# Patient Record
Sex: FEMALE | Race: WHITE | NOT HISPANIC OR LATINO | Employment: OTHER | URBAN - METROPOLITAN AREA
[De-identification: names, ages, dates, MRNs, and addresses within clinical notes are randomized per-mention and may not be internally consistent; named-entity substitution may affect disease eponyms.]

---

## 2017-01-31 ENCOUNTER — PATIENT MESSAGE (OUTPATIENT)
Dept: OBSTETRICS AND GYNECOLOGY | Facility: CLINIC | Age: 62
End: 2017-01-31

## 2017-02-17 ENCOUNTER — LAB VISIT (OUTPATIENT)
Dept: LAB | Facility: HOSPITAL | Age: 62
End: 2017-02-17
Attending: INTERNAL MEDICINE
Payer: COMMERCIAL

## 2017-02-17 DIAGNOSIS — R73.03 PREDIABETES: ICD-10-CM

## 2017-02-17 DIAGNOSIS — Z29.9 PREVENTIVE MEASURE: ICD-10-CM

## 2017-02-17 LAB
ALBUMIN SERPL BCP-MCNC: 3.9 G/DL
ALP SERPL-CCNC: 68 U/L
ALT SERPL W/O P-5'-P-CCNC: 28 U/L
ANION GAP SERPL CALC-SCNC: 7 MMOL/L
AST SERPL-CCNC: 22 U/L
BASOPHILS # BLD AUTO: 0.03 K/UL
BASOPHILS NFR BLD: 0.5 %
BILIRUB SERPL-MCNC: 0.4 MG/DL
BUN SERPL-MCNC: 17 MG/DL
CALCIUM SERPL-MCNC: 9.7 MG/DL
CHLORIDE SERPL-SCNC: 104 MMOL/L
CHOLEST/HDLC SERPL: 4.1 {RATIO}
CO2 SERPL-SCNC: 29 MMOL/L
CREAT SERPL-MCNC: 0.8 MG/DL
DIFFERENTIAL METHOD: NORMAL
EOSINOPHIL # BLD AUTO: 0.2 K/UL
EOSINOPHIL NFR BLD: 3.5 %
ERYTHROCYTE [DISTWIDTH] IN BLOOD BY AUTOMATED COUNT: 12.8 %
EST. GFR  (AFRICAN AMERICAN): >60 ML/MIN/1.73 M^2
EST. GFR  (NON AFRICAN AMERICAN): >60 ML/MIN/1.73 M^2
GLUCOSE SERPL-MCNC: 100 MG/DL
HCT VFR BLD AUTO: 41.6 %
HCV AB SERPL QL IA: NEGATIVE
HDL/CHOLESTEROL RATIO: 24.4 %
HDLC SERPL-MCNC: 193 MG/DL
HDLC SERPL-MCNC: 47 MG/DL
HGB BLD-MCNC: 13.6 G/DL
LDLC SERPL CALC-MCNC: 91.6 MG/DL
LYMPHOCYTES # BLD AUTO: 2.4 K/UL
LYMPHOCYTES NFR BLD: 40.3 %
MCH RBC QN AUTO: 30.6 PG
MCHC RBC AUTO-ENTMCNC: 32.7 %
MCV RBC AUTO: 94 FL
MONOCYTES # BLD AUTO: 0.3 K/UL
MONOCYTES NFR BLD: 5.2 %
NEUTROPHILS # BLD AUTO: 3 K/UL
NEUTROPHILS NFR BLD: 50.5 %
NONHDLC SERPL-MCNC: 146 MG/DL
PLATELET # BLD AUTO: 283 K/UL
PMV BLD AUTO: 10.5 FL
POTASSIUM SERPL-SCNC: 4.5 MMOL/L
PROT SERPL-MCNC: 7.4 G/DL
RBC # BLD AUTO: 4.45 M/UL
SODIUM SERPL-SCNC: 140 MMOL/L
TRIGL SERPL-MCNC: 272 MG/DL
TSH SERPL DL<=0.005 MIU/L-ACNC: 1.02 UIU/ML
WBC # BLD AUTO: 6.01 K/UL

## 2017-02-17 PROCEDURE — 86803 HEPATITIS C AB TEST: CPT

## 2017-02-17 PROCEDURE — 36415 COLL VENOUS BLD VENIPUNCTURE: CPT | Mod: PO

## 2017-02-17 PROCEDURE — 85025 COMPLETE CBC W/AUTO DIFF WBC: CPT

## 2017-02-17 PROCEDURE — 83036 HEMOGLOBIN GLYCOSYLATED A1C: CPT

## 2017-02-17 PROCEDURE — 80053 COMPREHEN METABOLIC PANEL: CPT

## 2017-02-17 PROCEDURE — 84443 ASSAY THYROID STIM HORMONE: CPT

## 2017-02-17 PROCEDURE — 80061 LIPID PANEL: CPT

## 2017-02-19 LAB
ESTIMATED AVG GLUCOSE: 123 MG/DL
HBA1C MFR BLD HPLC: 5.9 %

## 2017-02-21 ENCOUNTER — PATIENT MESSAGE (OUTPATIENT)
Dept: OBSTETRICS AND GYNECOLOGY | Facility: CLINIC | Age: 62
End: 2017-02-21

## 2017-02-21 ENCOUNTER — OFFICE VISIT (OUTPATIENT)
Dept: INTERNAL MEDICINE | Facility: CLINIC | Age: 62
End: 2017-02-21
Payer: COMMERCIAL

## 2017-02-21 VITALS
WEIGHT: 177.69 LBS | BODY MASS INDEX: 27.89 KG/M2 | TEMPERATURE: 97 F | OXYGEN SATURATION: 95 % | SYSTOLIC BLOOD PRESSURE: 130 MMHG | DIASTOLIC BLOOD PRESSURE: 86 MMHG | HEIGHT: 67 IN | HEART RATE: 102 BPM

## 2017-02-21 DIAGNOSIS — N95.9 MENOPAUSAL AND POSTMENOPAUSAL DISORDER: ICD-10-CM

## 2017-02-21 DIAGNOSIS — I10 ESSENTIAL HYPERTENSION: Primary | ICD-10-CM

## 2017-02-21 DIAGNOSIS — Z00.00 ENCOUNTER FOR PREVENTIVE HEALTH EXAMINATION: ICD-10-CM

## 2017-02-21 DIAGNOSIS — R73.03 PREDIABETES: ICD-10-CM

## 2017-02-21 DIAGNOSIS — I70.1 RAS (RENAL ARTERY STENOSIS): ICD-10-CM

## 2017-02-21 DIAGNOSIS — E78.00 PURE HYPERCHOLESTEROLEMIA: ICD-10-CM

## 2017-02-21 PROCEDURE — 3079F DIAST BP 80-89 MM HG: CPT | Mod: S$GLB,,, | Performed by: INTERNAL MEDICINE

## 2017-02-21 PROCEDURE — 90670 PCV13 VACCINE IM: CPT | Mod: S$GLB,,, | Performed by: INTERNAL MEDICINE

## 2017-02-21 PROCEDURE — 3075F SYST BP GE 130 - 139MM HG: CPT | Mod: S$GLB,,, | Performed by: INTERNAL MEDICINE

## 2017-02-21 PROCEDURE — 99999 PR PBB SHADOW E&M-EST. PATIENT-LVL III: CPT | Mod: PBBFAC,,, | Performed by: INTERNAL MEDICINE

## 2017-02-21 PROCEDURE — 99396 PREV VISIT EST AGE 40-64: CPT | Mod: 25,S$GLB,, | Performed by: INTERNAL MEDICINE

## 2017-02-21 PROCEDURE — 90471 IMMUNIZATION ADMIN: CPT | Mod: S$GLB,,, | Performed by: INTERNAL MEDICINE

## 2017-02-21 NOTE — MR AVS SNAPSHOT
[facility]  [address] [place] [address]  Phone: [phone]  Fax: [phone]                  Beatrice Regan   2017 3:40 PM   Office Visit    Description:  Female : 1955   Provider:  Lolis Huynh MD   Department:  [facility]           Reason for Visit     Follow-up           Diagnoses this Visit        Comments    Essential hypertension    -  Primary     Prediabetes         Pure hypercholesterolemia         CORNELL (renal artery stenosis)         Menopausal and postmenopausal disorder         Encounter for preventive health examination                To Do List           Future Appointments        Provider Department Dept Phone    2017 1:00 PM [id number] [phone]    3/2/2017 9:00 AM Sera Elder MD [facility] OB/ -[phone]    3/10/2017 9:45 AM Charlie Wade MD [facility] Ophthalmology [phone]    2017 10:05 AM LABORATORY, [facility] [phone]    2017 1:00 PM Lolis Huynh MD [facility] [phone]      Goals (5 Years of Data)     None      Follow-Up and Disposition     Return in about 3 months (around 2017).      [facility]     [facility] - 24/7 Assistance  Registered nurses in the [facility] provide clinical advisement, health education, appointment booking, and other advisory services.  Call for this free service at [phone].             Medications           Message regarding Medications     Verify the changes and/or additions to your medication regime listed below are the same as discussed with your clinician today.  If any of these changes or additions are incorrect, please notify your healthcare provider.        STOP taking these medications     albuterol 90 mcg/actuation inhaler Inhale 2 puffs into the lungs every 6 (six) hours as needed for Wheezing.    FOLIC  "ACID/MULTIVITS-MIN (MULTIVITAMIN FOR CHOLESTEROL ORAL) Take by mouth.    methylPREDNISolone (MEDROL DOSEPACK) 4 mg tablet use as directed           Verify that the below list of medications is an accurate representation of the medications you are currently taking.  If none reported, the list may be blank. If incorrect, please contact your healthcare provider. Carry this list with you in case of emergency.           Current Medications     aspirin (ASPIR-81) 81 MG EC tablet Take 1 tablet by mouth Daily.    atorvastatin (LIPITOR) 10 MG tablet Take 1 tablet (10 mg total) by mouth once daily.    cholecalciferol, vitamin D3, 1,000 unit capsule Take 1 capsule by mouth Daily.    fluoxetine (PROZAC) 20 MG capsule Take 1 capsule (20 mg total) by mouth once daily.    losartan (COZAAR) 50 MG tablet Take 1 tablet (50 mg total) by mouth once daily.    metformin (GLUCOPHAGE) 1000 MG tablet 1 po with breakfast, half po with lunch, 1 po with dinner.    multivitamin capsule Take 1 capsule by mouth once daily.    naproxen (NAPROSYN) 500 MG tablet Take 1 tablet (500 mg total) by mouth 2 (two) times daily as needed (pain). Monitor BP    omega-3 fatty acids (FISH OIL) 300 mg Cap Take by mouth.           Clinical Reference Information           Your Vitals Were     BP Pulse Temp Height Weight SpO2    130/86 (BP Location: Right arm) 102 97.2 °F (36.2 °C) (Tympanic) 5' 7" (1.702 m) 80.6 kg (177 lb 11.1 oz) 95%    BMI                27.83 kg/m2          Blood Pressure          Most Recent Value    BP  130/86      Allergies as of 2/21/2017     No Known Allergies      Immunizations Administered on Date of Encounter - 2/21/2017     Name Date Dose VIS Date Route    Pneumococcal Conjugate - 13 Valent  Incomplete 0.5 mL 11/5/2015 Intramuscular      Orders Placed During Today's Visit      Normal Orders This Visit    Pneumococcal Conjugate Vaccine (13 Valent) (IM)     Future Labs/Procedures Expected by Expires    Hemoglobin A1c  2/21/2017 4/22/2018 "    Mammo Digital Screening Bilat with CAD  2/21/2017 2/21/2018      Language Assistance Services     ATTENTION: Language assistance services are available, free of charge. Please call [phone].      ATENCIÓN: Si habdeonna saldana, tiene a edwards disposición servicios gratuitos de asistencia lingüística. Llame al [phone].     CHÚ Ý: N?u b?n nói Ti?ng Vi?t, có các d?ch v? h? tr? ngôn ng? mi?n phí dành cho b?n. G?i s? [phone].         [facility] complies with applicable Federal civil rights laws and does not discriminate on the basis of race, color, national origin, age, disability, or sex.

## 2017-02-22 ENCOUNTER — PATIENT MESSAGE (OUTPATIENT)
Dept: OBSTETRICS AND GYNECOLOGY | Facility: CLINIC | Age: 62
End: 2017-02-22

## 2017-02-27 ENCOUNTER — HOSPITAL ENCOUNTER (OUTPATIENT)
Dept: RADIOLOGY | Facility: HOSPITAL | Age: 62
Discharge: HOME | End: 2017-02-27
Attending: INTERNAL MEDICINE
Payer: COMMERCIAL

## 2017-02-27 DIAGNOSIS — Z00.00 ENCOUNTER FOR PREVENTIVE HEALTH EXAMINATION: ICD-10-CM

## 2017-02-27 PROCEDURE — 77067 SCR MAMMO BI INCL CAD: CPT | Mod: 26,,, | Performed by: RADIOLOGY

## 2017-02-27 PROCEDURE — 77067 SCR MAMMO BI INCL CAD: CPT | Mod: TC

## 2017-02-27 PROCEDURE — 77063 BREAST TOMOSYNTHESIS BI: CPT | Mod: 26,,, | Performed by: RADIOLOGY

## 2017-03-10 ENCOUNTER — OFFICE VISIT (OUTPATIENT)
Dept: OPHTHALMOLOGY | Facility: CLINIC | Age: 62
End: 2017-03-10
Payer: COMMERCIAL

## 2017-03-10 DIAGNOSIS — Z96.1 PSEUDOPHAKIA OF RIGHT EYE: ICD-10-CM

## 2017-03-10 DIAGNOSIS — R73.03 PREDIABETES: ICD-10-CM

## 2017-03-10 DIAGNOSIS — H43.813 PVD (POSTERIOR VITREOUS DETACHMENT), BILATERAL: ICD-10-CM

## 2017-03-10 DIAGNOSIS — H25.12 NUCLEAR SCLEROSIS, LEFT: Primary | ICD-10-CM

## 2017-03-10 PROCEDURE — 92014 COMPRE OPH EXAM EST PT 1/>: CPT | Mod: S$GLB,,, | Performed by: OPHTHALMOLOGY

## 2017-03-10 PROCEDURE — 99999 PR PBB SHADOW E&M-EST. PATIENT-LVL II: CPT | Mod: PBBFAC,,, | Performed by: OPHTHALMOLOGY

## 2017-03-10 NOTE — MR AVS SNAPSHOT
[facility]  [address] [place] [address]  Phone: [phone]  Fax: [phone]                  Beatrice Regan   3/10/2017 9:45 AM   Office Visit    Description:  Female : 1955   Provider:  Charlie Wade MD   Department:  [facility] - Ophthalmology           Reason for Visit     Eye Exam     Spots and/or Floaters           Diagnoses this Visit        Comments    Nuclear sclerosis, left    -  Primary     Pseudophakia of right eye         Prediabetes         PVD (posterior vitreous detachment), bilateral                To Do List           Future Appointments        Provider Department Dept Phone    3/30/2017 11:15 AM Sera Elder MD [facility] OB/ -[phone]    2017 10:05 AM LAB[facility]ATORY, [facility] [phone]    2017 1:00 PM Lolis Huynh MD [facility] [phone]      Goals (5 Years of Data)     None      Follow-Up and Disposition     Return in about 1 year (around 3/10/2018).      [facility]     [facility] Care [facility] - 24/7 Assistance  Registered nurses in the [facility] provide clinical advisement, health education, appointment booking, and other advisory services.  Call for this free service at [phone].             Medications           Message regarding Medications     Verify the changes and/or additions to your medication regime listed below are the same as discussed with your clinician today.  If any of these changes or additions are incorrect, please notify your healthcare provider.             Verify that the below list of medications is an accurate representation of the medications you are currently taking.  If none reported, the list may be blank. If incorrect, please contact your healthcare provider. Carry this list with you in case of emergency.           Current Medications     aspirin (ASPIR-81) 81 MG EC tablet Take 1 tablet by mouth Daily.    atorvastatin  (LIPITOR) 10 MG tablet Take 1 tablet (10 mg total) by mouth once daily.    cholecalciferol, vitamin D3, 1,000 unit capsule Take 1 capsule by mouth Daily.    fluoxetine (PROZAC) 20 MG capsule Take 1 capsule (20 mg total) by mouth once daily.    losartan (COZAAR) 50 MG tablet Take 1 tablet (50 mg total) by mouth once daily.    metformin (GLUCOPHAGE) 1000 MG tablet 1 po with breakfast, half po with lunch, 1 po with dinner.    multivitamin capsule Take 1 capsule by mouth once daily.    naproxen (NAPROSYN) 500 MG tablet Take 1 tablet (500 mg total) by mouth 2 (two) times daily as needed (pain). Monitor BP    omega-3 fatty acids (FISH OIL) 300 mg Cap Take by mouth.           Clinical Reference Information           Allergies as of 3/10/2017     No Known Allergies      Immunizations Administered on Date of Encounter - 3/10/2017     None      Language Assistance Services     ATTENTION: Language assistance services are available, free of charge. Please call [phone].      ATENCIÓN: Si kari saldana, tiene a edwards disposición servicios gratuitos de asistencia lingüística. Llame al [phone].     ELY Ý: N?u b?n nói Ti?ng Vi?t, có các d?ch v? h? tr? ngôn ng? mi?n phí dành cho b?n. G?i s? [phone].         [facility] complies with applicable Federal civil rights laws and does not discriminate on the basis of race, color, national origin, age, disability, or sex.

## 2017-03-10 NOTE — PROGRESS NOTES
SUBJECTIVE:   Beatrice Regan is a 62 y.o. female   Uncorrected distance visual acuity was 20/20 -1 in the right eye and 20/30 in the left eye.   Chief Complaint   Patient presents with    Eye Exam     no complaints.     Spots and/or Floaters     floater in od. no light flashes. no curtain or veil         HPI:  HPI     Eye Exam    Additional comments: no complaints.            Spots and/or Floaters    Additional comments: floater in od. no light flashes. no curtain or veil              Comments   Pt only wears glasses sometimes for reading    1. S/p bilateral blepharoplasty for Dermatochalasis  2. Restor IOL OD 2/16/11 with Yag 5/19/11  3. NSC OS       Last edited by Yulissa Aguilar MA on 3/10/2017  9:53 AM. (History)        Assessment /Plan :  1. Nuclear sclerosis, left Patient does reports mild visual decline from incipient cataractous changes, but not sufficient to affect activities of daily living. I recommend monitoring visual status and follow up when visual symptoms worsen.   2. Pseudophakia of right eye doing well   3. Prediabetes No diabetic retinopathy at this time. Reviewed diabetic eye precautions including avoiding tobacco use,  Good glucose control, and importance of regular follow up.    4. PVD (posterior vitreous detachment), bilateral Patient seen and evaluated for PVD OU. No tears or breaks were seen after careful retinal evaluation. Discussed retinal detachment signs and symptoms including flashes of lights, floaters, perceived curtains or veils. Advised to patient to monitor visual status including increase in flashes and floaters, or the development of visual field changes including curtain and /or veils. Advised patient to RTC urgently if these symptoms occur. Explained the need for follow up exams to the patient even if there are no changes in the symptoms.           RTC in 1 year or prn any changes

## 2017-03-30 ENCOUNTER — OFFICE VISIT (OUTPATIENT)
Dept: OBSTETRICS AND GYNECOLOGY | Facility: CLINIC | Age: 62
End: 2017-03-30
Payer: COMMERCIAL

## 2017-03-30 VITALS — HEIGHT: 67 IN | WEIGHT: 175.13 LBS | BODY MASS INDEX: 27.49 KG/M2

## 2017-03-30 DIAGNOSIS — Z01.419 ENCOUNTER FOR GYNECOLOGICAL EXAMINATION WITHOUT ABNORMAL FINDING: Primary | ICD-10-CM

## 2017-03-30 PROCEDURE — 99396 PREV VISIT EST AGE 40-64: CPT | Mod: S$GLB,,, | Performed by: OBSTETRICS & GYNECOLOGY

## 2017-03-30 PROCEDURE — 88142 CYTOPATH C/V THIN LAYER: CPT

## 2017-03-30 PROCEDURE — 99999 PR PBB SHADOW E&M-EST. PATIENT-LVL III: CPT | Mod: PBBFAC,,, | Performed by: OBSTETRICS & GYNECOLOGY

## 2017-03-30 NOTE — PROGRESS NOTES
CC: Well woman exam    Beatrice Regan is a 62 y.o. female  presents for a well woman exam.  LMP: No LMP recorded. Patient is postmenopausal..  No issues, problems, or complaints.    Past Medical History:   Diagnosis Date    Cataract     OS    Hyperlipidemia     Hypertension     with white coat effect    Menopausal and postmenopausal disorder     Metabolic syndrome     CORNELL (renal artery stenosis)      Past Surgical History:   Procedure Laterality Date    APPENDECTOMY      lap    BLEPHAROPLASTY OU      breast reduction      CATARACT EXTRACTION BILATERAL W/ ANTERIOR VITRECTOMY      CATARACT EXTRACTION W/  INTRAOCULAR LENS IMPLANT  OD 11    NE EXPLORATORY OF ABDOMEN      YAG OD       Social History     Social History    Marital status:      Spouse name: N/A    Number of children: N/A    Years of education: N/A     Social History Main Topics    Smoking status: Never Smoker    Smokeless tobacco: Never Used    Alcohol use No    Drug use: No    Sexual activity: Yes     Partners: Male     Birth control/ protection: Post-menopausal     Other Topics Concern    None     Social History Narrative     Family History   Problem Relation Age of Onset    Diabetes Father     Hypertension Father     Hypertension Brother     Melanoma Paternal Uncle     Melanoma Son     Melanoma Paternal Uncle     Breast cancer Neg Hx     Colon cancer Neg Hx     Ovarian cancer Neg Hx     Uterine cancer Neg Hx     Psoriasis Neg Hx     Lupus Neg Hx     Eczema Neg Hx     Acne Neg Hx      OB History      Para Term  AB TAB SAB Ectopic Multiple Living    2 2 2       2          Current Outpatient Prescriptions:     aspirin (ASPIR-81) 81 MG EC tablet, Take 1 tablet by mouth Daily., Disp: , Rfl:     atorvastatin (LIPITOR) 10 MG tablet, Take 1 tablet (10 mg total) by mouth once daily., Disp: 90 tablet, Rfl: 3    cholecalciferol, vitamin D3, 1,000 unit capsule, Take 1 capsule by mouth  "Daily., Disp: , Rfl:     fluoxetine (PROZAC) 20 MG capsule, Take 1 capsule (20 mg total) by mouth once daily., Disp: 90 capsule, Rfl: 3    losartan (COZAAR) 50 MG tablet, Take 1 tablet (50 mg total) by mouth once daily., Disp: 90 tablet, Rfl: 11    metformin (GLUCOPHAGE) 1000 MG tablet, 1 po with breakfast, half po with lunch, 1 po with dinner., Disp: 225 tablet, Rfl: 3    multivitamin capsule, Take 1 capsule by mouth once daily., Disp: , Rfl:     naproxen (NAPROSYN) 500 MG tablet, Take 1 tablet (500 mg total) by mouth 2 (two) times daily as needed (pain). Monitor BP, Disp: 30 tablet, Rfl: 2    omega-3 fatty acids (FISH OIL) 300 mg Cap, Take by mouth., Disp: , Rfl:     GYNECOLOGY HISTORY:  No abnormal pap/std    DATA REVIEWED:  Last pap: normal Date: 2014  Last mmg: normal Date: 2017  Last colonoscopy: hemorrhoids Date: 2011, repeat 10y  Last DEXA: normal Date: 2012, repeat 5y    Ht 5' 7" (1.702 m)  Wt 79.5 kg (175 lb 2.5 oz)  BMI 27.43 kg/m2    ROS:  GENERAL: Denies weight gain or weight loss. Feeling well overall.   SKIN: Denies rash or lesions.   HEAD: Denies head injury or headache.   NODES: Denies enlarged lymph nodes.   CHEST: Denies chest pain or shortness of breath.   CARDIOVASCULAR: Denies palpitations or left sided chest pain.   ABDOMEN: No abdominal pain, constipation, diarrhea, nausea, vomiting or rectal bleeding.   URINARY: No frequency, dysuria, hematuria, or burning on urination.  REPRODUCTIVE: See HPI.   BREASTS: The patient denies pain, lumps, or nipple discharge.   HEMATOLOGIC: No easy bruisability or excessive bleeding.   MUSCULOSKELETAL: Denies joint pain or swelling.   NEUROLOGIC: Denies syncope or weakness.   PSYCHIATRIC: Denies depression, anxiety or mood swings.    PHYSICAL EXAM:    APPEARANCE: Well nourished, well developed, in no acute distress.  AFFECT: WNL, alert and oriented x 3  SKIN: No acne or hirsutism  NECK: Neck symmetric without masses or thyromegaly  NODES: No inguinal, " cervical, axillary, or femoral lymph node enlargement  CHEST: Good respiratory effect  ABDOMEN: Soft.  No tenderness or masses.  No hepatosplenomegaly.  No hernias.  BREASTS: Symmetrical, no skin changes or visible lesions.  No palpable masses, nipple discharge bilaterally.  PELVIC: Normal external genitalia without lesions.  Normal hair distribution.  Adequate perineal body, normal urethral meatus.  Vagina atrophic without lesions or discharge.  Cervix pink, without lesions, discharge or tenderness.  No significant cystocele or rectocele.  Bimanual exam shows uterus to be normal size, regular, mobile and nontender.  Adnexa without masses or tenderness.   EXTREMITIES: No edema.    Encounter for gynecological examination without abnormal finding  -     Liquid-based pap smear, screening    Patient was counseled today on A.C.S. Pap guidelines (q3) and recommendations for yearly pelvic exams, yearly mammograms starting age 40, and clinical breast exams; to see her PCP for other health maintenance.

## 2017-04-06 ENCOUNTER — PATIENT MESSAGE (OUTPATIENT)
Dept: OBSTETRICS AND GYNECOLOGY | Facility: CLINIC | Age: 62
End: 2017-04-06

## 2017-05-23 ENCOUNTER — LAB VISIT (OUTPATIENT)
Dept: LAB | Facility: HOSPITAL | Age: 62
End: 2017-05-23
Attending: INTERNAL MEDICINE
Payer: COMMERCIAL

## 2017-05-23 ENCOUNTER — PATIENT MESSAGE (OUTPATIENT)
Dept: INTERNAL MEDICINE | Facility: CLINIC | Age: 62
End: 2017-05-23

## 2017-05-23 DIAGNOSIS — R73.03 PREDIABETES: ICD-10-CM

## 2017-05-23 PROCEDURE — 83036 HEMOGLOBIN GLYCOSYLATED A1C: CPT

## 2017-05-23 PROCEDURE — 36415 COLL VENOUS BLD VENIPUNCTURE: CPT | Mod: PO

## 2017-05-24 LAB
ESTIMATED AVG GLUCOSE: 126 MG/DL
HBA1C MFR BLD HPLC: 6 %

## 2017-06-11 ENCOUNTER — PATIENT MESSAGE (OUTPATIENT)
Dept: INTERNAL MEDICINE | Facility: CLINIC | Age: 62
End: 2017-06-11

## 2017-06-13 ENCOUNTER — OFFICE VISIT (OUTPATIENT)
Dept: INTERNAL MEDICINE | Facility: CLINIC | Age: 62
End: 2017-06-13
Payer: COMMERCIAL

## 2017-06-13 VITALS
TEMPERATURE: 97 F | HEART RATE: 85 BPM | OXYGEN SATURATION: 98 % | WEIGHT: 172.38 LBS | SYSTOLIC BLOOD PRESSURE: 132 MMHG | BODY MASS INDEX: 27 KG/M2 | DIASTOLIC BLOOD PRESSURE: 80 MMHG

## 2017-06-13 DIAGNOSIS — N95.9 MENOPAUSAL AND POSTMENOPAUSAL DISORDER: ICD-10-CM

## 2017-06-13 DIAGNOSIS — I10 ESSENTIAL HYPERTENSION: ICD-10-CM

## 2017-06-13 DIAGNOSIS — E78.00 PURE HYPERCHOLESTEROLEMIA: Primary | ICD-10-CM

## 2017-06-13 DIAGNOSIS — R73.03 PREDIABETES: ICD-10-CM

## 2017-06-13 PROBLEM — F32.A ANXIETY AND DEPRESSION: Status: ACTIVE | Noted: 2017-06-13

## 2017-06-13 PROBLEM — F41.9 ANXIETY AND DEPRESSION: Status: ACTIVE | Noted: 2017-06-13

## 2017-06-13 PROCEDURE — 99999 PR PBB SHADOW E&M-EST. PATIENT-LVL III: CPT | Mod: PBBFAC,,, | Performed by: INTERNAL MEDICINE

## 2017-06-13 PROCEDURE — 99204 OFFICE O/P NEW MOD 45 MIN: CPT | Mod: S$GLB,,, | Performed by: INTERNAL MEDICINE

## 2017-06-13 RX ORDER — FLUOXETINE HYDROCHLORIDE 20 MG/1
40 CAPSULE ORAL DAILY
Qty: 180 CAPSULE | Refills: 3 | Status: SHIPPED | OUTPATIENT
Start: 2017-06-13 | End: 2017-09-19 | Stop reason: SDUPTHER

## 2017-06-13 RX ORDER — VITAMIN E 268 MG
400 CAPSULE ORAL DAILY
Qty: 100 CAPSULE | Refills: 1 | Status: SHIPPED | OUTPATIENT
Start: 2017-06-13

## 2017-06-13 NOTE — PROGRESS NOTES
Subjective:       Patient ID: Beatrice Regan is a 62 y.o. female.    Chief Complaint: Follow-up    Here for follow up of medical problems.  Was exercising regularly until 1 month ago, has been tired and not exercising.  Taking metformin regularly.  Had wedding 10 days ago, with out of town guests.  BMs normal.  More hot flashes lately.  Feels overwhelmed and angry at .  Easily tearful.  No f/c/cough.  No cp/sob/palp.    Updated/annual due 2/17:  HM: 2/17 [id number], 4/11 TDaP, 12/15 zostavax, 4/12 BMD rep 5y, 4/11 Cscope rep 10y, 3/17 MMG/Gyn, 2/17 HCV neg, 3/17 Eye Dr. Wade.      Review of Systems   Constitutional: Negative for chills, diaphoresis and fever.   Respiratory: Negative for cough and shortness of breath.    Cardiovascular: Negative for chest pain, palpitations and leg swelling.   Gastrointestinal: Negative for blood in stool, constipation, diarrhea, nausea and vomiting.   Genitourinary: Negative for dysuria, frequency and hematuria.   Psychiatric/Behavioral: The patient is not nervous/anxious.        Objective:   /80 (BP Location: Right arm, Patient Position: Sitting, BP Method: Manual)   Pulse 85   Temp 97.2 °F (36.2 °C) (Tympanic)   Wt 78.2 kg (172 lb 6.4 oz)   SpO2 98%   BMI 27.00 kg/m²     Physical Exam   Constitutional: She is oriented to person, place, and time. She appears well-developed.   HENT:   Mouth/Throat: Oropharynx is clear and moist.   Neck: Neck supple. Carotid bruit is not present. No thyroid mass present.   Cardiovascular: Normal rate, regular rhythm and intact distal pulses.  Exam reveals no gallop and no friction rub.    No murmur heard.  Pulmonary/Chest: Effort normal and breath sounds normal. She has no wheezes. She has no rales.   Abdominal: Soft. Bowel sounds are normal. She exhibits no mass. There is no hepatosplenomegaly. There is no tenderness.   Musculoskeletal: She exhibits no edema.   Lymphadenopathy:     She has no cervical adenopathy.    Neurological: She is alert and oriented to person, place, and time.   Psychiatric: She exhibits a depressed mood.     Results for BROOKS VAUGHN (MRN [id number]) as of 6/13/2017 15:27   Ref. Range 2/17/2017 08:11 3/1/2017 20:28 3/30/2017 11:29 5/23/2017 10:55   Hemoglobin A1C Latest Ref Range: 4.5 - 6.2 % 5.9   6.0   Estimated Avg Glucose Latest Ref Range: 68 - 131 mg/dL 123   126     Assessment:       1. Pure hypercholesterolemia    2. Prediabetes    3. Essential hypertension    4. Menopausal and postmenopausal disorder        Plan:       Brooks was seen today for follow-up.    Diagnoses and all orders for this visit:    Pure hypercholesterolemia- on statin.    Prediabetes- cont rx.  Restart exercise.  Recheck 3mo.  -     Hemoglobin A1c; Future    Essential hypertension- stable on rx.     sx, uncontrolled- incr to 40mg.  Start vit E 400u daily.

## 2017-09-12 ENCOUNTER — LAB VISIT (OUTPATIENT)
Dept: LAB | Facility: HOSPITAL | Age: 62
End: 2017-09-12
Attending: INTERNAL MEDICINE
Payer: COMMERCIAL

## 2017-09-12 DIAGNOSIS — R73.03 PREDIABETES: ICD-10-CM

## 2017-09-12 LAB
ESTIMATED AVG GLUCOSE: 114 MG/DL
HBA1C MFR BLD HPLC: 5.6 %

## 2017-09-12 PROCEDURE — 36415 COLL VENOUS BLD VENIPUNCTURE: CPT | Mod: PO

## 2017-09-12 PROCEDURE — 83036 HEMOGLOBIN GLYCOSYLATED A1C: CPT

## 2017-09-19 ENCOUNTER — OFFICE VISIT (OUTPATIENT)
Dept: INTERNAL MEDICINE | Facility: CLINIC | Age: 62
End: 2017-09-19
Payer: COMMERCIAL

## 2017-09-19 VITALS
DIASTOLIC BLOOD PRESSURE: 90 MMHG | OXYGEN SATURATION: 97 % | WEIGHT: 173.5 LBS | HEIGHT: 67 IN | SYSTOLIC BLOOD PRESSURE: 130 MMHG | BODY MASS INDEX: 27.23 KG/M2 | TEMPERATURE: 97 F | HEART RATE: 98 BPM

## 2017-09-19 DIAGNOSIS — Z29.9 PREVENTIVE MEASURE: ICD-10-CM

## 2017-09-19 DIAGNOSIS — N95.9 MENOPAUSAL AND POSTMENOPAUSAL DISORDER: ICD-10-CM

## 2017-09-19 DIAGNOSIS — I10 ESSENTIAL HYPERTENSION: ICD-10-CM

## 2017-09-19 DIAGNOSIS — E78.5 HYPERLIPIDEMIA: ICD-10-CM

## 2017-09-19 DIAGNOSIS — I10 ESSENTIAL HYPERTENSION: Primary | ICD-10-CM

## 2017-09-19 DIAGNOSIS — R73.03 PREDIABETES: ICD-10-CM

## 2017-09-19 DIAGNOSIS — M76.60 ACHILLES TENDINITIS, UNSPECIFIED LATERALITY: ICD-10-CM

## 2017-09-19 DIAGNOSIS — E78.00 PURE HYPERCHOLESTEROLEMIA: ICD-10-CM

## 2017-09-19 DIAGNOSIS — M25.50 ARTHRALGIA, UNSPECIFIED JOINT: ICD-10-CM

## 2017-09-19 PROCEDURE — 3080F DIAST BP >= 90 MM HG: CPT | Mod: S$GLB,,, | Performed by: INTERNAL MEDICINE

## 2017-09-19 PROCEDURE — 99214 OFFICE O/P EST MOD 30 MIN: CPT | Mod: S$GLB,,, | Performed by: INTERNAL MEDICINE

## 2017-09-19 PROCEDURE — 3075F SYST BP GE 130 - 139MM HG: CPT | Mod: S$GLB,,, | Performed by: INTERNAL MEDICINE

## 2017-09-19 PROCEDURE — 3008F BODY MASS INDEX DOCD: CPT | Mod: S$GLB,,, | Performed by: INTERNAL MEDICINE

## 2017-09-19 PROCEDURE — 99999 PR PBB SHADOW E&M-EST. PATIENT-LVL III: CPT | Mod: PBBFAC,,, | Performed by: INTERNAL MEDICINE

## 2017-09-19 RX ORDER — VENLAFAXINE HYDROCHLORIDE 37.5 MG/1
37.5 CAPSULE, EXTENDED RELEASE ORAL DAILY
Qty: 90 CAPSULE | Refills: 3 | Status: SHIPPED | OUTPATIENT
Start: 2017-09-19 | End: 2018-02-06

## 2017-09-19 RX ORDER — FLUOXETINE HYDROCHLORIDE 20 MG/1
20 CAPSULE ORAL DAILY
Qty: 90 CAPSULE | Refills: 3
Start: 2017-09-19 | End: 2017-09-19

## 2017-09-19 RX ORDER — LOSARTAN POTASSIUM 50 MG/1
TABLET ORAL
Qty: 90 TABLET | Refills: 3 | Status: SHIPPED | OUTPATIENT
Start: 2017-09-19 | End: 2018-09-18 | Stop reason: SDUPTHER

## 2017-09-19 RX ORDER — ATORVASTATIN CALCIUM 10 MG/1
TABLET, FILM COATED ORAL
Qty: 90 TABLET | Refills: 3 | Status: SHIPPED | OUTPATIENT
Start: 2017-09-19 | End: 2018-09-18 | Stop reason: SDUPTHER

## 2017-09-19 RX ORDER — VENLAFAXINE HYDROCHLORIDE 37.5 MG/1
75 CAPSULE, EXTENDED RELEASE ORAL DAILY
Qty: 90 CAPSULE | Refills: 3 | Status: SHIPPED | OUTPATIENT
Start: 2017-09-19 | End: 2017-09-19 | Stop reason: SDUPTHER

## 2017-09-19 RX ORDER — NAPROXEN 500 MG/1
500 TABLET ORAL 2 TIMES DAILY PRN
Qty: 30 TABLET | Refills: 2 | Status: SHIPPED | OUTPATIENT
Start: 2017-09-19 | End: 2018-02-09

## 2017-09-19 NOTE — TELEPHONE ENCOUNTER
----- Message from Irene Gibbs sent at 9/19/2017  2:44 PM CDT -----  Contact: Sharif,  Wants to change dosage of Effexor, please call them back at [phone]. Thank you

## 2017-09-19 NOTE — PROGRESS NOTES
"Subjective:       Patient ID: Beatrice Regan is a 62 y.o. female.    Chief Complaint: Follow-up    Here for follow up of medical problems.  More active lately, compliant with metformin.  Has an achilles tendon problem which restricted more exercise.  Higher dose prozac caused drowsiness, so returned to 20mg daily.  Thinks  sx are uncontrolled, or she is just feeling overall malaise more than in the past.  No f/c/sw/cough.  BMs normal.  Not checking BPs lately.  Taking vit D.  No cp/sob/palp.    Updated/annual due 2/18:  HM: 2/17 [id number], 4/11 TDaP, 12/15 zostavax, 4/12 BMD rep 5y/had another one outside 2015 which she said was normal, 4/11 Cscope rep 10y, 3/17 MMG/Gyn Dr. Elder, 2/17 HCV neg, 3/17 Eye Dr. Wade.          Review of Systems   Constitutional: Negative for activity change, chills, diaphoresis, fever and unexpected weight change.   HENT: Negative for hearing loss, rhinorrhea and trouble swallowing.    Eyes: Negative for discharge and visual disturbance.   Respiratory: Negative for cough, chest tightness, shortness of breath and wheezing.    Cardiovascular: Negative for chest pain, palpitations and leg swelling.   Gastrointestinal: Negative for blood in stool, constipation, diarrhea, nausea and vomiting.   Endocrine: Negative for polydipsia and polyuria.   Genitourinary: Negative for difficulty urinating, dysuria, frequency, hematuria and menstrual problem.   Musculoskeletal: Negative for arthralgias, joint swelling and neck pain.   Neurological: Negative for weakness and headaches.   Psychiatric/Behavioral: Negative for confusion and dysphoric mood. The patient is not nervous/anxious.        Objective:   BP (!) 130/90 (BP Location: Right arm)   Pulse 98   Temp 97.4 °F (36.3 °C) (Tympanic)   Ht 5' 7" (1.702 m)   Wt 78.7 kg (173 lb 8 oz)   SpO2 97%   BMI 27.17 kg/m²     Physical Exam   Constitutional: She is oriented to person, place, and time. She appears well-developed.   HENT: "   Mouth/Throat: Oropharynx is clear and moist.   Neck: Neck supple. Carotid bruit is not present. No thyroid mass present.   Cardiovascular: Normal rate, regular rhythm and intact distal pulses.  Exam reveals no gallop and no friction rub.    No murmur heard.  Pulmonary/Chest: Effort normal and breath sounds normal. She has no wheezes. She has no rales.   Abdominal: Soft. Bowel sounds are normal. She exhibits no mass. There is no hepatosplenomegaly. There is no tenderness.   Musculoskeletal: She exhibits no edema.   Lymphadenopathy:     She has no cervical adenopathy.   Neurological: She is alert and oriented to person, place, and time.   Psychiatric: She has a normal mood and affect.     Results for BROOKS VAUGHN (MRN [id number]) as of 9/19/2017 13:53   Ref. Range 5/23/2017 10:55 9/12/2017 10:07   Hemoglobin A1C Latest Ref Range: 4.0 - 5.6 % 6.0 5.6   Estimated Avg Glucose Latest Ref Range: 68 - 131 mg/dL 126 114     Assessment:       1. Essential hypertension    2. Prediabetes    3. Pure hypercholesterolemia    4. Menopausal and postmenopausal disorder    5. Arthralgia, unspecified joint    6. Achilles tendinitis, unspecified laterality    7. Preventive measure        Plan:       Brooks was seen today for follow-up.    Diagnoses and all orders for this visit:    Essential hypertension- monitor and sent to me in 2 weeks.    Prediabetes- doing better.  -     Hemoglobin A1c; Future    Pure hypercholesterolemia- recheck 5mo.    Menopausal and postmenopausal disorder- cont vit E, change to low dose effexor, discussed poss local ERT.  -     fluoxetine (PROZAC) 20 MG capsule; Take 1 capsule (20 mg total) by mouth once daily.    Achilles tendinitis, unspecified laterality  -     naproxen (NAPROSYN) 500 MG tablet; Take 1 tablet (500 mg total) by mouth 2 (two) times daily as needed (pain). Monitor BP    Preventive measure- due 2/18:  -     Comprehensive metabolic panel; Future  -     Lipid panel; Future  -     DXA Bone  Density Spine And Hip; Future  -     CBC auto differential; Future  -     TSH; Future  -     Vitamin D; Future  -     Hemoglobin A1c; Future

## 2018-02-01 ENCOUNTER — PATIENT MESSAGE (OUTPATIENT)
Dept: INTERNAL MEDICINE | Facility: CLINIC | Age: 63
End: 2018-02-01

## 2018-02-02 ENCOUNTER — HOSPITAL ENCOUNTER (EMERGENCY)
Facility: HOSPITAL | Age: 63
Discharge: HOME | End: 2018-02-02
Attending: EMERGENCY MEDICINE
Payer: COMMERCIAL

## 2018-02-02 VITALS
WEIGHT: 168 LBS | HEIGHT: 67 IN | SYSTOLIC BLOOD PRESSURE: 149 MMHG | HEART RATE: 91 BPM | BODY MASS INDEX: 26.37 KG/M2 | OXYGEN SATURATION: 95 % | DIASTOLIC BLOOD PRESSURE: 76 MMHG | RESPIRATION RATE: 18 BRPM | TEMPERATURE: 99 F

## 2018-02-02 DIAGNOSIS — R11.2 NAUSEA & VOMITING: ICD-10-CM

## 2018-02-02 LAB
ALBUMIN SERPL BCP-MCNC: 4.6 G/DL
ALP SERPL-CCNC: 76 U/L
ALT SERPL W/O P-5'-P-CCNC: 42 U/L
AMORPH CRY URNS QL MICRO: ABNORMAL
ANION GAP SERPL CALC-SCNC: 12 MMOL/L
AST SERPL-CCNC: 30 U/L
BACTERIA #/AREA URNS HPF: ABNORMAL /HPF
BASOPHILS # BLD AUTO: 0.02 K/UL
BASOPHILS NFR BLD: 0.2 %
BILIRUB SERPL-MCNC: 0.3 MG/DL
BILIRUB UR QL STRIP: NEGATIVE
BUN SERPL-MCNC: 20 MG/DL
CALCIUM SERPL-MCNC: 10.2 MG/DL
CHLORIDE SERPL-SCNC: 103 MMOL/L
CLARITY UR: CLEAR
CO2 SERPL-SCNC: 23 MMOL/L
COLOR UR: YELLOW
CREAT SERPL-MCNC: 0.7 MG/DL
DIFFERENTIAL METHOD: ABNORMAL
EOSINOPHIL # BLD AUTO: 0 K/UL
EOSINOPHIL NFR BLD: 0 %
ERYTHROCYTE [DISTWIDTH] IN BLOOD BY AUTOMATED COUNT: 12.8 %
EST. GFR  (AFRICAN AMERICAN): >60 ML/MIN/1.73 M^2
EST. GFR  (NON AFRICAN AMERICAN): >60 ML/MIN/1.73 M^2
GLUCOSE SERPL-MCNC: 129 MG/DL
GLUCOSE UR QL STRIP: NEGATIVE
HCT VFR BLD AUTO: 43.8 %
HGB BLD-MCNC: 14.9 G/DL
HGB UR QL STRIP: ABNORMAL
KETONES UR QL STRIP: ABNORMAL
LEUKOCYTE ESTERASE UR QL STRIP: ABNORMAL
LYMPHOCYTES # BLD AUTO: 1.6 K/UL
LYMPHOCYTES NFR BLD: 14.9 %
MCH RBC QN AUTO: 30.7 PG
MCHC RBC AUTO-ENTMCNC: 34 G/DL
MCV RBC AUTO: 90 FL
MICROSCOPIC COMMENT: ABNORMAL
MONOCYTES # BLD AUTO: 0.3 K/UL
MONOCYTES NFR BLD: 3 %
NEUTROPHILS # BLD AUTO: 8.6 K/UL
NEUTROPHILS NFR BLD: 81.9 %
NITRITE UR QL STRIP: NEGATIVE
PH UR STRIP: 7 [PH] (ref 5–8)
PLATELET # BLD AUTO: 296 K/UL
PMV BLD AUTO: 9.8 FL
POTASSIUM SERPL-SCNC: 4.2 MMOL/L
PROT SERPL-MCNC: 8.3 G/DL
PROT UR QL STRIP: ABNORMAL
RBC # BLD AUTO: 4.85 M/UL
RBC #/AREA URNS HPF: 10 /HPF (ref 0–4)
SODIUM SERPL-SCNC: 138 MMOL/L
SP GR UR STRIP: 1.01 (ref 1–1.03)
TROPONIN I SERPL DL<=0.01 NG/ML-MCNC: 0.02 NG/ML
URN SPEC COLLECT METH UR: ABNORMAL
UROBILINOGEN UR STRIP-ACNC: NEGATIVE EU/DL
WBC # BLD AUTO: 10.45 K/UL
WBC #/AREA URNS HPF: 10 /HPF (ref 0–5)

## 2018-02-02 PROCEDURE — 99284 EMERGENCY DEPT VISIT MOD MDM: CPT | Mod: 25

## 2018-02-02 PROCEDURE — 25000003 PHARM REV CODE 250: Performed by: EMERGENCY MEDICINE

## 2018-02-02 PROCEDURE — 84484 ASSAY OF TROPONIN QUANT: CPT

## 2018-02-02 PROCEDURE — 93005 ELECTROCARDIOGRAM TRACING: CPT | Mod: 59

## 2018-02-02 PROCEDURE — 96375 TX/PRO/DX INJ NEW DRUG ADDON: CPT

## 2018-02-02 PROCEDURE — 93010 ELECTROCARDIOGRAM REPORT: CPT | Mod: ,,, | Performed by: INTERNAL MEDICINE

## 2018-02-02 PROCEDURE — 63600175 PHARM REV CODE 636 W HCPCS: Performed by: EMERGENCY MEDICINE

## 2018-02-02 PROCEDURE — 80053 COMPREHEN METABOLIC PANEL: CPT

## 2018-02-02 PROCEDURE — 96361 HYDRATE IV INFUSION ADD-ON: CPT

## 2018-02-02 PROCEDURE — 96365 THER/PROPH/DIAG IV INF INIT: CPT

## 2018-02-02 PROCEDURE — 85025 COMPLETE CBC W/AUTO DIFF WBC: CPT

## 2018-02-02 PROCEDURE — 81000 URINALYSIS NONAUTO W/SCOPE: CPT

## 2018-02-02 RX ORDER — PROMETHAZINE HYDROCHLORIDE 25 MG/1
25 SUPPOSITORY RECTAL EVERY 6 HOURS PRN
Qty: 10 SUPPOSITORY | Refills: 0 | Status: SHIPPED | OUTPATIENT
Start: 2018-02-02

## 2018-02-02 RX ORDER — ONDANSETRON 2 MG/ML
4 INJECTION INTRAMUSCULAR; INTRAVENOUS
Status: COMPLETED | OUTPATIENT
Start: 2018-02-02 | End: 2018-02-02

## 2018-02-02 RX ORDER — PROMETHAZINE HYDROCHLORIDE 25 MG/1
12.5 TABLET ORAL EVERY 6 HOURS PRN
Qty: 12 TABLET | Refills: 0 | Status: SHIPPED | OUTPATIENT
Start: 2018-02-02 | End: 2018-02-02 | Stop reason: ALTCHOICE

## 2018-02-02 RX ADMIN — PROMETHAZINE HYDROCHLORIDE 12.5 MG: 25 INJECTION INTRAMUSCULAR; INTRAVENOUS at 11:02

## 2018-02-02 RX ADMIN — SODIUM CHLORIDE 1000 ML: 0.9 INJECTION, SOLUTION INTRAVENOUS at 09:02

## 2018-02-02 RX ADMIN — ONDANSETRON 4 MG: 2 INJECTION INTRAMUSCULAR; INTRAVENOUS at 09:02

## 2018-02-03 NOTE — ED PROVIDER NOTES
SCRIBE #1 NOTE: I, Kim Pérez, am scribing for, and in the presence of, Masha Washington MD. I have scribed the entire note.      History      Chief Complaint   Patient presents with    Vomiting     vomiting and headache. stop takign effexor two days ago       Review of patient's allergies indicates:   Allergen Reactions    Codeine Nausea And Vomiting        HPI   HPI    2018, 9:31 PM   History obtained from the patient      History of Present Illness: Beatrice Regan is a 63 y.o. female patient who presents to the Emergency Department for emesis which onset gradually this morning. Patient reports having elevated blood pressure for the last 4 days after going to a , with highest BP reading of 163/91. Patient reports speaking to her PCP, Dr. Snow about her elevated blood pressure and was told to stop Effexor. Patient states she has not taken Effexor for the last 2 days. Symptoms are episodic and moderate in severity. Patient reports having 6 episodes today, last episode was about 1 hour ago. No mitigating or exacerbating factors reported. Associated sxs include nausea. Patient also c/o L frontal HA onset this morning. Patient rates HA as 3/10 in severity. Patient denies any fever, chills, visual disturbance, neck pain, dizziness, CP, SOB, abd pain, diarrhea, extremity weakness/numbness, and all other sxs at this time. No further complaints or concerns at this time.     Arrival mode: Personal vehicle      PCP: Lolis Snow MD       Past Medical History:  Past Medical History:   Diagnosis Date    Cataract     OS    Hyperlipidemia     Hypertension     with white coat effect    Menopausal and postmenopausal disorder     Metabolic syndrome     CORNELL (renal artery stenosis)        Past Surgical History:  Past Surgical History:   Procedure Laterality Date    APPENDECTOMY      lap    BLEPHAROPLASTY OU      breast reduction      CATARACT EXTRACTION BILATERAL W/ ANTERIOR VITRECTOMY      CATARACT  EXTRACTION W/  INTRAOCULAR LENS IMPLANT  OD 2/16/11    TN EXPLORATORY OF ABDOMEN      YAG OD           Family History:  Family History   Problem Relation Age of Onset    Diabetes Father     Hypertension Father     Hypertension Brother     Melanoma Paternal Uncle     Melanoma Son     Melanoma Paternal Uncle     Breast cancer Neg Hx     Colon cancer Neg Hx     Ovarian cancer Neg Hx     Uterine cancer Neg Hx     Psoriasis Neg Hx     Lupus Neg Hx     Eczema Neg Hx     Acne Neg Hx        Social History:  Social History     Social History Main Topics    Smoking status: Never Smoker    Smokeless tobacco: Never Used    Alcohol use No    Drug use: No    Sexual activity: Yes     Partners: Male     Birth control/ protection: Post-menopausal       ROS   Review of Systems   Constitutional: Negative for chills and fever.   HENT: Negative for sore throat.    Eyes: Negative for visual disturbance.   Respiratory: Negative for shortness of breath.    Cardiovascular: Negative for chest pain.   Gastrointestinal: Positive for nausea and vomiting. Negative for abdominal pain and diarrhea.   Genitourinary: Negative for dysuria.   Musculoskeletal: Negative for back pain and neck pain.   Skin: Negative for rash.   Neurological: Positive for headaches. Negative for dizziness, weakness and numbness.   Hematological: Does not bruise/bleed easily.   All other systems reviewed and are negative.      Physical Exam      Initial Vitals [02/02/18 2054]   BP Pulse Resp Temp SpO2   (!) 197/95 96 19 97.7 °F (36.5 °C) 98 %      MAP       129          Physical Exam  Nursing Notes and Vital Signs Reviewed.  Constitutional: Patient is in no acute distress. Well-developed and well-nourished.  Head: Atraumatic. Normocephalic.  Eyes: PERRL. EOM intact. Conjunctivae are not pale. No scleral icterus.  ENT: Mucous membranes are moist. Oropharynx is clear and symmetric.    Neck: Supple. Full ROM. No lymphadenopathy.  Cardiovascular: Regular  "rate. Regular rhythm. No murmurs, rubs, or gallops. Distal pulses are 2+ and symmetric.  Pulmonary/Chest: No respiratory distress. Clear to auscultation bilaterally. No wheezing or rales.  Abdominal: Soft and non-distended.  There is no tenderness.  No rebound, guarding, or rigidity. Good bowel sounds.  Genitourinary: No CVA tenderness  Musculoskeletal: Moves all extremities. No obvious deformities. No edema. No calf tenderness.  Skin: Warm and dry.  Neurological:  Alert, awake, and appropriate.  Normal speech.  No acute focal neurological deficits are appreciated.  Psychiatric: Normal affect. Good eye contact. Appropriate in content.    ED Course    Procedures  ED Vital Signs:  Vitals:    02/02/18 2054 02/02/18 2158 02/02/18 2159 02/02/18 2216   BP: (!) 197/95   (!) 164/82   Pulse: 96 90 91 90   Resp: 19   20   Temp: 97.7 °F (36.5 °C)      TempSrc: Oral      SpO2: 98%   96%   Weight: 76.2 kg (168 lb)      Height: 5' 7" (1.702 m)       02/02/18 2231 02/02/18 2242 02/02/18 2340 02/02/18 2358   BP: (!) 156/77  (!) 149/76    Pulse: 89 95 91    Resp: 16 19 18    Temp:  97.6 °F (36.4 °C)  98.5 °F (36.9 °C)   TempSrc:  Oral  Oral   SpO2: 97% 97% 95%    Weight:       Height:           Abnormal Lab Results:  Labs Reviewed   CBC W/ AUTO DIFFERENTIAL - Abnormal; Notable for the following:        Result Value    Gran # (ANC) 8.6 (*)     Gran% 81.9 (*)     Lymph% 14.9 (*)     Mono% 3.0 (*)     All other components within normal limits   COMPREHENSIVE METABOLIC PANEL - Abnormal; Notable for the following:     Glucose 129 (*)     All other components within normal limits   URINALYSIS - Abnormal; Notable for the following:     Protein, UA Trace (*)     Ketones, UA Trace (*)     Occult Blood UA 1+ (*)     Leukocytes, UA 1+ (*)     All other components within normal limits   URINALYSIS MICROSCOPIC - Abnormal; Notable for the following:     RBC, UA 10 (*)     WBC, UA 10 (*)     Bacteria, UA Moderate (*)     All other components " within normal limits   TROPONIN I        All Lab Results:  Results for orders placed or performed during the hospital encounter of 02/02/18   CBC auto differential   Result Value Ref Range    WBC 10.45 3.90 - 12.70 K/uL    RBC 4.85 4.00 - 5.40 M/uL    Hemoglobin 14.9 12.0 - 16.0 g/dL    Hematocrit 43.8 37.0 - 48.5 %    MCV 90 82 - 98 fL    MCH 30.7 27.0 - 31.0 pg    MCHC 34.0 32.0 - 36.0 g/dL    RDW 12.8 11.5 - 14.5 %    Platelets 296 150 - 350 K/uL    MPV 9.8 9.2 - 12.9 fL    Gran # (ANC) 8.6 (H) 1.8 - 7.7 K/uL    Lymph # 1.6 1.0 - 4.8 K/uL    Mono # 0.3 0.3 - 1.0 K/uL    Eos # 0.0 0.0 - 0.5 K/uL    Baso # 0.02 0.00 - 0.20 K/uL    Gran% 81.9 (H) 38.0 - 73.0 %    Lymph% 14.9 (L) 18.0 - 48.0 %    Mono% 3.0 (L) 4.0 - 15.0 %    Eosinophil% 0.0 0.0 - 8.0 %    Basophil% 0.2 0.0 - 1.9 %    Differential Method Automated    Comprehensive metabolic panel   Result Value Ref Range    Sodium 138 136 - 145 mmol/L    Potassium 4.2 3.5 - 5.1 mmol/L    Chloride 103 95 - 110 mmol/L    CO2 23 23 - 29 mmol/L    Glucose 129 (H) 70 - 110 mg/dL    BUN, Bld 20 8 - 23 mg/dL    Creatinine 0.7 0.5 - 1.4 mg/dL    Calcium 10.2 8.7 - 10.5 mg/dL    Total Protein 8.3 6.0 - 8.4 g/dL    Albumin 4.6 3.5 - 5.2 g/dL    Total Bilirubin 0.3 0.1 - 1.0 mg/dL    Alkaline Phosphatase 76 55 - 135 U/L    AST 30 10 - 40 U/L    ALT 42 10 - 44 U/L    Anion Gap 12 8 - 16 mmol/L    eGFR if African American >60 >60 mL/min/1.73 m^2    eGFR if non African American >60 >60 mL/min/1.73 m^2   Troponin I   Result Value Ref Range    Troponin I 0.016 0.000 - 0.026 ng/mL   Urinalysis   Result Value Ref Range    Specimen UA Urine, Clean Catch     Color, UA Yellow Yellow, Straw, Linda    Appearance, UA Clear Clear    pH, UA 7.0 5.0 - 8.0    Specific Gravity, UA 1.010 1.005 - 1.030    Protein, UA Trace (A) Negative    Glucose, UA Negative Negative    Ketones, UA Trace (A) Negative    Bilirubin (UA) Negative Negative    Occult Blood UA 1+ (A) Negative    Nitrite, UA Negative  Negative    Urobilinogen, UA Negative <2.0 EU/dL    Leukocytes, UA 1+ (A) Negative   Urinalysis Microscopic   Result Value Ref Range    RBC, UA 10 (H) 0 - 4 /hpf    WBC, UA 10 (H) 0 - 5 /hpf    Bacteria, UA Moderate (A) None-Occ /hpf    Amorphous, UA Moderate None-Moderate    Microscopic Comment SEE COMMENT          Imaging Results:  Imaging Results    None        The EKG was ordered, reviewed, and independently interpreted by the ED provider.  Interpretation time: 2151  Rate: 93 BPM  Rhythm: normal sinus rhythm  Interpretation: Nonspecific ST abnormality. Abnormal ECG. No STEMI.         The Emergency Provider reviewed the vital signs and test results, which are outlined above.    ED Discussion     11:49 PM: Reassessed pt at this time.  Pt states her condition has improved at this time. Discussed with pt all pertinent ED information and results. Discussed pt dx and plan of tx. Gave pt all f/u and return to the ED instructions. All questions and concerns were addressed at this time. Pt expresses understanding of information and instructions, and is comfortable with plan to discharge. Pt is stable for discharge.    I discussed with patient and/or family/caretaker that evaluation in the ED does not suggest any emergent or life threatening medical conditions requiring immediate intervention beyond what was provided in the ED, and I believe patient is safe for discharge.  Regardless, an unremarkable evaluation in the ED does not preclude the development or presence of a serious of life threatening condition. As such, patient was instructed to return immediately for any worsening or change in current symptoms.      ED Medication(s):  Medications   sodium chloride 0.9% bolus 1,000 mL (0 mLs Intravenous Stopped 2/2/18 2243)   ondansetron injection 4 mg (4 mg Intravenous Given 2/2/18 2154)   promethazine (PHENERGAN) 12.5 mg in dextrose 5 % 50 mL IVPB (0 mg Intravenous Stopped 2/2/18 7049)       Discharge Medication List as  of 2/2/2018 11:49 PM      START taking these medications    Details   promethazine (PHENERGAN) 25 MG tablet Take 0.5 tablets (12.5 mg total) by mouth every 6 (six) hours as needed for Nausea., Starting Fri 2/2/2018, Print             Follow-up Information     Lolis Snow MD In 3 days.    Specialty:  Internal Medicine  Contact information:  [address]  [place] [address] [phone]             [facility]    Specialty:  Emergency Medicine  Why:  As needed, If symptoms worsen  Contact information:  [address] [facility] [address] [phone]                   Medical Decision Making    Medical Decision Making:   Clinical Tests:   Lab Tests: Ordered and Reviewed  Medical Tests: Ordered and Reviewed           Scribe Attestation:   Scribe #1: I performed the above scribed service and the documentation accurately describes the services I performed. I attest to the accuracy of the note.    Attending:   Physician Attestation Statement for Scribe #1: I, Masha Washington MD, personally performed the services described in this documentation, as scribed by Kim Pérez, in my presence, and it is both accurate and complete.          Clinical Impression       ICD-10-CM ICD-9-CM   1. Nausea & vomiting R11.2 787.01       Disposition:   Disposition: Discharged  Condition: Stable         Masha Washington MD  02/03/18 0543

## 2018-02-06 ENCOUNTER — TELEPHONE (OUTPATIENT)
Dept: INTERNAL MEDICINE | Facility: CLINIC | Age: 63
End: 2018-02-06

## 2018-02-06 ENCOUNTER — PATIENT MESSAGE (OUTPATIENT)
Dept: INTERNAL MEDICINE | Facility: CLINIC | Age: 63
End: 2018-02-06

## 2018-02-06 ENCOUNTER — OFFICE VISIT (OUTPATIENT)
Dept: URGENT CARE | Facility: CLINIC | Age: 63
End: 2018-02-06
Payer: COMMERCIAL

## 2018-02-06 VITALS
HEIGHT: 67 IN | TEMPERATURE: 99 F | DIASTOLIC BLOOD PRESSURE: 90 MMHG | SYSTOLIC BLOOD PRESSURE: 140 MMHG | WEIGHT: 180 LBS | BODY MASS INDEX: 28.25 KG/M2 | HEART RATE: 101 BPM | OXYGEN SATURATION: 98 %

## 2018-02-06 DIAGNOSIS — I10 ESSENTIAL HYPERTENSION: Primary | ICD-10-CM

## 2018-02-06 DIAGNOSIS — Z79.899 MEDICATION COURSE CHANGED: ICD-10-CM

## 2018-02-06 PROCEDURE — 99214 OFFICE O/P EST MOD 30 MIN: CPT | Mod: S$GLB,,, | Performed by: NURSE PRACTITIONER

## 2018-02-06 PROCEDURE — 99999 PR PBB SHADOW E&M-EST. PATIENT-LVL IV: CPT | Mod: PBBFAC,,, | Performed by: NURSE PRACTITIONER

## 2018-02-06 PROCEDURE — 3008F BODY MASS INDEX DOCD: CPT | Mod: S$GLB,,, | Performed by: NURSE PRACTITIONER

## 2018-02-06 RX ORDER — PROPRANOLOL HYDROCHLORIDE 20 MG/1
20 TABLET ORAL 2 TIMES DAILY PRN
Qty: 60 TABLET | Refills: 0 | Status: SHIPPED | OUTPATIENT
Start: 2018-02-06 | End: 2019-06-13

## 2018-02-06 RX ORDER — VENLAFAXINE 25 MG/1
25 TABLET ORAL 2 TIMES DAILY
Qty: 60 TABLET | Refills: 0 | Status: SHIPPED | OUTPATIENT
Start: 2018-02-06 | End: 2018-04-02

## 2018-02-06 NOTE — TELEPHONE ENCOUNTER
----- Message from Estefania Hlil sent at 2/6/2018  3:58 PM CST -----  Pt at [phone]//states she is returning your call/please call again//thanks/rafia

## 2018-02-06 NOTE — TELEPHONE ENCOUNTER
----- Message from Gloria Laird sent at 2/6/2018  3:24 PM CST -----  Contact: pt   Call pt regarding checking in at urgent care and want to see if she can get the doctor to see her due her blood pressure being so high.   .[phone] (home)

## 2018-02-06 NOTE — PROGRESS NOTES
Subjective:      Patient ID: Beatrice Regan is a 63 y.o. female.    Chief Complaint: Hypertension and Dizziness    Mrs. Regan presents to Urgent Care today with complaints of elevated BP readings, dizziness, and nausea. She was at a  on 18 and felt like her BP was up. She checked it the day after and it was elevated. Since then, BP has been higher than usual as she is normally well-controlled on Losartan. She thought that the BP might be elevated due to her Effexor so she stopped this medication about one week ago. Since that time she's had intermittent headaches, flushing, dizziness, nausea, and vomiting. Was seen in the ER over the weekend and had a negative workup, however, BP was very elevated during the ER visit (190's/90's). The vomiting has subsided and the dizziness and headaches have improved a decent bit. The BP today has been running in the 160's/100's at home. Mrs. Regan has been compliant with BP meds. No increased caffeine or other over the counter decongestants. She denies increased sodium in the diet. She has been under more stress than usual.       Hypertension   This is a chronic problem. The problem is uncontrolled. Associated symptoms include anxiety, headaches and malaise/fatigue. Pertinent negatives include no chest pain or palpitations. There are no associated agents to hypertension. Risk factors for coronary artery disease include post-menopausal state. Past treatments include angiotensin blockers. Compliance problems include exercise (hasn't been exercising lately).    Dizziness:    Associated symptoms: headaches.no fever, no weakness, no palpitations and no chest pain.   PMH includes: anxiety.    Review of Systems   Constitutional: Positive for fatigue and malaise/fatigue. Negative for fever.   HENT: Negative.    Eyes: Negative for visual disturbance.   Respiratory: Negative.    Cardiovascular: Negative for chest pain and palpitations.   Gastrointestinal: Negative.   "  Genitourinary: Negative.    Musculoskeletal: Negative.    Skin: Negative.    Neurological: Positive for dizziness and headaches. Negative for tremors, seizures, syncope, facial asymmetry, speech difficulty, weakness and numbness.   Hematological: Negative.        Objective:   BP (!) 140/90 (BP Location: Right arm, Patient Position: Sitting, BP Method: Large (Manual))   Pulse 101   Temp 98.7 °F (37.1 °C) (Tympanic)   Ht 5' 7" (1.702 m)   Wt 81.7 kg (180 lb 0.1 oz)   SpO2 98%   BMI 28.19 kg/m²   Physical Exam   Constitutional: She is oriented to person, place, and time. She appears well-developed and well-nourished. No distress.   HENT:   Head: Normocephalic and atraumatic.   Neck: Normal range of motion. Neck supple.   Pulmonary/Chest: Effort normal. No respiratory distress.   Neurological: She is alert and oriented to person, place, and time.   Skin: Skin is warm and dry. She is not diaphoretic.   Psychiatric: Her speech is normal and behavior is normal. Her mood appears anxious (became tearful when discussing home stressors).   Nursing note and vitals reviewed.    Assessment:      1. Essential hypertension    2. Medication course changed       Plan:   Essential hypertension  -     propranolol (INDERAL) 20 MG tablet; Take 1 tablet (20 mg total) by mouth 2 (two) times daily as needed.  Dispense: 60 tablet; Refill: 0    Medication course changed  -     venlafaxine (EFFEXOR) 25 MG Tab; Take 1 tablet (25 mg total) by mouth 2 (two) times daily.  Dispense: 60 tablet; Refill: 0    Discussed with Dr. Huynh. She recommended restarting the Effexor at a low dose. Will also start some propranolol to use as needed for elevated BP or elevated HR.   Follow up with BUFF[facility] Hutchinson on Friday per Dr. Huynh.  Has an appointment with Dr. Huynh in a couple of weeks.  Instructions, follow up, and supportive care as per AVS.      "

## 2018-02-06 NOTE — PATIENT INSTRUCTIONS
Taking Your Blood Pressure  Blood pressure is the force of blood against the artery wall as it moves from the heart through the blood vessels. You can take your own blood pressure reading using a digital monitor. Take your readings the same each time, using the same arm. Take readings as often as your healthcare provider instructs.  About blood pressure monitors  Blood pressure monitors are designed for certain ages and cases. You can find monitors for older adults, for pregnant women, and for children. Make sure the one you choose is the right one for your age and situation.  The American Heart Association recommends an automatic cuff monitor that fits on your upper arm (bicep). The cuff should fit your arm size. A cuff thats too large or too small will not give an accurate reading. Measure around your upper arm to find your size.  Monitors that attach to your finger or wrist are not as accurate as monitors for your upper arm.  Ask your healthcare provider for help in choosing a monitor. Bring your monitor to your next provider visit if you need help in using it the correct way.  The steps below are general instructions for using an automatic digital monitor.  Step 1. Relax    · Take your blood pressure at the same time every day, such as in the morning or evening, or at the time your healthcare provider recommends.  · Wait at least a half-hour after smoking, eating, or exercising. Don't drink coffee, tea, soda, or other caffeinated beverages before checking your blood pressure.  · Sit comfortably at a table with both feet on the floor. Do not cross your legs or feet. Place the monitor near you.  · Rest for a few minutes before you begin.  Step 2. Wrap the cuff    · Place your arm on the table, palm up. Your arm should be at the level of your heart. Wrap the cuff around your upper arm, just above your elbow. Its best done on bare skin, not over clothing. Most cuffs will indicate where the brachial artery (the  blood vessel in the middle of the arm at the inner side of the elbow) should line up with the cuff. Look in your monitor's instruction booklet for an illustration. You can also bring your cuff to your healthcare provider and have them show you how to correctly place the cuff.  Step 3. Inflate the cuff    · Push the button that starts the pump.  · The cuff will tighten, then loosen.  · The numbers will change. When they stop changing, your blood pressure reading will appear.  · Take 2 or 3 readings one minute apart.  Step 4. Write down the results of each reading    · Write down your blood pressure numbers for each reading. Note the date and time. Keep your results in one place, such as a notebook. Even if your monitor has a built-in memory, keep a hard copy of the readings.  · Remove the cuff from your arm. Turn off the machine.  · Bring your blood pressure records with your healthcare providers at each visit.  · If you start a new blood pressure medicine, note the day you started the new medicine. Also note the day if you change the dose of your medicine. This information goes on your blood pressure recording sheet. This will help your healthcare provider monitor how well the medicine changes are working.  · Ask your healthcare provider what numbers should prompt you to call him or her. Also ask what numbers should prompt you to get help right away.  Date Last Reviewed: 11/1/2016  © 7848-2716 [employer]. [address] [place] [address], [place] [address]. All rights reserved. This information is not intended as a substitute for professional medical care. Always follow your healthcare professional's instructions.

## 2018-02-09 ENCOUNTER — OFFICE VISIT (OUTPATIENT)
Dept: INTERNAL MEDICINE | Facility: CLINIC | Age: 63
End: 2018-02-09
Payer: COMMERCIAL

## 2018-02-09 ENCOUNTER — OFFICE VISIT (OUTPATIENT)
Dept: CARDIOLOGY | Facility: CLINIC | Age: 63
End: 2018-02-09
Payer: COMMERCIAL

## 2018-02-09 VITALS
BODY MASS INDEX: 28.37 KG/M2 | DIASTOLIC BLOOD PRESSURE: 78 MMHG | HEART RATE: 96 BPM | SYSTOLIC BLOOD PRESSURE: 134 MMHG | WEIGHT: 180.75 LBS | HEIGHT: 67 IN

## 2018-02-09 VITALS
SYSTOLIC BLOOD PRESSURE: 160 MMHG | BODY MASS INDEX: 28.02 KG/M2 | DIASTOLIC BLOOD PRESSURE: 108 MMHG | TEMPERATURE: 95 F | HEIGHT: 67 IN | HEART RATE: 84 BPM | WEIGHT: 178.56 LBS | OXYGEN SATURATION: 98 %

## 2018-02-09 DIAGNOSIS — R73.03 PREDIABETES: ICD-10-CM

## 2018-02-09 DIAGNOSIS — I10 ESSENTIAL HYPERTENSION: Primary | ICD-10-CM

## 2018-02-09 DIAGNOSIS — I70.1 RAS (RENAL ARTERY STENOSIS): ICD-10-CM

## 2018-02-09 DIAGNOSIS — E78.00 PURE HYPERCHOLESTEROLEMIA: ICD-10-CM

## 2018-02-09 PROCEDURE — 99204 OFFICE O/P NEW MOD 45 MIN: CPT | Mod: S$GLB,,, | Performed by: INTERNAL MEDICINE

## 2018-02-09 PROCEDURE — 99213 OFFICE O/P EST LOW 20 MIN: CPT | Mod: S$GLB,,, | Performed by: PHYSICIAN ASSISTANT

## 2018-02-09 PROCEDURE — 3008F BODY MASS INDEX DOCD: CPT | Mod: S$GLB,,, | Performed by: PHYSICIAN ASSISTANT

## 2018-02-09 PROCEDURE — 99999 PR PBB SHADOW E&M-EST. PATIENT-LVL IV: CPT | Mod: PBBFAC,,, | Performed by: PHYSICIAN ASSISTANT

## 2018-02-09 PROCEDURE — 3008F BODY MASS INDEX DOCD: CPT | Mod: S$GLB,,, | Performed by: INTERNAL MEDICINE

## 2018-02-09 PROCEDURE — 99999 PR PBB SHADOW E&M-EST. PATIENT-LVL III: CPT | Mod: PBBFAC,,, | Performed by: INTERNAL MEDICINE

## 2018-02-09 NOTE — PROGRESS NOTES
Subjective:   Patient ID:  Beatrice Regan is a 63 y.o. female who presents for cardiac consult of Hypertension      HPI  The patient came in today for cardiac consult of Hypertension    Referring Provider: BUFFY Bullock   Reason for consult: HTN, CORNELL    2/9/18  This is a 63 year old female pt with PMHx HTN, HLD, CORNELL, pre-DM presents for evaluation of HTN/CORNELL.     Pf of Dr. Victoria in past  10 years ago pt had CRONELL found on u/s - was started on medications with statin and aspirin. Was initially told she may need a stent but then Dr. Victoria placed her on meds and she had been doing well until recently.On 1/29/18 she felt BP was elevated - 160s/90s. Pt had been on Effexor for post-menopausal symptoms a few months ago and read about side effects of high BP, then later she felt high BP. Went to ED was given IV zofran and phenergan as she was vomiting. She was at urgent care 3 days ago and BP was elevated and was started on Propanolol prn. This AM pt had headache 160s/90 and she took a propanolol, then took Losartan prior to office visit this AM. Last few months have gained some weight.     Patient feels no chest pain, no sob, no leg swelling, no PND, no palpitation, no dizziness, no syncope, no CNS symptoms.    Patient is compliant with medications.    Past Medical History:   Diagnosis Date    Cataract     OS    Hyperlipidemia     Hypertension     with white coat effect    Menopausal and postmenopausal disorder     Metabolic syndrome     CORNELL (renal artery stenosis)        Past Surgical History:   Procedure Laterality Date    APPENDECTOMY      lap    BLEPHAROPLASTY OU      breast reduction      CATARACT EXTRACTION BILATERAL W/ ANTERIOR VITRECTOMY      CATARACT EXTRACTION W/  INTRAOCULAR LENS IMPLANT  OD 2/16/11    CA EXPLORATORY OF ABDOMEN      YAG OD         Social History   Substance Use Topics    Smoking status: Former Smoker     Quit date: 2/9/1998    Smokeless tobacco: Never Used    Alcohol use  No       Family History   Problem Relation Age of Onset    Diabetes Father     Hypertension Father     Hypertension Brother     Melanoma Paternal Uncle     Melanoma Son     Melanoma Paternal Uncle     Breast cancer Neg Hx     Colon cancer Neg Hx     Ovarian cancer Neg Hx     Uterine cancer Neg Hx     Psoriasis Neg Hx     Lupus Neg Hx     Eczema Neg Hx     Acne Neg Hx        Patient's Medications   New Prescriptions    No medications on file   Previous Medications    ASPIRIN (ASPIR-81) 81 MG EC TABLET    Take 1 tablet by mouth Daily.    ATORVASTATIN (LIPITOR) 10 MG TABLET    TAKE ONE TABLET BY MOUTH ONE TIME DAILY     CHOLECALCIFEROL, VITAMIN D3, 1,000 UNIT CAPSULE    Take 1 capsule by mouth Daily.    LOSARTAN (COZAAR) 50 MG TABLET    TAKE ONE TABLET BY MOUTH ONE TIME DAILY     METFORMIN (GLUCOPHAGE) 1000 MG TABLET    1 po with breakfast, half po with lunch, 1 po with dinner.    MULTIVITAMIN CAPSULE    Take 1 capsule by mouth once daily.    OMEGA-3 FATTY ACIDS (FISH OIL) 300 MG CAP    Take by mouth.    PROMETHAZINE (PHENERGAN) 25 MG SUPPOSITORY    Place 1 suppository (25 mg total) rectally every 6 (six) hours as needed for Nausea.    PROPRANOLOL (INDERAL) 20 MG TABLET    Take 1 tablet (20 mg total) by mouth 2 (two) times daily as needed.    VENLAFAXINE (EFFEXOR) 25 MG TAB    Take 1 tablet (25 mg total) by mouth 2 (two) times daily.    VITAMIN E 400 UNIT CAPSULE    Take 1 capsule (400 Units total) by mouth once daily.   Modified Medications    No medications on file   Discontinued Medications    NAPROXEN (NAPROSYN) 500 MG TABLET    Take 1 tablet (500 mg total) by mouth 2 (two) times daily as needed (pain). Monitor BP       Review of Systems   Constitutional: Negative.    HENT: Negative.    Eyes: Negative.    Respiratory: Negative.    Cardiovascular: Negative.    Gastrointestinal: Negative.    Genitourinary: Negative.    Musculoskeletal: Negative.    Skin: Negative.    Neurological: Negative.   "  Endo/Heme/Allergies: Negative.    Psychiatric/Behavioral: Negative.    All 12 systems otherwise negative.      Wt Readings from Last 3 Encounters:   02/09/18 82 kg (180 lb 12.4 oz)   02/09/18 81 kg (178 lb 9.2 oz)   02/06/18 81.7 kg (180 lb 0.1 oz)     Temp Readings from Last 3 Encounters:   02/09/18 (!) 95.3 °F (35.2 °C) (Tympanic)   02/06/18 98.7 °F (37.1 °C) (Tympanic)   02/02/18 98.5 °F (36.9 °C) (Oral)     BP Readings from Last 3 Encounters:   02/09/18 134/78   02/09/18 (!) 160/108   02/06/18 (!) 140/90     Pulse Readings from Last 3 Encounters:   02/09/18 96   02/09/18 84   02/06/18 101       /78 (BP Method: Large (Manual))   Pulse 96   Ht 5' 7" (1.702 m)   Wt 82 kg (180 lb 12.4 oz)   BMI 28.31 kg/m²     Objective:   Physical Exam   Constitutional: She is oriented to person, place, and time. She appears well-developed and well-nourished. No distress.   HENT:   Head: Normocephalic and atraumatic.   Nose: Nose normal.   Mouth/Throat: Oropharynx is clear and moist.   Eyes: Conjunctivae and EOM are normal. No scleral icterus.   Neck: Normal range of motion. Neck supple. No JVD present. No thyromegaly present.   Cardiovascular: Normal rate, regular rhythm, S1 normal and S2 normal.  Exam reveals no gallop, no S3, no S4 and no friction rub.    No murmur heard.  Pulmonary/Chest: Effort normal and breath sounds normal. No stridor. No respiratory distress. She has no wheezes. She has no rales. She exhibits no tenderness.   Abdominal: Soft. Bowel sounds are normal. She exhibits no distension and no mass. There is no tenderness. There is no rebound.   Genitourinary:   Genitourinary Comments: Deferred   Musculoskeletal: Normal range of motion. She exhibits no edema, tenderness or deformity.   Lymphadenopathy:     She has no cervical adenopathy.   Neurological: She is alert and oriented to person, place, and time. She exhibits normal muscle tone. Coordination normal.   Skin: Skin is warm and dry. No rash noted. " She is not diaphoretic. No erythema. No pallor.   Psychiatric: She has a normal mood and affect. Her behavior is normal. Judgment and thought content normal.   Nursing note and vitals reviewed.      Lab Results   Component Value Date     02/02/2018    K 4.2 02/02/2018     02/02/2018    CO2 23 02/02/2018    BUN 20 02/02/2018    CREATININE 0.7 02/02/2018     (H) 02/02/2018    HGBA1C 5.6 09/12/2017    AST 30 02/02/2018    ALT 42 02/02/2018    ALBUMIN 4.6 02/02/2018    PROT 8.3 02/02/2018    BILITOT 0.3 02/02/2018    WBC 10.45 02/02/2018    HGB 14.9 02/02/2018    HCT 43.8 02/02/2018    MCV 90 02/02/2018     02/02/2018    TSH 1.016 02/17/2017    CHOL 193 02/17/2017    HDL 47 02/17/2017    LDLCALC 91.6 02/17/2017    TRIG 272 (H) 02/17/2017     Assessment:      1. Essential hypertension    2. CORNELL (renal artery stenosis)    3. Pure hypercholesterolemia    4. Prediabetes        Plan:   1. HTN  - will order CORNELL studies to evaluate  - cont current meds with close monitoring, BP in office well controlled  - low salt diet    2. HLD  - cont statin    3. BP difference between arms  - will get arterial US of UE to r/o subclavian stenosis  - if abnormal will refer for angio    4. CORNELL  - check u/s  - if abnormal will refer for angio        Thank you for allowing me to participate in this patient's care. Please do not hesitate to contact me with any questions or concerns. Consult note has been forwarded to the referral physician.

## 2018-02-09 NOTE — PROGRESS NOTES
Subjective:       Patient ID: Beatrice Regan is a 63 y.o. female.    Chief Complaint: Follow-up (urgent care)    63 year old female presents to clinic for  f/u. She reports noticing BP increasing a couple of weeks ago. She then started Effexor, then stopped taking it due to not wanting to take it. She reports this abrupt discontinuation of Effexor caused N/V. She went to [facility] ER for N/V, which then resolved. She then went to [facility] 2 days ago for elevated BP and started back on Effexor per PCP recommendation. Pt reports she is currently tapering off of Effexor with her PCP. She reports taking propranolol per PCP PRN elevated BP. She reports this helps her BP short-term. She reports BP has been 118-164/72-95, heart rate 70s-80s since UC visit. She reports being diagnosed with renal artery stenosis about 10 years ago and having similar sxs at that time. She reports no CP, SOB, exertional sxs, edema, or other medical complaints.    PCP: Dr. Huynh    Patient Active Problem List:     Essential hypertension     Menopausal and postmenopausal disorder     Dry eye     Nuclear sclerosis     History of blepharoplasty     CORNELL (renal artery stenosis)     Pure hypercholesterolemia     Prediabetes     Pseudophakia of right eye     Posterior vitreous detachment of both eyes      Hypertension   This is a new problem. The current episode started more than 1 year ago. The problem is unchanged. The problem is uncontrolled. Associated symptoms include anxiety and headaches. Pertinent negatives include no blurred vision, chest pain, malaise/fatigue, neck pain, orthopnea, palpitations, peripheral edema, PND, shortness of breath or sweats. Risk factors for coronary artery disease include family history and stress. Past treatments include nothing. The current treatment provides no improvement. Compliance problems include medication side effects.      Review of Systems   Constitutional: Negative for chills, fever and  "malaise/fatigue.   Eyes: Negative for blurred vision.   Respiratory: Negative for cough and shortness of breath.    Cardiovascular: Negative for chest pain, palpitations, orthopnea, leg swelling and PND.   Musculoskeletal: Negative for neck pain.   Neurological: Positive for headaches. Negative for weakness and numbness.       Objective:       Vitals:    02/09/18 1107   BP: (!) 160/108   BP Location: Right arm   Patient Position: Sitting   BP Method: Large (Manual)   Pulse: 84   Temp: (!) 95.3 °F (35.2 °C)   TempSrc: Tympanic   SpO2: 98%   Weight: 81 kg (178 lb 9.2 oz)   Height: 5' 7" (1.702 m)     Physical Exam   Constitutional: She is oriented to person, place, and time. She appears well-developed and well-nourished. No distress.   HENT:   Head: Normocephalic and atraumatic.   Eyes: EOM are normal. No scleral icterus.   Neck: Neck supple.   Cardiovascular: Normal rate and regular rhythm.    Pulmonary/Chest: Effort normal and breath sounds normal. No respiratory distress. She has no decreased breath sounds. She has no wheezes. She has no rhonchi. She has no rales.   Musculoskeletal: Normal range of motion. She exhibits no edema.   Neurological: She is alert and oriented to person, place, and time. No cranial nerve deficit.   Skin: Skin is warm and dry. No rash noted.   Psychiatric: She has a normal mood and affect. Her speech is normal and behavior is normal. Thought content normal.       Assessment:       1. Essential hypertension    2. CORNELL (renal artery stenosis)        Plan:         Beatrice was seen today for follow-up.    Diagnoses and all orders for this visit:    Essential hypertension, CORNELL (renal artery stenosis)  -     Ambulatory referral to Cardiology  Recommend asap eval by cardiology - appt made for pt today.    F/u with PCP in 1.5 weeks for health management as scheduled. RTC sooner if needed.  "

## 2018-02-13 ENCOUNTER — TELEPHONE (OUTPATIENT)
Dept: RADIOLOGY | Facility: HOSPITAL | Age: 63
End: 2018-02-13

## 2018-02-13 ENCOUNTER — LAB VISIT (OUTPATIENT)
Dept: LAB | Facility: HOSPITAL | Age: 63
End: 2018-02-13
Attending: INTERNAL MEDICINE
Payer: COMMERCIAL

## 2018-02-13 DIAGNOSIS — R73.03 PREDIABETES: ICD-10-CM

## 2018-02-13 DIAGNOSIS — I10 ESSENTIAL HYPERTENSION: ICD-10-CM

## 2018-02-13 DIAGNOSIS — E78.00 PURE HYPERCHOLESTEROLEMIA: ICD-10-CM

## 2018-02-13 DIAGNOSIS — Z29.9 PREVENTIVE MEASURE: ICD-10-CM

## 2018-02-13 LAB
25(OH)D3+25(OH)D2 SERPL-MCNC: 29 NG/ML
ALBUMIN SERPL BCP-MCNC: 3.8 G/DL
ALP SERPL-CCNC: 72 U/L
ALT SERPL W/O P-5'-P-CCNC: 27 U/L
ANION GAP SERPL CALC-SCNC: 9 MMOL/L
AST SERPL-CCNC: 20 U/L
BASOPHILS # BLD AUTO: 0.03 K/UL
BASOPHILS NFR BLD: 0.4 %
BILIRUB SERPL-MCNC: 0.2 MG/DL
BUN SERPL-MCNC: 18 MG/DL
CALCIUM SERPL-MCNC: 9.7 MG/DL
CHLORIDE SERPL-SCNC: 107 MMOL/L
CHOLEST SERPL-MCNC: 190 MG/DL
CHOLEST/HDLC SERPL: 3.6 {RATIO}
CO2 SERPL-SCNC: 26 MMOL/L
CREAT SERPL-MCNC: 0.8 MG/DL
DIFFERENTIAL METHOD: NORMAL
EOSINOPHIL # BLD AUTO: 0.2 K/UL
EOSINOPHIL NFR BLD: 2.7 %
ERYTHROCYTE [DISTWIDTH] IN BLOOD BY AUTOMATED COUNT: 12.6 %
EST. GFR  (AFRICAN AMERICAN): >60 ML/MIN/1.73 M^2
EST. GFR  (NON AFRICAN AMERICAN): >60 ML/MIN/1.73 M^2
ESTIMATED AVG GLUCOSE: 117 MG/DL
GLUCOSE SERPL-MCNC: 97 MG/DL
HBA1C MFR BLD HPLC: 5.7 %
HCT VFR BLD AUTO: 41.4 %
HDLC SERPL-MCNC: 53 MG/DL
HDLC SERPL: 27.9 %
HGB BLD-MCNC: 13.6 G/DL
IMM GRANULOCYTES # BLD AUTO: 0.02 K/UL
IMM GRANULOCYTES NFR BLD AUTO: 0.2 %
LDLC SERPL CALC-MCNC: 94.4 MG/DL
LYMPHOCYTES # BLD AUTO: 3.2 K/UL
LYMPHOCYTES NFR BLD: 38.1 %
MCH RBC QN AUTO: 30.2 PG
MCHC RBC AUTO-ENTMCNC: 32.9 G/DL
MCV RBC AUTO: 92 FL
MONOCYTES # BLD AUTO: 0.5 K/UL
MONOCYTES NFR BLD: 5.9 %
NEUTROPHILS # BLD AUTO: 4.4 K/UL
NEUTROPHILS NFR BLD: 52.7 %
NONHDLC SERPL-MCNC: 137 MG/DL
NRBC BLD-RTO: 0 /100 WBC
PLATELET # BLD AUTO: 315 K/UL
PMV BLD AUTO: 10.5 FL
POTASSIUM SERPL-SCNC: 4.1 MMOL/L
PROT SERPL-MCNC: 7.3 G/DL
RBC # BLD AUTO: 4.51 M/UL
SODIUM SERPL-SCNC: 142 MMOL/L
TRIGL SERPL-MCNC: 213 MG/DL
TSH SERPL DL<=0.005 MIU/L-ACNC: 1.58 UIU/ML
WBC # BLD AUTO: 8.43 K/UL

## 2018-02-13 PROCEDURE — 83036 HEMOGLOBIN GLYCOSYLATED A1C: CPT

## 2018-02-13 PROCEDURE — 82306 VITAMIN D 25 HYDROXY: CPT

## 2018-02-13 PROCEDURE — 80061 LIPID PANEL: CPT

## 2018-02-13 PROCEDURE — 80053 COMPREHEN METABOLIC PANEL: CPT

## 2018-02-13 PROCEDURE — 84443 ASSAY THYROID STIM HORMONE: CPT

## 2018-02-13 PROCEDURE — 85025 COMPLETE CBC W/AUTO DIFF WBC: CPT

## 2018-02-13 PROCEDURE — 36415 COLL VENOUS BLD VENIPUNCTURE: CPT | Mod: PO

## 2018-02-14 ENCOUNTER — HOSPITAL ENCOUNTER (OUTPATIENT)
Dept: RADIOLOGY | Facility: HOSPITAL | Age: 63
Discharge: HOME | End: 2018-02-14
Attending: INTERNAL MEDICINE
Payer: COMMERCIAL

## 2018-02-14 DIAGNOSIS — I10 ESSENTIAL HYPERTENSION: ICD-10-CM

## 2018-02-14 DIAGNOSIS — I70.1 RAS (RENAL ARTERY STENOSIS): ICD-10-CM

## 2018-02-14 PROCEDURE — 76770 US EXAM ABDO BACK WALL COMP: CPT | Mod: TC,PO

## 2018-02-14 PROCEDURE — 76770 US EXAM ABDO BACK WALL COMP: CPT | Mod: 26,,, | Performed by: RADIOLOGY

## 2018-02-14 PROCEDURE — 93975 VASCULAR STUDY: CPT | Mod: 26,,, | Performed by: RADIOLOGY

## 2018-02-15 ENCOUNTER — CLINICAL SUPPORT (OUTPATIENT)
Dept: CARDIOLOGY | Facility: CLINIC | Age: 63
End: 2018-02-15
Attending: INTERNAL MEDICINE
Payer: COMMERCIAL

## 2018-02-15 DIAGNOSIS — I10 ESSENTIAL HYPERTENSION: ICD-10-CM

## 2018-02-15 DIAGNOSIS — I70.1 RAS (RENAL ARTERY STENOSIS): ICD-10-CM

## 2018-02-15 LAB
DIASTOLIC DYSFUNCTION: YES
ESTIMATED PA SYSTOLIC PRESSURE: 28.52
RETIRED EF AND QEF - SEE NOTES: 60 (ref 55–65)

## 2018-02-15 PROCEDURE — 93930 UPPER EXTREMITY STUDY: CPT | Mod: S$GLB,,, | Performed by: INTERNAL MEDICINE

## 2018-02-15 PROCEDURE — 93306 TTE W/DOPPLER COMPLETE: CPT | Mod: S$GLB,,, | Performed by: INTERNAL MEDICINE

## 2018-02-15 PROCEDURE — 93922 UPR/L XTREMITY ART 2 LEVELS: CPT | Mod: S$GLB,,, | Performed by: INTERNAL MEDICINE

## 2018-02-16 LAB — VASCULAR ANKLE BRACHIAL INDEX (ABI) LEFT: 1.04 (ref 0.9–1.2)

## 2018-02-19 ENCOUNTER — TELEPHONE (OUTPATIENT)
Dept: CARDIOLOGY | Facility: CLINIC | Age: 63
End: 2018-02-19

## 2018-02-19 NOTE — TELEPHONE ENCOUNTER
Spoke with patient with normal echo, normal us arterial arms, and renal artery scan.  Pt still concerned with BP and is going to appt with dr leyva tomorrow and will also consider moving up her appt with dr. mary.

## 2018-02-20 ENCOUNTER — TELEPHONE (OUTPATIENT)
Dept: INTERNAL MEDICINE | Facility: CLINIC | Age: 63
End: 2018-02-20

## 2018-02-20 ENCOUNTER — OFFICE VISIT (OUTPATIENT)
Dept: INTERNAL MEDICINE | Facility: CLINIC | Age: 63
End: 2018-02-20
Payer: COMMERCIAL

## 2018-02-20 VITALS
WEIGHT: 180.56 LBS | HEART RATE: 96 BPM | BODY MASS INDEX: 28.34 KG/M2 | SYSTOLIC BLOOD PRESSURE: 118 MMHG | TEMPERATURE: 98 F | DIASTOLIC BLOOD PRESSURE: 76 MMHG | HEIGHT: 67 IN | OXYGEN SATURATION: 96 %

## 2018-02-20 DIAGNOSIS — N95.9 MENOPAUSAL AND POSTMENOPAUSAL DISORDER: ICD-10-CM

## 2018-02-20 DIAGNOSIS — Z00.00 ENCOUNTER FOR PREVENTIVE HEALTH EXAMINATION: ICD-10-CM

## 2018-02-20 DIAGNOSIS — I10 ESSENTIAL HYPERTENSION: ICD-10-CM

## 2018-02-20 DIAGNOSIS — R23.2 VASOMOTOR FLUSHING: Primary | ICD-10-CM

## 2018-02-20 DIAGNOSIS — R73.03 PREDIABETES: ICD-10-CM

## 2018-02-20 DIAGNOSIS — E78.00 PURE HYPERCHOLESTEROLEMIA: ICD-10-CM

## 2018-02-20 PROCEDURE — 99396 PREV VISIT EST AGE 40-64: CPT | Mod: S$GLB,,, | Performed by: INTERNAL MEDICINE

## 2018-02-20 PROCEDURE — 99999 PR PBB SHADOW E&M-EST. PATIENT-LVL III: CPT | Mod: PBBFAC,,, | Performed by: INTERNAL MEDICINE

## 2018-02-20 RX ORDER — ATENOLOL 50 MG/1
50 TABLET ORAL DAILY
Qty: 30 TABLET | Refills: 11 | Status: SHIPPED | OUTPATIENT
Start: 2018-02-20 | End: 2019-03-14

## 2018-02-20 NOTE — PROGRESS NOTES
"Subjective:       Patient ID: Beatrice Regan is a 63 y.o. female.    Chief Complaint: Annual Exam    Here for follow up of medical problems and preventive exam.  BPs 129-147/ 77-91, HR 77-97.  Tried not to take venlafaxine 25mg but got nausea so took it.    Episodes of face flushing.    Recently restarted exercise.  Energy good.  No f/c/sw/cough.  No diarrhea.    Renal artery u/s negative.  ECHO neg.  2/18 BMD normal.      HM: 10/17 fluvax, 2/17 [id number], 4/11 TDaP, 12/15 zostavax, 2/18 BMD normal rep 5y, 4/11 Cscope rep 10y, 3/17 [facility]/Gyn Dr. Elder, 2/17 HCV neg, 3/17 Eye Dr. Wade.      Review of Systems   Constitutional: Negative for activity change, chills, diaphoresis, fever and unexpected weight change.   HENT: Negative for hearing loss, rhinorrhea and trouble swallowing.    Eyes: Negative for discharge and visual disturbance.   Respiratory: Negative for cough, chest tightness, shortness of breath and wheezing.    Cardiovascular: Negative for chest pain, palpitations and leg swelling.   Gastrointestinal: Negative for blood in stool, constipation, diarrhea, nausea and vomiting.   Endocrine: Negative for polydipsia and polyuria.   Genitourinary: Negative for difficulty urinating, dysuria, frequency, hematuria and menstrual problem.   Musculoskeletal: Negative for arthralgias, joint swelling and neck pain.   Neurological: Negative for weakness and headaches.   Psychiatric/Behavioral: Negative for confusion and dysphoric mood. The patient is not nervous/anxious.        Objective:   /76 (BP Location: Right arm, Patient Position: Sitting)   Pulse 96   Temp 97.7 °F (36.5 °C) (Tympanic)   Ht 5' 7" (1.702 m)   Wt 81.9 kg (180 lb 8.9 oz)   SpO2 96%   BMI 28.28 kg/m²     Physical Exam   Constitutional: She is oriented to person, place, and time. She appears well-developed.   HENT:   Mouth/Throat: Oropharynx is clear and moist.   Neck: Neck supple. Carotid bruit is not present. No thyroid mass present. "   Cardiovascular: Normal rate, regular rhythm and intact distal pulses.  Exam reveals no gallop and no friction rub.    No murmur heard.  Pulmonary/Chest: Effort normal and breath sounds normal. She has no wheezes. She has no rales.   Abdominal: Soft. Bowel sounds are normal. She exhibits no mass. There is no hepatosplenomegaly. There is no tenderness.   Musculoskeletal: She exhibits no edema.   Lymphadenopathy:     She has no cervical adenopathy.   Neurological: She is alert and oriented to person, place, and time.   Psychiatric: She has a normal mood and affect.     Results for BROOKS VAUGHN (MRN [id number]) as of 2/20/2018 13:32   Ref. Range 2/13/2018 07:33   WBC Latest Ref Range: 3.90 - 12.70 K/uL 8.43   RBC Latest Ref Range: 4.00 - 5.40 M/uL 4.51   Hemoglobin Latest Ref Range: 12.0 - 16.0 g/dL 13.6   Hematocrit Latest Ref Range: 37.0 - 48.5 % 41.4   MCV Latest Ref Range: 82 - 98 fL 92   MCH Latest Ref Range: 27.0 - 31.0 pg 30.2   MCHC Latest Ref Range: 32.0 - 36.0 g/dL 32.9   RDW Latest Ref Range: 11.5 - 14.5 % 12.6   Platelets Latest Ref Range: 150 - 350 K/uL 315   MPV Latest Ref Range: 9.2 - 12.9 fL 10.5   Gran% Latest Ref Range: 38.0 - 73.0 % 52.7   Gran # (ANC) Latest Ref Range: 1.8 - 7.7 K/uL 4.4   Immature Granulocytes Latest Ref Range: 0.0 - 0.5 % 0.2   Immature Grans (Abs) Latest Ref Range: 0.00 - 0.04 K/uL 0.02   Lymph% Latest Ref Range: 18.0 - 48.0 % 38.1   Lymph # Latest Ref Range: 1.0 - 4.8 K/uL 3.2   Mono% Latest Ref Range: 4.0 - 15.0 % 5.9   Mono # Latest Ref Range: 0.3 - 1.0 K/uL 0.5   Eosinophil% Latest Ref Range: 0.0 - 8.0 % 2.7   Eos # Latest Ref Range: 0.0 - 0.5 K/uL 0.2   Basophil% Latest Ref Range: 0.0 - 1.9 % 0.4   Baso # Latest Ref Range: 0.00 - 0.20 K/uL 0.03   nRBC Latest Ref Range: 0 /100 WBC 0   Sodium Latest Ref Range: 136 - 145 mmol/L 142   Potassium Latest Ref Range: 3.5 - 5.1 mmol/L 4.1   Chloride Latest Ref Range: 95 - 110 mmol/L 107   CO2 Latest Ref Range: 23 - 29 mmol/L 26    Anion Gap Latest Ref Range: 8 - 16 mmol/L 9   BUN, Bld Latest Ref Range: 8 - 23 mg/dL 18   Creatinine Latest Ref Range: 0.5 - 1.4 mg/dL 0.8   eGFR if non African American Latest Ref Range: >60 mL/min/1.73 m^2 >60.0   eGFR if African American Latest Ref Range: >60 mL/min/1.73 m^2 >60.0   Glucose Latest Ref Range: 70 - 110 mg/dL 97   Calcium Latest Ref Range: 8.7 - 10.5 mg/dL 9.7   Alkaline Phosphatase Latest Ref Range: 55 - 135 U/L 72   Total Protein Latest Ref Range: 6.0 - 8.4 g/dL 7.3   Albumin Latest Ref Range: 3.5 - 5.2 g/dL 3.8   Total Bilirubin Latest Ref Range: 0.1 - 1.0 mg/dL 0.2   AST Latest Ref Range: 10 - 40 U/L 20   ALT Latest Ref Range: 10 - 44 U/L 27   Triglycerides Latest Ref Range: 30 - 150 mg/dL 213 (H)   Cholesterol Latest Ref Range: 120 - 199 mg/dL 190   HDL Latest Ref Range: 40 - 75 mg/dL 53   LDL Cholesterol Latest Ref Range: 63.0 - 159.0 mg/dL 94.4   Total Cholesterol/HDL Ratio Latest Ref Range: 2.0 - 5.0  3.6   Vit D, 25-Hydroxy Latest Ref Range: 30 - 96 ng/mL 29 (L)   Hemoglobin A1C Latest Ref Range: 4.0 - 5.6 % 5.7 (H)   Estimated Avg Glucose Latest Ref Range: 68 - 131 mg/dL 117   TSH Latest Ref Range: 0.400 - 4.000 uIU/mL 1.581     Assessment:       1. Vasomotor flushing    2. Encounter for preventive health examination    3. Prediabetes    4. Pure hypercholesterolemia    5. Essential hypertension    6. Menopausal and postmenopausal disorder        Plan:       Beatrice was seen today for annual exam.    Diagnoses and all orders for this visit:    Vasomotor flushing  -     Metanephrines, urine; Future  -     5 HIAA, quantitative, Urine; Future  -     VMA, urine; Future    Encounter for preventive health examination- utd.    Prediabetes- cont exercise.    Pure hypercholesterolemia- cont statin.    Essential hypertension- cont losartan, start once daily atenolol, cont prn propranolol.    Menopausal and postmenopausal disorder- want off venlafaxine 12.5mg daily.    RTC 2 mo.

## 2018-02-20 NOTE — TELEPHONE ENCOUNTER
Pharmacy called to advise that Atenolol 50mg is still on b/o. They do have the 25mg in stock.  She would like to change it Atenolol 25mg tablet take 2 tablets by mouth daily.  Please advise./rpr    [please do change to 2-25mg together daily, if they are extended release, otherwise to 25mg bid.  SM

## 2018-02-22 ENCOUNTER — LAB VISIT (OUTPATIENT)
Dept: LAB | Facility: HOSPITAL | Age: 63
End: 2018-02-22
Attending: INTERNAL MEDICINE
Payer: COMMERCIAL

## 2018-02-22 DIAGNOSIS — R23.2 VASOMOTOR FLUSHING: ICD-10-CM

## 2018-02-22 PROCEDURE — 83835 ASSAY OF METANEPHRINES: CPT

## 2018-02-22 PROCEDURE — 84585 ASSAY OF URINE VMA: CPT

## 2018-02-22 PROCEDURE — 83497 ASSAY OF 5-HIAA: CPT

## 2018-02-27 LAB
5HIAA & CREATININE UR-IMP: NORMAL
5OH-INDOLEACETATE 24H UR-MCNC: 2.1 MG/L
5OH-INDOLEACETATE 24H UR-MRATE: 5 MG/D (ref 0–15)
5OH-INDOLEACETATE/CREAT 24H UR: 5 MG/GCR (ref 0–14)
COLLECT DURATION TIME SPEC: 24 HR
CREAT 24H UR-MRATE: 1035 MG/D (ref 500–1400)
CREAT UR-MCNC: 46 MG/DL
METANEPH 24H UR-MCNC: 47 UG/L
METANEPH 24H UR-MRATE: 106 UG/D (ref 39–143)
METANEPH+NORMETANEPH UR-IMP: ABNORMAL
METANEPH/CREAT 24H UR: 102 UG/G CRT (ref 0–300)
NORMETANEPHRINE 24H UR-MCNC: 206 UG/L
NORMETANEPHRINE 24H UR-MRATE: 464 UG/D (ref 109–393)
NORMETANEPHRINE/CREAT 24H UR: 448 UG/G CRT (ref 0–400)
SPECIMEN VOL ?TM UR: 2250 ML
VMA & CREAT 24H UR-IMP: NORMAL
VMA 24H UR-MCNC: 2.2 MG/L
VMA 24H UR-MRATE: 5 MG/D (ref 0–7)
VMA/CREAT 24H UR: 5 MG/GCR (ref 0–6)

## 2018-03-08 DIAGNOSIS — E88.810 METABOLIC SYNDROME: ICD-10-CM

## 2018-03-08 RX ORDER — METFORMIN HYDROCHLORIDE 1000 MG/1
TABLET ORAL
Qty: 225 TABLET | Refills: 2 | Status: SHIPPED | OUTPATIENT
Start: 2018-03-08 | End: 2019-06-13

## 2018-03-08 NOTE — TELEPHONE ENCOUNTER
----- Message from Mary Patel sent at 3/8/2018 10:01 AM CST -----  Contact: pt  States she needs to schedule a mammogram. Please call pt at [phone]. Thank you

## 2018-03-29 ENCOUNTER — TELEPHONE (OUTPATIENT)
Dept: OBSTETRICS AND GYNECOLOGY | Facility: CLINIC | Age: 63
End: 2018-03-29

## 2018-03-29 DIAGNOSIS — Z12.39 BREAST CANCER SCREENING: Primary | ICD-10-CM

## 2018-03-29 NOTE — TELEPHONE ENCOUNTER
----- Message from Chela Gil sent at 3/29/2018 12:08 PM CDT -----  Contact: PT Portal Request  Appointment Request From: Beatrice Regan    With Provider: Other - (see comments)    Would Accept With:Only the person I've selected    Preferred Date Range: From 4/5/2018 To 4/13/2018    Preferred Times: Any    Reason for visit: Request an Appt    Comments:  I spoke with someone there to request appointment for mammogram.  I received notification of mammogram due in the mail.  According to phone conversation I needed Dr Elder to order it. She said she would get this for me so I could set up appointment. Have not heard back. Could you get request for mammogram and set appointment for me? Thank you Beatrice Regan    MAMMOGRAM ORDER NEEDS TO BE PLACED PLEASE

## 2018-03-29 NOTE — TELEPHONE ENCOUNTER
Spoke with pt, scheduled mammogram per pt request. Mammogram orders in per written guidelines. Pt verbalized understanding.

## 2018-04-02 ENCOUNTER — OFFICE VISIT (OUTPATIENT)
Dept: OPHTHALMOLOGY | Facility: CLINIC | Age: 63
End: 2018-04-02
Payer: COMMERCIAL

## 2018-04-02 DIAGNOSIS — R73.03 PREDIABETES: ICD-10-CM

## 2018-04-02 DIAGNOSIS — H04.129 DRY EYE: ICD-10-CM

## 2018-04-02 DIAGNOSIS — Z96.1 PSEUDOPHAKIA OF RIGHT EYE: ICD-10-CM

## 2018-04-02 DIAGNOSIS — H25.12 NUCLEAR SCLEROSIS, LEFT: Primary | ICD-10-CM

## 2018-04-02 PROCEDURE — 92014 COMPRE OPH EXAM EST PT 1/>: CPT | Mod: S$GLB,,, | Performed by: OPHTHALMOLOGY

## 2018-04-02 PROCEDURE — 99999 PR PBB SHADOW E&M-EST. PATIENT-LVL II: CPT | Mod: PBBFAC,,, | Performed by: OPHTHALMOLOGY

## 2018-04-02 NOTE — PROGRESS NOTES
SUBJECTIVE:   Beatrice Regan is a 63 y.o. female   Uncorrected distance visual acuity was 20/20-2 in the right eye and 20/25-1 in the left eye.Corrected near visual acuity was J1 using both eyes.   Chief Complaint   Patient presents with    Eye Exam     yearly exam        HPI:  HPI     Eye Exam    Additional comments: yearly exam           Comments   Patient states her vision fluctuates - feels dry. No gtts. Eyes feel   tired. Uses glasses only in dim light / computer / reading. Not for   driving.      1. S/p bilateral blepharoplasty for Dermatochalasis  2. Restor IOL OD 2/16/11 with Yag 5/19/11  3. NSC OS  4. Pre Diabetes       Last edited by Deloris Barnes MA on 4/2/2018  9:58 AM. (History)        Assessment /Plan :  1. Nuclear sclerosis, left Patient does reports mild visual decline from incipient cataractous changes, but not sufficient to affect activities of daily living. I recommend monitoring visual status and follow up when visual symptoms worsen.     2. Pseudophakia of right eye well   3. Dry eye Findings and symptoms consistent with dry eyes. recommending:AT's qid/titrate prn     4. Prediabetes No diabetic retinopathy at this time. Reviewed diabetic eye precautions including avoiding tobacco use,  Good glucose control, and importance of regular follow up.        RTC in 1 year or prn any changes

## 2018-04-24 ENCOUNTER — HOSPITAL ENCOUNTER (OUTPATIENT)
Dept: RADIOLOGY | Facility: HOSPITAL | Age: 63
Discharge: HOME | End: 2018-04-24
Attending: OBSTETRICS & GYNECOLOGY
Payer: COMMERCIAL

## 2018-04-24 VITALS — WEIGHT: 180 LBS | BODY MASS INDEX: 28.25 KG/M2 | HEIGHT: 67 IN

## 2018-04-24 DIAGNOSIS — Z12.39 BREAST CANCER SCREENING: ICD-10-CM

## 2018-04-24 PROCEDURE — 77067 SCR MAMMO BI INCL CAD: CPT | Mod: 26,,, | Performed by: RADIOLOGY

## 2018-04-24 PROCEDURE — 77067 SCR MAMMO BI INCL CAD: CPT | Mod: TC,PO

## 2018-04-24 PROCEDURE — 77063 BREAST TOMOSYNTHESIS BI: CPT | Mod: 26,,, | Performed by: RADIOLOGY

## 2018-09-18 DIAGNOSIS — I10 ESSENTIAL HYPERTENSION: ICD-10-CM

## 2018-09-18 DIAGNOSIS — E78.5 HYPERLIPIDEMIA: ICD-10-CM

## 2018-09-18 RX ORDER — LOSARTAN POTASSIUM 50 MG/1
TABLET ORAL
Qty: 90 TABLET | Refills: 2 | Status: SHIPPED | OUTPATIENT
Start: 2018-09-18 | End: 2019-06-11 | Stop reason: SDUPTHER

## 2018-09-18 RX ORDER — ATORVASTATIN CALCIUM 10 MG/1
TABLET, FILM COATED ORAL
Qty: 90 TABLET | Refills: 2 | Status: SHIPPED | OUTPATIENT
Start: 2018-09-18 | End: 2019-06-11 | Stop reason: SDUPTHER

## 2018-10-05 ENCOUNTER — PATIENT MESSAGE (OUTPATIENT)
Dept: INTERNAL MEDICINE | Facility: CLINIC | Age: 63
End: 2018-10-05

## 2018-11-11 ENCOUNTER — PATIENT MESSAGE (OUTPATIENT)
Dept: INTERNAL MEDICINE | Facility: CLINIC | Age: 63
End: 2018-11-11

## 2018-11-11 DIAGNOSIS — M79.673 HEEL PAIN, UNSPECIFIED LATERALITY: Primary | ICD-10-CM

## 2019-02-06 ENCOUNTER — OFFICE VISIT (OUTPATIENT)
Dept: DERMATOLOGY | Facility: CLINIC | Age: 64
End: 2019-02-06
Payer: MEDICARE

## 2019-02-06 DIAGNOSIS — L30.9 DERMATITIS: ICD-10-CM

## 2019-02-06 DIAGNOSIS — D23.9 BLUE NEVUS: Primary | ICD-10-CM

## 2019-02-06 DIAGNOSIS — L81.4 LENTIGO: ICD-10-CM

## 2019-02-06 DIAGNOSIS — L57.0 ACTINIC KERATOSIS: ICD-10-CM

## 2019-02-06 DIAGNOSIS — Z12.83 SCREENING, MALIGNANT NEOPLASM, SKIN: ICD-10-CM

## 2019-02-06 DIAGNOSIS — D18.00 ANGIOMA: ICD-10-CM

## 2019-02-06 PROCEDURE — 99999 PR PBB SHADOW E&M-EST. PATIENT-LVL II: CPT | Mod: PBBFAC,,, | Performed by: STUDENT IN AN ORGANIZED HEALTH CARE EDUCATION/TRAINING PROGRAM

## 2019-02-06 PROCEDURE — 1101F PT FALLS ASSESS-DOCD LE1/YR: CPT | Mod: CPTII,S$GLB,, | Performed by: STUDENT IN AN ORGANIZED HEALTH CARE EDUCATION/TRAINING PROGRAM

## 2019-02-06 PROCEDURE — 17003 DESTRUCTION, PREMALIGNANT LESIONS; SECOND THROUGH 14 LESIONS: ICD-10-PCS | Mod: S$GLB,,, | Performed by: STUDENT IN AN ORGANIZED HEALTH CARE EDUCATION/TRAINING PROGRAM

## 2019-02-06 PROCEDURE — 17000 PR DESTRUCTION(LASER SURGERY,CRYOSURGERY,CHEMOSURGERY),PREMALIGNANT LESIONS,FIRST LESION: ICD-10-PCS | Mod: S$GLB,,, | Performed by: STUDENT IN AN ORGANIZED HEALTH CARE EDUCATION/TRAINING PROGRAM

## 2019-02-06 PROCEDURE — 17000 DESTRUCT PREMALG LESION: CPT | Mod: S$GLB,,, | Performed by: STUDENT IN AN ORGANIZED HEALTH CARE EDUCATION/TRAINING PROGRAM

## 2019-02-06 PROCEDURE — 99999 PR PBB SHADOW E&M-EST. PATIENT-LVL II: ICD-10-PCS | Mod: PBBFAC,,, | Performed by: STUDENT IN AN ORGANIZED HEALTH CARE EDUCATION/TRAINING PROGRAM

## 2019-02-06 PROCEDURE — 99202 OFFICE O/P NEW SF 15 MIN: CPT | Mod: 25,S$GLB,, | Performed by: STUDENT IN AN ORGANIZED HEALTH CARE EDUCATION/TRAINING PROGRAM

## 2019-02-06 PROCEDURE — 1101F PR PT FALLS ASSESS DOC 0-1 FALLS W/OUT INJ PAST YR: ICD-10-PCS | Mod: CPTII,S$GLB,, | Performed by: STUDENT IN AN ORGANIZED HEALTH CARE EDUCATION/TRAINING PROGRAM

## 2019-02-06 PROCEDURE — 99202 PR OFFICE/OUTPT VISIT, NEW, LEVL II, 15-29 MIN: ICD-10-PCS | Mod: 25,S$GLB,, | Performed by: STUDENT IN AN ORGANIZED HEALTH CARE EDUCATION/TRAINING PROGRAM

## 2019-02-06 PROCEDURE — 17003 DESTRUCT PREMALG LES 2-14: CPT | Mod: S$GLB,,, | Performed by: STUDENT IN AN ORGANIZED HEALTH CARE EDUCATION/TRAINING PROGRAM

## 2019-02-06 RX ORDER — TRIAMCINOLONE ACETONIDE 1 MG/G
CREAM TOPICAL
Qty: 454 G | Refills: 0 | Status: SHIPPED | OUTPATIENT
Start: 2019-02-06

## 2019-02-06 NOTE — PROGRESS NOTES
Subjective:       Patient ID:  Beatrice Regan is a 64 y.o. female who presents for   Chief Complaint   Patient presents with    Skin Check     tbse      History of Present Illness: The patient presents with chief complaint of a skin lesion. She was last seen on 11/24/14, where she had several actinic keratosis treated with cryotherapy. Complains today of a mole on the ear.   Location: spot to ear   Duration: unknown   Signs/Symptoms: dark spot to left ear     Prior treatments: none     Pt has no history of skin cancer. Patient with a a son an 2 uncles with a history of melanoma. She has had several moles removed in the past, but none were concerning.           Review of Systems   Skin: Positive for itching and rash.        Objective:    Physical Exam   Constitutional: She appears well-developed and well-nourished. No distress.   Neurological: She is alert and oriented to person, place, and time. She is not disoriented.   Psychiatric: She has a normal mood and affect.   Skin:   Areas Examined (abnormalities noted in diagram):   Scalp / Hair Palpated and Inspected  Head / Face Inspection Performed  Neck Inspection Performed  Chest / Axilla Inspection Performed  Abdomen Inspection Performed  Genitals / Buttocks / Groin Inspection Performed  Back Inspection Performed  RUE Inspected  LUE Inspection Performed  RLE Inspected  LLE Inspection Performed  Nails and Digits Inspection Performed                   Diagram Legend     Erythematous scaling macule/papule c/w actinic keratosis       Vascular papule c/w angioma      Pigmented verrucoid papule/plaque c/w seborrheic keratosis      Yellow umbilicated papule c/w sebaceous hyperplasia      Irregularly shaped tan macule c/w lentigo     1-2 mm smooth white papules consistent with Milia      Movable subcutaneous cyst with punctum c/w epidermal inclusion cyst      Subcutaneous movable cyst c/w pilar cyst      Firm pink to brown papule c/w dermatofibroma       Pedunculated fleshy papule(s) c/w skin tag(s)      Evenly pigmented macule c/w junctional nevus     Mildly variegated pigmented, slightly irregular-bordered macule c/w mildly atypical nevus      Flesh colored to evenly pigmented papule c/w intradermal nevus       Pink pearly papule/plaque c/w basal cell carcinoma      Erythematous hyperkeratotic cursted plaque c/w SCC      Surgical scar with no sign of skin cancer recurrence      Open and closed comedones      Inflammatory papules and pustules      Verrucoid papule consistent consistent with wart     Erythematous eczematous patches and plaques     Dystrophic onycholytic nail with subungual debris c/w onychomycosis     Umbilicated papule    Erythematous-base heme-crusted tan verrucoid plaque consistent with inflamed seborrheic keratosis     Erythematous Silvery Scaling Plaque c/w Psoriasis     See annotation      Assessment / Plan:        Blue nevus  Reassurance     Actinic keratosis  Cryosurgery Procedure Note    Verbal consent from the patient is obtained including, but not limited to, risk of hypopigmentation/hyperpigmentation, scar, recurrence of lesion. The patient is aware of the precancerous quality and need for treatment of these lesions. Liquid nitrogen cryosurgery is applied to the 6 actinic keratoses, as detailed in the physical exam, to produce a freeze injury. The patient is aware that blisters may form and is instructed on wound care with gentle cleansing and use of vaseline ointment to keep moist until healed. The patient is supplied a handout on cryosurgery and is instructed to call if lesions do not completely resolve.    Lentigo  This is a benign hyperpigmented sun induced lesion. Daily sun protection will reduce the number of new lesions. Treatment of these benign lesions are considered cosmetic.    Angioma  This is a benign vascular lesion. Reassurance given. No treatment required.     Screening, malignant neoplasm, skin  Total body skin  examination performed today including at least 12 points as noted in physical examination. No lesions suspicious for malignancy noted.    Dermatitis  -     triamcinolone acetonide 0.1% (KENALOG) 0.1 % cream; AAA bid  Dispense: 454 g; Refill: 0         Follow-up in about 1 year (around 2/6/2020).

## 2019-02-07 ENCOUNTER — OFFICE VISIT (OUTPATIENT)
Dept: DERMATOLOGY | Facility: CLINIC | Age: 64
End: 2019-02-07
Payer: MEDICARE

## 2019-02-07 DIAGNOSIS — D48.5 NEOPLASM OF UNCERTAIN BEHAVIOR OF SKIN: Primary | ICD-10-CM

## 2019-02-07 PROCEDURE — 1101F PT FALLS ASSESS-DOCD LE1/YR: CPT | Mod: CPTII,S$GLB,, | Performed by: STUDENT IN AN ORGANIZED HEALTH CARE EDUCATION/TRAINING PROGRAM

## 2019-02-07 PROCEDURE — 99999 PR PBB SHADOW E&M-EST. PATIENT-LVL II: CPT | Mod: PBBFAC,,, | Performed by: STUDENT IN AN ORGANIZED HEALTH CARE EDUCATION/TRAINING PROGRAM

## 2019-02-07 PROCEDURE — 11104 PUNCH BX SKIN SINGLE LESION: CPT | Mod: S$GLB,,, | Performed by: STUDENT IN AN ORGANIZED HEALTH CARE EDUCATION/TRAINING PROGRAM

## 2019-02-07 PROCEDURE — 99999 PR PBB SHADOW E&M-EST. PATIENT-LVL II: ICD-10-PCS | Mod: PBBFAC,,, | Performed by: STUDENT IN AN ORGANIZED HEALTH CARE EDUCATION/TRAINING PROGRAM

## 2019-02-07 PROCEDURE — 11104 PR PUNCH BIOPSY, SKIN, SINGLE LESION: ICD-10-PCS | Mod: S$GLB,,, | Performed by: STUDENT IN AN ORGANIZED HEALTH CARE EDUCATION/TRAINING PROGRAM

## 2019-02-07 PROCEDURE — 1101F PR PT FALLS ASSESS DOC 0-1 FALLS W/OUT INJ PAST YR: ICD-10-PCS | Mod: CPTII,S$GLB,, | Performed by: STUDENT IN AN ORGANIZED HEALTH CARE EDUCATION/TRAINING PROGRAM

## 2019-02-07 PROCEDURE — 11105 PR PUNCH BIOPSY, SKIN, EA ADDTL LESION: ICD-10-PCS | Mod: S$GLB,,, | Performed by: STUDENT IN AN ORGANIZED HEALTH CARE EDUCATION/TRAINING PROGRAM

## 2019-02-07 PROCEDURE — 88304 TISSUE SPECIMEN TO PATHOLOGY, DERMATOLOGY: ICD-10-PCS | Mod: 26,,, | Performed by: PATHOLOGY

## 2019-02-07 PROCEDURE — 11105 PUNCH BX SKIN EA SEP/ADDL: CPT | Mod: S$GLB,,, | Performed by: STUDENT IN AN ORGANIZED HEALTH CARE EDUCATION/TRAINING PROGRAM

## 2019-02-07 PROCEDURE — 99212 PR OFFICE/OUTPT VISIT, EST, LEVL II, 10-19 MIN: ICD-10-PCS | Mod: 25,S$GLB,, | Performed by: STUDENT IN AN ORGANIZED HEALTH CARE EDUCATION/TRAINING PROGRAM

## 2019-02-07 PROCEDURE — 99212 OFFICE O/P EST SF 10 MIN: CPT | Mod: 25,S$GLB,, | Performed by: STUDENT IN AN ORGANIZED HEALTH CARE EDUCATION/TRAINING PROGRAM

## 2019-02-07 PROCEDURE — 88304 TISSUE EXAM BY PATHOLOGIST: CPT | Mod: 26,,, | Performed by: PATHOLOGY

## 2019-02-07 PROCEDURE — 88304 TISSUE EXAM BY PATHOLOGIST: CPT | Mod: 59 | Performed by: PATHOLOGY

## 2019-02-07 NOTE — PROGRESS NOTES
Subjective:       Patient ID:  Beatrice Regan is a 64 y.o. female who presents for   Chief Complaint   Patient presents with    Spot     c/o spots to chest x 1 year no tx      History of Present Illness: The patient presents with chief complaint of skin lesions  Location: chest   Duration: 1 year   Signs/Symptoms: bothersome     Prior treatments: none           Review of Systems   Skin: Negative for itching, rash and dry skin.        Objective:    Physical Exam   Constitutional: She appears well-developed and well-nourished. No distress.   Neurological: She is alert and oriented to person, place, and time. She is not disoriented.   Psychiatric: She has a normal mood and affect.   Skin:   Areas Examined (abnormalities noted in diagram):   Head / Face Inspection Performed  Neck Inspection Performed  Chest / Axilla Inspection Performed  RUE Inspected  LUE Inspection Performed              Diagram Legend     Erythematous scaling macule/papule c/w actinic keratosis       Vascular papule c/w angioma      Pigmented verrucoid papule/plaque c/w seborrheic keratosis      Yellow umbilicated papule c/w sebaceous hyperplasia      Irregularly shaped tan macule c/w lentigo     1-2 mm smooth white papules consistent with Milia      Movable subcutaneous cyst with punctum c/w epidermal inclusion cyst      Subcutaneous movable cyst c/w pilar cyst      Firm pink to brown papule c/w dermatofibroma      Pedunculated fleshy papule(s) c/w skin tag(s)      Evenly pigmented macule c/w junctional nevus     Mildly variegated pigmented, slightly irregular-bordered macule c/w mildly atypical nevus      Flesh colored to evenly pigmented papule c/w intradermal nevus       Pink pearly papule/plaque c/w basal cell carcinoma      Erythematous hyperkeratotic cursted plaque c/w SCC      Surgical scar with no sign of skin cancer recurrence      Open and closed comedones      Inflammatory papules and pustules      Verrucoid papule consistent  consistent with wart     Erythematous eczematous patches and plaques     Dystrophic onycholytic nail with subungual debris c/w onychomycosis     Umbilicated papule    Erythematous-base heme-crusted tan verrucoid plaque consistent with inflamed seborrheic keratosis     Erythematous Silvery Scaling Plaque c/w Psoriasis     See annotation      Assessment / Plan:      Pathology Orders:     Normal Orders This Visit    Tissue Specimen To Pathology, Dermatology     Questions:    Directional Terms:  Other(comment)    Clinical Information:  r/o EIC vs other    Specific Site:  superior chest    Tissue Specimen To Pathology, Dermatology     Questions:    Directional Terms:  Other(comment)    Clinical Information:  r/o EIC vs other    Specific Site:  inferior chest        Neoplasm of uncertain behavior of skin  -     Tissue Specimen To Pathology, Dermatology  -     Tissue Specimen To Pathology, Dermatology    Punch biopsy procedure note:  Punch biopsy performed after verbal consent obtained. Area marked and prepped with alcohol. Approximately 1cc of 1% lidocaine with epinephrine injected. 4 mm disposable punch used to remove lesion. Hemostasis obtained and biopsy site closed with 1 - 2 Prolene sutures. Wound care instructions reviewed with patient and handout given.           Follow-up in about 2 weeks (around 2/21/2019) for suture removal.

## 2019-02-18 ENCOUNTER — PATIENT MESSAGE (OUTPATIENT)
Dept: INTERNAL MEDICINE | Facility: CLINIC | Age: 64
End: 2019-02-18

## 2019-02-18 DIAGNOSIS — R73.03 PREDIABETES: ICD-10-CM

## 2019-02-18 DIAGNOSIS — E78.00 PURE HYPERCHOLESTEROLEMIA: ICD-10-CM

## 2019-02-18 DIAGNOSIS — Z29.9 PREVENTIVE MEASURE: Primary | ICD-10-CM

## 2019-02-20 ENCOUNTER — LAB VISIT (OUTPATIENT)
Dept: LAB | Facility: HOSPITAL | Age: 64
End: 2019-02-20
Payer: MEDICARE

## 2019-02-20 DIAGNOSIS — R73.03 PREDIABETES: ICD-10-CM

## 2019-02-20 DIAGNOSIS — E78.00 PURE HYPERCHOLESTEROLEMIA: ICD-10-CM

## 2019-02-20 DIAGNOSIS — Z29.9 PREVENTIVE MEASURE: ICD-10-CM

## 2019-02-20 LAB
ALBUMIN SERPL BCP-MCNC: 4.2 G/DL
ALP SERPL-CCNC: 67 U/L
ALT SERPL W/O P-5'-P-CCNC: 24 U/L
ANION GAP SERPL CALC-SCNC: 7 MMOL/L
AST SERPL-CCNC: 21 U/L
BASOPHILS # BLD AUTO: 0.04 K/UL
BASOPHILS NFR BLD: 0.7 %
BILIRUB SERPL-MCNC: 0.3 MG/DL
BUN SERPL-MCNC: 23 MG/DL
CALCIUM SERPL-MCNC: 10.6 MG/DL
CHLORIDE SERPL-SCNC: 107 MMOL/L
CHOLEST SERPL-MCNC: 156 MG/DL
CHOLEST/HDLC SERPL: 3.3 {RATIO}
CO2 SERPL-SCNC: 28 MMOL/L
CREAT SERPL-MCNC: 0.8 MG/DL
DIFFERENTIAL METHOD: NORMAL
EOSINOPHIL # BLD AUTO: 0.2 K/UL
EOSINOPHIL NFR BLD: 2.9 %
ERYTHROCYTE [DISTWIDTH] IN BLOOD BY AUTOMATED COUNT: 12.9 %
EST. GFR  (AFRICAN AMERICAN): >60 ML/MIN/1.73 M^2
EST. GFR  (NON AFRICAN AMERICAN): >60 ML/MIN/1.73 M^2
ESTIMATED AVG GLUCOSE: 117 MG/DL
GLUCOSE SERPL-MCNC: 100 MG/DL
HBA1C MFR BLD HPLC: 5.7 %
HCT VFR BLD AUTO: 43.8 %
HDLC SERPL-MCNC: 47 MG/DL
HDLC SERPL: 30.1 %
HGB BLD-MCNC: 14.4 G/DL
IMM GRANULOCYTES # BLD AUTO: 0.01 K/UL
IMM GRANULOCYTES NFR BLD AUTO: 0.2 %
LDLC SERPL CALC-MCNC: 81.4 MG/DL
LYMPHOCYTES # BLD AUTO: 2.2 K/UL
LYMPHOCYTES NFR BLD: 37.2 %
MCH RBC QN AUTO: 30.8 PG
MCHC RBC AUTO-ENTMCNC: 32.9 G/DL
MCV RBC AUTO: 94 FL
MONOCYTES # BLD AUTO: 0.5 K/UL
MONOCYTES NFR BLD: 9.1 %
NEUTROPHILS # BLD AUTO: 3 K/UL
NEUTROPHILS NFR BLD: 49.9 %
NONHDLC SERPL-MCNC: 109 MG/DL
NRBC BLD-RTO: 0 /100 WBC
PLATELET # BLD AUTO: 263 K/UL
PMV BLD AUTO: 11.1 FL
POTASSIUM SERPL-SCNC: 5.4 MMOL/L
PROT SERPL-MCNC: 7.3 G/DL
RBC # BLD AUTO: 4.68 M/UL
SODIUM SERPL-SCNC: 142 MMOL/L
TRIGL SERPL-MCNC: 138 MG/DL
TSH SERPL DL<=0.005 MIU/L-ACNC: 1.08 UIU/ML
WBC # BLD AUTO: 5.91 K/UL

## 2019-02-20 PROCEDURE — 84443 ASSAY THYROID STIM HORMONE: CPT

## 2019-02-20 PROCEDURE — 36415 COLL VENOUS BLD VENIPUNCTURE: CPT

## 2019-02-20 PROCEDURE — 80053 COMPREHEN METABOLIC PANEL: CPT

## 2019-02-20 PROCEDURE — 85025 COMPLETE CBC W/AUTO DIFF WBC: CPT

## 2019-02-20 PROCEDURE — 83036 HEMOGLOBIN GLYCOSYLATED A1C: CPT

## 2019-02-20 PROCEDURE — 80061 LIPID PANEL: CPT

## 2019-02-21 ENCOUNTER — CLINICAL SUPPORT (OUTPATIENT)
Dept: DERMATOLOGY | Facility: CLINIC | Age: 64
End: 2019-02-21
Payer: MEDICARE

## 2019-02-21 ENCOUNTER — LAB VISIT (OUTPATIENT)
Dept: LAB | Facility: HOSPITAL | Age: 64
End: 2019-02-21
Payer: MEDICARE

## 2019-02-21 ENCOUNTER — PATIENT MESSAGE (OUTPATIENT)
Dept: INTERNAL MEDICINE | Facility: CLINIC | Age: 64
End: 2019-02-21

## 2019-02-21 DIAGNOSIS — R79.9 ABNORMAL BLOOD CHEMISTRY: ICD-10-CM

## 2019-02-21 DIAGNOSIS — R79.9 ABNORMAL BLOOD CHEMISTRY: Primary | ICD-10-CM

## 2019-02-21 DIAGNOSIS — Z48.02 VISIT FOR SUTURE REMOVAL: Primary | ICD-10-CM

## 2019-02-21 LAB
ANION GAP SERPL CALC-SCNC: 11 MMOL/L
BUN SERPL-MCNC: 16 MG/DL
CALCIUM SERPL-MCNC: 10.6 MG/DL
CHLORIDE SERPL-SCNC: 103 MMOL/L
CO2 SERPL-SCNC: 27 MMOL/L
CREAT SERPL-MCNC: 0.7 MG/DL
EST. GFR  (AFRICAN AMERICAN): >60 ML/MIN/1.73 M^2
EST. GFR  (NON AFRICAN AMERICAN): >60 ML/MIN/1.73 M^2
GLUCOSE SERPL-MCNC: 93 MG/DL
POTASSIUM SERPL-SCNC: 4.2 MMOL/L
SODIUM SERPL-SCNC: 141 MMOL/L

## 2019-02-21 PROCEDURE — 80048 BASIC METABOLIC PNL TOTAL CA: CPT

## 2019-02-21 PROCEDURE — 99024 PR POST-OP FOLLOW-UP VISIT: ICD-10-PCS | Mod: S$GLB,,, | Performed by: STUDENT IN AN ORGANIZED HEALTH CARE EDUCATION/TRAINING PROGRAM

## 2019-02-21 PROCEDURE — 99024 POSTOP FOLLOW-UP VISIT: CPT | Mod: S$GLB,,, | Performed by: STUDENT IN AN ORGANIZED HEALTH CARE EDUCATION/TRAINING PROGRAM

## 2019-02-21 PROCEDURE — 36415 COLL VENOUS BLD VENIPUNCTURE: CPT

## 2019-02-28 NOTE — PROGRESS NOTES
"Subjective:      Patient ID: Beatrice Regan is a 64 y.o. female.    Chief Complaint: Annual Exam      HPI  Here for f/u medical problems and preventive exam.  Energy good.  Has lost 25# purposefully.  On [employer].  Gym treadmill and weights and walks daily.    No f/c/sw/cough.  No cp/sob/palp.  BMs normal.  Urine normal.  Taking vit D daily.  Taking Ca supplement plus "feuling bars" have calcium.  Off metformin x 3mo.    HM: 10/17 fluvax, 3/19 today HAV, 2/17 [id number], 3/19 today [id number], 4/11 TDaP, 12/15 zostavax, 2/18 BMD normal rep 5y, 4/11 Cscope rep 10y, 4/18 [facility]/Gyn Dr. Elder, 2/17 HCV neg, 4/18 Eye Dr. Wade.     Review of Systems   Constitutional: Negative for activity change, appetite change, chills, diaphoresis, fever and unexpected weight change.   HENT: Negative for congestion, ear pain, hearing loss, rhinorrhea, sinus pressure, sore throat and trouble swallowing.    Eyes: Negative for discharge and visual disturbance.   Respiratory: Negative for cough, chest tightness, shortness of breath and wheezing.    Cardiovascular: Negative for chest pain and palpitations.   Gastrointestinal: Negative for blood in stool, constipation, diarrhea, nausea and vomiting.   Endocrine: Negative for polydipsia and polyuria.   Genitourinary: Negative for difficulty urinating, dysuria, frequency, hematuria, menstrual problem, urgency and vaginal discharge.   Musculoskeletal: Negative for arthralgias, joint swelling and neck pain.   Skin: Negative for rash.   Neurological: Negative for dizziness, weakness and headaches.   Psychiatric/Behavioral: Negative for confusion, dysphoric mood and sleep disturbance. The patient is not nervous/anxious.          Objective:   /70   Pulse 88   Temp 97.7 °F (36.5 °C) (Tympanic)   Ht 5' 7" (1.702 m)   Wt 70.6 kg (155 lb 10.3 oz)   SpO2 98%   BMI 24.38 kg/m²     Physical Exam   Constitutional: She is oriented to person, place, and time. She appears well-developed and " well-nourished.   HENT:   Right Ear: External ear normal. Tympanic membrane is not injected.   Left Ear: External ear normal. Tympanic membrane is not injected.   Mouth/Throat: Oropharynx is clear and moist.   Eyes: Conjunctivae are normal.   Neck: Normal range of motion. Neck supple. No thyromegaly present.   Cardiovascular: Normal rate, regular rhythm and intact distal pulses. Exam reveals no gallop and no friction rub.   No murmur heard.  Pulmonary/Chest: Effort normal and breath sounds normal. She has no wheezes. She has no rales.   Abdominal: Soft. Bowel sounds are normal. She exhibits no mass. There is no tenderness.   Musculoskeletal: She exhibits no edema.   Lymphadenopathy:     She has no cervical adenopathy.   Neurological: She is alert and oriented to person, place, and time.   Skin: Skin is warm. No rash noted.   Psychiatric: She has a normal mood and affect.     Results for orders placed or performed in visit on 02/21/19   Basic metabolic panel   Result Value Ref Range    Sodium 141 136 - 145 mmol/L    Potassium 4.2 3.5 - 5.1 mmol/L    Chloride 103 95 - 110 mmol/L    CO2 27 23 - 29 mmol/L    Glucose 93 70 - 110 mg/dL    BUN, Bld 16 8 - 23 mg/dL    Creatinine 0.7 0.5 - 1.4 mg/dL    Calcium 10.6 (H) 8.7 - 10.5 mg/dL    Anion Gap 11 8 - 16 mmol/L    eGFR if African American >60.0 >60 mL/min/1.73 m^2    eGFR if non African American >60.0 >60 mL/min/1.73 m^2           Assessment:       1. Encounter for preventive health examination    2. Pure hypercholesterolemia    3. Prediabetes    4. Menopausal and postmenopausal disorder    5. Essential hypertension    6. Hypercalcemia          Plan:     Encounter for preventive health examination- utd.  HAV, [id number], Discussed pt needs to get Shingrix vaccination at pharmacy.  -     Pneumococcal Polysaccharide Vaccine (23 Valent) (SQ/IM)  -     Hepatitis A Vaccine (Adult) (IM)    Pure hypercholesterolemia- 10yr vasc risk 6%, cont exercise and statin.    Prediabetes-  cont wt loss and exercise, recheck 3mo.  -     Hemoglobin A1c; Future; Expected date: 03/14/2019    Menopausal and postmenopausal disorder    Essential hypertension- stable, cont rx.    Hypercalcemia- recheck 3mo.  -     PTH, intact; Future; Expected date: 03/28/2019  -     Basic metabolic panel; Future; Expected date: 03/14/2019  -     Calcium, ionized; Future; Expected date: 03/14/2019

## 2019-03-14 ENCOUNTER — OFFICE VISIT (OUTPATIENT)
Dept: DERMATOLOGY | Facility: CLINIC | Age: 64
End: 2019-03-14
Payer: MEDICARE

## 2019-03-14 ENCOUNTER — OFFICE VISIT (OUTPATIENT)
Dept: INTERNAL MEDICINE | Facility: CLINIC | Age: 64
End: 2019-03-14
Payer: MEDICARE

## 2019-03-14 VITALS
SYSTOLIC BLOOD PRESSURE: 118 MMHG | DIASTOLIC BLOOD PRESSURE: 70 MMHG | HEIGHT: 67 IN | OXYGEN SATURATION: 98 % | TEMPERATURE: 98 F | HEART RATE: 88 BPM | WEIGHT: 155.63 LBS | BODY MASS INDEX: 24.43 KG/M2

## 2019-03-14 DIAGNOSIS — E78.00 PURE HYPERCHOLESTEROLEMIA: ICD-10-CM

## 2019-03-14 DIAGNOSIS — N95.9 MENOPAUSAL AND POSTMENOPAUSAL DISORDER: ICD-10-CM

## 2019-03-14 DIAGNOSIS — R73.03 PREDIABETES: ICD-10-CM

## 2019-03-14 DIAGNOSIS — Z00.00 ENCOUNTER FOR PREVENTIVE HEALTH EXAMINATION: Primary | ICD-10-CM

## 2019-03-14 DIAGNOSIS — T14.8XXA HEMATOMA: Primary | ICD-10-CM

## 2019-03-14 DIAGNOSIS — I10 ESSENTIAL HYPERTENSION: ICD-10-CM

## 2019-03-14 DIAGNOSIS — E83.52 HYPERCALCEMIA: ICD-10-CM

## 2019-03-14 PROCEDURE — 90732 PNEUMOCOCCAL POLYSACCHARIDE VACCINE 23-VALENT =>2YO SQ IM: ICD-10-PCS | Mod: S$GLB,,, | Performed by: INTERNAL MEDICINE

## 2019-03-14 PROCEDURE — G0009 PNEUMOCOCCAL POLYSACCHARIDE VACCINE 23-VALENT =>2YO SQ IM: ICD-10-PCS | Mod: 59,S$GLB,, | Performed by: INTERNAL MEDICINE

## 2019-03-14 PROCEDURE — 99999 PR PBB SHADOW E&M-EST. PATIENT-LVL I: CPT | Mod: PBBFAC,,, | Performed by: STUDENT IN AN ORGANIZED HEALTH CARE EDUCATION/TRAINING PROGRAM

## 2019-03-14 PROCEDURE — 99999 PR PBB SHADOW E&M-EST. PATIENT-LVL I: ICD-10-PCS | Mod: PBBFAC,,, | Performed by: STUDENT IN AN ORGANIZED HEALTH CARE EDUCATION/TRAINING PROGRAM

## 2019-03-14 PROCEDURE — G0009 ADMIN PNEUMOCOCCAL VACCINE: HCPCS | Mod: 59,S$GLB,, | Performed by: INTERNAL MEDICINE

## 2019-03-14 PROCEDURE — 90471 HEPATITIS A VACCINE ADULT IM: ICD-10-PCS | Mod: S$GLB,,, | Performed by: INTERNAL MEDICINE

## 2019-03-14 PROCEDURE — 3074F SYST BP LT 130 MM HG: CPT | Mod: CPTII,S$GLB,, | Performed by: INTERNAL MEDICINE

## 2019-03-14 PROCEDURE — 99397 PR PREVENTIVE VISIT,EST,65 & OVER: ICD-10-PCS | Mod: 25,S$GLB,, | Performed by: INTERNAL MEDICINE

## 2019-03-14 PROCEDURE — 3078F PR MOST RECENT DIASTOLIC BLOOD PRESSURE < 80 MM HG: ICD-10-PCS | Mod: CPTII,S$GLB,, | Performed by: INTERNAL MEDICINE

## 2019-03-14 PROCEDURE — 90632 HEPA VACCINE ADULT IM: CPT | Mod: S$GLB,,, | Performed by: INTERNAL MEDICINE

## 2019-03-14 PROCEDURE — 99999 PR PBB SHADOW E&M-EST. PATIENT-LVL IV: CPT | Mod: PBBFAC,,, | Performed by: INTERNAL MEDICINE

## 2019-03-14 PROCEDURE — 99024 POSTOP FOLLOW-UP VISIT: CPT | Mod: S$GLB,,, | Performed by: STUDENT IN AN ORGANIZED HEALTH CARE EDUCATION/TRAINING PROGRAM

## 2019-03-14 PROCEDURE — 99999 PR PBB SHADOW E&M-EST. PATIENT-LVL IV: ICD-10-PCS | Mod: PBBFAC,,, | Performed by: INTERNAL MEDICINE

## 2019-03-14 PROCEDURE — 99024 PR POST-OP FOLLOW-UP VISIT: ICD-10-PCS | Mod: S$GLB,,, | Performed by: STUDENT IN AN ORGANIZED HEALTH CARE EDUCATION/TRAINING PROGRAM

## 2019-03-14 PROCEDURE — 90732 PPSV23 VACC 2 YRS+ SUBQ/IM: CPT | Mod: S$GLB,,, | Performed by: INTERNAL MEDICINE

## 2019-03-14 PROCEDURE — 3074F PR MOST RECENT SYSTOLIC BLOOD PRESSURE < 130 MM HG: ICD-10-PCS | Mod: CPTII,S$GLB,, | Performed by: INTERNAL MEDICINE

## 2019-03-14 PROCEDURE — 90471 IMMUNIZATION ADMIN: CPT | Mod: S$GLB,,, | Performed by: INTERNAL MEDICINE

## 2019-03-14 PROCEDURE — 90632 HEPATITIS A VACCINE ADULT IM: ICD-10-PCS | Mod: S$GLB,,, | Performed by: INTERNAL MEDICINE

## 2019-03-14 PROCEDURE — 99397 PER PM REEVAL EST PAT 65+ YR: CPT | Mod: 25,S$GLB,, | Performed by: INTERNAL MEDICINE

## 2019-03-14 PROCEDURE — 3078F DIAST BP <80 MM HG: CPT | Mod: CPTII,S$GLB,, | Performed by: INTERNAL MEDICINE

## 2019-03-19 ENCOUNTER — PATIENT MESSAGE (OUTPATIENT)
Dept: OPHTHALMOLOGY | Facility: CLINIC | Age: 64
End: 2019-03-19

## 2019-03-19 ENCOUNTER — TELEPHONE (OUTPATIENT)
Dept: OPHTHALMOLOGY | Facility: CLINIC | Age: 64
End: 2019-03-19

## 2019-03-19 NOTE — TELEPHONE ENCOUNTER
Spoke with pt.  She has swollen lump on lid.  She thinks it is a stye  It has been there about 4 days.  No tearing or discharge.  She just started warm compresses yesterday.  Discussed that warm compresses 3-4x a day is often the only treatment needed to make stye resolve.  Offered appt today with Dr. Serrano, but pt chooses to try warm compresses first.  To call back if she has any more concerns.

## 2019-04-15 ENCOUNTER — TELEPHONE (OUTPATIENT)
Dept: OPHTHALMOLOGY | Facility: CLINIC | Age: 64
End: 2019-04-15

## 2019-04-15 ENCOUNTER — OFFICE VISIT (OUTPATIENT)
Dept: OPHTHALMOLOGY | Facility: CLINIC | Age: 64
End: 2019-04-15
Payer: MEDICARE

## 2019-04-15 DIAGNOSIS — H25.12 SENILE NUCLEAR CATARACT, LEFT: Primary | ICD-10-CM

## 2019-04-15 DIAGNOSIS — H00.12 CHALAZION OF RIGHT LOWER EYELID: ICD-10-CM

## 2019-04-15 DIAGNOSIS — H43.813 POSTERIOR VITREOUS DETACHMENT OF BOTH EYES: ICD-10-CM

## 2019-04-15 DIAGNOSIS — Z96.1 PSEUDOPHAKIA OF RIGHT EYE: ICD-10-CM

## 2019-04-15 PROCEDURE — 92136 IOL MASTER - OS - LEFT EYE: ICD-10-PCS | Mod: LT,S$GLB,, | Performed by: OPHTHALMOLOGY

## 2019-04-15 PROCEDURE — 99999 PR PBB SHADOW E&M-EST. PATIENT-LVL II: ICD-10-PCS | Mod: PBBFAC,,, | Performed by: OPHTHALMOLOGY

## 2019-04-15 PROCEDURE — 99999 PR PBB SHADOW E&M-EST. PATIENT-LVL II: CPT | Mod: PBBFAC,,, | Performed by: OPHTHALMOLOGY

## 2019-04-15 PROCEDURE — 92136 OPHTHALMIC BIOMETRY: CPT | Mod: LT,S$GLB,, | Performed by: OPHTHALMOLOGY

## 2019-04-15 PROCEDURE — 92014 COMPRE OPH EXAM EST PT 1/>: CPT | Mod: S$GLB,,, | Performed by: OPHTHALMOLOGY

## 2019-04-15 PROCEDURE — 92014 PR EYE EXAM, EST PATIENT,COMPREHESV: ICD-10-PCS | Mod: S$GLB,,, | Performed by: OPHTHALMOLOGY

## 2019-04-15 RX ORDER — PREDNISOLONE ACETATE-GATIFLOXACIN-BROMFENAC .75; 5; 1 MG/ML; MG/ML; MG/ML
1 SUSPENSION/ DROPS OPHTHALMIC 4 TIMES DAILY
Qty: 7 ML | Refills: 1 | Status: SHIPPED | OUTPATIENT
Start: 2019-04-15

## 2019-04-15 NOTE — PROGRESS NOTES
SUBJECTIVE:   Beatrice Regan is a 64 y.o. female   Uncorrected distance visual acuity was 20/25 in the right eye and 20/40 in the left eye.   Chief Complaint   Patient presents with    Cataract        HPI:  HPI     Patient c/o chalazions first ALAINA and then RLL which is  almost gone ,   patient states distant vision is blurry in OS  And patient states more   difficult to bring objects int o focus.         1. S/p bilateral blepharoplasty for Dermatochalasis  2. Restor IOL OD 2/16/11 with Yag 5/19/11  3. NSC OS  4. Pre Diabetes    Last edited by JOSEF Lomax on 4/15/2019  8:24 AM. (History)        Assessment /Plan :  1. Senile nuclear cataract, left  Visually Significant Cataract OS:   Patient reports decreased vision consistent with the clinical amount of lenticular opacity,  which reaches the level of visual significance and affects activities of daily living such as  read. Risks, benefits, and alternatives to cataract surgery were  discussed.  IOL options were discussed, including Premium IOL'S and the associated  side effects and additional patient cost associated with them as well as patient's visual  goals. The pt expressed a desire to proceed with surgery with the potential for some  reasonable degree of visual improvement and was consented.  Risks of loss of vision and eye reviewed as well as possibility of need for spectacle correction after surgery even with premium implants. Verbal and written preop  instructions were provided to the patient.      Pt is interested in ORA assisted Mulitfocal IOL's with possible TORIC correction if indicated and understands that approximately 80% patients generally do not require glasses after surgery but some pts still wear glasses for near, intermediate and/or distance tasks especially if they have higher levels of astigmatism. Some pt also see haloes around lights and have more difficulty reading in reduced lighting.The additonal cost of $1900 per eye will be  the patient's responsibilty and second eye will also require a multifocal lens implant.      Final visual acuity may be limited by astigmatism    Pt wishes to have Phaco/IOL  OS     Requests a Restor Multifocal IOL. 2.5 with ORA    Will aim for near and distance    Other considerations: None                 2. Pseudophakia of right eye  -- Condition stable, no therapeutic change required. Monitoring routinely.     3. Chalazion of right lower eyelid Findings and symptoms consistent with Chalazion right lower eyelid. The chalzion instruction sheet was reviewed with the pt, recommending frequent warm wet compresses 20-30 min and rpt 3-4 times. Clean lid margins with dilute baby shampoo or ocusoft pads and omega 3 Fish oils orally. If worsens or fails to resolve in approximately 3-4 weeks call back to schedule chalazion excision if desired.     4. Posterior vitreous detachment of both eyes  -- Condition stable, no therapeutic change required. Monitoring routinely.

## 2019-04-15 NOTE — TELEPHONE ENCOUNTER
Spoke with Edilma.  Pt called back and has decided just to do lid hygiene for now.  She won't be getting medicine for her chalazion.

## 2019-04-15 NOTE — TELEPHONE ENCOUNTER
----- Message from Mignon Mares sent at 4/15/2019 11:03 AM CDT -----  Contact: [facility]essie   She's calling in regards to another medication that's cheaper/ affordable       [facility] call back at [phone]

## 2019-04-24 ENCOUNTER — TELEPHONE (OUTPATIENT)
Dept: FAMILY MEDICINE | Facility: CLINIC | Age: 64
End: 2019-04-24

## 2019-04-24 DIAGNOSIS — Z12.39 BREAST CANCER SCREENING: Primary | ICD-10-CM

## 2019-04-24 NOTE — TELEPHONE ENCOUNTER
----- Message from Cira Smith sent at 4/24/2019  9:48 AM CDT -----  Contact: pt  She's calling In regards to orders for mammo,pls call pt back at      [phone] (home)

## 2019-05-24 ENCOUNTER — OFFICE VISIT (OUTPATIENT)
Dept: DERMATOLOGY | Facility: CLINIC | Age: 64
End: 2019-05-24
Payer: MEDICARE

## 2019-05-24 DIAGNOSIS — L81.4 LENTIGO: ICD-10-CM

## 2019-05-24 DIAGNOSIS — L82.1 SEBORRHEIC KERATOSES: Primary | ICD-10-CM

## 2019-05-24 PROCEDURE — 1101F PT FALLS ASSESS-DOCD LE1/YR: CPT | Mod: CPTII,S$GLB,, | Performed by: STUDENT IN AN ORGANIZED HEALTH CARE EDUCATION/TRAINING PROGRAM

## 2019-05-24 PROCEDURE — 99212 PR OFFICE/OUTPT VISIT, EST, LEVL II, 10-19 MIN: ICD-10-PCS | Mod: S$GLB,,, | Performed by: STUDENT IN AN ORGANIZED HEALTH CARE EDUCATION/TRAINING PROGRAM

## 2019-05-24 PROCEDURE — 99999 PR PBB SHADOW E&M-EST. PATIENT-LVL III: CPT | Mod: PBBFAC,,, | Performed by: STUDENT IN AN ORGANIZED HEALTH CARE EDUCATION/TRAINING PROGRAM

## 2019-05-24 PROCEDURE — 99212 OFFICE O/P EST SF 10 MIN: CPT | Mod: S$GLB,,, | Performed by: STUDENT IN AN ORGANIZED HEALTH CARE EDUCATION/TRAINING PROGRAM

## 2019-05-24 PROCEDURE — 99999 PR PBB SHADOW E&M-EST. PATIENT-LVL III: ICD-10-PCS | Mod: PBBFAC,,, | Performed by: STUDENT IN AN ORGANIZED HEALTH CARE EDUCATION/TRAINING PROGRAM

## 2019-05-24 PROCEDURE — 1101F PR PT FALLS ASSESS DOC 0-1 FALLS W/OUT INJ PAST YR: ICD-10-PCS | Mod: CPTII,S$GLB,, | Performed by: STUDENT IN AN ORGANIZED HEALTH CARE EDUCATION/TRAINING PROGRAM

## 2019-05-24 RX ORDER — TRETINOIN 0.25 MG/G
CREAM TOPICAL NIGHTLY
Qty: 45 G | Refills: 6 | Status: SHIPPED | OUTPATIENT
Start: 2019-05-24

## 2019-05-24 NOTE — PROGRESS NOTES
Subjective:       Patient ID:  Beatrice Regan is a 64 y.o. female who presents for   Chief Complaint   Patient presents with    Spot     c/o spot to leg x 3-4 weeks tx none      History of Present Illness: The patient presents with chief complaint of a skin lesion.  Location: leg   Duration:  3-4 weeks   Signs/Symptoms: redness, crusty  Prior treatments: none   Denies any history of skin cancer.     At last visit on 3/14/19, she had a hematoma on the chest from a punch biopsy. That has since resolved. No issues in the interim.         Review of Systems   Skin: Negative for itching, rash and dry skin.        Objective:    Physical Exam   Constitutional: She appears well-developed and well-nourished. No distress.   Neurological: She is alert and oriented to person, place, and time. She is not disoriented.   Psychiatric: She has a normal mood and affect.   Skin:   Areas Examined (abnormalities noted in diagram):   Head / Face Inspection Performed  Neck Inspection Performed  Chest / Axilla Inspection Performed  RUE Inspected  LUE Inspection Performed  RLE Inspected  LLE Inspection Performed              Diagram Legend     Erythematous scaling macule/papule c/w actinic keratosis       Vascular papule c/w angioma      Pigmented verrucoid papule/plaque c/w seborrheic keratosis      Yellow umbilicated papule c/w sebaceous hyperplasia      Irregularly shaped tan macule c/w lentigo     1-2 mm smooth white papules consistent with Milia      Movable subcutaneous cyst with punctum c/w epidermal inclusion cyst      Subcutaneous movable cyst c/w pilar cyst      Firm pink to brown papule c/w dermatofibroma      Pedunculated fleshy papule(s) c/w skin tag(s)      Evenly pigmented macule c/w junctional nevus     Mildly variegated pigmented, slightly irregular-bordered macule c/w mildly atypical nevus      Flesh colored to evenly pigmented papule c/w intradermal nevus       Pink pearly papule/plaque c/w basal cell carcinoma       Erythematous hyperkeratotic cursted plaque c/w SCC      Surgical scar with no sign of skin cancer recurrence      Open and closed comedones      Inflammatory papules and pustules      Verrucoid papule consistent consistent with wart     Erythematous eczematous patches and plaques     Dystrophic onycholytic nail with subungual debris c/w onychomycosis     Umbilicated papule    Erythematous-base heme-crusted tan verrucoid plaque consistent with inflamed seborrheic keratosis     Erythematous Silvery Scaling Plaque c/w Psoriasis     See annotation      Assessment / Plan:        Seborrheic keratoses  These are benign inherited growths without a malignant potential. Reassurance given to patient.     Cryosurgery procedure note:    Verbal consent from the patient is obtained including, but not limited to, risk of hypopigmentation/hyperpigmentation, scar, recurrence of lesion. Liquid nitrogen cryosurgery is applied to 1 lesions to produce a freeze injury. The patient is aware that blisters may form and is instructed on wound care with gentle cleansing and use of vaseline ointment to keep moist until healed. The patient is supplied a handout on cryosurgery and is instructed to call if lesions do not completely resolve.    Lentigo  This is a benign hyperpigmented sun induced lesion. Daily sun protection will reduce the number of new lesions. Treatment of these benign lesions are considered cosmetic.    -     tretinoin (RETIN-A) 0.025 % cream; Apply topically every evening.  Dispense: 45 g; Refill: 6         Follow up in about 1 year (around 5/24/2020).

## 2019-05-28 ENCOUNTER — HOSPITAL ENCOUNTER (OUTPATIENT)
Dept: RADIOLOGY | Facility: HOSPITAL | Age: 64
Discharge: HOME | End: 2019-05-28
Attending: INTERNAL MEDICINE
Payer: MEDICARE

## 2019-05-28 VITALS — WEIGHT: 155 LBS | HEIGHT: 67 IN | BODY MASS INDEX: 24.33 KG/M2

## 2019-05-28 DIAGNOSIS — Z12.39 BREAST CANCER SCREENING: ICD-10-CM

## 2019-05-28 PROCEDURE — 77067 SCR MAMMO BI INCL CAD: CPT | Mod: TC

## 2019-05-28 PROCEDURE — 77063 BREAST TOMOSYNTHESIS BI: CPT | Mod: 26,,, | Performed by: RADIOLOGY

## 2019-05-28 PROCEDURE — 77063 MAMMO DIGITAL SCREENING BILAT WITH TOMOSYNTHESIS_CAD: ICD-10-PCS | Mod: 26,,, | Performed by: RADIOLOGY

## 2019-05-28 PROCEDURE — 77067 SCR MAMMO BI INCL CAD: CPT | Mod: 26,,, | Performed by: RADIOLOGY

## 2019-05-28 PROCEDURE — 77067 MAMMO DIGITAL SCREENING BILAT WITH TOMOSYNTHESIS_CAD: ICD-10-PCS | Mod: 26,,, | Performed by: RADIOLOGY

## 2019-05-30 NOTE — PROGRESS NOTES
"Subjective:      Patient ID: Beatrice Regan is a 64 y.o. female.    Chief Complaint: Follow-up (3 months )      HPI  Here for follow up of medical problems.  Lost 16# more, total 41#.  Exercising daily.  Energy good.  BMs normal.  No separate Ca supplement.  No f/c/sw/cough.  No cp/sob/palp.    BP at home 106-117/68.    Updated/ annual due 3/20:  HM: 10/17 fluvax, 3/19 HAV, 2/17 [id number], 3/19 [id number], 4/11 TDaP, 12/15 zostavax, 2/18 BMD normal rep 5y, 4/11 Cscope rep 10y, 5/19 MMG/Gyn Dr. Elder, 2/17 HCV neg, 4/18 Eye Dr. Wade.     Review of Systems   Constitutional: Negative for activity change, chills, diaphoresis, fever and unexpected weight change.   HENT: Negative for hearing loss, rhinorrhea and trouble swallowing.    Eyes: Negative for discharge and visual disturbance.   Respiratory: Negative for cough, chest tightness, shortness of breath and wheezing.    Cardiovascular: Negative for chest pain, palpitations and leg swelling.   Gastrointestinal: Negative for blood in stool, constipation, diarrhea, nausea and vomiting.   Endocrine: Negative for polydipsia and polyuria.   Genitourinary: Negative for difficulty urinating, dysuria, frequency, hematuria and menstrual problem.   Musculoskeletal: Negative for arthralgias, joint swelling and neck pain.   Neurological: Negative for weakness and headaches.   Psychiatric/Behavioral: Negative for confusion and dysphoric mood. The patient is not nervous/anxious.          Objective:   /68   Pulse 94   Temp 98.6 °F (37 °C) (Oral)   Ht 5' 7" (1.702 m)   Wt 63.5 kg (139 lb 15.9 oz)   SpO2 96%   BMI 21.93 kg/m²     Physical Exam   Constitutional: She is oriented to person, place, and time. She appears well-developed.   HENT:   Mouth/Throat: Oropharynx is clear and moist.   Neck: Neck supple. Carotid bruit is not present. No thyroid mass present.   Cardiovascular: Normal rate, regular rhythm and intact distal pulses. Exam reveals no gallop and no " friction rub.   No murmur heard.  Pulmonary/Chest: Effort normal and breath sounds normal. She has no wheezes. She has no rales.   Abdominal: Soft. Bowel sounds are normal. She exhibits no mass. There is no hepatosplenomegaly. There is no tenderness.   Musculoskeletal: She exhibits no edema.   Lymphadenopathy:     She has no cervical adenopathy.   Neurological: She is alert and oriented to person, place, and time.   Psychiatric: She has a normal mood and affect.       Results for BROOKS VAUGHN (MRN [id number]) as of 6/13/2019 11:05   Ref. Range 2/20/2019 08:25 2/21/2019 10:12 5/28/2019 09:45 6/6/2019 09:15   Sodium Latest Ref Range: 136 - 145 mmol/L 142 141  143   Potassium Latest Ref Range: 3.5 - 5.1 mmol/L 5.4 (H) 4.2  5.3 (H)   Chloride Latest Ref Range: 95 - 110 mmol/L 107 103  106   CO2 Latest Ref Range: 23 - 29 mmol/L 28 27  31 (H)   Anion Gap Latest Ref Range: 8 - 16 mmol/L 7 (L) 11  6 (L)   BUN, Bld Latest Ref Range: 8 - 23 mg/dL 23 16  19   Creatinine Latest Ref Range: 0.5 - 1.4 mg/dL 0.8 0.7  0.7   eGFR if non African American Latest Ref Range: >60 mL/min/1.73 m^2 >60.0 >60.0  >60.0   eGFR if African American Latest Ref Range: >60 mL/min/1.73 m^2 >60.0 >60.0  >60.0   Glucose Latest Ref Range: 70 - 110 mg/dL 100 93  84   Calcium Latest Ref Range: 8.7 - 10.5 mg/dL 10.6 (H) 10.6 (H)  10.8 (H)   Calcium, Ion Latest Ref Range: 1.06 - 1.42 mmol/L    1.34   Alkaline Phosphatase Latest Ref Range: 55 - 135 U/L 67      PROTEIN TOTAL Latest Ref Range: 6.0 - 8.4 g/dL 7.3      Albumin Latest Ref Range: 3.5 - 5.2 g/dL 4.2      BILIRUBIN TOTAL Latest Ref Range: 0.1 - 1.0 mg/dL 0.3      AST Latest Ref Range: 10 - 40 U/L 21      ALT Latest Ref Range: 10 - 44 U/L 24      Triglycerides Latest Ref Range: 30 - 150 mg/dL 138      Cholesterol Latest Ref Range: 120 - 199 mg/dL 156      HDL Latest Ref Range: 40 - 75 mg/dL 47      Hdl/Cholesterol Ratio Latest Ref Range: 20.0 - 50.0 % 30.1      LDL Cholesterol External Latest  Ref Range: 63.0 - 159.0 mg/dL 81.4      Non-HDL Cholesterol Latest Units: mg/dL 109      Total Cholesterol/HDL Ratio Latest Ref Range: 2.0 - 5.0  3.3      Hemoglobin A1C External Latest Ref Range: 4.0 - 5.6 % 5.7 (H)   5.4   Estimated Avg Glucose Latest Ref Range: 68 - 131 mg/dL 117   108   TSH Latest Ref Range: 0.400 - 4.000 uIU/mL 1.083      PTH Latest Ref Range: 9.0 - 77.0 pg/mL    53.0       Assessment:       1. Hypercalcemia    2. Prediabetes    3. Essential hypertension          Plan:     Hypercalcemia, normal ionized Ca.  Recheck 4mo.  -     X-Ray Chest PA And Lateral; Future; Expected date: 06/13/2019  -     Basic metabolic panel; Future; Expected date: 06/13/2019  -     Calcium, ionized; Future; Expected date: 06/13/2019    Prediabetes, now resolved.    Essential hypertension- possible resolved with 41# wt loss.  Stop losartan, send me BPs in 2 weeks.

## 2019-06-06 ENCOUNTER — PATIENT MESSAGE (OUTPATIENT)
Dept: FAMILY MEDICINE | Facility: CLINIC | Age: 64
End: 2019-06-06

## 2019-06-06 ENCOUNTER — LAB VISIT (OUTPATIENT)
Dept: LAB | Facility: HOSPITAL | Age: 64
End: 2019-06-06
Payer: MEDICARE

## 2019-06-06 DIAGNOSIS — R73.03 PREDIABETES: ICD-10-CM

## 2019-06-06 DIAGNOSIS — E83.52 HYPERCALCEMIA: ICD-10-CM

## 2019-06-06 LAB
ANION GAP SERPL CALC-SCNC: 6 MMOL/L (ref 8–16)
BUN SERPL-MCNC: 19 MG/DL (ref 8–23)
CA-I BLDV-SCNC: 1.34 MMOL/L (ref 1.06–1.42)
CALCIUM SERPL-MCNC: 10.8 MG/DL (ref 8.7–10.5)
CHLORIDE SERPL-SCNC: 106 MMOL/L (ref 95–110)
CO2 SERPL-SCNC: 31 MMOL/L (ref 23–29)
CREAT SERPL-MCNC: 0.7 MG/DL (ref 0.5–1.4)
EST. GFR  (AFRICAN AMERICAN): >60 ML/MIN/1.73 M^2
EST. GFR  (NON AFRICAN AMERICAN): >60 ML/MIN/1.73 M^2
ESTIMATED AVG GLUCOSE: 108 MG/DL (ref 68–131)
GLUCOSE SERPL-MCNC: 84 MG/DL (ref 70–110)
HBA1C MFR BLD HPLC: 5.4 % (ref 4–5.6)
POTASSIUM SERPL-SCNC: 5.3 MMOL/L (ref 3.5–5.1)
PTH-INTACT SERPL-MCNC: 53 PG/ML (ref 9–77)
SODIUM SERPL-SCNC: 143 MMOL/L (ref 136–145)

## 2019-06-06 PROCEDURE — 36415 COLL VENOUS BLD VENIPUNCTURE: CPT

## 2019-06-06 PROCEDURE — 82330 ASSAY OF CALCIUM: CPT

## 2019-06-06 PROCEDURE — 80048 BASIC METABOLIC PNL TOTAL CA: CPT

## 2019-06-06 PROCEDURE — 83970 ASSAY OF PARATHORMONE: CPT

## 2019-06-06 PROCEDURE — 83036 HEMOGLOBIN GLYCOSYLATED A1C: CPT

## 2019-06-11 DIAGNOSIS — E78.5 HYPERLIPIDEMIA: ICD-10-CM

## 2019-06-11 DIAGNOSIS — I10 ESSENTIAL HYPERTENSION: ICD-10-CM

## 2019-06-11 RX ORDER — LOSARTAN POTASSIUM 50 MG/1
TABLET ORAL
Qty: 90 TABLET | Refills: 1 | Status: SHIPPED | OUTPATIENT
Start: 2019-06-11 | End: 2019-06-13

## 2019-06-11 RX ORDER — ATORVASTATIN CALCIUM 10 MG/1
TABLET, FILM COATED ORAL
Qty: 90 TABLET | Refills: 1 | Status: SHIPPED | OUTPATIENT
Start: 2019-06-11 | End: 2019-12-08 | Stop reason: SDUPTHER

## 2019-06-13 ENCOUNTER — HOSPITAL ENCOUNTER (OUTPATIENT)
Dept: RADIOLOGY | Facility: HOSPITAL | Age: 64
Discharge: HOME | End: 2019-06-13
Attending: INTERNAL MEDICINE
Payer: MEDICARE

## 2019-06-13 ENCOUNTER — OFFICE VISIT (OUTPATIENT)
Dept: FAMILY MEDICINE | Facility: CLINIC | Age: 64
End: 2019-06-13
Payer: MEDICARE

## 2019-06-13 VITALS
BODY MASS INDEX: 21.97 KG/M2 | OXYGEN SATURATION: 96 % | WEIGHT: 140 LBS | HEIGHT: 67 IN | HEART RATE: 94 BPM | DIASTOLIC BLOOD PRESSURE: 68 MMHG | SYSTOLIC BLOOD PRESSURE: 112 MMHG | TEMPERATURE: 99 F

## 2019-06-13 DIAGNOSIS — I10 ESSENTIAL HYPERTENSION: ICD-10-CM

## 2019-06-13 DIAGNOSIS — R73.03 PREDIABETES: ICD-10-CM

## 2019-06-13 DIAGNOSIS — E83.52 HYPERCALCEMIA: ICD-10-CM

## 2019-06-13 DIAGNOSIS — E83.52 HYPERCALCEMIA: Primary | ICD-10-CM

## 2019-06-13 PROCEDURE — 71046 X-RAY EXAM CHEST 2 VIEWS: CPT | Mod: TC,FY,PO

## 2019-06-13 PROCEDURE — 1101F PT FALLS ASSESS-DOCD LE1/YR: CPT | Mod: CPTII,S$GLB,, | Performed by: INTERNAL MEDICINE

## 2019-06-13 PROCEDURE — 99214 OFFICE O/P EST MOD 30 MIN: CPT | Mod: S$GLB,,, | Performed by: INTERNAL MEDICINE

## 2019-06-13 PROCEDURE — 3074F PR MOST RECENT SYSTOLIC BLOOD PRESSURE < 130 MM HG: ICD-10-PCS | Mod: CPTII,S$GLB,, | Performed by: INTERNAL MEDICINE

## 2019-06-13 PROCEDURE — 71046 X-RAY EXAM CHEST 2 VIEWS: CPT | Mod: 26,,, | Performed by: RADIOLOGY

## 2019-06-13 PROCEDURE — 99999 PR PBB SHADOW E&M-EST. PATIENT-LVL IV: CPT | Mod: PBBFAC,,, | Performed by: INTERNAL MEDICINE

## 2019-06-13 PROCEDURE — 3008F BODY MASS INDEX DOCD: CPT | Mod: CPTII,S$GLB,, | Performed by: INTERNAL MEDICINE

## 2019-06-13 PROCEDURE — 99214 PR OFFICE/OUTPT VISIT, EST, LEVL IV, 30-39 MIN: ICD-10-PCS | Mod: S$GLB,,, | Performed by: INTERNAL MEDICINE

## 2019-06-13 PROCEDURE — 71046 XR CHEST PA AND LATERAL: ICD-10-PCS | Mod: 26,,, | Performed by: RADIOLOGY

## 2019-06-13 PROCEDURE — 3078F DIAST BP <80 MM HG: CPT | Mod: CPTII,S$GLB,, | Performed by: INTERNAL MEDICINE

## 2019-06-13 PROCEDURE — 3008F PR BODY MASS INDEX (BMI) DOCUMENTED: ICD-10-PCS | Mod: CPTII,S$GLB,, | Performed by: INTERNAL MEDICINE

## 2019-06-13 PROCEDURE — 3074F SYST BP LT 130 MM HG: CPT | Mod: CPTII,S$GLB,, | Performed by: INTERNAL MEDICINE

## 2019-06-13 PROCEDURE — 99999 PR PBB SHADOW E&M-EST. PATIENT-LVL IV: ICD-10-PCS | Mod: PBBFAC,,, | Performed by: INTERNAL MEDICINE

## 2019-06-13 PROCEDURE — 3078F PR MOST RECENT DIASTOLIC BLOOD PRESSURE < 80 MM HG: ICD-10-PCS | Mod: CPTII,S$GLB,, | Performed by: INTERNAL MEDICINE

## 2019-06-13 PROCEDURE — 1101F PR PT FALLS ASSESS DOC 0-1 FALLS W/OUT INJ PAST YR: ICD-10-PCS | Mod: CPTII,S$GLB,, | Performed by: INTERNAL MEDICINE

## 2019-06-14 ENCOUNTER — TELEPHONE (OUTPATIENT)
Dept: OPHTHALMOLOGY | Facility: CLINIC | Age: 64
End: 2019-06-14

## 2019-06-14 NOTE — TELEPHONE ENCOUNTER
Will give message to [place]. Pt is scheduled for a 1 day with [facility] on 7/18.    ----- Message from Toya Hand sent at 6/14/2019 10:37 AM CDT -----  Contact: [phone]  Would like to  Cancel  appt, patient will call back later  thanks karishma

## 2019-09-11 ENCOUNTER — PATIENT MESSAGE (OUTPATIENT)
Dept: FAMILY MEDICINE | Facility: CLINIC | Age: 64
End: 2019-09-11

## 2019-10-01 ENCOUNTER — LAB VISIT (OUTPATIENT)
Dept: LAB | Facility: HOSPITAL | Age: 64
End: 2019-10-01
Attending: INTERNAL MEDICINE
Payer: MEDICARE

## 2019-10-01 DIAGNOSIS — E83.52 HYPERCALCEMIA: ICD-10-CM

## 2019-10-01 PROCEDURE — 80048 BASIC METABOLIC PNL TOTAL CA: CPT | Mod: HCNC

## 2019-10-01 PROCEDURE — 36415 COLL VENOUS BLD VENIPUNCTURE: CPT | Mod: HCNC,PO

## 2019-10-01 PROCEDURE — 82330 ASSAY OF CALCIUM: CPT | Mod: HCNC

## 2019-10-02 LAB
ANION GAP SERPL CALC-SCNC: 9 MMOL/L (ref 8–16)
BUN SERPL-MCNC: 22 MG/DL (ref 8–23)
CA-I BLDV-SCNC: 1.39 MMOL/L (ref 1.06–1.42)
CALCIUM SERPL-MCNC: 10.2 MG/DL (ref 8.7–10.5)
CHLORIDE SERPL-SCNC: 105 MMOL/L (ref 95–110)
CO2 SERPL-SCNC: 28 MMOL/L (ref 23–29)
CREAT SERPL-MCNC: 0.7 MG/DL (ref 0.5–1.4)
EST. GFR  (AFRICAN AMERICAN): >60 ML/MIN/1.73 M^2
EST. GFR  (NON AFRICAN AMERICAN): >60 ML/MIN/1.73 M^2
GLUCOSE SERPL-MCNC: 71 MG/DL (ref 70–110)
POTASSIUM SERPL-SCNC: 5.5 MMOL/L (ref 3.5–5.1)
SODIUM SERPL-SCNC: 142 MMOL/L (ref 136–145)

## 2019-10-07 ENCOUNTER — PATIENT MESSAGE (OUTPATIENT)
Dept: FAMILY MEDICINE | Facility: CLINIC | Age: 64
End: 2019-10-07

## 2019-10-07 NOTE — PROGRESS NOTES
Subjective:       Patient ID:  Beatrice Regan is a 64 y.o. female who presents for   Chief Complaint   Patient presents with    Spot     c/o spot to chest that hasnt healed properly x 3-4 weeks no tx      History of Present Illness: The patient presents for follow-up of wound check from a previous cyst removal. She had 2 punch excisions done on the chest on 2/7/19. She reports that 1 of the areas healed completely, but the other one looks very bruised and is tender still. She denies any redness around the area, denies any fever/chills or other complaints.           Review of Systems   Skin: Negative for itching, rash and dry skin.        Objective:    Physical Exam   Constitutional: She appears well-developed and well-nourished. No distress.   Neurological: She is alert and oriented to person, place, and time. She is not disoriented.   Psychiatric: She has a normal mood and affect.   Skin:   Areas Examined (abnormalities noted in diagram):   Head / Face Inspection Performed  Chest / Axilla Inspection Performed              Diagram Legend     Erythematous scaling macule/papule c/w actinic keratosis       Vascular papule c/w angioma      Pigmented verrucoid papule/plaque c/w seborrheic keratosis      Yellow umbilicated papule c/w sebaceous hyperplasia      Irregularly shaped tan macule c/w lentigo     1-2 mm smooth white papules consistent with Milia      Movable subcutaneous cyst with punctum c/w epidermal inclusion cyst      Subcutaneous movable cyst c/w pilar cyst      Firm pink to brown papule c/w dermatofibroma      Pedunculated fleshy papule(s) c/w skin tag(s)      Evenly pigmented macule c/w junctional nevus     Mildly variegated pigmented, slightly irregular-bordered macule c/w mildly atypical nevus      Flesh colored to evenly pigmented papule c/w intradermal nevus       Pink pearly papule/plaque c/w basal cell carcinoma      Erythematous hyperkeratotic cursted plaque c/w SCC      Surgical scar with  no sign of skin cancer recurrence      Open and closed comedones      Inflammatory papules and pustules      Verrucoid papule consistent consistent with wart     Erythematous eczematous patches and plaques     Dystrophic onycholytic nail with subungual debris c/w onychomycosis     Umbilicated papule    Erythematous-base heme-crusted tan verrucoid plaque consistent with inflamed seborrheic keratosis     Erythematous Silvery Scaling Plaque c/w Psoriasis     See annotation      Assessment / Plan:        Hematoma - s/p punch excision of a cyst on the chest. Discussed etiology of symptoms, and treatment including compression vs injections vs other. There is no evidence of infection. Patient wants to hold on any interventions at this time and will proceed with compression. Counseled the patient to notify clinic in the next 1-2 weeks if any worsening or improvement. If no improvement, will manually drain lesion.        Follow-up if symptoms worsen or fail to improve.   66 yr old female w hxf aortic dissection (post-repair several years prior), spinal cord hematoma (now functionally paraplegic), chronic spasticity + pain (on Oxycodone and Baclofen pump), HTN, nephrolithiasis s/p left urethral stent, UTIs and endotheliitis/keratitis (post-corneal transplant) presenting with black tarry stools. Admitted to medicine for further management.

## 2019-10-21 NOTE — PROGRESS NOTES
"Subjective:      Patient ID: Beatrice Regan is a 64 y.o. female.    Chief Complaint: Follow-up (4 months )      HPI  Here for follow up of medical problems.  BPs at home 122-138/70s.  Energy good.  Treadmill, light weights.  No f/c/sw/cough.  No cp/sob/palp.  BMs normal.    Updated/ annual due 3/20:  HM: 11/19 fluvax, 3/19 HAV, 2/17 [id number], 3/19 [id number], 4/11 TDaP, 12/15 zostavax, 2/18 BMD normal rep 5y, 4/11 Cscope rep 10y, 5/19 MMG/Gyn Dr. Elder, 2/17 HCV neg, 4/18 Eye Dr. Wade.     Review of Systems   Constitutional: Negative for activity change, chills, diaphoresis, fever and unexpected weight change.   HENT: Negative for hearing loss, rhinorrhea and trouble swallowing.    Eyes: Negative for discharge and visual disturbance.   Respiratory: Negative for cough, chest tightness, shortness of breath and wheezing.    Cardiovascular: Negative for chest pain, palpitations and leg swelling.   Gastrointestinal: Negative for blood in stool, constipation, diarrhea, nausea and vomiting.   Endocrine: Negative for polydipsia and polyuria.   Genitourinary: Negative for difficulty urinating, dysuria, frequency, hematuria and menstrual problem.   Musculoskeletal: Negative for arthralgias, joint swelling and neck pain.   Neurological: Negative for weakness and headaches.   Psychiatric/Behavioral: Negative for confusion and dysphoric mood. The patient is not nervous/anxious.          Objective:   /75   Pulse 79   Temp 97.8 °F (36.6 °C) (Oral)   Ht 5' 7" (1.702 m)   Wt 66.6 kg (146 lb 13.2 oz)   SpO2 98%   BMI 23.00 kg/m²     Physical Exam   Constitutional: She is oriented to person, place, and time. She appears well-developed.   HENT:   Mouth/Throat: Oropharynx is clear and moist.   Neck: Neck supple. Carotid bruit is not present. No thyroid mass present.   Cardiovascular: Normal rate, regular rhythm and intact distal pulses. Exam reveals no gallop and no friction rub.   No murmur heard.  Pulmonary/Chest: " Effort normal and breath sounds normal. She has no wheezes. She has no rales.   Abdominal: Soft. Bowel sounds are normal. She exhibits no mass. There is no hepatosplenomegaly. There is no tenderness.   Musculoskeletal: She exhibits no edema.   Lymphadenopathy:     She has no cervical adenopathy.   Neurological: She is alert and oriented to person, place, and time.   Psychiatric: She has a normal mood and affect.           Assessment:       1. Essential hypertension    2. Prediabetes    3. Other hyperlipidemia    4. Preventive measure    5. Hypercalcemia          Plan:     Essential hypertension-   -     Hypertension [employer] [facility] ([facility]) Enrollment Order  -     Hypertension Digital Medicine ([facility]): Assign Onboarding Questionnaires    Prediabetes- cont exercise, recheck 6mo.  -     Hemoglobin A1c; Future; Expected date: 11/04/2019    Other hyperlipidemia-on statin, recheck 6mo.    Hypercalcemia, now resolved.    Preventive measure- fluvax now, labs in 6mo.  -     CBC auto differential; Future; Expected date: 11/04/2019  -     Comprehensive metabolic panel; Future; Expected date: 11/04/2019  -     Lipid panel; Future; Expected date: 11/04/2019  -     TSH; Future; Expected date: 11/04/2019  -     Hemoglobin A1c; Future; Expected date: 11/04/2019

## 2019-11-04 ENCOUNTER — PATIENT MESSAGE (OUTPATIENT)
Dept: ADMINISTRATIVE | Facility: OTHER | Age: 64
End: 2019-11-04

## 2019-11-04 ENCOUNTER — OFFICE VISIT (OUTPATIENT)
Dept: FAMILY MEDICINE | Facility: CLINIC | Age: 64
End: 2019-11-04
Payer: MEDICARE

## 2019-11-04 ENCOUNTER — PES CALL (OUTPATIENT)
Dept: ADMINISTRATIVE | Facility: CLINIC | Age: 64
End: 2019-11-04

## 2019-11-04 VITALS
OXYGEN SATURATION: 98 % | BODY MASS INDEX: 23.04 KG/M2 | HEART RATE: 79 BPM | HEIGHT: 67 IN | WEIGHT: 146.81 LBS | DIASTOLIC BLOOD PRESSURE: 75 MMHG | SYSTOLIC BLOOD PRESSURE: 130 MMHG | TEMPERATURE: 98 F

## 2019-11-04 DIAGNOSIS — R73.03 PREDIABETES: ICD-10-CM

## 2019-11-04 DIAGNOSIS — I10 ESSENTIAL HYPERTENSION: Primary | ICD-10-CM

## 2019-11-04 DIAGNOSIS — E83.52 HYPERCALCEMIA: ICD-10-CM

## 2019-11-04 DIAGNOSIS — E78.49 OTHER HYPERLIPIDEMIA: ICD-10-CM

## 2019-11-04 DIAGNOSIS — Z29.9 PREVENTIVE MEASURE: ICD-10-CM

## 2019-11-04 PROCEDURE — 3075F PR MOST RECENT SYSTOLIC BLOOD PRESS GE 130-139MM HG: ICD-10-PCS | Mod: HCNC,CPTII,S$GLB, | Performed by: INTERNAL MEDICINE

## 2019-11-04 PROCEDURE — 99214 OFFICE O/P EST MOD 30 MIN: CPT | Mod: 25,HCNC,S$GLB, | Performed by: INTERNAL MEDICINE

## 2019-11-04 PROCEDURE — 3078F PR MOST RECENT DIASTOLIC BLOOD PRESSURE < 80 MM HG: ICD-10-PCS | Mod: HCNC,CPTII,S$GLB, | Performed by: INTERNAL MEDICINE

## 2019-11-04 PROCEDURE — 3078F DIAST BP <80 MM HG: CPT | Mod: HCNC,CPTII,S$GLB, | Performed by: INTERNAL MEDICINE

## 2019-11-04 PROCEDURE — G0008 ADMIN INFLUENZA VIRUS VAC: HCPCS | Mod: HCNC,S$GLB,, | Performed by: INTERNAL MEDICINE

## 2019-11-04 PROCEDURE — 3075F SYST BP GE 130 - 139MM HG: CPT | Mod: HCNC,CPTII,S$GLB, | Performed by: INTERNAL MEDICINE

## 2019-11-04 PROCEDURE — 1101F PT FALLS ASSESS-DOCD LE1/YR: CPT | Mod: HCNC,CPTII,S$GLB, | Performed by: INTERNAL MEDICINE

## 2019-11-04 PROCEDURE — 99999 PR PBB SHADOW E&M-EST. PATIENT-LVL IV: ICD-10-PCS | Mod: PBBFAC,HCNC,, | Performed by: INTERNAL MEDICINE

## 2019-11-04 PROCEDURE — 90662 IIV NO PRSV INCREASED AG IM: CPT | Mod: HCNC,S$GLB,, | Performed by: INTERNAL MEDICINE

## 2019-11-04 PROCEDURE — 1101F PR PT FALLS ASSESS DOC 0-1 FALLS W/OUT INJ PAST YR: ICD-10-PCS | Mod: HCNC,CPTII,S$GLB, | Performed by: INTERNAL MEDICINE

## 2019-11-04 PROCEDURE — 3008F BODY MASS INDEX DOCD: CPT | Mod: HCNC,CPTII,S$GLB, | Performed by: INTERNAL MEDICINE

## 2019-11-04 PROCEDURE — 90662 FLU VACCINE - HIGH DOSE (65+) PRESERVATIVE FREE IM: ICD-10-PCS | Mod: HCNC,S$GLB,, | Performed by: INTERNAL MEDICINE

## 2019-11-04 PROCEDURE — 99999 PR PBB SHADOW E&M-EST. PATIENT-LVL IV: CPT | Mod: PBBFAC,HCNC,, | Performed by: INTERNAL MEDICINE

## 2019-11-04 PROCEDURE — 3008F PR BODY MASS INDEX (BMI) DOCUMENTED: ICD-10-PCS | Mod: HCNC,CPTII,S$GLB, | Performed by: INTERNAL MEDICINE

## 2019-11-04 PROCEDURE — G0008 FLU VACCINE - HIGH DOSE (65+) PRESERVATIVE FREE IM: ICD-10-PCS | Mod: HCNC,S$GLB,, | Performed by: INTERNAL MEDICINE

## 2019-11-04 PROCEDURE — 99214 PR OFFICE/OUTPT VISIT, EST, LEVL IV, 30-39 MIN: ICD-10-PCS | Mod: 25,HCNC,S$GLB, | Performed by: INTERNAL MEDICINE

## 2019-11-11 ENCOUNTER — PATIENT OUTREACH (OUTPATIENT)
Dept: OTHER | Facility: OTHER | Age: 64
End: 2019-11-11

## 2019-11-11 NOTE — LETTER
November 11, 2019     Beatrice Regan  [address] [place] [address]       Dear Beatrice,    [facility]! Our goal is to make care effective, proactive and convenient by using data you send us from home to better treat your chronic conditions.          My name is Maggie Hairston, and I am your dedicated Digital Medicine clinician. As an expert in medication management, I will help ensure that the medications you are taking continue to provide the intended benefits and help you reach your goals. You can reach me directly at [phone] or by sending me a message directly through your [facility] account.      I am Nayeli Gonzales and I will be your health . My job is to help you identify lifestyle changes to improve your disease control. We will talk about nutrition, exercise, and other ways you may be able to adjust your current habits to better your health. Additionally, we will help ensure you are completing the tests and screenings that are necessary to help manage your conditions. You can reach me directly at [phone] or by sending me a message directly through your [facility] account.    Most importantly, YOU are at the center of this team. Together, we will work to improve your overall health and encourage you to meet your goals for a healthier lifestyle.     What we expect from YOU:  · Please take frequent home blood pressure measurements. We ask that you take at least 1 blood pressure reading per week, but more information will better help us get you know you. Be sure you rest for a few minutes before taking the reading in a quiet, comfortable place.     Be available to receive phone calls or [facility] messages, when appropriate, from your care team. Please let us know if there are any specific days or times that work best for us to reach you via phone.     Complete routine tests and screenings. Dont worry, we will help keep you on track!           What  you should expect from your Digital Medicine Care Team:   We will work with you to create a personalized plan of care and provide you with encouragement and education, including regarding lifestyle changes, that could help you manage your disease states.     We will adjust your current medications, if needed, and continue to monitor your long-term progress.     We will provide you and your physician with monthly progress reports after you have been in the program for more than 30 days.     We will send you reminders through [facility] and text messages to help ensure you do not miss any testing deadlines to help manage your disease states.    You will be able to reach us by phone or through your [facility] account by clicking our names under Care Team on the right side of the home screen.    I look forward to working with you to achieve your blood pressure goals!    We look forward to working with you to help manage your health,    Sincerely,    Your Digital Medicine Team    Please visit our websites to learn more:   · Hypertension: [email or web address]      Remember, we are not available for emergencies. If you have an emergency, please contact your doctors office directly or call [facility] on-call ([phone] or [phone]) or [phone].

## 2019-11-11 NOTE — LETTER
November 11, 2019     Beatrice Regan  [address] [place] [address]       Dear Beatrice,    Welcome to "[employer]! Our goal is to make care effective, proactive and convenient by using data you send us from home to better treat your chronic conditions.          My name is Maggie Hairston, and I am your dedicated Digital Medicine clinician. As an expert in medication management, I will help ensure that the medications you are taking continue to provide the intended benefits and help you reach your goals. You can reach me directly at [phone] or by sending me a message directly through your [facility] account.      I am Nayeli Gonzales and I will be your health . My job is to help you identify lifestyle changes to improve your disease control. We will talk about nutrition, exercise, and other ways you may be able to adjust your current habits to better your health. Additionally, we will help ensure you are completing the tests and screenings that are necessary to help manage your conditions. You can reach me directly at [phone] or by sending me a message directly through your [facility] account.    Most importantly, YOU are at the center of this team. Together, we will work to improve your overall health and encourage you to meet your goals for a healthier lifestyle.     What we expect from YOU:  · Please take frequent home blood pressure measurements. We ask that you take at least 1 blood pressure reading per week, but more information will better help us get you know you. Be sure you rest for a few minutes before taking the reading in a quiet, comfortable place.     Be available to receive phone calls or [facility] messages, when appropriate, from your care team. Please let us know if there are any specific days or times that work best for us to reach you via phone.     Complete routine tests and screenings. Dont worry, we will help keep you on track!           What  you should expect from your Digital Medicine Care Team:   We will work with you to create a personalized plan of care and provide you with encouragement and education, including regarding lifestyle changes, that could help you manage your disease states.     We will adjust your current medications, if needed, and continue to monitor your long-term progress.     We will provide you and your physician with monthly progress reports after you have been in the program for more than 30 days.     We will send you reminders through [facility] and text messages to help ensure you do not miss any testing deadlines to help manage your disease states.    You will be able to reach us by phone or through your [facility] account by clicking our names under Care Team on the right side of the home screen.    I look forward to working with you to achieve your blood pressure goals!    We look forward to working with you to help manage your health,    Sincerely,    Your Digital Medicine Team    Please visit our websites to learn more:   · Hypertension: [email or web address]      Remember, we are not available for emergencies. If you have an emergency, please contact your doctors office directly or call [facility] on-call ([phone] or [phone]) or [phone].

## 2019-11-11 NOTE — PROGRESS NOTES
Digital Medicine: Health  Introduction    Introduced Beatrice Regan to Digital Medicine. Discussed health  role and recommended lifestyle modifications.    The history is provided by the patient.     HYPERTENSION  Our goal is to get BP to consistently below 130/80mmHg and make the process convenient so patient can avoid extra trips to the office. Getting your blood pressure below 130/80mmHg (definition of control) will reduce your risk for heart attack, kidney failure, stroke and death (as well as kidney failure, eye disease, & dementia)      Reviewed that the Digital Medicine care team - consisting of a clinician and a health  - will follow the most current evidence-based national guidelines for treating your condition.  The health  will focus on lifestyle modifications and motivation while the clinician will focus on medication therapy.  The care team will review all data on a regular basis and reach out as needed.      Explained that one of the key parts of the program is communication with the care team.  Asked patient to respond to outreach attempts and complete questionnaires.  Stressed importance of medication adherence.    Explained that we expect patient to obtain several blood pressures per week at random times of day.  Instructed patient not to allow anyone else to use phone and monitoring device.  Confirmed appropriate BP monitoring technique.      Explained to patient that the digital medicine team is not available for emergencies.  Patient will call [facility] on-call ([phone] or [phone]) or [phone] if needed.      Patient's BP goal is 130/80.Patient's BP average is 120/88 mmHg, which is above goal, per 2017 ACC/AHA Hypertension Guidelines.          Last 5 Patient Entered Readings                                      Current 30 Day Average: 120/88     Recent Readings 11/8/2019 11/7/2019    SBP (mmHg) 119 120    DBP (mmHg) 72 104    Pulse 80 85             Intervention/Plan    There are no preventive care reminders to display for this patient.    Reviewed the importance of self-monitoring, medication adherence, and that the health  can be used as a resource for lifestyle modifications to help reduce or maintain a healthy lifestyle.    Sent link to [facility] webpages and my contact information via [employer] for future questions. Follow up scheduled.         Screenings    SDOH

## 2019-11-19 ENCOUNTER — PATIENT MESSAGE (OUTPATIENT)
Dept: FAMILY MEDICINE | Facility: CLINIC | Age: 64
End: 2019-11-19

## 2019-11-20 RX ORDER — LOSARTAN POTASSIUM 50 MG/1
50 TABLET ORAL DAILY
Qty: 90 TABLET | Refills: 3 | Status: SHIPPED | OUTPATIENT
Start: 2019-11-20 | End: 2020-11-09 | Stop reason: SDUPTHER

## 2019-11-26 ENCOUNTER — PATIENT OUTREACH (OUTPATIENT)
Dept: OTHER | Facility: OTHER | Age: 64
End: 2019-11-26

## 2019-12-05 ENCOUNTER — PATIENT OUTREACH (OUTPATIENT)
Dept: OTHER | Facility: OTHER | Age: 64
End: 2019-12-05

## 2019-12-05 NOTE — PROGRESS NOTES
Called patient x 2 - patient answered phone and hung up immediately both times. My Chart message sent.

## 2019-12-07 DIAGNOSIS — E78.5 HYPERLIPIDEMIA: ICD-10-CM

## 2019-12-08 RX ORDER — ATORVASTATIN CALCIUM 10 MG/1
TABLET, FILM COATED ORAL
Qty: 90 TABLET | Refills: 0 | Status: SHIPPED | OUTPATIENT
Start: 2019-12-08 | End: 2020-05-21 | Stop reason: SDUPTHER

## 2019-12-09 DIAGNOSIS — E78.5 HYPERLIPIDEMIA: ICD-10-CM

## 2019-12-09 RX ORDER — ATORVASTATIN CALCIUM 10 MG/1
TABLET, FILM COATED ORAL
Qty: 90 TABLET | Refills: 3 | Status: SHIPPED | OUTPATIENT
Start: 2019-12-09 | End: 2019-12-23 | Stop reason: SDUPTHER

## 2019-12-23 DIAGNOSIS — E78.5 HYPERLIPIDEMIA: ICD-10-CM

## 2019-12-23 RX ORDER — ATORVASTATIN CALCIUM 10 MG/1
10 TABLET, FILM COATED ORAL DAILY
Qty: 90 TABLET | Refills: 3 | Status: SHIPPED | OUTPATIENT
Start: 2019-12-23 | End: 2020-11-09 | Stop reason: SDUPTHER

## 2019-12-31 NOTE — PROGRESS NOTES
"    Digital Medicine: Clinician Follow-Up    Hx: CORNELL (on ARB), HLD, pre-diabetes    The history is provided by the patient.     HYPERTENSION  Our goal is to get BP to consistently below 130/80mmHg and make the process convenient so patient can avoid extra trips to the office. Getting your blood pressure below 130/80mmHg (definition of control) will reduce your risk for heart attack, kidney failure, stroke and death (as well as kidney failure, eye disease, & dementia)      Reviewed non-pharmacologic therapies and impact on BP      Explained that we expect patient to obtain several blood pressures per week at random times of day.  Instructed patient not to allow anyone else to use phone and monitoring device.  Confirmed appropriate BP monitoring technique.      Explained to patient that the digital medicine team is not available for emergencies.  Patient will call [facility] on-call ([phone] or [phone]) or [phone] if needed.    Patient's BP goal is 130/80. Patients BP average is 137/81 mmHg, which is above goal, per 2017 ACC/AHA Hypertension Guidelines.    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     Beatrice denies hypertensive signs and symptoms, but states she is concerned about recent elevations.    All blood pressure and diabetic control medications were stopped earlier in the year after patient lost >50 pounds. She contacted her PCP in November when concerned about BP elevations. Due to her concerns, she re-started the losartan 50 mg tablets daily. BP responded well, patient stopped   taking losartan on her own due to control obtained. Since stopping, her BP has increased again, so patient now reports she has been taking it again for about a week now.     Average blood pressure is near goal, most recent reading is meeting goal of <130/80 mmHg.      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 Tried to review technique and advise patient to place cuff on arm, then rest for 3-5 minutes before obtaining her pressure, but patient continued to inform me she knows technique due to being a nurse, and "this just is what it is after 60." Will re-visit with patient at next outreach, and CC chart to health  to continue to work on resting reading technique.     Patient discussed option of halving losartan dose, advised her that I do not agree with that, at this time since she is not consistently at goal blood pressure, but we can make this our goal. Beatrice agrees. She does not like to take medication daily, and discusses potential for PRN medication also. Will work with patient and health  to achieve goals as clinically appropriate.     Patient also asked about monitoring once weekly - discussed the digital medicine protocol of needing a MINIMUM of one reading per week. Advised patient that the more readings she can submit, at varying times of the day, the better. Goal of 2-3 readings per week is ideal.       Med Review complete.    Allergies reviewed.      INTERVENTION(S)  reviewed appropriate dose schedule, reviewed monitoring technique and encouragement/support    PLAN  patient verbalizes understanding, Health  follow up and continue monitoring    Continue current regimen. Next clinical outreach planned in 2-3 months as patient is near goal.   If trend out of goal range is noted, will call sooner.         There are no preventive care reminders to display for this patient.    Last 5 Patient Entered Readings                                      Current 30 Day Average: 137/81     Recent Readings 12/30/2019 12/23/2019 12/22/2019 12/22/2019 12/14/2019    SBP (mmHg) 125 134 158 164 132    DBP (mmHg) 71 79 89 89 77    Pulse 78 78 76 76 78           Hypertension Medications             losartan (COZAAR) 50 MG tablet Take 1 tablet (50 mg total) by mouth once daily.                 Sleep " Apnea Screening  Patient not previously diagnosed with KIRAN and     Medication Affordability Screening  Patient is currently not having problems affording medications    Medication Adherence Screening   She misses doses: more than once a week    self discontinues and re-starts ARB    Patient identified the following reasons for non-compliance: dislike of meds    Self dosing/discontinuing/re-starting losartan in the last 6 weeks.     Intervention(s): patient education

## 2020-01-11 ENCOUNTER — PATIENT MESSAGE (OUTPATIENT)
Dept: DERMATOLOGY | Facility: CLINIC | Age: 65
End: 2020-01-11

## 2020-01-13 ENCOUNTER — TELEPHONE (OUTPATIENT)
Dept: DERMATOLOGY | Facility: CLINIC | Age: 65
End: 2020-01-13

## 2020-01-13 NOTE — TELEPHONE ENCOUNTER
Spoke to pt and informed her that in order to schedule a new pt appointment she would need to talk to the appointment line. Transferred pt to appointment line to schedule sister.

## 2020-02-19 ENCOUNTER — PATIENT MESSAGE (OUTPATIENT)
Dept: FAMILY MEDICINE | Facility: CLINIC | Age: 65
End: 2020-02-19

## 2020-03-23 ENCOUNTER — PATIENT MESSAGE (OUTPATIENT)
Dept: OBSTETRICS AND GYNECOLOGY | Facility: CLINIC | Age: 65
End: 2020-03-23

## 2020-03-24 ENCOUNTER — TELEPHONE (OUTPATIENT)
Dept: OBSTETRICS AND GYNECOLOGY | Facility: CLINIC | Age: 65
End: 2020-03-24

## 2020-03-24 NOTE — TELEPHONE ENCOUNTER
Called pt to reschedule annual appt due to COVID-19. Confirmed 5/7/20 at 11:00 am ([facility]). Pt verbalized understanding.

## 2020-04-10 ENCOUNTER — PATIENT MESSAGE (OUTPATIENT)
Dept: FAMILY MEDICINE | Facility: CLINIC | Age: 65
End: 2020-04-10

## 2020-04-27 ENCOUNTER — LAB VISIT (OUTPATIENT)
Dept: LAB | Facility: HOSPITAL | Age: 65
End: 2020-04-27
Attending: INTERNAL MEDICINE
Payer: MEDICARE

## 2020-04-27 DIAGNOSIS — E78.49 OTHER HYPERLIPIDEMIA: ICD-10-CM

## 2020-04-27 DIAGNOSIS — R73.03 PREDIABETES: ICD-10-CM

## 2020-04-27 DIAGNOSIS — Z29.9 PREVENTIVE MEASURE: ICD-10-CM

## 2020-04-27 LAB
ALBUMIN SERPL BCP-MCNC: 4.3 G/DL (ref 3.5–5.2)
ALP SERPL-CCNC: 69 U/L (ref 55–135)
ALT SERPL W/O P-5'-P-CCNC: 22 U/L (ref 10–44)
ANION GAP SERPL CALC-SCNC: 7 MMOL/L (ref 8–16)
AST SERPL-CCNC: 21 U/L (ref 10–40)
BASOPHILS # BLD AUTO: 0.05 K/UL (ref 0–0.2)
BASOPHILS NFR BLD: 1 % (ref 0–1.9)
BILIRUB SERPL-MCNC: 0.4 MG/DL (ref 0.1–1)
BUN SERPL-MCNC: 22 MG/DL (ref 8–23)
CALCIUM SERPL-MCNC: 10 MG/DL (ref 8.7–10.5)
CHLORIDE SERPL-SCNC: 106 MMOL/L (ref 95–110)
CHOLEST SERPL-MCNC: 205 MG/DL (ref 120–199)
CHOLEST/HDLC SERPL: 3.1 {RATIO} (ref 2–5)
CO2 SERPL-SCNC: 29 MMOL/L (ref 23–29)
CREAT SERPL-MCNC: 0.8 MG/DL (ref 0.5–1.4)
DIFFERENTIAL METHOD: ABNORMAL
EOSINOPHIL # BLD AUTO: 0.1 K/UL (ref 0–0.5)
EOSINOPHIL NFR BLD: 2.8 % (ref 0–8)
ERYTHROCYTE [DISTWIDTH] IN BLOOD BY AUTOMATED COUNT: 12.4 % (ref 11.5–14.5)
EST. GFR  (AFRICAN AMERICAN): >60 ML/MIN/1.73 M^2
EST. GFR  (NON AFRICAN AMERICAN): >60 ML/MIN/1.73 M^2
ESTIMATED AVG GLUCOSE: 114 MG/DL (ref 68–131)
GLUCOSE SERPL-MCNC: 89 MG/DL (ref 70–110)
HBA1C MFR BLD HPLC: 5.6 % (ref 4–5.6)
HCT VFR BLD AUTO: 44.5 % (ref 37–48.5)
HDLC SERPL-MCNC: 66 MG/DL (ref 40–75)
HDLC SERPL: 32.2 % (ref 20–50)
HGB BLD-MCNC: 14 G/DL (ref 12–16)
IMM GRANULOCYTES # BLD AUTO: 0.01 K/UL (ref 0–0.04)
IMM GRANULOCYTES NFR BLD AUTO: 0.2 % (ref 0–0.5)
LDLC SERPL CALC-MCNC: 116.2 MG/DL (ref 63–159)
LYMPHOCYTES # BLD AUTO: 2 K/UL (ref 1–4.8)
LYMPHOCYTES NFR BLD: 39.5 % (ref 18–48)
MCH RBC QN AUTO: 31 PG (ref 27–31)
MCHC RBC AUTO-ENTMCNC: 31.5 G/DL (ref 32–36)
MCV RBC AUTO: 99 FL (ref 82–98)
MONOCYTES # BLD AUTO: 0.5 K/UL (ref 0.3–1)
MONOCYTES NFR BLD: 9.5 % (ref 4–15)
NEUTROPHILS # BLD AUTO: 2.3 K/UL (ref 1.8–7.7)
NEUTROPHILS NFR BLD: 47 % (ref 38–73)
NONHDLC SERPL-MCNC: 139 MG/DL
NRBC BLD-RTO: 0 /100 WBC
PLATELET # BLD AUTO: 244 K/UL (ref 150–350)
PMV BLD AUTO: 10.7 FL (ref 9.2–12.9)
POTASSIUM SERPL-SCNC: 4.9 MMOL/L (ref 3.5–5.1)
PROT SERPL-MCNC: 7.6 G/DL (ref 6–8.4)
RBC # BLD AUTO: 4.52 M/UL (ref 4–5.4)
SODIUM SERPL-SCNC: 142 MMOL/L (ref 136–145)
TRIGL SERPL-MCNC: 114 MG/DL (ref 30–150)
TSH SERPL DL<=0.005 MIU/L-ACNC: 1.11 UIU/ML (ref 0.4–4)
WBC # BLD AUTO: 4.96 K/UL (ref 3.9–12.7)

## 2020-04-27 PROCEDURE — 80061 LIPID PANEL: CPT | Mod: HCNC

## 2020-04-27 PROCEDURE — 84443 ASSAY THYROID STIM HORMONE: CPT | Mod: HCNC

## 2020-04-27 PROCEDURE — 80053 COMPREHEN METABOLIC PANEL: CPT | Mod: HCNC

## 2020-04-27 PROCEDURE — 83036 HEMOGLOBIN GLYCOSYLATED A1C: CPT | Mod: HCNC

## 2020-04-27 PROCEDURE — 85025 COMPLETE CBC W/AUTO DIFF WBC: CPT | Mod: HCNC

## 2020-04-27 PROCEDURE — 36415 COLL VENOUS BLD VENIPUNCTURE: CPT | Mod: HCNC,PO

## 2020-05-04 ENCOUNTER — PATIENT MESSAGE (OUTPATIENT)
Dept: FAMILY MEDICINE | Facility: CLINIC | Age: 65
End: 2020-05-04

## 2020-05-04 ENCOUNTER — TELEPHONE (OUTPATIENT)
Dept: FAMILY MEDICINE | Facility: CLINIC | Age: 65
End: 2020-05-04

## 2020-05-04 NOTE — PROGRESS NOTES
"Digital Medicine: Health  Follow-Up    The history is provided by the patient.     Follow Up  Follow-up reason(s): reading review      Readings are missing. Patient finally answered after attempting to reach her for several months. Patient states she plans on takin her BP today and thinks about taking it all the time, she just gets "nervous" while taking her BP which "causes it to be high." she states she takes her BP on another machine she has at home and it's within goal. She states she gets nervous taking her BP using the ihealth cuff because it goes straight to her care team.   Encouraged her that her most previous readings weren't that bad and she should feel good about that.  She states she just "has a lot going on and that 130's and 140's readings are high for her."  She states she does "deep breathing exercises and everything"  Before I could ask about anything else lifestyle wise, she thanked me for calling and stated she would take her BP tonight.            INTERVENTION(S)  encouragement/support and denied questions    PLAN  patient verbalizes understanding, Clinician follow-up and continue monitoring    Plan to follow up in 4 weeks.      There are no preventive care reminders to display for this patient.    Last 5 Patient Entered Readings                                      Current 30 Day Average:      Recent Readings 3/10/2020 3/10/2020 3/10/2020 3/10/2020 3/4/2020    SBP (mmHg) 137 123 143 148 133    DBP (mmHg) 78 80 86 85 76    Pulse 78 64 69 70 73                  Screenings    SDOH  "

## 2020-05-07 ENCOUNTER — OFFICE VISIT (OUTPATIENT)
Dept: OBSTETRICS AND GYNECOLOGY | Facility: CLINIC | Age: 65
End: 2020-05-07
Payer: MEDICARE

## 2020-05-07 VITALS — SYSTOLIC BLOOD PRESSURE: 128 MMHG | DIASTOLIC BLOOD PRESSURE: 80 MMHG | BODY MASS INDEX: 25.14 KG/M2 | WEIGHT: 160.5 LBS

## 2020-05-07 DIAGNOSIS — Z01.419 ENCOUNTER FOR GYNECOLOGICAL EXAMINATION WITHOUT ABNORMAL FINDING: Primary | ICD-10-CM

## 2020-05-07 DIAGNOSIS — Z12.31 ENCOUNTER FOR SCREENING MAMMOGRAM FOR BREAST CANCER: ICD-10-CM

## 2020-05-07 PROCEDURE — 3074F PR MOST RECENT SYSTOLIC BLOOD PRESSURE < 130 MM HG: ICD-10-PCS | Mod: HCNC,CPTII,S$GLB, | Performed by: OBSTETRICS & GYNECOLOGY

## 2020-05-07 PROCEDURE — 3074F SYST BP LT 130 MM HG: CPT | Mod: HCNC,CPTII,S$GLB, | Performed by: OBSTETRICS & GYNECOLOGY

## 2020-05-07 PROCEDURE — 87624 HPV HI-RISK TYP POOLED RSLT: CPT | Mod: HCNC

## 2020-05-07 PROCEDURE — G0101 CA SCREEN;PELVIC/BREAST EXAM: HCPCS | Mod: HCNC,S$GLB,, | Performed by: OBSTETRICS & GYNECOLOGY

## 2020-05-07 PROCEDURE — 3079F DIAST BP 80-89 MM HG: CPT | Mod: HCNC,CPTII,S$GLB, | Performed by: OBSTETRICS & GYNECOLOGY

## 2020-05-07 PROCEDURE — 99999 PR PBB SHADOW E&M-EST. PATIENT-LVL III: ICD-10-PCS | Mod: PBBFAC,HCNC,, | Performed by: OBSTETRICS & GYNECOLOGY

## 2020-05-07 PROCEDURE — 99999 PR PBB SHADOW E&M-EST. PATIENT-LVL III: CPT | Mod: PBBFAC,HCNC,, | Performed by: OBSTETRICS & GYNECOLOGY

## 2020-05-07 PROCEDURE — 88175 CYTOPATH C/V AUTO FLUID REDO: CPT | Mod: HCNC

## 2020-05-07 PROCEDURE — G0101 PR CA SCREEN;PELVIC/BREAST EXAM: ICD-10-PCS | Mod: HCNC,S$GLB,, | Performed by: OBSTETRICS & GYNECOLOGY

## 2020-05-07 PROCEDURE — 3079F PR MOST RECENT DIASTOLIC BLOOD PRESSURE 80-89 MM HG: ICD-10-PCS | Mod: HCNC,CPTII,S$GLB, | Performed by: OBSTETRICS & GYNECOLOGY

## 2020-05-07 NOTE — PROGRESS NOTES
CC: Well woman exam    Beatrice Regan is a 65 y.o. female  presents for a well woman exam.  LMP: No LMP recorded. Patient is postmenopausal..  No issues, problems, or complaints. Not sexually active ( had prostate cancer). Has been eating healthier & exercising    Past Medical History:   Diagnosis Date    Cataract     OS    Hyperlipidemia     Hypertension     with white coat effect    Menopausal and postmenopausal disorder     Metabolic syndrome     CORNELL (renal artery stenosis)      Past Surgical History:   Procedure Laterality Date    APPENDECTOMY      lap    BLEPHAROPLASTY OU      breast reduction      CATARACT EXTRACTION BILATERAL W/ ANTERIOR VITRECTOMY      CATARACT EXTRACTION W/  INTRAOCULAR LENS IMPLANT  OD 11    AR EXPLORATORY OF ABDOMEN      TOTAL REDUCTION MAMMOPLASTY Bilateral 2009    YAG OD       Social History     Socioeconomic History    Marital status:      Spouse name: Not on file    Number of children: Not on file    Years of education: Not on file    Highest education level: Not on file   Occupational History    Not on file   Social Needs    Financial resource strain: Not very hard    Food insecurity:     Worry: Never true     Inability: Never true    Transportation needs:     Medical: No     Non-medical: No   Tobacco Use    Smoking status: Former Smoker     Last attempt to quit: 1998     Years since quittin.2    Smokeless tobacco: Never Used   Substance and Sexual Activity    Alcohol use: No     Frequency: Monthly or less     Drinks per session: 1 or 2     Binge frequency: Never    Drug use: No    Sexual activity: Not Currently     Partners: Male     Birth control/protection: Post-menopausal   Lifestyle    Physical activity:     Days per week: 3 days     Minutes per session: 20 min    Stress: Only a little   Relationships    Social connections:     Talks on phone: More than three times a week     Gets together: Once a week      Attends Hinduism service: 1 to 4 times per year     Active member of club or organization: Yes     Attends meetings of clubs or organizations: 1 to 4 times per year     Relationship status:    Other Topics Concern    Are you pregnant or think you may be? Not Asked    Breast-feeding Not Asked   Social History Narrative    Not on file     Family History   Problem Relation Age of Onset    Diabetes Father     Hypertension Father     Hypertension Brother     Melanoma Paternal Uncle     Melanoma Son     Melanoma Paternal Uncle     Breast cancer Neg Hx     Colon cancer Neg Hx     Ovarian cancer Neg Hx     Uterine cancer Neg Hx     Psoriasis Neg Hx     Lupus Neg Hx     Eczema Neg Hx     Acne Neg Hx      OB History        2    Para   2    Term   2            AB        Living   2       SAB        TAB        Ectopic        Multiple        Live Births                     Current Outpatient Medications:     aspirin (ASPIR-81) 81 MG EC tablet, Take 1 tablet by mouth Daily., Disp: , Rfl:     atorvastatin (LIPITOR) 10 MG tablet, TAKE ONE TABLET BY MOUTH ONE TIME DAILY, Disp: 90 tablet, Rfl: 0    atorvastatin (LIPITOR) 10 MG tablet, Take 1 tablet (10 mg total) by mouth once daily., Disp: 90 tablet, Rfl: 3    cholecalciferol, vitamin D3, 1,000 unit capsule, Take 1 capsule by mouth Daily., Disp: , Rfl:     losartan (COZAAR) 50 MG tablet, Take 1 tablet (50 mg total) by mouth once daily., Disp: 90 tablet, Rfl: 3    multivitamin capsule, Take 1 capsule by mouth once daily., Disp: , Rfl:     omega-3 fatty acids (FISH OIL) 300 mg Cap, Take by mouth., Disp: , Rfl:     prednisolon/gatiflox/bromfenac (PREDNISOL ACE-GATIFLOX-BROMFEN) 1-0.5-0.075 % DrpS, Place 1 drop into the left eye 4 (four) times daily. Start eyedrops the day before surgery (Patient not taking: Reported on 2020), Disp: 7 mL, Rfl: 1    promethazine (PHENERGAN) 25 MG suppository, Place 1 suppository (25 mg total) rectally  every 6 (six) hours as needed for Nausea. (Patient not taking: Reported on 5/7/2020), Disp: 10 suppository, Rfl: 0    tobramycin-dexamethasone 0.3-0.1% (TOBRADEX) 0.3-0.1 % Oint, Place into both eyes 4 (four) times daily as needed. Apply to Lid Margins (Patient not taking: Reported on 5/7/2020), Disp: 3.5 g, Rfl: 3    tretinoin (RETIN-A) 0.025 % cream, Apply topically every evening. (Patient not taking: Reported on 5/7/2020), Disp: 45 g, Rfl: 6    triamcinolone acetonide 0.1% (KENALOG) 0.1 % cream, AAA bid (Patient not taking: Reported on 5/7/2020), Disp: 454 g, Rfl: 0    vitamin E 400 UNIT capsule, Take 1 capsule (400 Units total) by mouth once daily. (Patient not taking: Reported on 5/7/2020), Disp: 100 capsule, Rfl: 1    GYNECOLOGY HISTORY:  No abnormal pap/std    DATA REVIEWED:  Last pap: normal Date: 2017  Last mmg: normal Date: 2019  Last DEXA: normal Date: 2018    /80   Wt 72.8 kg (160 lb 7.9 oz)   BMI 25.14 kg/m²     ROS:  GENERAL: Denies weight gain or weight loss. Feeling well overall.   SKIN: Denies rash or lesions.   HEAD: Denies head injury or headache.   NODES: Denies enlarged lymph nodes.   CHEST: Denies chest pain or shortness of breath.   CARDIOVASCULAR: Denies palpitations or left sided chest pain.   ABDOMEN: No abdominal pain, constipation, diarrhea, nausea, vomiting or rectal bleeding.   URINARY: No frequency, dysuria, hematuria, or burning on urination.  REPRODUCTIVE: See HPI.   BREASTS: The patient denies pain, lumps, or nipple discharge.   HEMATOLOGIC: No easy bruisability or excessive bleeding.   MUSCULOSKELETAL: Denies joint pain or swelling.   NEUROLOGIC: Denies syncope or weakness.   PSYCHIATRIC: Denies depression, anxiety or mood swings.    PHYSICAL EXAM:    APPEARANCE: Well nourished, well developed, in no acute distress.  AFFECT: WNL, alert and oriented x 3  SKIN: No acne or hirsutism  NECK: Neck symmetric without masses or thyromegaly  NODES: No inguinal, cervical,  axillary, or femoral lymph node enlargement  CHEST: Good respiratory effect  ABDOMEN: Soft.  No tenderness or masses.  No hepatosplenomegaly.  No hernias.  BREASTS: Symmetrical, no skin changes or visible lesions.  No palpable masses, nipple discharge bilaterally.  PELVIC: Normal external genitalia without lesions.  Normal hair distribution.  Adequate perineal body, normal urethral meatus.  Vagina atrophic without lesions or discharge.  Cervix pink, without lesions, discharge or tenderness.  No significant cystocele or rectocele.  Bimanual exam shows uterus to be normal size, regular, mobile and nontender.  Adnexa without masses or tenderness.   EXTREMITIES: No edema.    Encounter for gynecological examination without abnormal finding  -     Liquid-Based Pap Smear, Screening  -     HPV High Risk Genotypes, PCR    Encounter for screening mammogram for breast cancer  -     Mammo Digital Screening Bilat; Future; Expected date: 05/07/2020    Patient was counseled today on A.C.S. Pap guidelines (q5y) and recommendations for yearly pelvic exams, yearly mammograms starting age 40, and clinical breast exams; to see her PCP for other health maintenance.

## 2020-05-11 LAB
FINAL PATHOLOGIC DIAGNOSIS: NORMAL
Lab: NORMAL

## 2020-05-14 LAB
HPV HR 12 DNA SPEC QL NAA+PROBE: NEGATIVE
HPV16 AG SPEC QL: NEGATIVE
HPV18 DNA SPEC QL NAA+PROBE: NEGATIVE

## 2020-05-20 ENCOUNTER — PATIENT MESSAGE (OUTPATIENT)
Dept: OBSTETRICS AND GYNECOLOGY | Facility: CLINIC | Age: 65
End: 2020-05-20

## 2020-05-20 NOTE — PROGRESS NOTES
"Subjective:      Patient ID: Beatrice Regan is a 65 y.o. female.    Chief Complaint: Follow-up      HPI  Here for f/u medical problems and preventive exam.  Walking most days, 5mi.    Energy good.  No f/c/sw/cough.  No cp/sob/palp.  BMs and urine normal.  Taking vit D.  Fraternal twin has just been diagnosed with Alzheimer's.    HM: 11/19 fluvax, 3/19 HAV, 2/17 [id number], 3/19 [id number], 4/11 TDaP, 12/15 zostavax, 2/18 BMD normal rep 5y, 4/11 Cscope rep 10y, 5/20 MMG/Gyn Dr. Elder, 2/17 HCV neg, 4/18 Eye Dr. Wade.     Review of Systems   Constitutional: Negative for activity change, appetite change, chills, diaphoresis, fever and unexpected weight change.   HENT: Negative for congestion, ear pain, hearing loss, rhinorrhea, sinus pressure, sore throat and trouble swallowing.    Eyes: Negative for discharge and visual disturbance.   Respiratory: Negative for cough, chest tightness, shortness of breath and wheezing.    Cardiovascular: Negative for chest pain and palpitations.   Gastrointestinal: Negative for blood in stool, constipation, diarrhea, nausea and vomiting.   Endocrine: Negative for polydipsia and polyuria.   Genitourinary: Negative for difficulty urinating, dysuria, frequency, hematuria, menstrual problem, urgency and vaginal discharge.   Musculoskeletal: Negative for arthralgias, joint swelling and neck pain.   Skin: Negative for rash.   Neurological: Negative for dizziness, weakness and headaches.   Psychiatric/Behavioral: Negative for confusion, dysphoric mood and sleep disturbance. The patient is not nervous/anxious.          Objective:   /86 (BP Location: Left arm, Patient Position: Sitting, BP Method: Medium (Manual))   Pulse 80   Temp 97.9 °F (36.6 °C) (Oral)   Ht 5' 7" (1.702 m)   Wt 71.5 kg (157 lb 10.1 oz)   SpO2 98%   BMI 24.69 kg/m²     Physical Exam   Constitutional: She is oriented to person, place, and time. She appears well-developed and well-nourished.   HENT:   Right " Ear: External ear normal. Tympanic membrane is not injected.   Left Ear: External ear normal. Tympanic membrane is not injected.   Mouth/Throat: Oropharynx is clear and moist.   Eyes: Conjunctivae are normal.   Neck: Normal range of motion. Neck supple. No thyromegaly present.   Cardiovascular: Normal rate, regular rhythm and intact distal pulses. Exam reveals no gallop and no friction rub.   No murmur heard.  Pulmonary/Chest: Effort normal and breath sounds normal. She has no wheezes. She has no rales.   Abdominal: Soft. Bowel sounds are normal. She exhibits no mass. There is no tenderness.   Musculoskeletal: She exhibits no edema.   Lymphadenopathy:     She has no cervical adenopathy.   Neurological: She is alert and oriented to person, place, and time.   Skin: Skin is warm. No rash noted.   Psychiatric: She has a normal mood and affect.       Results for BROOKS VAUGHN (MRN [id number]) as of 5/21/2020 09:11   Ref. Range 4/27/2020 08:15 5/7/2020 11:50 5/7/2020 11:51   WBC Latest Ref Range: 3.90 - 12.70 K/uL 4.96     RBC Latest Ref Range: 4.00 - 5.40 M/uL 4.52     Hemoglobin Latest Ref Range: 12.0 - 16.0 g/dL 14.0     Hematocrit Latest Ref Range: 37.0 - 48.5 % 44.5     MCV Latest Ref Range: 82 - 98 fL 99 (H)     MCH Latest Ref Range: 27.0 - 31.0 pg 31.0     MCHC Latest Ref Range: 32.0 - 36.0 g/dL 31.5 (L)     RDW Latest Ref Range: 11.5 - 14.5 % 12.4     Platelets Latest Ref Range: 150 - 350 K/uL 244     MPV Latest Ref Range: 9.2 - 12.9 fL 10.7     Gran% Latest Ref Range: 38.0 - 73.0 % 47.0     Gran # (ANC) Latest Ref Range: 1.8 - 7.7 K/uL 2.3     Lymph% Latest Ref Range: 18.0 - 48.0 % 39.5     Lymph # Latest Ref Range: 1.0 - 4.8 K/uL 2.0     Mono% Latest Ref Range: 4.0 - 15.0 % 9.5     Mono # Latest Ref Range: 0.3 - 1.0 K/uL 0.5     Eosinophil% Latest Ref Range: 0.0 - 8.0 % 2.8     Eos # Latest Ref Range: 0.0 - 0.5 K/uL 0.1     Basophil% Latest Ref Range: 0.0 - 1.9 % 1.0     Baso # Latest Ref Range: 0.00 - 0.20  K/uL 0.05     nRBC Latest Ref Range: 0 /100 WBC 0     Differential Method Unknown Automated     Immature Grans (Abs) Latest Ref Range: 0.00 - 0.04 K/uL 0.01     Immature Granulocytes Latest Ref Range: 0.0 - 0.5 % 0.2     Sodium Latest Ref Range: 136 - 145 mmol/L 142     Potassium Latest Ref Range: 3.5 - 5.1 mmol/L 4.9     Chloride Latest Ref Range: 95 - 110 mmol/L 106     CO2 Latest Ref Range: 23 - 29 mmol/L 29     Anion Gap Latest Ref Range: 8 - 16 mmol/L 7 (L)     BUN, Bld Latest Ref Range: 8 - 23 mg/dL 22     Creatinine Latest Ref Range: 0.5 - 1.4 mg/dL 0.8     eGFR if non African American Latest Ref Range: >60 mL/min/1.73 m^2 >60.0     eGFR if African American Latest Ref Range: >60 mL/min/1.73 m^2 >60.0     Glucose Latest Ref Range: 70 - 110 mg/dL 89     Calcium Latest Ref Range: 8.7 - 10.5 mg/dL 10.0     Alkaline Phosphatase Latest Ref Range: 55 - 135 U/L 69     PROTEIN TOTAL Latest Ref Range: 6.0 - 8.4 g/dL 7.6     Albumin Latest Ref Range: 3.5 - 5.2 g/dL 4.3     BILIRUBIN TOTAL Latest Ref Range: 0.1 - 1.0 mg/dL 0.4     AST Latest Ref Range: 10 - 40 U/L 21     ALT Latest Ref Range: 10 - 44 U/L 22     Triglycerides Latest Ref Range: 30 - 150 mg/dL 114     Cholesterol Latest Ref Range: 120 - 199 mg/dL 205 (H)     HDL Latest Ref Range: 40 - 75 mg/dL 66     Hdl/Cholesterol Ratio Latest Ref Range: 20.0 - 50.0 % 32.2     LDL Cholesterol External Latest Ref Range: 63.0 - 159.0 mg/dL 116.2     Non-HDL Cholesterol Latest Units: mg/dL 139     Total Cholesterol/HDL Ratio Latest Ref Range: 2.0 - 5.0  3.1     Hemoglobin A1C External Latest Ref Range: 4.0 - 5.6 % 5.6     Estimated Avg Glucose Latest Ref Range: 68 - 131 mg/dL 114     TSH Latest Ref Range: 0.400 - 4.000 uIU/mL 1.114     HPV High Risk type 16, PCR Latest Ref Range: Negative    Negative   HPV High Risk type 18, PCR Latest Ref Range: Negative    Negative   HPV other High Risk types, PCR Latest Ref Range: Negative    Negative       Assessment:       1. Encounter  for preventive health examination    2. Essential hypertension    3. Other hyperlipidemia    4. Prediabetes          Plan:     Encounter for preventive health examination- utd.    Essential hypertension- doing well, cont rx.    Other hyperlipidemia- doing well, cont statin and exercise.  10yr vasc risk 7.6%.    Prediabetes- cont exercise, recheck 6mo.  -     Hemoglobin A1C; Future; Expected date: 05/21/2020

## 2020-05-21 ENCOUNTER — OFFICE VISIT (OUTPATIENT)
Dept: FAMILY MEDICINE | Facility: CLINIC | Age: 65
End: 2020-05-21
Payer: MEDICARE

## 2020-05-21 VITALS
DIASTOLIC BLOOD PRESSURE: 86 MMHG | TEMPERATURE: 98 F | BODY MASS INDEX: 24.74 KG/M2 | WEIGHT: 157.63 LBS | OXYGEN SATURATION: 98 % | HEART RATE: 80 BPM | HEIGHT: 67 IN | SYSTOLIC BLOOD PRESSURE: 128 MMHG

## 2020-05-21 DIAGNOSIS — Z00.00 ENCOUNTER FOR PREVENTIVE HEALTH EXAMINATION: Primary | ICD-10-CM

## 2020-05-21 DIAGNOSIS — R73.03 PREDIABETES: ICD-10-CM

## 2020-05-21 DIAGNOSIS — E78.49 OTHER HYPERLIPIDEMIA: ICD-10-CM

## 2020-05-21 DIAGNOSIS — I10 ESSENTIAL HYPERTENSION: ICD-10-CM

## 2020-05-21 PROCEDURE — 3079F DIAST BP 80-89 MM HG: CPT | Mod: HCNC,CPTII,S$GLB, | Performed by: INTERNAL MEDICINE

## 2020-05-21 PROCEDURE — 99999 PR PBB SHADOW E&M-EST. PATIENT-LVL III: ICD-10-PCS | Mod: PBBFAC,HCNC,, | Performed by: INTERNAL MEDICINE

## 2020-05-21 PROCEDURE — 99397 PER PM REEVAL EST PAT 65+ YR: CPT | Mod: HCNC,S$GLB,, | Performed by: INTERNAL MEDICINE

## 2020-05-21 PROCEDURE — 3074F SYST BP LT 130 MM HG: CPT | Mod: HCNC,CPTII,S$GLB, | Performed by: INTERNAL MEDICINE

## 2020-05-21 PROCEDURE — 99397 PR PREVENTIVE VISIT,EST,65 & OVER: ICD-10-PCS | Mod: HCNC,S$GLB,, | Performed by: INTERNAL MEDICINE

## 2020-05-21 PROCEDURE — 99499 RISK ADDL DX/OHS AUDIT: ICD-10-PCS | Mod: HCNC,S$GLB,, | Performed by: INTERNAL MEDICINE

## 2020-05-21 PROCEDURE — 99499 UNLISTED E&M SERVICE: CPT | Mod: HCNC,S$GLB,, | Performed by: INTERNAL MEDICINE

## 2020-05-21 PROCEDURE — 3074F PR MOST RECENT SYSTOLIC BLOOD PRESSURE < 130 MM HG: ICD-10-PCS | Mod: HCNC,CPTII,S$GLB, | Performed by: INTERNAL MEDICINE

## 2020-05-21 PROCEDURE — 99999 PR PBB SHADOW E&M-EST. PATIENT-LVL III: CPT | Mod: PBBFAC,HCNC,, | Performed by: INTERNAL MEDICINE

## 2020-05-21 PROCEDURE — 3079F PR MOST RECENT DIASTOLIC BLOOD PRESSURE 80-89 MM HG: ICD-10-PCS | Mod: HCNC,CPTII,S$GLB, | Performed by: INTERNAL MEDICINE

## 2020-05-21 RX ORDER — MAGNESIUM 250 MG
TABLET ORAL ONCE
COMMUNITY

## 2020-05-29 ENCOUNTER — HOSPITAL ENCOUNTER (OUTPATIENT)
Dept: RADIOLOGY | Facility: HOSPITAL | Age: 65
Discharge: HOME | End: 2020-05-29
Attending: OBSTETRICS & GYNECOLOGY
Payer: MEDICARE

## 2020-05-29 VITALS — BODY MASS INDEX: 24.74 KG/M2 | HEIGHT: 67 IN | WEIGHT: 157.63 LBS

## 2020-05-29 DIAGNOSIS — Z12.31 ENCOUNTER FOR SCREENING MAMMOGRAM FOR BREAST CANCER: ICD-10-CM

## 2020-05-29 PROCEDURE — 77067 SCR MAMMO BI INCL CAD: CPT | Mod: 26,HCNC,, | Performed by: RADIOLOGY

## 2020-05-29 PROCEDURE — 77063 MAMMO DIGITAL SCREENING BILAT WITH TOMOSYNTHESIS_CAD: ICD-10-PCS | Mod: 26,HCNC,, | Performed by: RADIOLOGY

## 2020-05-29 PROCEDURE — 77067 SCR MAMMO BI INCL CAD: CPT | Mod: TC,HCNC

## 2020-05-29 PROCEDURE — 77063 BREAST TOMOSYNTHESIS BI: CPT | Mod: 26,HCNC,, | Performed by: RADIOLOGY

## 2020-05-29 PROCEDURE — 77067 MAMMO DIGITAL SCREENING BILAT WITH TOMOSYNTHESIS_CAD: ICD-10-PCS | Mod: 26,HCNC,, | Performed by: RADIOLOGY

## 2020-06-02 ENCOUNTER — PATIENT OUTREACH (OUTPATIENT)
Dept: OTHER | Facility: OTHER | Age: 65
End: 2020-06-02

## 2020-07-27 ENCOUNTER — TELEPHONE (OUTPATIENT)
Dept: OPHTHALMOLOGY | Facility: CLINIC | Age: 65
End: 2020-07-27

## 2020-07-27 NOTE — TELEPHONE ENCOUNTER
Will call patient today to schedule a cataract consult    ----- Message from Mignon Mares sent at 7/27/2020  3:28 PM CDT -----  Pt would like to schedule cataract cons pls call today thanks

## 2020-08-07 ENCOUNTER — OFFICE VISIT (OUTPATIENT)
Dept: OPHTHALMOLOGY | Facility: CLINIC | Age: 65
End: 2020-08-07
Payer: MEDICARE

## 2020-08-07 DIAGNOSIS — Z96.1 PSEUDOPHAKIA OF RIGHT EYE: Primary | ICD-10-CM

## 2020-08-07 DIAGNOSIS — H25.12 NUCLEAR SCLEROSIS OF LEFT EYE: ICD-10-CM

## 2020-08-07 DIAGNOSIS — R73.03 PREDIABETES: ICD-10-CM

## 2020-08-07 PROCEDURE — 99999 PR PBB SHADOW E&M-EST. PATIENT-LVL III: ICD-10-PCS | Mod: PBBFAC,HCNC,, | Performed by: OPHTHALMOLOGY

## 2020-08-07 PROCEDURE — 92014 PR EYE EXAM, EST PATIENT,COMPREHESV: ICD-10-PCS | Mod: HCNC,S$GLB,, | Performed by: OPHTHALMOLOGY

## 2020-08-07 PROCEDURE — 99999 PR PBB SHADOW E&M-EST. PATIENT-LVL III: CPT | Mod: PBBFAC,HCNC,, | Performed by: OPHTHALMOLOGY

## 2020-08-07 PROCEDURE — 92014 COMPRE OPH EXAM EST PT 1/>: CPT | Mod: HCNC,S$GLB,, | Performed by: OPHTHALMOLOGY

## 2020-08-07 NOTE — PROGRESS NOTES
SUBJECTIVE  Beatrice Whitney Regan is 65 y.o. female  Uncorrected distance visual acuity was 20/25 +1 in the right eye and 20/30 -1 in the left eye.   Chief Complaint   Patient presents with    Cataract          HPI     Pt here for Cat eval. No pain or discomfort. Pt notices a big difference   from her right eye to her left eye. No gtts.     1. S/p bilateral blepharoplasty for Dermatochalasis  2. Restor IOL OD 2/16/11 with Yag 5/19/11  3. NSC OS  4. Pre Diabetes    Last edited by John Healy, Patient Care Assistant on 8/7/2020  8:07   AM. (History)         Assessment /Plan :  1. Pseudophakia of right eye  -- Condition stable, no therapeutic change required. Monitoring routinely.     2. Nuclear sclerosis of left eye  Monitor for now     3. Prediabetes No diabetic retinopathy at this time. Reviewed diabetic eye precautions including avoiding tobacco use,  Good glucose control, and importance of regular follow up.        RTC in 1 year or prn any changes

## 2020-09-09 NOTE — PROGRESS NOTES
Digital Medicine: Health  Follow-Up                      Additional Follow-up details: Patient has moved to [place], [place].    Patient is now receiving care out there. Patient is no longer eligible for the program, she also requested to be discharged.    Will discharge patient from program.         Lifestyle  Plan  There are no preventive care reminders to display for this patient.    Last 5 Patient Entered Readings                                      Current 30 Day Average:      Recent Readings 6/2/2020 6/2/2020 6/2/2020 3/10/2020 3/10/2020    SBP (mmHg) 126 135 145 137 123    DBP (mmHg) 78 84 81 78 80    Pulse 79 81 82 78 64

## 2020-10-01 NOTE — PROGRESS NOTES
"Subjective:       Patient ID: Beatrice Regan is a 62 y.o. female.    Chief Complaint: Follow-up    HPI Comments: Here for f/u medical problems and preventive exam. 2 UTIs have been treated recently.  Twice treated with Augmentin, last about 4 weeks ago.  3 weeks of cough, first with head congestion now only cough and now improving on mucinex DM.    Had been doing regular exercise until got sick recently.   sx doing well on prozac.  No f/c.  No cp/sob/palp.  No n/v/d.  Taking vit D 1K daily.  Taking glucophage.    HM: 2/17 today [id number], 4/11 TDaP, 12/15 zostavax, 4/12 BMD rep 5y, 4/11 Cscope rep 10y, 9/15 MMG, 2/17 HCV neg, 12/16 Eye Dr. Wade.            Review of Systems   Constitutional: Negative for appetite change, chills, diaphoresis and fever.   HENT: Negative for congestion, ear pain, rhinorrhea, sinus pressure and sore throat.    Respiratory: Negative for cough, chest tightness and shortness of breath.    Cardiovascular: Negative for chest pain and palpitations.   Gastrointestinal: Negative for blood in stool, constipation, diarrhea, nausea and vomiting.   Genitourinary: Negative for dysuria, frequency, hematuria, menstrual problem, urgency and vaginal discharge.   Musculoskeletal: Negative for arthralgias.   Skin: Negative for rash.   Neurological: Negative for dizziness and headaches.   Psychiatric/Behavioral: Negative for sleep disturbance. The patient is not nervous/anxious.        Objective:     Visit Vitals    /86 (BP Location: Right arm)    Pulse 102    Temp 97.2 °F (36.2 °C) (Tympanic)    Ht 5' 7" (1.702 m)    Wt 80.6 kg (177 lb 11.1 oz)    SpO2 95%    BMI 27.83 kg/m2       Physical Exam   Constitutional: She is oriented to person, place, and time. She appears well-developed and well-nourished.   HENT:   Right Ear: External ear normal. Tympanic membrane is not injected.   Left Ear: External ear normal. Tympanic membrane is not injected.   Mouth/Throat: Oropharynx is clear " and moist.   Eyes: Conjunctivae are normal.   Neck: Normal range of motion. Neck supple. No thyromegaly present.   Cardiovascular: Normal rate, regular rhythm and intact distal pulses.  Exam reveals no gallop and no friction rub.    No murmur heard.  Pulmonary/Chest: Effort normal and breath sounds normal. She has no wheezes. She has no rales.   Abdominal: Soft. Bowel sounds are normal. She exhibits no mass. There is no tenderness.   Musculoskeletal: She exhibits no edema.   Lymphadenopathy:     She has no cervical adenopathy.   Neurological: She is alert and oriented to person, place, and time.   Skin: Skin is warm. No rash noted.   Psychiatric: She has a normal mood and affect.       Results for orders placed or performed in visit on 02/17/17   Comprehensive metabolic panel   Result Value Ref Range    Sodium 140 136 - 145 mmol/L    Potassium 4.5 3.5 - 5.1 mmol/L    Chloride 104 95 - 110 mmol/L    CO2 29 23 - 29 mmol/L    Glucose 100 70 - 110 mg/dL    BUN, Bld 17 8 - 23 mg/dL    Creatinine 0.8 0.5 - 1.4 mg/dL    Calcium 9.7 8.7 - 10.5 mg/dL    Total Protein 7.4 6.0 - 8.4 g/dL    Albumin 3.9 3.5 - 5.2 g/dL    Total Bilirubin 0.4 0.1 - 1.0 mg/dL    Alkaline Phosphatase 68 55 - 135 U/L    AST 22 10 - 40 U/L    ALT 28 10 - 44 U/L    Anion Gap 7 (L) 8 - 16 mmol/L    eGFR if African American >60.0 >60 mL/min/1.73 m^2    eGFR if non African American >60.0 >60 mL/min/1.73 m^2   Lipid panel   Result Value Ref Range    Cholesterol 193 120 - 199 mg/dL    Triglycerides 272 (H) 30 - 150 mg/dL    HDL 47 40 - 75 mg/dL    LDL Cholesterol 91.6 63.0 - 159.0 mg/dL    HDL/Chol Ratio 24.4 20.0 - 50.0 %    Total Cholesterol/HDL Ratio 4.1 2.0 - 5.0    Non-HDL Cholesterol 146 mg/dL   CBC auto differential   Result Value Ref Range    WBC 6.01 3.90 - 12.70 K/uL    RBC 4.45 4.00 - 5.40 M/uL    Hemoglobin 13.6 12.0 - 16.0 g/dL    Hematocrit 41.6 37.0 - 48.5 %    MCV 94 82 - 98 fL    MCH 30.6 27.0 - 31.0 pg    MCHC 32.7 32.0 - 36.0 %    RDW  12.8 11.5 - 14.5 %    Platelets 283 150 - 350 K/uL    MPV 10.5 9.2 - 12.9 fL    Gran # 3.0 1.8 - 7.7 K/uL    Lymph # 2.4 1.0 - 4.8 K/uL    Mono # 0.3 0.3 - 1.0 K/uL    Eos # 0.2 0.0 - 0.5 K/uL    Baso # 0.03 0.00 - 0.20 K/uL    Gran% 50.5 38.0 - 73.0 %    Lymph% 40.3 18.0 - 48.0 %    Mono% 5.2 4.0 - 15.0 %    Eosinophil% 3.5 0.0 - 8.0 %    Basophil% 0.5 0.0 - 1.9 %    Differential Method Automated    TSH   Result Value Ref Range    TSH 1.016 0.400 - 4.000 uIU/mL   Hepatitis C antibody   Result Value Ref Range    Hepatitis C Ab Negative    Hemoglobin A1c   Result Value Ref Range    Hemoglobin A1C 5.9 4.5 - 6.2 %    Estimated Avg Glucose 123 68 - 131 mg/dL       Assessment:       1. Essential hypertension    2. Prediabetes    3. Pure hypercholesterolemia    4. CORNELL (renal artery stenosis)    5. Menopausal and postmenopausal disorder    6. Encounter for preventive health examination        Plan:       Beatrice was seen today for follow-up.    Diagnoses and all orders for this visit:    Essential hypertension- stable on rx.    Prediabetes- restart exercise, cont metformin.  Recheck 3mo.  -     Hemoglobin A1c; Future    Pure hypercholesterolemia- stable on rx.    CORNELL (renal artery stenosis)    Menopausal and postmenopausal disorder- stable on rx.    Encounter for preventive health examination- mmg due.  [id number].  -     Mammo Digital Screening Bilat with CAD; Future  -     Pneumococcal Conjugate Vaccine (13 Valent) (IM)            no

## 2020-11-09 ENCOUNTER — PATIENT MESSAGE (OUTPATIENT)
Dept: FAMILY MEDICINE | Facility: CLINIC | Age: 65
End: 2020-11-09

## 2020-11-09 DIAGNOSIS — E78.5 HYPERLIPIDEMIA: ICD-10-CM

## 2020-11-09 DIAGNOSIS — I10 ESSENTIAL HYPERTENSION: Primary | ICD-10-CM

## 2020-11-09 RX ORDER — LOSARTAN POTASSIUM 50 MG/1
50 TABLET ORAL DAILY
Qty: 90 TABLET | Refills: 0 | Status: SHIPPED | OUTPATIENT
Start: 2020-11-09 | End: 2021-02-07 | Stop reason: SDUPTHER

## 2020-11-09 RX ORDER — ATORVASTATIN CALCIUM 10 MG/1
10 TABLET, FILM COATED ORAL DAILY
Qty: 90 TABLET | Refills: 0 | Status: SHIPPED | OUTPATIENT
Start: 2020-11-09 | End: 2021-02-07 | Stop reason: SDUPTHER

## 2020-12-19 ENCOUNTER — HOSPITAL ENCOUNTER (OUTPATIENT)
Dept: LAB | Facility: MEDICAL CENTER | Age: 65
End: 2020-12-19
Attending: FAMILY MEDICINE
Payer: MEDICARE

## 2020-12-19 DIAGNOSIS — Z20.822 EXPOSURE TO COVID-19 VIRUS: ICD-10-CM

## 2020-12-19 PROCEDURE — U0003 INFECTIOUS AGENT DETECTION BY NUCLEIC ACID (DNA OR RNA); SEVERE ACUTE RESPIRATORY SYNDROME CORONAVIRUS 2 (SARS-COV-2) (CORONAVIRUS DISEASE [COVID-19]), AMPLIFIED PROBE TECHNIQUE, MAKING USE OF HIGH THROUGHPUT TECHNOLOGIES AS DESCRIBED BY CMS-2020-01-R: HCPCS

## 2020-12-19 PROCEDURE — C9803 HOPD COVID-19 SPEC COLLECT: HCPCS

## 2020-12-20 LAB
COVID ORDER STATUS COVID19: NORMAL
SARS-COV-2 RNA RESP QL NAA+PROBE: NOTDETECTED
SPECIMEN SOURCE: NORMAL

## 2020-12-23 ENCOUNTER — HOSPITAL ENCOUNTER (OUTPATIENT)
Dept: LAB | Facility: MEDICAL CENTER | Age: 65
End: 2020-12-23
Attending: FAMILY MEDICINE
Payer: MEDICARE

## 2020-12-23 DIAGNOSIS — Z20.822 EXPOSURE TO COVID-19 VIRUS: ICD-10-CM

## 2020-12-23 PROCEDURE — C9803 HOPD COVID-19 SPEC COLLECT: HCPCS

## 2020-12-23 PROCEDURE — U0003 INFECTIOUS AGENT DETECTION BY NUCLEIC ACID (DNA OR RNA); SEVERE ACUTE RESPIRATORY SYNDROME CORONAVIRUS 2 (SARS-COV-2) (CORONAVIRUS DISEASE [COVID-19]), AMPLIFIED PROBE TECHNIQUE, MAKING USE OF HIGH THROUGHPUT TECHNOLOGIES AS DESCRIBED BY CMS-2020-01-R: HCPCS

## 2020-12-24 LAB — COVID ORDER STATUS COVID19: NORMAL

## 2020-12-24 RX ORDER — ONDANSETRON 4 MG/1
4 TABLET, ORALLY DISINTEGRATING ORAL EVERY 6 HOURS PRN
Qty: 10 TAB | Refills: 0 | Status: SHIPPED | OUTPATIENT
Start: 2020-12-24 | End: 2021-02-04

## 2020-12-24 RX ORDER — PREDNISONE 20 MG/1
TABLET ORAL
Qty: 12 TAB | Refills: 0 | Status: ON HOLD | OUTPATIENT
Start: 2020-12-24 | End: 2021-01-04

## 2020-12-24 RX ORDER — AZITHROMYCIN 250 MG/1
TABLET, FILM COATED ORAL
Qty: 6 TAB | Refills: 0 | Status: ON HOLD | OUTPATIENT
Start: 2020-12-24 | End: 2021-01-04

## 2020-12-25 ENCOUNTER — HOSPITAL ENCOUNTER (EMERGENCY)
Facility: MEDICAL CENTER | Age: 65
End: 2020-12-25
Attending: EMERGENCY MEDICINE | Admitting: EMERGENCY MEDICINE
Payer: MEDICARE

## 2020-12-25 VITALS
OXYGEN SATURATION: 96 % | SYSTOLIC BLOOD PRESSURE: 144 MMHG | HEART RATE: 82 BPM | BODY MASS INDEX: 25.95 KG/M2 | TEMPERATURE: 97.5 F | DIASTOLIC BLOOD PRESSURE: 77 MMHG | WEIGHT: 165.34 LBS | HEIGHT: 67 IN | RESPIRATION RATE: 18 BRPM

## 2020-12-25 DIAGNOSIS — U07.1 COVID-19: ICD-10-CM

## 2020-12-25 LAB
ALBUMIN SERPL BCP-MCNC: 4.5 G/DL (ref 3.2–4.9)
ALBUMIN/GLOB SERPL: 1.3 G/DL
ALP SERPL-CCNC: 80 U/L (ref 30–99)
ALT SERPL-CCNC: 22 U/L (ref 2–50)
ANION GAP SERPL CALC-SCNC: 11 MMOL/L (ref 7–16)
AST SERPL-CCNC: 25 U/L (ref 12–45)
BASOPHILS # BLD AUTO: 0.1 % (ref 0–1.8)
BASOPHILS # BLD: 0.01 K/UL (ref 0–0.12)
BILIRUB SERPL-MCNC: 0.2 MG/DL (ref 0.1–1.5)
BUN SERPL-MCNC: 22 MG/DL (ref 8–22)
CALCIUM SERPL-MCNC: 9.4 MG/DL (ref 8.4–10.2)
CHLORIDE SERPL-SCNC: 101 MMOL/L (ref 96–112)
CO2 SERPL-SCNC: 24 MMOL/L (ref 20–33)
CREAT SERPL-MCNC: 0.5 MG/DL (ref 0.5–1.4)
EOSINOPHIL # BLD AUTO: 0.01 K/UL (ref 0–0.51)
EOSINOPHIL NFR BLD: 0.1 % (ref 0–6.9)
ERYTHROCYTE [DISTWIDTH] IN BLOOD BY AUTOMATED COUNT: 41.7 FL (ref 35.9–50)
GLOBULIN SER CALC-MCNC: 3.6 G/DL (ref 1.9–3.5)
GLUCOSE SERPL-MCNC: 124 MG/DL (ref 65–99)
HCT VFR BLD AUTO: 44.9 % (ref 37–47)
HGB BLD-MCNC: 15 G/DL (ref 12–16)
IMM GRANULOCYTES # BLD AUTO: 0.02 K/UL (ref 0–0.11)
IMM GRANULOCYTES NFR BLD AUTO: 0.3 % (ref 0–0.9)
LIPASE SERPL-CCNC: 15 U/L (ref 7–58)
LYMPHOCYTES # BLD AUTO: 1.07 K/UL (ref 1–4.8)
LYMPHOCYTES NFR BLD: 14.5 % (ref 22–41)
MCH RBC QN AUTO: 30.2 PG (ref 27–33)
MCHC RBC AUTO-ENTMCNC: 33.4 G/DL (ref 33.6–35)
MCV RBC AUTO: 90.5 FL (ref 81.4–97.8)
MONOCYTES # BLD AUTO: 0.56 K/UL (ref 0–0.85)
MONOCYTES NFR BLD AUTO: 7.6 % (ref 0–13.4)
NEUTROPHILS # BLD AUTO: 5.69 K/UL (ref 2–7.15)
NEUTROPHILS NFR BLD: 77.4 % (ref 44–72)
NRBC # BLD AUTO: 0 K/UL
NRBC BLD-RTO: 0 /100 WBC
PLATELET # BLD AUTO: 195 K/UL (ref 164–446)
PMV BLD AUTO: 10.3 FL (ref 9–12.9)
POTASSIUM SERPL-SCNC: 4.4 MMOL/L (ref 3.6–5.5)
PROT SERPL-MCNC: 8.1 G/DL (ref 6–8.2)
RBC # BLD AUTO: 4.96 M/UL (ref 4.2–5.4)
SARS-COV-2 RNA RESP QL NAA+PROBE: DETECTED
SODIUM SERPL-SCNC: 136 MMOL/L (ref 135–145)
SPECIMEN SOURCE: ABNORMAL
WBC # BLD AUTO: 7.4 K/UL (ref 4.8–10.8)

## 2020-12-25 PROCEDURE — 99284 EMERGENCY DEPT VISIT MOD MDM: CPT

## 2020-12-25 PROCEDURE — 80053 COMPREHEN METABOLIC PANEL: CPT

## 2020-12-25 PROCEDURE — 700111 HCHG RX REV CODE 636 W/ 250 OVERRIDE (IP): Performed by: EMERGENCY MEDICINE

## 2020-12-25 PROCEDURE — 36415 COLL VENOUS BLD VENIPUNCTURE: CPT

## 2020-12-25 PROCEDURE — 83690 ASSAY OF LIPASE: CPT

## 2020-12-25 PROCEDURE — 85025 COMPLETE CBC W/AUTO DIFF WBC: CPT

## 2020-12-25 PROCEDURE — A9270 NON-COVERED ITEM OR SERVICE: HCPCS | Performed by: EMERGENCY MEDICINE

## 2020-12-25 PROCEDURE — 96374 THER/PROPH/DIAG INJ IV PUSH: CPT

## 2020-12-25 PROCEDURE — 96375 TX/PRO/DX INJ NEW DRUG ADDON: CPT

## 2020-12-25 PROCEDURE — 700105 HCHG RX REV CODE 258: Performed by: EMERGENCY MEDICINE

## 2020-12-25 PROCEDURE — 700102 HCHG RX REV CODE 250 W/ 637 OVERRIDE(OP): Performed by: EMERGENCY MEDICINE

## 2020-12-25 RX ORDER — SODIUM CHLORIDE 9 MG/ML
1000 INJECTION, SOLUTION INTRAVENOUS ONCE
Status: COMPLETED | OUTPATIENT
Start: 2020-12-25 | End: 2020-12-25

## 2020-12-25 RX ORDER — PROMETHAZINE HYDROCHLORIDE 25 MG/1
25 SUPPOSITORY RECTAL EVERY 6 HOURS PRN
Qty: 10 SUPPOSITORY | Refills: 0 | Status: SHIPPED | OUTPATIENT
Start: 2020-12-25 | End: 2020-12-28 | Stop reason: SDUPTHER

## 2020-12-25 RX ORDER — PROMETHAZINE HYDROCHLORIDE 25 MG/1
25 SUPPOSITORY RECTAL ONCE
Status: COMPLETED | OUTPATIENT
Start: 2020-12-25 | End: 2020-12-25

## 2020-12-25 RX ORDER — PROMETHAZINE HYDROCHLORIDE 25 MG/1
25 SUPPOSITORY RECTAL EVERY 6 HOURS PRN
Qty: 10 SUPPOSITORY | Refills: 0 | Status: ON HOLD | OUTPATIENT
Start: 2020-12-25 | End: 2021-01-04

## 2020-12-25 RX ORDER — METOCLOPRAMIDE HYDROCHLORIDE 5 MG/ML
10 INJECTION INTRAMUSCULAR; INTRAVENOUS ONCE
Status: COMPLETED | OUTPATIENT
Start: 2020-12-25 | End: 2020-12-25

## 2020-12-25 RX ADMIN — SODIUM CHLORIDE 1000 ML: 9 INJECTION, SOLUTION INTRAVENOUS at 16:16

## 2020-12-25 RX ADMIN — PROMETHAZINE HYDROCHLORIDE 25 MG: 25 SUPPOSITORY RECTAL at 17:09

## 2020-12-25 RX ADMIN — METOCLOPRAMIDE 10 MG: 5 INJECTION, SOLUTION INTRAMUSCULAR; INTRAVENOUS at 16:17

## 2020-12-25 RX ADMIN — FENTANYL CITRATE 50 MCG: 50 INJECTION, SOLUTION INTRAMUSCULAR; INTRAVENOUS at 16:17

## 2020-12-25 NOTE — ED PROVIDER NOTES
ED Provider Note    CHIEF COMPLAINT  Chief Complaint   Patient presents with   • Coronavirus Screening   • N/V   • Body Aches   • Headache       HPI  Judy Blackburn is a 65 y.o. female who presents with nausea and vomiting.  The patient's been sick with coronavirus for about a week.  She did have a positive test about 3 days ago.  She states she was seen by her primary care provider who placed her on erythromycin, prednisone, and rectal Phenergan.  She is been unable to find a pharmacy they can fill the rectal Phenergan and she is had persistent vomiting throughout the day.  She states she feels very dehydrated.  She does have some diffuse myalgias and she also has a diffuse headache.  She has not had any fevers over the last 24 hours.  She denies difficulty with breathing.    REVIEW OF SYSTEMS  See HPI for further details. All other systems are negative.     PAST MEDICAL HISTORY  Past Medical History:   Diagnosis Date   • Hyperlipidemia    • Hypertension        FAMILY HISTORY  [unfilled]    SOCIAL HISTORY  Social History     Socioeconomic History   • Marital status:      Spouse name: Not on file   • Number of children: Not on file   • Years of education: Not on file   • Highest education level: Not on file   Occupational History   • Not on file   Social Needs   • Financial resource strain: Not on file   • Food insecurity     Worry: Not on file     Inability: Not on file   • Transportation needs     Medical: Not on file     Non-medical: Not on file   Tobacco Use   • Smoking status: Never Smoker   • Smokeless tobacco: Never Used   Substance and Sexual Activity   • Alcohol use: Yes     Comment: occasionally   • Drug use: Never   • Sexual activity: Not on file   Lifestyle   • Physical activity     Days per week: Not on file     Minutes per session: Not on file   • Stress: Not on file   Relationships   • Social connections     Talks on phone: Not on file     Gets together: Not on file     Attends Muslim  "service: Not on file     Active member of club or organization: Not on file     Attends meetings of clubs or organizations: Not on file     Relationship status: Not on file   • Intimate partner violence     Fear of current or ex partner: Not on file     Emotionally abused: Not on file     Physically abused: Not on file     Forced sexual activity: Not on file   Other Topics Concern   • Not on file   Social History Narrative   • Not on file       SURGICAL HISTORY  No past surgical history on file.    CURRENT MEDICATIONS  Home Medications    **Home medications have not yet been reviewed for this encounter**         ALLERGIES  No Known Allergies    PHYSICAL EXAM  VITAL SIGNS: /90   Pulse 88   Temp 36.3 °C (97.4 °F) (Temporal)   Resp 20   Ht 1.702 m (5' 7\")   Wt 75 kg (165 lb 5.5 oz)   SpO2 94%   BMI 25.90 kg/m²       Constitutional: Ill in appearance but nontoxic  HENT: Normocephalic, Atraumatic, Bilateral external ears normal, Oropharynx moist, No oral exudates, Nose normal.   Eyes: PERRLA, EOMI, Conjunctiva normal, No discharge.   Neck: Normal range of motion, No tenderness, Supple, No stridor.   Lymphatic: No lymphadenopathy noted.   Cardiovascular: Normal heart rate, Normal rhythm, No murmurs, No rubs, No gallops.   Thorax & Lungs: Normal breath sounds, No respiratory distress, No wheezing, No chest tenderness.   Abdomen: Bowel sounds normal, Soft, No tenderness, No masses, No pulsatile masses.   Skin: Warm, Dry, No erythema, No rash.   Back: No tenderness, No CVA tenderness.   Extremities: Intact distal pulses, No edema, No tenderness, No cyanosis, No clubbing.    Neurologic: Alert & oriented x 3, Normal motor function, Normal sensory function, No focal deficits noted.   Psychiatric: Affect normal, Judgment normal, Mood normal.     COURSE & MEDICAL DECISION MAKING  Pertinent Labs & Imaging studies reviewed. (See chart for details)  This a 65-year-old female who presents the emergency department with " vomiting.  I suspect this is all part of her viral process from coronavirus.  The patient received approximately 500 cc of saline before she was rechecked and she does feel significantly better.  She is not been on keep anything down over the last 5 hours therefore IV fluids was administered and she has had some improvement.  The patient also received IV Reglan and fentanyl.  She states her headache has resolved she continues have some nausea and she will receive a rectal dose of Phenergan as this has been helpful in the past.  Laboratory analysis is reassuring she does not have any metabolic derangements.  The patient will be discharged home with instructions to utilize the Phenergan suppositories as needed.  She will return for persistent vomiting.  At this time she does not have any respiratory distress nor she hypoxic.    FINAL IMPRESSION  1.  COVID-19  2.  Vomiting with dehydration    Disposition  The patient will be discharged in stable condition         Electronically signed by: Pro Jacobo M.D., 12/25/2020 3:57 PM

## 2020-12-28 RX ORDER — PROMETHAZINE HYDROCHLORIDE 25 MG/1
25 SUPPOSITORY RECTAL EVERY 6 HOURS PRN
Qty: 10 SUPPOSITORY | Refills: 0 | Status: ON HOLD | OUTPATIENT
Start: 2020-12-28 | End: 2021-01-04

## 2020-12-29 ENCOUNTER — APPOINTMENT (OUTPATIENT)
Dept: RADIOLOGY | Facility: MEDICAL CENTER | Age: 65
DRG: 177 | End: 2020-12-29
Attending: EMERGENCY MEDICINE
Payer: MEDICARE

## 2020-12-29 ENCOUNTER — HOSPITAL ENCOUNTER (INPATIENT)
Facility: MEDICAL CENTER | Age: 65
LOS: 7 days | DRG: 177 | End: 2021-01-05
Attending: EMERGENCY MEDICINE | Admitting: INTERNAL MEDICINE
Payer: MEDICARE

## 2020-12-29 DIAGNOSIS — R09.02 HYPOXIA: ICD-10-CM

## 2020-12-29 DIAGNOSIS — J12.82 PNEUMONIA DUE TO COVID-19 VIRUS: ICD-10-CM

## 2020-12-29 DIAGNOSIS — E78.5 HYPERLIPIDEMIA, UNSPECIFIED HYPERLIPIDEMIA TYPE: ICD-10-CM

## 2020-12-29 DIAGNOSIS — R11.2 NON-INTRACTABLE VOMITING WITH NAUSEA, UNSPECIFIED VOMITING TYPE: ICD-10-CM

## 2020-12-29 DIAGNOSIS — U07.1 PNEUMONIA DUE TO COVID-19 VIRUS: ICD-10-CM

## 2020-12-29 DIAGNOSIS — I12.9 HYPERTENSION, RENAL: ICD-10-CM

## 2020-12-29 LAB
ALBUMIN SERPL BCP-MCNC: 4 G/DL (ref 3.2–4.9)
ALBUMIN/GLOB SERPL: 1.2 G/DL
ALP SERPL-CCNC: 95 U/L (ref 30–99)
ALT SERPL-CCNC: 40 U/L (ref 2–50)
ANION GAP SERPL CALC-SCNC: 10 MMOL/L (ref 7–16)
APPEARANCE UR: CLEAR
AST SERPL-CCNC: 35 U/L (ref 12–45)
BACTERIA #/AREA URNS HPF: ABNORMAL /HPF
BASOPHILS # BLD AUTO: 0.2 % (ref 0–1.8)
BASOPHILS # BLD: 0.01 K/UL (ref 0–0.12)
BILIRUB SERPL-MCNC: 0.3 MG/DL (ref 0.1–1.5)
BILIRUB UR QL STRIP.AUTO: NEGATIVE
BUN SERPL-MCNC: 10 MG/DL (ref 8–22)
CALCIUM SERPL-MCNC: 9 MG/DL (ref 8.4–10.2)
CHLORIDE SERPL-SCNC: 103 MMOL/L (ref 96–112)
CO2 SERPL-SCNC: 23 MMOL/L (ref 20–33)
COLOR UR: YELLOW
CREAT SERPL-MCNC: 0.42 MG/DL (ref 0.5–1.4)
CRP SERPL HS-MCNC: 7.43 MG/DL (ref 0–0.75)
D DIMER PPP IA.FEU-MCNC: 1.05 UG/ML (FEU) (ref 0–0.5)
EOSINOPHIL # BLD AUTO: 0 K/UL (ref 0–0.51)
EOSINOPHIL NFR BLD: 0 % (ref 0–6.9)
EPI CELLS #/AREA URNS HPF: ABNORMAL /HPF
ERYTHROCYTE [DISTWIDTH] IN BLOOD BY AUTOMATED COUNT: 41.3 FL (ref 35.9–50)
GLOBULIN SER CALC-MCNC: 3.4 G/DL (ref 1.9–3.5)
GLUCOSE SERPL-MCNC: 101 MG/DL (ref 65–99)
GLUCOSE UR STRIP.AUTO-MCNC: NEGATIVE MG/DL
HCT VFR BLD AUTO: 43.1 % (ref 37–47)
HGB BLD-MCNC: 14.6 G/DL (ref 12–16)
IMM GRANULOCYTES # BLD AUTO: 0.01 K/UL (ref 0–0.11)
IMM GRANULOCYTES NFR BLD AUTO: 0.2 % (ref 0–0.9)
KETONES UR STRIP.AUTO-MCNC: NEGATIVE MG/DL
LACTATE BLD-SCNC: 1.2 MMOL/L (ref 0.5–2)
LEUKOCYTE ESTERASE UR QL STRIP.AUTO: NEGATIVE
LYMPHOCYTES # BLD AUTO: 1.56 K/UL (ref 1–4.8)
LYMPHOCYTES NFR BLD: 23.8 % (ref 22–41)
MCH RBC QN AUTO: 30.4 PG (ref 27–33)
MCHC RBC AUTO-ENTMCNC: 33.9 G/DL (ref 33.6–35)
MCV RBC AUTO: 89.8 FL (ref 81.4–97.8)
MICRO URNS: ABNORMAL
MONOCYTES # BLD AUTO: 0.47 K/UL (ref 0–0.85)
MONOCYTES NFR BLD AUTO: 7.2 % (ref 0–13.4)
NEUTROPHILS # BLD AUTO: 4.5 K/UL (ref 2–7.15)
NEUTROPHILS NFR BLD: 68.6 % (ref 44–72)
NITRITE UR QL STRIP.AUTO: NEGATIVE
NRBC # BLD AUTO: 0 K/UL
NRBC BLD-RTO: 0 /100 WBC
PH UR STRIP.AUTO: 7 [PH] (ref 5–8)
PLATELET # BLD AUTO: 227 K/UL (ref 164–446)
PMV BLD AUTO: 9.6 FL (ref 9–12.9)
POTASSIUM SERPL-SCNC: 4.2 MMOL/L (ref 3.6–5.5)
PROCALCITONIN SERPL-MCNC: 0.04 NG/ML
PROT SERPL-MCNC: 7.4 G/DL (ref 6–8.2)
PROT UR QL STRIP: NEGATIVE MG/DL
RBC # BLD AUTO: 4.8 M/UL (ref 4.2–5.4)
RBC # URNS HPF: ABNORMAL /HPF
RBC UR QL AUTO: ABNORMAL
SODIUM SERPL-SCNC: 136 MMOL/L (ref 135–145)
SP GR UR STRIP.AUTO: 1.01
WBC # BLD AUTO: 6.6 K/UL (ref 4.8–10.8)
WBC #/AREA URNS HPF: ABNORMAL /HPF

## 2020-12-29 PROCEDURE — 84145 PROCALCITONIN (PCT): CPT

## 2020-12-29 PROCEDURE — 83520 IMMUNOASSAY QUANT NOS NONAB: CPT

## 2020-12-29 PROCEDURE — 87086 URINE CULTURE/COLONY COUNT: CPT

## 2020-12-29 PROCEDURE — 81001 URINALYSIS AUTO W/SCOPE: CPT

## 2020-12-29 PROCEDURE — 36415 COLL VENOUS BLD VENIPUNCTURE: CPT

## 2020-12-29 PROCEDURE — 700105 HCHG RX REV CODE 258: Performed by: INTERNAL MEDICINE

## 2020-12-29 PROCEDURE — 85025 COMPLETE CBC W/AUTO DIFF WBC: CPT

## 2020-12-29 PROCEDURE — 96372 THER/PROPH/DIAG INJ SC/IM: CPT

## 2020-12-29 PROCEDURE — 85379 FIBRIN DEGRADATION QUANT: CPT

## 2020-12-29 PROCEDURE — 770021 HCHG ROOM/CARE - ISO PRIVATE

## 2020-12-29 PROCEDURE — 71045 X-RAY EXAM CHEST 1 VIEW: CPT

## 2020-12-29 PROCEDURE — 83605 ASSAY OF LACTIC ACID: CPT

## 2020-12-29 PROCEDURE — 99285 EMERGENCY DEPT VISIT HI MDM: CPT

## 2020-12-29 PROCEDURE — 86140 C-REACTIVE PROTEIN: CPT

## 2020-12-29 PROCEDURE — 80053 COMPREHEN METABOLIC PANEL: CPT

## 2020-12-29 PROCEDURE — 700111 HCHG RX REV CODE 636 W/ 250 OVERRIDE (IP): Performed by: INTERNAL MEDICINE

## 2020-12-29 PROCEDURE — 99223 1ST HOSP IP/OBS HIGH 75: CPT | Mod: AI | Performed by: INTERNAL MEDICINE

## 2020-12-29 PROCEDURE — A9270 NON-COVERED ITEM OR SERVICE: HCPCS | Performed by: INTERNAL MEDICINE

## 2020-12-29 PROCEDURE — 87040 BLOOD CULTURE FOR BACTERIA: CPT | Mod: 91

## 2020-12-29 PROCEDURE — 700102 HCHG RX REV CODE 250 W/ 637 OVERRIDE(OP): Performed by: INTERNAL MEDICINE

## 2020-12-29 RX ORDER — GUAIFENESIN 600 MG/1
600 TABLET, EXTENDED RELEASE ORAL 2 TIMES DAILY PRN
Status: DISCONTINUED | OUTPATIENT
Start: 2020-12-29 | End: 2020-12-29

## 2020-12-29 RX ORDER — BISACODYL 10 MG
10 SUPPOSITORY, RECTAL RECTAL
Status: DISCONTINUED | OUTPATIENT
Start: 2020-12-29 | End: 2021-01-05 | Stop reason: HOSPADM

## 2020-12-29 RX ORDER — LOSARTAN POTASSIUM 25 MG/1
25 TABLET ORAL EVERY EVENING
Status: DISCONTINUED | OUTPATIENT
Start: 2020-12-29 | End: 2021-01-05 | Stop reason: HOSPADM

## 2020-12-29 RX ORDER — GUAIFENESIN 600 MG/1
1200 TABLET, EXTENDED RELEASE ORAL 2 TIMES DAILY PRN
Status: DISCONTINUED | OUTPATIENT
Start: 2020-12-29 | End: 2021-01-05 | Stop reason: HOSPADM

## 2020-12-29 RX ORDER — ONDANSETRON 4 MG/1
4 TABLET, ORALLY DISINTEGRATING ORAL EVERY 4 HOURS PRN
Status: DISCONTINUED | OUTPATIENT
Start: 2020-12-29 | End: 2020-12-29

## 2020-12-29 RX ORDER — GUAIFENESIN/DEXTROMETHORPHAN 100-10MG/5
10 SYRUP ORAL EVERY 6 HOURS PRN
Status: DISCONTINUED | OUTPATIENT
Start: 2020-12-29 | End: 2020-12-29

## 2020-12-29 RX ORDER — ONDANSETRON 4 MG/1
4 TABLET, ORALLY DISINTEGRATING ORAL EVERY 6 HOURS PRN
Status: DISCONTINUED | OUTPATIENT
Start: 2020-12-29 | End: 2021-01-05 | Stop reason: HOSPADM

## 2020-12-29 RX ORDER — SODIUM CHLORIDE 9 MG/ML
INJECTION, SOLUTION INTRAVENOUS CONTINUOUS
Status: ACTIVE | OUTPATIENT
Start: 2020-12-29 | End: 2020-12-30

## 2020-12-29 RX ORDER — AMOXICILLIN 250 MG
2 CAPSULE ORAL 2 TIMES DAILY
Status: DISCONTINUED | OUTPATIENT
Start: 2020-12-29 | End: 2021-01-05 | Stop reason: HOSPADM

## 2020-12-29 RX ORDER — SIMVASTATIN 20 MG
20 TABLET ORAL NIGHTLY
Status: DISCONTINUED | OUTPATIENT
Start: 2020-12-29 | End: 2021-01-05 | Stop reason: HOSPADM

## 2020-12-29 RX ORDER — ACETAMINOPHEN 325 MG/1
650 TABLET ORAL EVERY 6 HOURS PRN
Status: DISCONTINUED | OUTPATIENT
Start: 2020-12-29 | End: 2021-01-05 | Stop reason: HOSPADM

## 2020-12-29 RX ORDER — POLYETHYLENE GLYCOL 3350 17 G/17G
1 POWDER, FOR SOLUTION ORAL
Status: DISCONTINUED | OUTPATIENT
Start: 2020-12-29 | End: 2021-01-05 | Stop reason: HOSPADM

## 2020-12-29 RX ORDER — ONDANSETRON 2 MG/ML
4 INJECTION INTRAMUSCULAR; INTRAVENOUS EVERY 4 HOURS PRN
Status: DISCONTINUED | OUTPATIENT
Start: 2020-12-29 | End: 2020-12-29

## 2020-12-29 RX ORDER — ONDANSETRON 2 MG/ML
4 INJECTION INTRAMUSCULAR; INTRAVENOUS EVERY 6 HOURS PRN
Status: DISCONTINUED | OUTPATIENT
Start: 2020-12-29 | End: 2021-01-05 | Stop reason: HOSPADM

## 2020-12-29 RX ORDER — DIPHENHYDRAMINE HYDROCHLORIDE 50 MG/ML
25 INJECTION INTRAMUSCULAR; INTRAVENOUS NIGHTLY PRN
Status: DISCONTINUED | OUTPATIENT
Start: 2020-12-29 | End: 2021-01-05 | Stop reason: HOSPADM

## 2020-12-29 RX ADMIN — SODIUM CHLORIDE 1000 ML: 9 INJECTION, SOLUTION INTRAVENOUS at 14:14

## 2020-12-29 RX ADMIN — SENNOSIDES-DOCUSATE SODIUM TAB 8.6-50 MG 2 TABLET: 8.6-5 TAB at 17:37

## 2020-12-29 RX ADMIN — ASPIRIN 81 MG: 81 TABLET, COATED ORAL at 17:37

## 2020-12-29 RX ADMIN — ENOXAPARIN SODIUM 40 MG: 40 INJECTION SUBCUTANEOUS at 14:45

## 2020-12-29 RX ADMIN — SIMVASTATIN 20 MG: 20 TABLET, FILM COATED ORAL at 20:12

## 2020-12-29 RX ADMIN — ACETAMINOPHEN 650 MG: 325 TABLET, FILM COATED ORAL at 16:04

## 2020-12-29 RX ADMIN — LOSARTAN POTASSIUM 25 MG: 25 TABLET, FILM COATED ORAL at 17:37

## 2020-12-29 RX ADMIN — DIPHENHYDRAMINE HYDROCHLORIDE 25 MG: 50 INJECTION, SOLUTION INTRAMUSCULAR; INTRAVENOUS at 20:12

## 2020-12-29 ASSESSMENT — ENCOUNTER SYMPTOMS
ABDOMINAL PAIN: 0
MYALGIAS: 0
CONSTIPATION: 0
SENSORY CHANGE: 0
SHORTNESS OF BREATH: 1
VOMITING: 0
SPEECH CHANGE: 0
DIARRHEA: 0
HEARTBURN: 0
DIZZINESS: 0
BLURRED VISION: 0
INSOMNIA: 0
FEVER: 1
DEPRESSION: 0
CHILLS: 0
COUGH: 1
CLAUDICATION: 0
SPUTUM PRODUCTION: 1
NERVOUS/ANXIOUS: 0
WEAKNESS: 0
PHOTOPHOBIA: 0
HEADACHES: 1

## 2020-12-29 ASSESSMENT — FIBROSIS 4 INDEX: FIB4 SCORE: 1.8

## 2020-12-29 NOTE — ED NOTES
IV established with blood draw. 2nd blood cultures sent to lab. Covid results already in chart. Patient is alert and oriented but tired and sob. Oxygen applied upon entry to hospital and sats in med 90's on 3 liters nasal cannula.

## 2020-12-29 NOTE — ASSESSMENT & PLAN NOTE
Continue with supplemental O2 and wean off as needed, currently on 6 L  Continue with Decadron  Per ID, not a candidate for remdesivir  Incentive spirometry, proning, symptomatic management  Due to increasing O2 requirements and unstable condition, not a candidate for ACS at this time

## 2020-12-29 NOTE — ED PROVIDER NOTES
ED Provider Note  CHIEF COMPLAINT  Chief Complaint   Patient presents with   • Shortness of Breath   • Fever       HPI  Judy Blackburn is a 65 y.o. female who presents to the ER complaining of worsening shortness of breath and difficulty getting a full deep breath over the last several days.  Patient also reports she is very weak.  She was hypoxic at 86% on room air.  She was diagnosed with COVID-19 on December 23.  She been sick several days prior to that.  Everybody in her family has Covid.  Her  is currently hospitalized here with COVID-19 pneumonia.  She had fevers, myalgias, sore throat, headache at onset of symptoms.  A lot of those symptoms have since resolved.  However, the cough has gotten worse and her shortness of breath is gotten worse, particularly over the last few days.  She is to be on Glucophage for diabetes, but she lost 50 pounds and has not had to take Glucophage in several years.  No underlying lung disease.  She has never been a smoker.    REVIEW OF SYSTEMS  See HPI for further details.  Positive for shortness of breath, generalized weakness, dyspnea on exertion, cough, fever, chills, myalgias.  Negative for hemoptysis, pain/swelling in the legs, syncope.  All other systems are negative.    PAST MEDICAL HISTORY  Past Medical History:   Diagnosis Date   • Hyperlipidemia    • Hypertension        FAMILY HISTORY  History reviewed. No pertinent family history.    SOCIAL HISTORY  Social History     Socioeconomic History   • Marital status:      Spouse name: Not on file   • Number of children: Not on file   • Years of education: Not on file   • Highest education level: Not on file   Occupational History   • Not on file   Social Needs   • Financial resource strain: Not on file   • Food insecurity     Worry: Not on file     Inability: Not on file   • Transportation needs     Medical: Not on file     Non-medical: Not on file   Tobacco Use   • Smoking status: Never Smoker   • Smokeless  "tobacco: Never Used   Substance and Sexual Activity   • Alcohol use: Yes     Comment: occasionally   • Drug use: Never   • Sexual activity: Not on file   Lifestyle   • Physical activity     Days per week: Not on file     Minutes per session: Not on file   • Stress: Not on file   Relationships   • Social connections     Talks on phone: Not on file     Gets together: Not on file     Attends Church service: Not on file     Active member of club or organization: Not on file     Attends meetings of clubs or organizations: Not on file     Relationship status: Not on file   • Intimate partner violence     Fear of current or ex partner: Not on file     Emotionally abused: Not on file     Physically abused: Not on file     Forced sexual activity: Not on file   Other Topics Concern   • Not on file   Social History Narrative   • Not on file       SURGICAL HISTORY  History reviewed. No pertinent surgical history.    CURRENT MEDICATIONS  Home Medications     Reviewed by Génesis Rondon R.N. (Registered Nurse) on 12/29/20 at 1146  Med List Status: Not Addressed   Medication Last Dose Status   azithromycin (ZITHROMAX) 250 MG Tab  Active   losartan (COZAAR) 25 MG TABS  Active   ondansetron (ZOFRAN ODT) 4 MG TABLET DISPERSIBLE  Active   predniSONE (DELTASONE) 20 MG Tab  Active   promethazine (PHENERGAN) 25 MG Suppos  Active   promethazine (PHENERGAN) 25 MG Suppos  Active   simvastatin (ZOCOR) 20 MG TABS  Active                ALLERGIES  No Known Allergies    PHYSICAL EXAM  VITAL SIGNS: BP (!) 165/93   Pulse (!) 109   Temp 36.8 °C (98.2 °F) (Temporal)   Resp (!) 22   Ht 1.702 m (5' 7\")   Wt 75.5 kg (166 lb 7.2 oz)   SpO2 90%   BMI 26.07 kg/m²      Constitutional: Well developed, well nourished; mild to moderate acute distress; ill-appearing  HENT: Normocephalic, atraumatic; Bilateral external ears normal; Oropharynx with moist mucous membranes; No erythema or exudates in the posterior oropharynx.   Eyes: PERRL, EOMI, " Conjunctiva normal. No discharge.   Neck:  Supple, nontender midline; No stridor; No nuchal rigidity.   Lymphatic: No cervical lymphadenopathy noted.   Cardiovascular: Regular rate and rhythm without murmurs, rubs, or gallop.   Thorax & Lungs: Slight increased work of breathing.  Deep breathing precipitates cough.  Crackles bilateral bases and midlung fields bilaterally.  Decreased breath sounds throughout.. Nontender chest wall. No crepitus or subcutaneous air  Abdomen: Soft, nontender, bowel sounds normal. No obvious masses; No pulsatile masses; no rebound, guarding, or peritoneal signs.   Skin: Good color; warm and dry without rash or petechia.  Back: Nontender, No CVA tenderness.   Extremities: Distal radial, dorsalis pedis, posterior tibial pulses are equal bilaterally; No edema; Nontender calves or saphenous, No cyanosis, No clubbing.   Musculoskeletal: Good range of motion in all major joints. No tenderness to palpation or major deformities noted.   Neurologic: Alert & oriented x 4, clear speech    EKG  Please see my EKG interpretation below    RADIOLOGY/PROCEDURES  DX-CHEST-PORTABLE (1 VIEW)   Final Result      Diffuse patchy bilateral pulmonary infiltrates. These findings are consistent with Covid 19 pneumonia.          COURSE & MEDICAL DECISION MAKING  Pertinent Labs & Imaging studies reviewed. (See chart for details)  Results for orders placed or performed during the hospital encounter of 12/29/20   CBC WITH DIFFERENTIAL   Result Value Ref Range    WBC 6.6 4.8 - 10.8 K/uL    RBC 4.80 4.20 - 5.40 M/uL    Hemoglobin 14.6 12.0 - 16.0 g/dL    Hematocrit 43.1 37.0 - 47.0 %    MCV 89.8 81.4 - 97.8 fL    MCH 30.4 27.0 - 33.0 pg    MCHC 33.9 33.6 - 35.0 g/dL    RDW 41.3 35.9 - 50.0 fL    Platelet Count 227 164 - 446 K/uL    MPV 9.6 9.0 - 12.9 fL    Neutrophils-Polys 68.60 44.00 - 72.00 %    Lymphocytes 23.80 22.00 - 41.00 %    Monocytes 7.20 0.00 - 13.40 %    Eosinophils 0.00 0.00 - 6.90 %    Basophils 0.20 0.00  - 1.80 %    Immature Granulocytes 0.20 0.00 - 0.90 %    Nucleated RBC 0.00 /100 WBC    Neutrophils (Absolute) 4.50 2.00 - 7.15 K/uL    Lymphs (Absolute) 1.56 1.00 - 4.80 K/uL    Monos (Absolute) 0.47 0.00 - 0.85 K/uL    Eos (Absolute) 0.00 0.00 - 0.51 K/uL    Baso (Absolute) 0.01 0.00 - 0.12 K/uL    Immature Granulocytes (abs) 0.01 0.00 - 0.11 K/uL    NRBC (Absolute) 0.00 K/uL   COMP METABOLIC PANEL   Result Value Ref Range    Sodium 136 135 - 145 mmol/L    Potassium 4.2 3.6 - 5.5 mmol/L    Chloride 103 96 - 112 mmol/L    Co2 23 20 - 33 mmol/L    Anion Gap 10.0 7.0 - 16.0    Glucose 101 (H) 65 - 99 mg/dL    Bun 10 8 - 22 mg/dL    Creatinine 0.42 (L) 0.50 - 1.40 mg/dL    Calcium 9.0 8.4 - 10.2 mg/dL    AST(SGOT) 35 12 - 45 U/L    ALT(SGPT) 40 2 - 50 U/L    Alkaline Phosphatase 95 30 - 99 U/L    Total Bilirubin 0.3 0.1 - 1.5 mg/dL    Albumin 4.0 3.2 - 4.9 g/dL    Total Protein 7.4 6.0 - 8.2 g/dL    Globulin 3.4 1.9 - 3.5 g/dL    A-G Ratio 1.2 g/dL   LACTIC ACID   Result Value Ref Range    Lactic Acid 1.2 0.5 - 2.0 mmol/L   ESTIMATED GFR   Result Value Ref Range    GFR If African American >60 >60 mL/min/1.73 m 2    GFR If Non African American >60 >60 mL/min/1.73 m 2   D-Dimer   Result Value Ref Range    D-Dimer Screen 1.05 (H) 0.00 - 0.50 ug/mL (FEU)   CRP Quantitative (Non-Cardiac)   Result Value Ref Range    Stat C-Reactive Protein 7.43 (H) 0.00 - 0.75 mg/dL   Procalcitonin   Result Value Ref Range    Procalcitonin 0.04 <0.25 ng/mL         Patient presents to the ER complaining of shortness of breath and dyspnea on exertion as well as progressive generalized weakness over the last few days.  She was diagnosed with COVID-19 on December 23.  She presents hypoxic today with O2 sat of 86% on room air.  She is oxygen requiring.  Chest x-ray reveals multifocal patchy infiltrate consistent with Covid pneumonia.  She is saturating in the low 90s on 3 L at this time.  Her  is currently hospitalized upstairs in room 122  and I was told he is currently on 10 L of oxygen.  Patient states that all of the fevers, myalgias, headache and sore throat seems to have resolved.  However, the cough and shortness of breath is getting worse.  Patient does not have any underlying does not disease.  She has history of diabetes which improved significantly with weight loss.  At this time patient is oxygen requiring and will need to be hospitalized for Covid pneumonia with hypoxia.  I spoke with Dr. Mendieta, hospitalist on-call, and she will kindly evaluate the patient hospitalization.    1300: Discussed with Dr Mendieta, hospitalist on-call, and she will kindly evaluate patient for hospitalization.    I verified that the patient was wearing a mask and I was wearing appropriate PPE every time I entered the room. The patient's mask was on the patient at all times during my encounter    CRITICAL CARE  The very real possibilty of a deterioration of this patient's condition required the highest level of my preparedness for sudden, emergent intervention.  I provided critical care services, which included medication orders, frequent reevaluations of the patient's condition and response to treatment, ordering and reviewing test results, and discussing the case with various consultants.  The critical care time associated with the care of the patient was 35 minutes. Review chart for interventions. This time is exclusive of any other billable procedures.       FINAL IMPRESSION  1. Pneumonia due to COVID-19 virus Acute    2. Hypoxia Acute    The critical care time associated with the care of the patient was 35 minutes. Review chart for interventions. This time is exclusive of any other billable procedures.      This dictation has been created using voice recognition software. The accuracy of the dictation is limited by the abilities of the software. I expect there may be some errors of grammar and possibly content. I made every attempt to manually correct the errors  within my dictation. However, errors related to voice recognition software may still exist and should be interpreted within the appropriate context.    Electronically signed by: Ifeoma Walls M.D., 12/29/2020 12:12 PM

## 2020-12-29 NOTE — ED NOTES
Med rec updated and complete  Allergies reviewed  Pt reports that she only took one dose of her PREDNISONE and AZITHROMYCIN (Z-pack) on 12/24/2020, stopped because she was not able to keep anything down.

## 2020-12-29 NOTE — ED TRIAGE NOTES
Pt presents with friend from home for worsening covid symptoms. Was dx 1 week ago. Unable to tolerated food/fluids. Pt 86% on RA. Dry cough noted. Took 2 tylenol. And phenergan suppository 4 hrs ago. Pt masked. Friend updated on visitor policy. Pt  is currently admitted to our covid unit.

## 2020-12-29 NOTE — H&P
Hospital Medicine History & Physical Note    Date of Service  12/29/2020    Primary Care Physician  No primary care provider on file.    Consultants  none    Code Status  Full Code    Chief Complaint  Chief Complaint   Patient presents with   • Shortness of Breath   • Fever       History of Presenting Illness  65 y.o. female who presented 12/29/2020 with lethargy, dizziness and dehydration as she cannot keep any fluids or food down for the past 2 days.  She has been feeling poorly for 2 weeks, positive covid test on 12/23/2020.  She states she could not tolerate the vomiting and headache anymore and presented to the ED for further evaluation.  Of note her  is also hospitalized secondary to covid.  She has been running a low level fever for days.  Since ER care and placement of oxygen, she feels like breathing is slightly easier and her headache has improved.  Nausea has subsided and she is hungry.    Review of Systems  Review of Systems   Constitutional: Positive for fever. Negative for chills.   HENT: Negative for congestion.    Eyes: Negative for blurred vision and photophobia.   Respiratory: Positive for cough, sputum production and shortness of breath.    Cardiovascular: Negative for chest pain, claudication and leg swelling.   Gastrointestinal: Negative for abdominal pain, constipation, diarrhea, heartburn and vomiting.   Genitourinary: Negative for dysuria and hematuria.   Musculoskeletal: Negative for joint pain and myalgias.   Skin: Negative for itching and rash.   Neurological: Positive for headaches. Negative for dizziness, sensory change, speech change and weakness.   Psychiatric/Behavioral: Negative for depression. The patient is not nervous/anxious and does not have insomnia.        Past Medical History   has a past medical history of Hyperlipidemia and Hypertension.    Surgical History  None    Family History  Reviewed and does not contribute to current hospital stay.    Social History   reports  that she has never smoked. She has never used smokeless tobacco. She reports current alcohol use. She reports that she does not use drugs.    Allergies  Allergies   Allergen Reactions   • Codeine Vomiting and Nausea       Medications  Prior to Admission Medications   Prescriptions Last Dose Informant Patient Reported? Taking?   Cholecalciferol (VITAMIN D3 PO) 2020 at 2030 Patient Yes Yes   Sig: Take 1 Cap by mouth every evening.   aspirin EC (ECOTRIN) 81 MG Tablet Delayed Response 2020 at 2030 Patient Yes Yes   Sig: Take 81 mg by mouth every evening.   azithromycin (ZITHROMAX) 250 MG Tab 2020 at STOPPED Patient No No   Si tabs by mouth day 1, 1 tab by mouth days 2-5   Patient taking differently: Take 250-500 mg by mouth see administration instructions. 2 tabs by mouth day 1, 1 tab by mouth days 2-5  Pt started on 2020 for 5 day course   losartan (COZAAR) 25 MG TABS 2020 at 2030 Patient Yes No   Sig: Take 25 mg by mouth every evening.   ondansetron (ZOFRAN ODT) 4 MG TABLET DISPERSIBLE 2020 at Unknown Patient No No   Sig: Take 1 Tab by mouth every 6 hours as needed for Nausea.   predniSONE (DELTASONE) 20 MG Tab 2020 at STOPPED Patient No No   Sig: Take 3 tabs po for 2 days then 2 tabs for 2 days then 1 for 2 days   Patient taking differently: Take 20-60 mg by mouth see administration instructions. Take 3 tabs po for 2 days then 2 tabs for 2 days then 1 for 2 days  Pt started on 2020 for 6 day course   promethazine (PHENERGAN) 25 MG Suppos 2020 at 0900 Patient No No   Sig: Insert 1 Suppository into the rectum every 6 hours as needed for Nausea/Vomiting.   Patient taking differently: Insert 25 mg into the rectum every 6 hours as needed for Nausea/Vomiting. Indications: Nausea and Vomiting   promethazine (PHENERGAN) 25 MG Suppos Duplicate Patient No No   Sig: Insert 1 Suppository into the rectum every 6 hours as needed for Nausea/Vomiting.   Patient taking  differently: Insert 25 mg into the rectum every 6 hours as needed for Nausea/Vomiting. Duplicate   simvastatin (ZOCOR) 20 MG TABS 12/28/2020 at 2030 Patient Yes No   Sig: Take 20 mg by mouth every evening.      Facility-Administered Medications: None       Physical Exam  Temp:  [36.8 °C (98.2 °F)] 36.8 °C (98.2 °F)  Pulse:  [] 90  Resp:  [14-22] 17  BP: (138-167)/(69-93) 138/71  SpO2:  [86 %-94 %] 94 %    Physical Exam  Vitals signs and nursing note reviewed.   Constitutional:       General: She is not in acute distress.     Appearance: Normal appearance. She is not ill-appearing.   HENT:      Head: Normocephalic and atraumatic.      Nose: Nose normal.   Neck:      Musculoskeletal: Neck supple.   Cardiovascular:      Rate and Rhythm: Normal rate and regular rhythm.      Heart sounds: Normal heart sounds. No murmur.   Pulmonary:      Effort: Pulmonary effort is normal. No respiratory distress.      Breath sounds: Rales (R middle/lower lobes) present.   Abdominal:      General: Bowel sounds are normal. There is no distension.      Palpations: Abdomen is soft.   Musculoskeletal:         General: No swelling or tenderness.   Skin:     General: Skin is warm and dry.   Neurological:      General: No focal deficit present.      Mental Status: She is alert and oriented to person, place, and time.   Psychiatric:         Mood and Affect: Mood normal.         Laboratory:  Recent Labs     12/29/20  1155   WBC 6.6   RBC 4.80   HEMOGLOBIN 14.6   HEMATOCRIT 43.1   MCV 89.8   MCH 30.4   MCHC 33.9   RDW 41.3   PLATELETCT 227   MPV 9.6     Recent Labs     12/29/20  1155   SODIUM 136   POTASSIUM 4.2   CHLORIDE 103   CO2 23   GLUCOSE 101*   BUN 10   CREATININE 0.42*   CALCIUM 9.0     Recent Labs     12/29/20  1155   ALTSGPT 40   ASTSGOT 35   ALKPHOSPHAT 95   TBILIRUBIN 0.3   GLUCOSE 101*         No results for input(s): NTPROBNP in the last 72 hours.      No results for input(s): TROPONINT in the last 72  hours.    Imaging:  DX-CHEST-PORTABLE (1 VIEW)   Final Result      Diffuse patchy bilateral pulmonary infiltrates. These findings are consistent with Covid 19 pneumonia.            Assessment/Plan:  I anticipate this patient will require at least two midnights for appropriate medical management, necessitating inpatient admission.    * Pneumonia due to COVID-19 virus  Assessment & Plan  Viral pneumonia, continue with supportive care  IV hydration x 2 L only to make sure she does not got to volume overload  Had been on Z pack and prednisone as outpatient - will not continue    Dehydration  Assessment & Plan  Gentle IV hydration with NS at 83cc/hour x 2 Liters      Hyperlipidemia  Assessment & Plan  Continue statin when able to take PO      Nausea and vomiting  Assessment & Plan  Anti-emetics PRN  Gentle IV hydration  Likely s/e of phlegm with covid pneumonia

## 2020-12-30 LAB
ANION GAP SERPL CALC-SCNC: 7 MMOL/L (ref 7–16)
BUN SERPL-MCNC: 11 MG/DL (ref 8–22)
CALCIUM SERPL-MCNC: 8.3 MG/DL (ref 8.4–10.2)
CHLORIDE SERPL-SCNC: 106 MMOL/L (ref 96–112)
CO2 SERPL-SCNC: 25 MMOL/L (ref 20–33)
CREAT SERPL-MCNC: 0.47 MG/DL (ref 0.5–1.4)
ERYTHROCYTE [DISTWIDTH] IN BLOOD BY AUTOMATED COUNT: 43.4 FL (ref 35.9–50)
GLUCOSE BLD-MCNC: 112 MG/DL (ref 65–99)
GLUCOSE SERPL-MCNC: 101 MG/DL (ref 65–99)
HCT VFR BLD AUTO: 37.8 % (ref 37–47)
HGB BLD-MCNC: 12.3 G/DL (ref 12–16)
MCH RBC QN AUTO: 29.9 PG (ref 27–33)
MCHC RBC AUTO-ENTMCNC: 32.5 G/DL (ref 33.6–35)
MCV RBC AUTO: 92 FL (ref 81.4–97.8)
PLATELET # BLD AUTO: 207 K/UL (ref 164–446)
PMV BLD AUTO: 10 FL (ref 9–12.9)
POTASSIUM SERPL-SCNC: 3.7 MMOL/L (ref 3.6–5.5)
RBC # BLD AUTO: 4.11 M/UL (ref 4.2–5.4)
SODIUM SERPL-SCNC: 138 MMOL/L (ref 135–145)
WBC # BLD AUTO: 5.8 K/UL (ref 4.8–10.8)

## 2020-12-30 PROCEDURE — 80048 BASIC METABOLIC PNL TOTAL CA: CPT

## 2020-12-30 PROCEDURE — 770021 HCHG ROOM/CARE - ISO PRIVATE

## 2020-12-30 PROCEDURE — 700111 HCHG RX REV CODE 636 W/ 250 OVERRIDE (IP): Performed by: INTERNAL MEDICINE

## 2020-12-30 PROCEDURE — 85027 COMPLETE CBC AUTOMATED: CPT

## 2020-12-30 PROCEDURE — 700102 HCHG RX REV CODE 250 W/ 637 OVERRIDE(OP): Performed by: INTERNAL MEDICINE

## 2020-12-30 PROCEDURE — A9270 NON-COVERED ITEM OR SERVICE: HCPCS | Performed by: INTERNAL MEDICINE

## 2020-12-30 PROCEDURE — 700105 HCHG RX REV CODE 258: Performed by: INTERNAL MEDICINE

## 2020-12-30 PROCEDURE — 700102 HCHG RX REV CODE 250 W/ 637 OVERRIDE(OP): Performed by: HOSPITALIST

## 2020-12-30 PROCEDURE — A9270 NON-COVERED ITEM OR SERVICE: HCPCS | Performed by: HOSPITALIST

## 2020-12-30 PROCEDURE — 99232 SBSQ HOSP IP/OBS MODERATE 35: CPT | Performed by: INTERNAL MEDICINE

## 2020-12-30 PROCEDURE — 82962 GLUCOSE BLOOD TEST: CPT

## 2020-12-30 RX ORDER — ALPRAZOLAM 0.25 MG/1
0.25 TABLET ORAL ONCE
Status: COMPLETED | OUTPATIENT
Start: 2020-12-30 | End: 2020-12-30

## 2020-12-30 RX ORDER — DEXTROSE MONOHYDRATE 25 G/50ML
50 INJECTION, SOLUTION INTRAVENOUS
Status: DISCONTINUED | OUTPATIENT
Start: 2020-12-30 | End: 2021-01-05 | Stop reason: HOSPADM

## 2020-12-30 RX ORDER — KETOROLAC TROMETHAMINE 10 MG/1
10 TABLET, FILM COATED ORAL EVERY 8 HOURS PRN
Status: DISCONTINUED | OUTPATIENT
Start: 2020-12-30 | End: 2020-12-30

## 2020-12-30 RX ORDER — IBUPROFEN 200 MG
400 TABLET ORAL EVERY 6 HOURS PRN
Status: DISCONTINUED | OUTPATIENT
Start: 2020-12-30 | End: 2021-01-05 | Stop reason: HOSPADM

## 2020-12-30 RX ORDER — DEXAMETHASONE 4 MG/1
6 TABLET ORAL DAILY
Status: DISCONTINUED | OUTPATIENT
Start: 2020-12-30 | End: 2021-01-05 | Stop reason: HOSPADM

## 2020-12-30 RX ADMIN — LOSARTAN POTASSIUM 25 MG: 25 TABLET, FILM COATED ORAL at 17:13

## 2020-12-30 RX ADMIN — ONDANSETRON 4 MG: 2 INJECTION INTRAMUSCULAR; INTRAVENOUS at 01:48

## 2020-12-30 RX ADMIN — ACETAMINOPHEN 650 MG: 325 TABLET, FILM COATED ORAL at 08:22

## 2020-12-30 RX ADMIN — ASPIRIN 81 MG: 81 TABLET, COATED ORAL at 17:14

## 2020-12-30 RX ADMIN — ACETAMINOPHEN 650 MG: 325 TABLET, FILM COATED ORAL at 01:31

## 2020-12-30 RX ADMIN — DEXAMETHASONE 6 MG: 4 TABLET ORAL at 17:13

## 2020-12-30 RX ADMIN — ACETAMINOPHEN 650 MG: 325 TABLET, FILM COATED ORAL at 21:07

## 2020-12-30 RX ADMIN — IBUPROFEN 400 MG: 200 TABLET, FILM COATED ORAL at 18:08

## 2020-12-30 RX ADMIN — SIMVASTATIN 20 MG: 20 TABLET, FILM COATED ORAL at 21:07

## 2020-12-30 RX ADMIN — ENOXAPARIN SODIUM 40 MG: 40 INJECTION SUBCUTANEOUS at 06:28

## 2020-12-30 RX ADMIN — SODIUM CHLORIDE: 9 INJECTION, SOLUTION INTRAVENOUS at 01:43

## 2020-12-30 RX ADMIN — ACETAMINOPHEN 650 MG: 325 TABLET, FILM COATED ORAL at 14:43

## 2020-12-30 RX ADMIN — ALPRAZOLAM 0.25 MG: 0.25 TABLET ORAL at 02:18

## 2020-12-30 RX ADMIN — KETOROLAC TROMETHAMINE 10 MG: 10 TABLET, FILM COATED ORAL at 13:13

## 2020-12-30 ASSESSMENT — ENCOUNTER SYMPTOMS
EYES NEGATIVE: 1
MUSCULOSKELETAL NEGATIVE: 1
NEUROLOGICAL NEGATIVE: 1
HEMOPTYSIS: 0
SPUTUM PRODUCTION: 0
CARDIOVASCULAR NEGATIVE: 1
NAUSEA: 1
COUGH: 1
SHORTNESS OF BREATH: 1
FEVER: 1
VOMITING: 1
CHILLS: 1

## 2020-12-30 ASSESSMENT — PAIN DESCRIPTION - PAIN TYPE: TYPE: ACUTE PAIN

## 2020-12-30 NOTE — HOSPITAL COURSE
65 y.o. female who presented 12/29/2020 with lethargy, dizziness and dehydration as she cannot keep any fluids or food down for the past 2 days.  She has been feeling poorly for 2 weeks, positive covid test on 12/23/2020.  She states she could not tolerate the vomiting and headache anymore and presented to the ED for further evaluation.  Of note her  is also hospitalized secondary to covid.  She has been running a low level fever for days.  Since ER care and placement of oxygen, she feels like breathing is slightly easier and her headache has improved.  Nausea has subsided and she is hungry.

## 2020-12-30 NOTE — PROGRESS NOTES
[facility] Medicine Daily Progress Note    Date of Service  12/30/2020    Chief Complaint  65 y.o. female admitted 12/29/2020 with nausea and vomiting  Hospital Course  65 y.o. female who presented 12/29/2020 with lethargy, dizziness and dehydration as she cannot keep any fluids or food down for the past 2 days.  She has been feeling poorly for 2 weeks, positive covid test on 12/23/2020.  She states she could not tolerate the vomiting and headache anymore and presented to the ED for further evaluation.  Of note her  is also hospitalized secondary to covid.  She has been running a low level fever for days.  Since ER care and placement of oxygen, she feels like breathing is slightly easier and her headache has improved.  Nausea has subsided and she is hungry.      Interval Problem Update  Patient was seen examined by me at bedside today, she was complaining of severe headache and shortness of breath at time of examination.  Oxygen was increased to 5L she was desaturating to the 80s on 3L.     is hospitalized as well with Covid.     Consultants/Specialty  Infectious disease    Code Status  Full Code    Disposition  Anticipated DC unknown    Review of Systems  Review of Systems   Constitutional: Positive for chills, fever and malaise/fatigue.   Eyes: Negative.    Respiratory: Positive for cough and shortness of breath. Negative for hemoptysis and sputum production.    Cardiovascular: Negative.    Gastrointestinal: Positive for nausea and vomiting.   Genitourinary: Negative.    Musculoskeletal: Negative.    Neurological: Negative.         Physical Exam  Temp:  [36.9 °C (98.4 °F)-38.1 °C (100.5 °F)] 37.9 °C (100.3 °F)  Pulse:  [85-96] 96  Resp:  [16-20] 20  BP: (113-159)/(66-81) 159/77  SpO2:  [92 %-95 %] 92 %    Physical Exam  Vitals signs and nursing note reviewed.   Constitutional:       Appearance: She is ill-appearing.   Cardiovascular:      Rate and Rhythm: Normal rate and regular rhythm.      Heart  sounds: No murmur. No friction rub. No gallop.    Pulmonary:      Effort: Respiratory distress present.      Breath sounds: Rhonchi present. No wheezing.   Abdominal:      General: Abdomen is flat. Bowel sounds are normal. There is no distension.      Palpations: Abdomen is soft. There is no mass.      Tenderness: There is no abdominal tenderness.   Skin:     General: Skin is warm and dry.      Coloration: Skin is not jaundiced or pale.      Findings: No bruising or lesion.   Neurological:      General: No focal deficit present.      Mental Status: She is alert and oriented to person, place, and time.         Fluids  No intake or output data in the 24 hours ending 12/30/20 1539    Laboratory  Recent Labs     12/29/20  1155 12/30/20  0517   WBC 6.6 5.8   RBC 4.80 4.11*   HEMOGLOBIN 14.6 12.3   HEMATOCRIT 43.1 37.8   MCV 89.8 92.0   MCH 30.4 29.9   MCHC 33.9 32.5*   RDW 41.3 43.4   PLATELETCT 227 207   MPV 9.6 10.0     Recent Labs     12/29/20  1155 12/30/20  0517   SODIUM 136 138   POTASSIUM 4.2 3.7   CHLORIDE 103 106   CO2 23 25   GLUCOSE 101* 101*   BUN 10 11   CREATININE 0.42* 0.47*   CALCIUM 9.0 8.3*                   Imaging  DX-CHEST-PORTABLE (1 VIEW)   Final Result      Diffuse patchy bilateral pulmonary infiltrates. These findings are consistent with Covid 19 pneumonia.           Assessment/Plan  * Pneumonia due to COVID-19 virus  Assessment & Plan  Viral pneumonia, continue with supportive care  Continue with supplemental O2 and wean off as needed  We will continue with Decadron  Low threshold for ID consult for remdesivir, will reassess in a.m.  Incentive spirometry, proning, symptomatic management    Dehydration  Assessment & Plan  Gentle IV hydration with NS at 83cc/hour x 2 Liters      Hyperlipidemia  Assessment & Plan  Continue statin when able to take PO      Nausea and vomiting  Assessment & Plan  Anti-emetics PRN  Gentle IV hydration  Symptomatic management       VTE prophylaxis: Lovenox

## 2020-12-30 NOTE — DISCHARGE PLANNING
ER CM spoke with pt via her cell phone. ID Verified. She lives with Spouse in 1 story home with no steps in. She notes that her  Jim is currently also admitted to the 1st floor at [facility].  PCP listed as Dr Mckinney but she is new to him and has not met him yet. She has no DME or O2 at home. She was independent and active prior to becoming ill. RX filled at [facility].     Care Transition Team Assessment    Information Source  Orientation : Oriented x 4  Information Given By: Patient  Informant's Name: elizabeth  Who is responsible for making decisions for patient? : Patient         Elopement Risk  Legal Hold: No  Ambulatory or Self Mobile in Wheelchair: No-Not an Elopement Risk    Interdisciplinary Discharge Planning  Primary Care Physician: Dr Mckinney  Lives with - Patient's Self Care Capacity: Spouse  Support Systems: Spouse / Significant Other  Housing / Facility: 1 Story House(steps no)  Mobility Issues: No  Prior Services: None  Durable Medical Equipment: Not Applicable    Discharge Preparedness  What is your plan after discharge?: Uncertain - pending medical team collaboration         Finances  Prescription Coverage: Yes([facility])    Vision / Hearing Impairment  Vision Impairment : No              Domestic Abuse  Have you ever been the victim of abuse or violence?: No    Psychological Assessment  History of Substance Abuse: None         Anticipated Discharge Information  Discharge Disposition: Still a Patient (30)

## 2020-12-31 LAB
ANION GAP SERPL CALC-SCNC: 12 MMOL/L (ref 7–16)
BUN SERPL-MCNC: 13 MG/DL (ref 8–22)
CALCIUM SERPL-MCNC: 9.1 MG/DL (ref 8.4–10.2)
CHLORIDE SERPL-SCNC: 106 MMOL/L (ref 96–112)
CO2 SERPL-SCNC: 23 MMOL/L (ref 20–33)
CREAT SERPL-MCNC: 0.52 MG/DL (ref 0.5–1.4)
ERYTHROCYTE [DISTWIDTH] IN BLOOD BY AUTOMATED COUNT: 43.2 FL (ref 35.9–50)
GLUCOSE BLD-MCNC: 151 MG/DL (ref 65–99)
GLUCOSE BLD-MCNC: 155 MG/DL (ref 65–99)
GLUCOSE BLD-MCNC: 191 MG/DL (ref 65–99)
GLUCOSE BLD-MCNC: 212 MG/DL (ref 65–99)
GLUCOSE SERPL-MCNC: 232 MG/DL (ref 65–99)
HCT VFR BLD AUTO: 40 % (ref 37–47)
HGB BLD-MCNC: 13 G/DL (ref 12–16)
IL6 SERPL-MCNC: 7.3 PG/ML
MCH RBC QN AUTO: 29.6 PG (ref 27–33)
MCHC RBC AUTO-ENTMCNC: 32.5 G/DL (ref 33.6–35)
MCV RBC AUTO: 91.1 FL (ref 81.4–97.8)
PLATELET # BLD AUTO: 252 K/UL (ref 164–446)
PMV BLD AUTO: 9.9 FL (ref 9–12.9)
POTASSIUM SERPL-SCNC: 4 MMOL/L (ref 3.6–5.5)
RBC # BLD AUTO: 4.39 M/UL (ref 4.2–5.4)
SODIUM SERPL-SCNC: 141 MMOL/L (ref 135–145)
WBC # BLD AUTO: 5.3 K/UL (ref 4.8–10.8)

## 2020-12-31 PROCEDURE — 85027 COMPLETE CBC AUTOMATED: CPT

## 2020-12-31 PROCEDURE — 700111 HCHG RX REV CODE 636 W/ 250 OVERRIDE (IP): Performed by: INTERNAL MEDICINE

## 2020-12-31 PROCEDURE — 770021 HCHG ROOM/CARE - ISO PRIVATE

## 2020-12-31 PROCEDURE — 700102 HCHG RX REV CODE 250 W/ 637 OVERRIDE(OP): Performed by: INTERNAL MEDICINE

## 2020-12-31 PROCEDURE — 99232 SBSQ HOSP IP/OBS MODERATE 35: CPT | Performed by: INTERNAL MEDICINE

## 2020-12-31 PROCEDURE — 80048 BASIC METABOLIC PNL TOTAL CA: CPT

## 2020-12-31 PROCEDURE — 82962 GLUCOSE BLOOD TEST: CPT

## 2020-12-31 PROCEDURE — A9270 NON-COVERED ITEM OR SERVICE: HCPCS | Performed by: INTERNAL MEDICINE

## 2020-12-31 RX ORDER — ALPRAZOLAM 0.25 MG/1
0.25 TABLET ORAL
Status: DISCONTINUED | OUTPATIENT
Start: 2020-12-31 | End: 2021-01-05 | Stop reason: HOSPADM

## 2020-12-31 RX ADMIN — ASPIRIN 81 MG: 81 TABLET, COATED ORAL at 17:58

## 2020-12-31 RX ADMIN — INSULIN HUMAN 1 UNITS: 100 INJECTION, SOLUTION PARENTERAL at 20:46

## 2020-12-31 RX ADMIN — DEXAMETHASONE 6 MG: 4 TABLET ORAL at 04:39

## 2020-12-31 RX ADMIN — INSULIN HUMAN 1 UNITS: 100 INJECTION, SOLUTION PARENTERAL at 18:10

## 2020-12-31 RX ADMIN — LOSARTAN POTASSIUM 25 MG: 25 TABLET, FILM COATED ORAL at 17:58

## 2020-12-31 RX ADMIN — IBUPROFEN 400 MG: 200 TABLET, FILM COATED ORAL at 22:53

## 2020-12-31 RX ADMIN — SENNOSIDES-DOCUSATE SODIUM TAB 8.6-50 MG 2 TABLET: 8.6-5 TAB at 18:00

## 2020-12-31 RX ADMIN — ACETAMINOPHEN 650 MG: 325 TABLET, FILM COATED ORAL at 20:37

## 2020-12-31 RX ADMIN — SIMVASTATIN 20 MG: 20 TABLET, FILM COATED ORAL at 20:37

## 2020-12-31 RX ADMIN — INSULIN HUMAN 2 UNITS: 100 INJECTION, SOLUTION PARENTERAL at 04:56

## 2020-12-31 RX ADMIN — ALPRAZOLAM 0.25 MG: 0.25 TABLET ORAL at 18:33

## 2020-12-31 RX ADMIN — ENOXAPARIN SODIUM 40 MG: 40 INJECTION SUBCUTANEOUS at 04:39

## 2020-12-31 RX ADMIN — INSULIN HUMAN 1 UNITS: 100 INJECTION, SOLUTION PARENTERAL at 12:17

## 2020-12-31 ASSESSMENT — ENCOUNTER SYMPTOMS
NAUSEA: 1
CARDIOVASCULAR NEGATIVE: 1
SHORTNESS OF BREATH: 1
MUSCULOSKELETAL NEGATIVE: 1
COUGH: 1
NEUROLOGICAL NEGATIVE: 1
HEMOPTYSIS: 0
SPUTUM PRODUCTION: 0
VOMITING: 1
CHILLS: 1
EYES NEGATIVE: 1
FEVER: 1

## 2020-12-31 NOTE — PROGRESS NOTES
ID evaluation for remdesivir  COVID + 12/23  O2 4 liters, decreased  Does not meet [facility] criteria for remdesivir    Will monitor for change

## 2020-12-31 NOTE — PROGRESS NOTES
[facility] Medicine Daily Progress Note    Date of Service  12/31/2020    Chief Complaint  65 y.o. female admitted 12/29/2020 with nausea and vomiting  Hospital Course  65 y.o. female who presented 12/29/2020 with lethargy, dizziness and dehydration as she cannot keep any fluids or food down for the past 2 days.  She has been feeling poorly for 2 weeks, positive covid test on 12/23/2020.  She states she could not tolerate the vomiting and headache anymore and presented to the ED for further evaluation.  Of note her  is also hospitalized secondary to covid.  She has been running a low level fever for days.  Since ER care and placement of oxygen, she feels like breathing is slightly easier and her headache has improved.  Nausea has subsided and she is hungry.      Interval Problem Update  Patient seen examined by me this morning at bedside, stated headache was better at time of examination.  However she still feels anxious and short of breath.  She does have Covid, which is causing shortness of breath.  She has been requiring more oxygen, increased to 5L today.    Not a candidate for remdesivir per ID    Consultants/Specialty  None    Code Status  Full Code    Disposition  Anticipated DC unknown    Review of Systems  Review of Systems   Constitutional: Positive for chills, fever and malaise/fatigue.   Eyes: Negative.    Respiratory: Positive for cough and shortness of breath. Negative for hemoptysis and sputum production.    Cardiovascular: Negative.    Gastrointestinal: Positive for nausea and vomiting.   Genitourinary: Negative.    Musculoskeletal: Negative.    Neurological: Negative.         Physical Exam  Temp:  [36.5 °C (97.7 °F)-37.9 °C (100.3 °F)] 36.8 °C (98.2 °F)  Pulse:  [73-96] 95  Resp:  [19-20] 19  BP: (131-163)/(74-87) 163/87  SpO2:  [90 %-92 %] 90 %    Physical Exam  Vitals signs and nursing note reviewed.   Constitutional:       Appearance: She is ill-appearing.   Cardiovascular:      Rate and  Rhythm: Normal rate and regular rhythm.      Heart sounds: No murmur. No friction rub. No gallop.    Pulmonary:      Effort: Respiratory distress present.      Breath sounds: Rhonchi present. No wheezing.   Abdominal:      General: Abdomen is flat. Bowel sounds are normal. There is no distension.      Palpations: Abdomen is soft. There is no mass.      Tenderness: There is no abdominal tenderness.   Skin:     General: Skin is warm and dry.      Coloration: Skin is not jaundiced or pale.      Findings: No bruising or lesion.   Neurological:      General: No focal deficit present.      Mental Status: She is alert and oriented to person, place, and time.         Fluids  No intake or output data in the 24 hours ending 12/31/20 1319    Laboratory  Recent Labs     12/29/20  1155 12/30/20  0517 12/31/20  0315   WBC 6.6 5.8 5.3   RBC 4.80 4.11* 4.39   HEMOGLOBIN 14.6 12.3 13.0   HEMATOCRIT 43.1 37.8 40.0   MCV 89.8 92.0 91.1   MCH 30.4 29.9 29.6   MCHC 33.9 32.5* 32.5*   RDW 41.3 43.4 43.2   PLATELETCT 227 207 252   MPV 9.6 10.0 9.9     Recent Labs     12/29/20  1155 12/30/20  0517 12/31/20  0315   SODIUM 136 138 141   POTASSIUM 4.2 3.7 4.0   CHLORIDE 103 106 106   CO2 23 25 23   GLUCOSE 101* 101* 232*   BUN 10 11 13   CREATININE 0.42* 0.47* 0.52   CALCIUM 9.0 8.3* 9.1                   Imaging  DX-CHEST-PORTABLE (1 VIEW)   Final Result      Diffuse patchy bilateral pulmonary infiltrates. These findings are consistent with Covid 19 pneumonia.           Assessment/Plan  * Pneumonia due to COVID-19 virus  Assessment & Plan  Viral pneumonia, continue with supportive care  Continue with supplemental O2 and wean off as needed  We will continue with Decadron  Per ID, not a candidate for remdesivir  Incentive spirometry, proning, symptomatic management  Due to increasing O2 requirements and unstable condition, not a candidate for ACS at this time.    Dehydration  Assessment & Plan  Gentle IV hydration with NS at 83cc/hour x 2  Liters      Hyperlipidemia  Assessment & Plan  Continue statin when able to take PO      Nausea and vomiting  Assessment & Plan  Resolved  Anti-emetics PRN  Symptomatic management  We will discontinue IV fluids       VTE prophylaxis: Lovenox

## 2021-01-01 LAB
ALBUMIN SERPL BCP-MCNC: 3.6 G/DL (ref 3.2–4.9)
ALBUMIN/GLOB SERPL: 1.1 G/DL
ALP SERPL-CCNC: 100 U/L (ref 30–99)
ALT SERPL-CCNC: 68 U/L (ref 2–50)
ANION GAP SERPL CALC-SCNC: 10 MMOL/L (ref 7–16)
AST SERPL-CCNC: 61 U/L (ref 12–45)
BACTERIA UR CULT: NORMAL
BILIRUB SERPL-MCNC: 0.2 MG/DL (ref 0.1–1.5)
BUN SERPL-MCNC: 19 MG/DL (ref 8–22)
CALCIUM SERPL-MCNC: 9.4 MG/DL (ref 8.4–10.2)
CHLORIDE SERPL-SCNC: 105 MMOL/L (ref 96–112)
CO2 SERPL-SCNC: 25 MMOL/L (ref 20–33)
CREAT SERPL-MCNC: 0.51 MG/DL (ref 0.5–1.4)
ERYTHROCYTE [DISTWIDTH] IN BLOOD BY AUTOMATED COUNT: 41.8 FL (ref 35.9–50)
GLOBULIN SER CALC-MCNC: 3.3 G/DL (ref 1.9–3.5)
GLUCOSE BLD-MCNC: 135 MG/DL (ref 65–99)
GLUCOSE BLD-MCNC: 170 MG/DL (ref 65–99)
GLUCOSE BLD-MCNC: 178 MG/DL (ref 65–99)
GLUCOSE BLD-MCNC: 182 MG/DL (ref 65–99)
GLUCOSE SERPL-MCNC: 155 MG/DL (ref 65–99)
HCT VFR BLD AUTO: 38.9 % (ref 37–47)
HGB BLD-MCNC: 13 G/DL (ref 12–16)
MCH RBC QN AUTO: 30.4 PG (ref 27–33)
MCHC RBC AUTO-ENTMCNC: 33.4 G/DL (ref 33.6–35)
MCV RBC AUTO: 90.9 FL (ref 81.4–97.8)
PLATELET # BLD AUTO: 279 K/UL (ref 164–446)
PMV BLD AUTO: 10.2 FL (ref 9–12.9)
POTASSIUM SERPL-SCNC: 4.1 MMOL/L (ref 3.6–5.5)
PROT SERPL-MCNC: 6.9 G/DL (ref 6–8.2)
RBC # BLD AUTO: 4.28 M/UL (ref 4.2–5.4)
SIGNIFICANT IND 70042: NORMAL
SITE SITE: NORMAL
SODIUM SERPL-SCNC: 140 MMOL/L (ref 135–145)
SOURCE SOURCE: NORMAL
WBC # BLD AUTO: 10.5 K/UL (ref 4.8–10.8)

## 2021-01-01 PROCEDURE — 85027 COMPLETE CBC AUTOMATED: CPT

## 2021-01-01 PROCEDURE — A9270 NON-COVERED ITEM OR SERVICE: HCPCS | Performed by: INTERNAL MEDICINE

## 2021-01-01 PROCEDURE — 80053 COMPREHEN METABOLIC PANEL: CPT

## 2021-01-01 PROCEDURE — 770021 HCHG ROOM/CARE - ISO PRIVATE

## 2021-01-01 PROCEDURE — 99232 SBSQ HOSP IP/OBS MODERATE 35: CPT | Performed by: INTERNAL MEDICINE

## 2021-01-01 PROCEDURE — 700111 HCHG RX REV CODE 636 W/ 250 OVERRIDE (IP): Performed by: INTERNAL MEDICINE

## 2021-01-01 PROCEDURE — 700102 HCHG RX REV CODE 250 W/ 637 OVERRIDE(OP): Performed by: INTERNAL MEDICINE

## 2021-01-01 PROCEDURE — 82962 GLUCOSE BLOOD TEST: CPT | Mod: 91

## 2021-01-01 RX ADMIN — INSULIN HUMAN 1 UNITS: 100 INJECTION, SOLUTION PARENTERAL at 17:34

## 2021-01-01 RX ADMIN — DEXAMETHASONE 6 MG: 4 TABLET ORAL at 04:41

## 2021-01-01 RX ADMIN — ENOXAPARIN SODIUM 40 MG: 40 INJECTION SUBCUTANEOUS at 04:41

## 2021-01-01 RX ADMIN — MAGNESIUM HYDROXIDE 30 ML: 400 SUSPENSION ORAL at 15:13

## 2021-01-01 RX ADMIN — GUAIFENESIN 1200 MG: 600 TABLET, EXTENDED RELEASE ORAL at 21:32

## 2021-01-01 RX ADMIN — ALPRAZOLAM 0.25 MG: 0.25 TABLET ORAL at 21:48

## 2021-01-01 RX ADMIN — SIMVASTATIN 20 MG: 20 TABLET, FILM COATED ORAL at 21:32

## 2021-01-01 RX ADMIN — INSULIN HUMAN 1 UNITS: 100 INJECTION, SOLUTION PARENTERAL at 12:48

## 2021-01-01 RX ADMIN — INSULIN HUMAN 1 UNITS: 100 INJECTION, SOLUTION PARENTERAL at 21:36

## 2021-01-01 RX ADMIN — BISACODYL 10 MG: 10 SUPPOSITORY RECTAL at 21:44

## 2021-01-01 RX ADMIN — LOSARTAN POTASSIUM 25 MG: 25 TABLET, FILM COATED ORAL at 17:33

## 2021-01-01 RX ADMIN — ASPIRIN 81 MG: 81 TABLET, COATED ORAL at 17:33

## 2021-01-01 RX ADMIN — SENNOSIDES-DOCUSATE SODIUM TAB 8.6-50 MG 2 TABLET: 8.6-5 TAB at 17:33

## 2021-01-01 ASSESSMENT — ENCOUNTER SYMPTOMS
EYES NEGATIVE: 1
NEUROLOGICAL NEGATIVE: 1
SPUTUM PRODUCTION: 0
CHILLS: 1
SHORTNESS OF BREATH: 1
COUGH: 1
CARDIOVASCULAR NEGATIVE: 1
MUSCULOSKELETAL NEGATIVE: 1
HEMOPTYSIS: 0
VOMITING: 1
FEVER: 1
NAUSEA: 1

## 2021-01-01 NOTE — PROGRESS NOTES
[facility] Medicine Daily Progress Note    Date of Service  1/1/2021    Chief Complaint  65 y.o. female admitted 12/29/2020 with nausea and vomiting  Hospital Course  65 y.o. female who presented 12/29/2020 with lethargy, dizziness and dehydration as she cannot keep any fluids or food down for the past 2 days.  She has been feeling poorly for 2 weeks, positive covid test on 12/23/2020.  She states she could not tolerate the vomiting and headache anymore and presented to the ED for further evaluation.  Of note her  is also hospitalized secondary to covid.  She has been running a low level fever for days.  Since ER care and placement of oxygen, she feels like breathing is slightly easier and her headache has improved.  Nausea has subsided and she is hungry.      Interval Problem Update  Patient was seen and examined this morning.  Overall she reported significant improvement in symptoms.  She was still requiring 6L supplemental O2, also desaturated and needed increased O2 when walking.  We will continue to wean as tolerated incentive spirometry and self proning encouraged.    Not a candidate for remdesivir per ID    Consultants/Specialty  None    Code Status  Full Code    Disposition  Anticipated DC 48 to 72 hours    Review of Systems  Review of Systems   Constitutional: Positive for chills, fever and malaise/fatigue.   Eyes: Negative.    Respiratory: Positive for cough and shortness of breath. Negative for hemoptysis and sputum production.    Cardiovascular: Negative.    Gastrointestinal: Positive for nausea and vomiting.   Genitourinary: Negative.    Musculoskeletal: Negative.    Neurological: Negative.         Physical Exam  Temp:  [36.1 °C (97 °F)-37.8 °C (100 °F)] 36.1 °C (97 °F)  Pulse:  [76-88] 88  Resp:  [18-20] 20  BP: (131-151)/(72-97) 151/82  SpO2:  [89 %-94 %] 91 %    Physical Exam  Vitals signs and nursing note reviewed.   Constitutional:       Appearance: She is ill-appearing.   Cardiovascular:       Rate and Rhythm: Normal rate and regular rhythm.      Heart sounds: No murmur. No friction rub. No gallop.    Pulmonary:      Effort: Respiratory distress present.      Breath sounds: Rhonchi present. No wheezing.   Abdominal:      General: Abdomen is flat. Bowel sounds are normal. There is no distension.      Palpations: Abdomen is soft. There is no mass.      Tenderness: There is no abdominal tenderness.   Skin:     General: Skin is warm and dry.      Coloration: Skin is not jaundiced or pale.      Findings: No bruising or lesion.   Neurological:      General: No focal deficit present.      Mental Status: She is alert and oriented to person, place, and time.         Fluids  No intake or output data in the 24 hours ending 01/01/21 1439    Laboratory  Recent Labs     12/30/20  0517 12/31/20 0315 01/01/21  0124   WBC 5.8 5.3 10.5   RBC 4.11* 4.39 4.28   HEMOGLOBIN 12.3 13.0 13.0   HEMATOCRIT 37.8 40.0 38.9   MCV 92.0 91.1 90.9   MCH 29.9 29.6 30.4   MCHC 32.5* 32.5* 33.4*   RDW 43.4 43.2 41.8   PLATELETCT 207 252 279   MPV 10.0 9.9 10.2     Recent Labs     12/30/20  0517 12/31/20 0315 01/01/21  0124   SODIUM 138 141 140   POTASSIUM 3.7 4.0 4.1   CHLORIDE 106 106 105   CO2 25 23 25   GLUCOSE 101* 232* 155*   BUN 11 13 19   CREATININE 0.47* 0.52 0.51   CALCIUM 8.3* 9.1 9.4                   Imaging  DX-CHEST-PORTABLE (1 VIEW)   Final Result      Diffuse patchy bilateral pulmonary infiltrates. These findings are consistent with Covid 19 pneumonia.           Assessment/Plan  * Pneumonia due to COVID-19 virus  Assessment & Plan  Viral pneumonia, continue with supportive care  Continue with supplemental O2 and wean off as needed  We will continue with Decadron  Per ID, not a candidate for remdesivir  Incentive spirometry, proning, symptomatic management  Due to increasing O2 requirements and unstable condition, not a candidate for ACS at this time    Dehydration  Assessment & Plan  Gentle IV hydration with NS at  83cc/hour x 2 Liters      Hyperlipidemia  Assessment & Plan  Continue statin      Nausea and vomiting  Assessment & Plan  Resolved  Anti-emetics PRN  Symptomatic management         VTE prophylaxis: Lovenox

## 2021-01-01 NOTE — PROGRESS NOTES
Pt. Had a one time dose of Xanax early am today. She says it was very helpful with her anxiety. Pt. Is requesting xanax to help with her increasing anxiety and anxiousness. Especially, when ambulating and during any exertion. MD notified. New order given for .25mg daily prn. Order entered and verified by MD. Will continue to monitor.

## 2021-01-02 PROBLEM — K59.00 CONSTIPATION: Status: ACTIVE | Noted: 2021-01-02

## 2021-01-02 LAB
GLUCOSE BLD-MCNC: 143 MG/DL (ref 65–99)
GLUCOSE BLD-MCNC: 148 MG/DL (ref 65–99)
GLUCOSE BLD-MCNC: 179 MG/DL (ref 65–99)

## 2021-01-02 PROCEDURE — 700101 HCHG RX REV CODE 250: Performed by: INTERNAL MEDICINE

## 2021-01-02 PROCEDURE — 82962 GLUCOSE BLOOD TEST: CPT | Mod: 91

## 2021-01-02 PROCEDURE — 700102 HCHG RX REV CODE 250 W/ 637 OVERRIDE(OP): Performed by: INTERNAL MEDICINE

## 2021-01-02 PROCEDURE — 99232 SBSQ HOSP IP/OBS MODERATE 35: CPT | Performed by: INTERNAL MEDICINE

## 2021-01-02 PROCEDURE — A9270 NON-COVERED ITEM OR SERVICE: HCPCS | Performed by: INTERNAL MEDICINE

## 2021-01-02 PROCEDURE — 770021 HCHG ROOM/CARE - ISO PRIVATE

## 2021-01-02 PROCEDURE — 700111 HCHG RX REV CODE 636 W/ 250 OVERRIDE (IP): Performed by: INTERNAL MEDICINE

## 2021-01-02 RX ORDER — LABETALOL HYDROCHLORIDE 5 MG/ML
10 INJECTION, SOLUTION INTRAVENOUS EVERY 4 HOURS PRN
Status: DISCONTINUED | OUTPATIENT
Start: 2021-01-02 | End: 2021-01-05 | Stop reason: HOSPADM

## 2021-01-02 RX ADMIN — SENNOSIDES-DOCUSATE SODIUM TAB 8.6-50 MG 2 TABLET: 8.6-5 TAB at 17:41

## 2021-01-02 RX ADMIN — INSULIN HUMAN 1 UNITS: 100 INJECTION, SOLUTION PARENTERAL at 20:40

## 2021-01-02 RX ADMIN — DEXAMETHASONE 6 MG: 4 TABLET ORAL at 05:41

## 2021-01-02 RX ADMIN — ALPRAZOLAM 0.25 MG: 0.25 TABLET ORAL at 20:12

## 2021-01-02 RX ADMIN — LABETALOL HYDROCHLORIDE 10 MG: 5 INJECTION, SOLUTION INTRAVENOUS at 20:24

## 2021-01-02 RX ADMIN — INSULIN HUMAN 1 UNITS: 100 INJECTION, SOLUTION PARENTERAL at 11:59

## 2021-01-02 RX ADMIN — GUAIFENESIN 1200 MG: 600 TABLET, EXTENDED RELEASE ORAL at 10:22

## 2021-01-02 RX ADMIN — LOSARTAN POTASSIUM 25 MG: 25 TABLET, FILM COATED ORAL at 17:41

## 2021-01-02 RX ADMIN — ASPIRIN 81 MG: 81 TABLET, COATED ORAL at 17:41

## 2021-01-02 RX ADMIN — POLYETHYLENE GLYCOL 3350 1 PACKET: 17 POWDER, FOR SOLUTION ORAL at 17:42

## 2021-01-02 RX ADMIN — SIMVASTATIN 20 MG: 20 TABLET, FILM COATED ORAL at 20:12

## 2021-01-02 RX ADMIN — ENOXAPARIN SODIUM 40 MG: 40 INJECTION SUBCUTANEOUS at 05:41

## 2021-01-02 RX ADMIN — SENNOSIDES-DOCUSATE SODIUM TAB 8.6-50 MG 2 TABLET: 8.6-5 TAB at 05:41

## 2021-01-02 ASSESSMENT — ENCOUNTER SYMPTOMS
HEMOPTYSIS: 0
NEUROLOGICAL NEGATIVE: 1
EYES NEGATIVE: 1
MUSCULOSKELETAL NEGATIVE: 1
CHILLS: 0
COUGH: 1
CARDIOVASCULAR NEGATIVE: 1
FEVER: 0
SHORTNESS OF BREATH: 1
SPUTUM PRODUCTION: 0
NERVOUS/ANXIOUS: 1
VOMITING: 0
NAUSEA: 0

## 2021-01-02 ASSESSMENT — PAIN DESCRIPTION - PAIN TYPE: TYPE: ACUTE PAIN

## 2021-01-02 NOTE — PROGRESS NOTES
[facility] Medicine Daily Progress Note    Date of Service  1/2/2021    Chief Complaint  65 y.o. female admitted 12/29/2020 with nausea and vomiting  Hospital Course  65 y.o. female who presented 12/29/2020 with lethargy, dizziness and dehydration as she cannot keep any fluids or food down for the past 2 days.  She has been feeling poorly for 2 weeks, positive covid test on 12/23/2020.  She states she could not tolerate the vomiting and headache anymore and presented to the ED for further evaluation.  Of note her  is also hospitalized secondary to covid.  She has been running a low level fever for days.  Since ER care and placement of oxygen, she feels like breathing is slightly easier and her headache has improved.  Nausea has subsided and she is hungry.      Interval Problem Update  Patient was seen and examined this morning, continues to improve each day.  Reports she has been alert but anxious about diagnosis, but overall feels better.  We will continue to wean off oxygen.    Not a candidate for remdesivir per ID    Consultants/Specialty  None    Code Status  Full Code    Disposition  Anticipated DC 48 to 72 hours    Review of Systems  Review of Systems   Constitutional: Positive for malaise/fatigue. Negative for chills and fever.   Eyes: Negative.    Respiratory: Positive for cough and shortness of breath. Negative for hemoptysis and sputum production.    Cardiovascular: Negative.    Gastrointestinal: Negative for nausea and vomiting.   Genitourinary: Negative.    Musculoskeletal: Negative.    Neurological: Negative.    Psychiatric/Behavioral: The patient is nervous/anxious.         Physical Exam  Temp:  [36.1 °C (96.9 °F)-36.7 °C (98 °F)] 36.1 °C (96.9 °F)  Pulse:  [60-88] 72  Resp:  [18-20] 18  BP: (150-161)/(72-87) 150/72  SpO2:  [87 %-92 %] 92 %    Physical Exam  Vitals signs and nursing note reviewed.   Constitutional:       Appearance: She is ill-appearing.   Cardiovascular:      Rate and Rhythm:  Normal rate and regular rhythm.      Heart sounds: No murmur. No friction rub. No gallop.    Pulmonary:      Effort: No respiratory distress.      Breath sounds: Rhonchi present. No wheezing.   Abdominal:      General: Abdomen is flat. Bowel sounds are normal. There is no distension.      Palpations: Abdomen is soft. There is no mass.      Tenderness: There is no abdominal tenderness.   Skin:     General: Skin is warm and dry.      Coloration: Skin is not jaundiced or pale.      Findings: No bruising or lesion.   Neurological:      General: No focal deficit present.      Mental Status: She is alert and oriented to person, place, and time.         Fluids    Intake/Output Summary (Last 24 hours) at 1/2/2021 1318  Last data filed at 1/1/2021 2100  Gross per 24 hour   Intake --   Output 300 ml   Net -300 ml       Laboratory  Recent Labs     12/31/20  0315 01/01/21  0124   WBC 5.3 10.5   RBC 4.39 4.28   HEMOGLOBIN 13.0 13.0   HEMATOCRIT 40.0 38.9   MCV 91.1 90.9   MCH 29.6 30.4   MCHC 32.5* 33.4*   RDW 43.2 41.8   PLATELETCT 252 279   MPV 9.9 10.2     Recent Labs     12/31/20  0315 01/01/21  0124   SODIUM 141 140   POTASSIUM 4.0 4.1   CHLORIDE 106 105   CO2 23 25   GLUCOSE 232* 155*   BUN 13 19   CREATININE 0.52 0.51   CALCIUM 9.1 9.4                   Imaging  DX-CHEST-PORTABLE (1 VIEW)   Final Result      Diffuse patchy bilateral pulmonary infiltrates. These findings are consistent with Covid 19 pneumonia.           Assessment/Plan  * Pneumonia due to COVID-19 virus  Assessment & Plan  Viral pneumonia, continue with supportive care  Continue with supplemental O2 and wean off as needed  We will continue with Decadron  Per ID, not a candidate for remdesivir  Incentive spirometry, proning, symptomatic management  Due to increasing O2 requirements and unstable condition, not a candidate for ACS at this time    Constipation  Assessment & Plan  Continue with bowel regimen    Dehydration  Assessment & Plan  Gentle IV  hydration with NS at 83cc/hour x 2 Liters      Hyperlipidemia  Assessment & Plan  Continue statin      Nausea and vomiting  Assessment & Plan  Resolved  Symptomatic management         VTE prophylaxis: Lovenox

## 2021-01-02 NOTE — PROGRESS NOTES
Patient on 6L NP with sats 88-91% at rest, with activity sate decreases to 83-85% and RR increases to 34-36

## 2021-01-03 LAB
BACTERIA BLD CULT: NORMAL
BACTERIA BLD CULT: NORMAL
GLUCOSE BLD-MCNC: 120 MG/DL (ref 65–99)
GLUCOSE BLD-MCNC: 147 MG/DL (ref 65–99)
GLUCOSE BLD-MCNC: 177 MG/DL (ref 65–99)
GLUCOSE BLD-MCNC: 194 MG/DL (ref 65–99)
SIGNIFICANT IND 70042: NORMAL
SIGNIFICANT IND 70042: NORMAL
SITE SITE: NORMAL
SITE SITE: NORMAL
SOURCE SOURCE: NORMAL
SOURCE SOURCE: NORMAL

## 2021-01-03 PROCEDURE — 700102 HCHG RX REV CODE 250 W/ 637 OVERRIDE(OP): Performed by: INTERNAL MEDICINE

## 2021-01-03 PROCEDURE — 700111 HCHG RX REV CODE 636 W/ 250 OVERRIDE (IP): Performed by: INTERNAL MEDICINE

## 2021-01-03 PROCEDURE — 99232 SBSQ HOSP IP/OBS MODERATE 35: CPT | Performed by: INTERNAL MEDICINE

## 2021-01-03 PROCEDURE — 82962 GLUCOSE BLOOD TEST: CPT | Mod: 91

## 2021-01-03 PROCEDURE — 770021 HCHG ROOM/CARE - ISO PRIVATE

## 2021-01-03 PROCEDURE — A9270 NON-COVERED ITEM OR SERVICE: HCPCS | Performed by: INTERNAL MEDICINE

## 2021-01-03 RX ADMIN — INSULIN HUMAN 1 UNITS: 100 INJECTION, SOLUTION PARENTERAL at 17:26

## 2021-01-03 RX ADMIN — SENNOSIDES-DOCUSATE SODIUM TAB 8.6-50 MG 2 TABLET: 8.6-5 TAB at 17:25

## 2021-01-03 RX ADMIN — GUAIFENESIN 1200 MG: 600 TABLET, EXTENDED RELEASE ORAL at 01:19

## 2021-01-03 RX ADMIN — DEXAMETHASONE 6 MG: 4 TABLET ORAL at 05:11

## 2021-01-03 RX ADMIN — ASPIRIN 81 MG: 81 TABLET, COATED ORAL at 17:26

## 2021-01-03 RX ADMIN — ACETAMINOPHEN 650 MG: 325 TABLET, FILM COATED ORAL at 01:18

## 2021-01-03 RX ADMIN — INSULIN HUMAN 1 UNITS: 100 INJECTION, SOLUTION PARENTERAL at 12:14

## 2021-01-03 RX ADMIN — GUAIFENESIN 1200 MG: 600 TABLET, EXTENDED RELEASE ORAL at 17:25

## 2021-01-03 RX ADMIN — SENNOSIDES-DOCUSATE SODIUM TAB 8.6-50 MG 2 TABLET: 8.6-5 TAB at 05:11

## 2021-01-03 RX ADMIN — LABETALOL HYDROCHLORIDE 10 MG: 5 INJECTION, SOLUTION INTRAVENOUS at 21:31

## 2021-01-03 RX ADMIN — ALPRAZOLAM 0.25 MG: 0.25 TABLET ORAL at 21:30

## 2021-01-03 RX ADMIN — SIMVASTATIN 20 MG: 20 TABLET, FILM COATED ORAL at 21:30

## 2021-01-03 RX ADMIN — LOSARTAN POTASSIUM 25 MG: 25 TABLET, FILM COATED ORAL at 17:26

## 2021-01-03 RX ADMIN — ENOXAPARIN SODIUM 40 MG: 40 INJECTION SUBCUTANEOUS at 05:10

## 2021-01-03 ASSESSMENT — ENCOUNTER SYMPTOMS
FEVER: 0
NERVOUS/ANXIOUS: 1
CARDIOVASCULAR NEGATIVE: 1
EYES NEGATIVE: 1
NEUROLOGICAL NEGATIVE: 1
VOMITING: 0
SHORTNESS OF BREATH: 1
HEMOPTYSIS: 0
MUSCULOSKELETAL NEGATIVE: 1
COUGH: 1
SPUTUM PRODUCTION: 0
CHILLS: 0
NAUSEA: 0

## 2021-01-03 NOTE — PROGRESS NOTES
[facility] Medicine Daily Progress Note    Date of Service  1/3/2021    Chief Complaint  65 y.o. female admitted 12/29/2020 with nausea and vomiting  Hospital Course  65 y.o. female who presented 12/29/2020 with lethargy, dizziness and dehydration as she cannot keep any fluids or food down for the past 2 days.  She has been feeling poorly for 2 weeks, positive covid test on 12/23/2020.  She states she could not tolerate the vomiting and headache anymore and presented to the ED for further evaluation.  Of note her  is also hospitalized secondary to covid.  She has been running a low level fever for days.  Since ER care and placement of oxygen, she feels like breathing is slightly easier and her headache has improved.  Nausea has subsided and she is hungry.      Interval Problem Update  Patient was seen and examined this morning, significant improvement this morning.  Still little anxious at night, but was able to wean down to 6L O2.  BM 1/3/2021    We will do walk test    Not a candidate for remdesivir per ID    Consultants/Specialty  None    Code Status  Full Code    Disposition  Anticipated DC 24 to 48 hours    Review of Systems  Review of Systems   Constitutional: Positive for malaise/fatigue. Negative for chills and fever.   HENT: Positive for congestion.    Eyes: Negative.    Respiratory: Positive for cough and shortness of breath. Negative for hemoptysis and sputum production.    Cardiovascular: Negative.    Gastrointestinal: Negative for nausea and vomiting.   Genitourinary: Negative.    Musculoskeletal: Negative.    Neurological: Negative.    Psychiatric/Behavioral: The patient is nervous/anxious.         Physical Exam  Temp:  [36.1 °C (97 °F)-36.6 °C (97.8 °F)] 36.6 °C (97.8 °F)  Pulse:  [57-86] 57  Resp:  [18] 18  BP: (111-182)/(72-91) 151/75  SpO2:  [92 %-96 %] 96 %    Physical Exam  Vitals signs and nursing note reviewed.   Constitutional:       Appearance: She is ill-appearing.   Cardiovascular:       Rate and Rhythm: Normal rate and regular rhythm.      Heart sounds: No murmur. No friction rub. No gallop.    Pulmonary:      Effort: No respiratory distress.      Breath sounds: Rhonchi present. No wheezing.   Abdominal:      General: Abdomen is flat. Bowel sounds are normal. There is no distension.      Palpations: Abdomen is soft. There is no mass.      Tenderness: There is no abdominal tenderness.   Skin:     General: Skin is warm and dry.      Coloration: Skin is not jaundiced or pale.      Findings: No bruising or lesion.   Neurological:      General: No focal deficit present.      Mental Status: She is alert and oriented to person, place, and time.         Fluids    Intake/Output Summary (Last 24 hours) at 1/3/2021 1123  Last data filed at 1/2/2021 1848  Gross per 24 hour   Intake 440 ml   Output --   Net 440 ml       Laboratory  Recent Labs     01/01/21  0124   WBC 10.5   RBC 4.28   HEMOGLOBIN 13.0   HEMATOCRIT 38.9   MCV 90.9   MCH 30.4   MCHC 33.4*   RDW 41.8   PLATELETCT 279   MPV 10.2     Recent Labs     01/01/21  0124   SODIUM 140   POTASSIUM 4.1   CHLORIDE 105   CO2 25   GLUCOSE 155*   BUN 19   CREATININE 0.51   CALCIUM 9.4                   Imaging  DX-CHEST-PORTABLE (1 VIEW)   Final Result      Diffuse patchy bilateral pulmonary infiltrates. These findings are consistent with Covid 19 pneumonia.           Assessment/Plan  * Pneumonia due to COVID-19 virus  Assessment & Plan  Continue with supplemental O2 and wean off as needed, currently on 6 L  Continue with Decadron  Per ID, not a candidate for remdesivir  Incentive spirometry, proning, symptomatic management  Due to increasing O2 requirements and unstable condition, not a candidate for ACS at this time    Constipation  Assessment & Plan  Continue with bowel regimen    Hyperlipidemia  Assessment & Plan  Continue statin      Nausea and vomiting  Assessment & Plan  Resolved  Symptomatic management         VTE prophylaxis: Lovenox

## 2021-01-03 NOTE — PROGRESS NOTES
Patient oriented and cooperative with care. C/O cough, given PRN mucinex with some relief. Reports constipation, MD ordered to give suppository and miralax this shift. Patient remains on 6L NC with stable vital signs, afebrile. BG slightly elevated. Will continue to wean O2 and ambulate pt and monitor for constipation/BM.

## 2021-01-03 NOTE — DISCHARGE PLANNING
Care Transition Team Discharge Planning    Anticipated Discharge Disposition: home w/ remote o2 monitoring, waiting o2 evaluation needs    Action: Per rounds, pt and spouse [see room 1122.02] are nearing d/c. Pt can go tomorrow when o2 and remote monitoring is set up.     RN team indicates there is a person who sets up remote monitoring at [facility], and she is not here until tomorrow-Jeannine      Barriers to Discharge: needs remote o2 monitoring set up    Plan: f/u w/ Jeannine, CCA, medical team, etc.

## 2021-01-04 PROBLEM — K59.00 CONSTIPATION: Status: RESOLVED | Noted: 2021-01-02 | Resolved: 2021-01-04

## 2021-01-04 PROBLEM — R11.2 NAUSEA AND VOMITING: Status: RESOLVED | Noted: 2020-12-29 | Resolved: 2021-01-04

## 2021-01-04 LAB
GLUCOSE BLD-MCNC: 152 MG/DL (ref 65–99)
GLUCOSE BLD-MCNC: 181 MG/DL (ref 65–99)

## 2021-01-04 PROCEDURE — A9270 NON-COVERED ITEM OR SERVICE: HCPCS | Performed by: INTERNAL MEDICINE

## 2021-01-04 PROCEDURE — 82962 GLUCOSE BLOOD TEST: CPT | Mod: 91

## 2021-01-04 PROCEDURE — 700111 HCHG RX REV CODE 636 W/ 250 OVERRIDE (IP): Performed by: INTERNAL MEDICINE

## 2021-01-04 PROCEDURE — 99231 SBSQ HOSP IP/OBS SF/LOW 25: CPT | Performed by: INTERNAL MEDICINE

## 2021-01-04 PROCEDURE — 700102 HCHG RX REV CODE 250 W/ 637 OVERRIDE(OP): Performed by: INTERNAL MEDICINE

## 2021-01-04 PROCEDURE — 99454 REM MNTR PHYSIOL PARAM 16-30: CPT

## 2021-01-04 PROCEDURE — 770021 HCHG ROOM/CARE - ISO PRIVATE

## 2021-01-04 PROCEDURE — 99453 REM MNTR PHYSIOL PARAM SETUP: CPT

## 2021-01-04 RX ORDER — ALPRAZOLAM 0.25 MG/1
0.25 TABLET ORAL
Qty: 5 TAB | Refills: 0 | Status: SHIPPED | OUTPATIENT
Start: 2021-01-04 | End: 2021-01-09

## 2021-01-04 RX ORDER — DEXAMETHASONE 6 MG/1
6 TABLET ORAL DAILY
Qty: 3 TAB | Refills: 0 | Status: SHIPPED | OUTPATIENT
Start: 2021-01-05 | End: 2021-01-08

## 2021-01-04 RX ADMIN — ASPIRIN 81 MG: 81 TABLET, COATED ORAL at 17:36

## 2021-01-04 RX ADMIN — LOSARTAN POTASSIUM 25 MG: 25 TABLET, FILM COATED ORAL at 17:36

## 2021-01-04 RX ADMIN — SIMVASTATIN 20 MG: 20 TABLET, FILM COATED ORAL at 20:39

## 2021-01-04 RX ADMIN — ACETAMINOPHEN 650 MG: 325 TABLET, FILM COATED ORAL at 06:52

## 2021-01-04 RX ADMIN — DEXAMETHASONE 6 MG: 4 TABLET ORAL at 05:52

## 2021-01-04 RX ADMIN — GUAIFENESIN 1200 MG: 600 TABLET, EXTENDED RELEASE ORAL at 20:45

## 2021-01-04 RX ADMIN — INSULIN HUMAN 1 UNITS: 100 INJECTION, SOLUTION PARENTERAL at 12:00

## 2021-01-04 RX ADMIN — SENNOSIDES-DOCUSATE SODIUM TAB 8.6-50 MG 2 TABLET: 8.6-5 TAB at 05:52

## 2021-01-04 RX ADMIN — ENOXAPARIN SODIUM 40 MG: 40 INJECTION SUBCUTANEOUS at 05:52

## 2021-01-04 RX ADMIN — SENNOSIDES-DOCUSATE SODIUM TAB 8.6-50 MG 2 TABLET: 8.6-5 TAB at 17:36

## 2021-01-04 RX ADMIN — ALPRAZOLAM 0.25 MG: 0.25 TABLET ORAL at 17:39

## 2021-01-04 RX ADMIN — INSULIN HUMAN 1 UNITS: 100 INJECTION, SOLUTION PARENTERAL at 05:53

## 2021-01-04 RX ADMIN — INSULIN HUMAN 1 UNITS: 100 INJECTION, SOLUTION PARENTERAL at 20:41

## 2021-01-04 NOTE — DISCHARGE SUMMARY
"Discharge Summary    CHIEF COMPLAINT ON ADMISSION  Chief Complaint   Patient presents with   • Shortness of Breath   • Fever       Reason for Admission  Shortness of breath,fever     Admission Date  12/29/2020    CODE STATUS  Full Code    HPI & HOSPITAL COURSE  Per HPI \"65 y.o. female who presented 12/29/2020 with lethargy, dizziness and dehydration as she cannot keep any fluids or food down for the past 2 days.  She has been feeling poorly for 2 weeks, positive covid test on 12/23/2020.  She states she could not tolerate the vomiting and headache anymore and presented to the ED for further evaluation.  Of note her  is also hospitalized secondary to covid.  She has been running a low level fever for days.  Since ER care and placement of oxygen, she feels like breathing is slightly easier and her headache has improved.\"     Patient was admitted started on Decadron for Covid pneumonia.  She did require up to 6 L of O2, had very favorable progression.  She will be discharged home with oxygen.  While she is in the hospital she did have several episodes of hypotension, she states she has a history of renal artery stenosis.  She will need to follow-up with PCP for further management.      Therefore, she is discharged in good and stable condition to home with close outpatient follow-up.    The patient met 2-midnight criteria for an inpatient stay at the time of discharge.    Discharge Date  1/4/2021    FOLLOW UP ITEMS POST DISCHARGE  Follow-up with PCP  Follow-up with cardiologist    DISCHARGE DIAGNOSES  Principal Problem:    Pneumonia due to COVID-19 virus POA: Unknown  Active Problems:    Hyperlipidemia POA: Unknown  Resolved Problems:    Nausea and vomiting POA: Unknown    Constipation POA: Unknown      FOLLOW UP  Future Appointments   Date Time Provider Department Center   1/5/2021  2:00 PM [facility]   1/6/2021  2:00 PM [facility]   1/7/2021  2:00 PM [facility] URGENT CARE [facility] " [facility]   1/8/2021  2:00 PM [facility] URGENT CARE [facility]   1/9/2021  2:00 PM [facility]   1/10/2021  2:00 PM [facility]   1/11/2021  2:00 PM [facility]   1/12/2021  2:00 PM [facility]   1/13/2021  2:00 PM [facility]   1/14/2021  2:00 PM [facility]     Lonny Mckinney M.D.  [address] [place] [address] [address] [place] [id number]  [phone]    Call  Hospital FU, Hypertension, establish care      MEDICATIONS ON DISCHARGE     Medication List      START taking these medications      Instructions   ALPRAZolam 0.25 MG Tabs  Commonly known as: XANAX   Take 1 Tab by mouth 1 time a day as needed for Sleep or Anxiety for up to 5 days.  Dose: 0.25 mg     dexamethasone 6 MG Tabs  Start taking on: January 5, 2021  Commonly known as: DECADRON   Take 1 Tab by mouth every day for 3 days.  Dose: 6 mg        CONTINUE taking these medications      Instructions   aspirin EC 81 MG Tbec  Commonly known as: ECOTRIN   Take 81 mg by mouth every evening.  Dose: 81 mg     losartan 25 MG Tabs  Commonly known as: COZAAR   Take 25 mg by mouth every evening.  Dose: 25 mg     ondansetron 4 MG Tbdp  Commonly known as: Zofran ODT   Take 1 Tab by mouth every 6 hours as needed for Nausea.  Dose: 4 mg     simvastatin 20 MG Tabs  Commonly known as: ZOCOR   Take 20 mg by mouth every evening.  Dose: 20 mg        STOP taking these medications    azithromycin 250 MG Tabs  Commonly known as: ZITHROMAX     predniSONE 20 MG Tabs  Commonly known as: DELTASONE     promethazine 25 MG Supp  Commonly known as: PHENERGAN     VITAMIN D3 PO            Allergies  Allergies   Allergen Reactions   • Codeine Vomiting and Nausea       DIET  Orders Placed This Encounter   Procedures   • Diet Order Diet: Regular     Standing Status:   Standing     Number of Occurrences:   1     Order Specific Question:   Diet:     Answer:   Regular [1]       ACTIVITY  As tolerated.  Weight bearing  as tolerated    CONSULTATIONS  None    PROCEDURES  None    LABORATORY  Lab Results   Component Value Date    SODIUM 140 01/01/2021    POTASSIUM 4.1 01/01/2021    CHLORIDE 105 01/01/2021    CO2 25 01/01/2021    GLUCOSE 155 (H) 01/01/2021    BUN 19 01/01/2021    CREATININE 0.51 01/01/2021        Lab Results   Component Value Date    WBC 10.5 01/01/2021    HEMOGLOBIN 13.0 01/01/2021    HEMATOCRIT 38.9 01/01/2021    PLATELETCT 279 01/01/2021        Total time of the discharge process exceeds 31 minutes.

## 2021-01-04 NOTE — PROGRESS NOTES
Patient oriented and cooperative this shift. VSS, BP elevated but below parameters for labetalol. Patient c/o increased anxiety toward end of shift. O2 weaned to 5L NC. Walking pulse ox done. Will continue to monitor anxiety and oxygenation. Possible d/c home tomorrow with spouse.

## 2021-01-04 NOTE — DISCHARGE PLANNING
Anticipated Discharge Disposition: home w/ remote o2 monitoring     Action: Reviewed with Jeannine Holliday, COPD coordinator/ Respiratory therapist and noted home O2 order. Pt not previously on home O2. Choice form completed for [employer] oxygen company and form faxed to Sandra QUIJANO to send referrals. Reviewed with KARSTEN Cotto.   Call placed to Cheryl, Care coordination  to arrange 10 virtual visits starting tomorrow through tele medicaine at [facility] [phone]. Cheryl called at [phone]. She will arrange the 10 visits and they will print up on pt’s AVS at discharge.     Barriers to Discharge: needs remote o2 monitoring set up     Plan: f/u w/ Sandra Paez CCA, Kirti SHELLEY, medical team, etc.     Addendum: [facility] declined so O2 referreal sent to [facility].

## 2021-01-04 NOTE — FACE TO FACE
"Face to Face Note  -  Durable Medical Equipment    Te Ross M.D. - NPI: [id number]  I certify that this patient is under my care and that they had a durable medical equipment(DME)face to face encounter by myself that meets the physician DME face-to-face encounter requirements with this patient on:    Date of encounter:   Patient:                    MRN:                       YOB: 2022  Judy Blackburn  [id number]  1955     The encounter with the patient was in whole, or in part, for the following medical condition, which is the primary reason for durable medical equipment:  Other - Covid-19    I certify that, based on my findings, the following durable medical equipment is medically necessary:  Oxygen.    HOME O2 Saturation Measurements:(Values must be present for Home Oxygen orders)  Room air sat at rest: 90  Room air sat with amb: 85  With liters of O2: 6, O2 sat at rest with O2: 93  With Liters of O2: 6, O2 sat with amb with O2 : 91  Is the patient mobile?: Yes    My Clinical findings support the need for the above equipment due to:  Hypoxia    Supporting Symptoms: The patient requires supplemental oxygen, as the following interventions have been tried with limited or no improvement: \"Positive expiratory pressure therapies, \"Ambulation with oximetry and \"Incentive spirometry    If patient feels more short of breath, they can go up to 6 liters per minute and contact healthcare provider.  "

## 2021-01-04 NOTE — RESPIRATORY CARE
REMOTE MONITORING PROGRAM by RESPIRATORY THERAPY  1/4/2021 at 11:04 AM by Jeannine Holliday, RRT     Patient interviewed by RT. Patient agrees to Remote Monitoring program. Device instruction performed with welcome packet, consent signed and placed at bedside chart. Patient instructed on how to use MyChart and Zoom. No questions at this time. [employer] will be oxygen supplier.

## 2021-01-05 VITALS
SYSTOLIC BLOOD PRESSURE: 129 MMHG | HEART RATE: 72 BPM | WEIGHT: 166.45 LBS | RESPIRATION RATE: 18 BRPM | DIASTOLIC BLOOD PRESSURE: 76 MMHG | TEMPERATURE: 97.9 F | HEIGHT: 67 IN | OXYGEN SATURATION: 95 % | BODY MASS INDEX: 26.12 KG/M2

## 2021-01-05 PROCEDURE — 700102 HCHG RX REV CODE 250 W/ 637 OVERRIDE(OP): Performed by: INTERNAL MEDICINE

## 2021-01-05 PROCEDURE — 82962 GLUCOSE BLOOD TEST: CPT

## 2021-01-05 PROCEDURE — 99232 SBSQ HOSP IP/OBS MODERATE 35: CPT | Performed by: FAMILY MEDICINE

## 2021-01-05 PROCEDURE — 700111 HCHG RX REV CODE 636 W/ 250 OVERRIDE (IP): Performed by: INTERNAL MEDICINE

## 2021-01-05 PROCEDURE — A9270 NON-COVERED ITEM OR SERVICE: HCPCS | Performed by: INTERNAL MEDICINE

## 2021-01-05 RX ADMIN — SENNOSIDES-DOCUSATE SODIUM TAB 8.6-50 MG 2 TABLET: 8.6-5 TAB at 05:07

## 2021-01-05 RX ADMIN — ENOXAPARIN SODIUM 40 MG: 40 INJECTION SUBCUTANEOUS at 05:16

## 2021-01-05 RX ADMIN — DEXAMETHASONE 6 MG: 4 TABLET ORAL at 05:12

## 2021-01-05 NOTE — PROGRESS NOTES
Discharge instructions given to patient verbalized understanding.  Home oxygen tank and concentrator delivered at bedside.  Patient able to demonstrate how to use portable oxygen tank and concentrator prior to discharge.  Transferred via wheelchair to private vehicle, spouse driving patient home.

## 2021-01-05 NOTE — DISCHARGE SUMMARY
"Discharge Summary    CHIEF COMPLAINT ON ADMISSION  Chief Complaint   Patient presents with   • Shortness of Breath   • Fever       Reason for Admission  Shortness of breath,fever     Admission Date  12/29/2020    CODE STATUS  Full Code    HPI & HOSPITAL COURSE  Per HPI \"65 y.o. female who presented 12/29/2020 with lethargy, dizziness and dehydration as she cannot keep any fluids or food down for the past 2 days.  She has been feeling poorly for 2 weeks, positive covid test on 12/23/2020.  She states she could not tolerate the vomiting and headache anymore and presented to the ED for further evaluation.  Of note her  is also hospitalized secondary to covid.  She has been running a low level fever for days.  Since ER care and placement of oxygen, she feels like breathing is slightly easier and her headache has improved.\"     Patient was admitted started on Decadron for Covid pneumonia.  She did require up to 6 L of O2, had very favorable progression.  She will be discharged home with oxygen.  While she is in the hospital she did have several episodes of hypotension, she states she has a history of renal artery stenosis.  She will need to follow-up with PCP for further management.  Pt is seen at the bed side and feels better and will be discharged once O2 tank is delivered.      Therefore, she is discharged in good and stable condition to home with close outpatient follow-up.    The patient met 2-midnight criteria for an inpatient stay at the time of discharge.    Discharge Date  1/5/2021    FOLLOW UP ITEMS POST DISCHARGE  Follow-up with PCP  Follow-up with cardiologist    DISCHARGE DIAGNOSES  Principal Problem:    Pneumonia due to COVID-19 virus POA: Unknown  Active Problems:    Hyperlipidemia POA: Unknown  Resolved Problems:    Nausea and vomiting POA: Unknown    Constipation POA: Unknown      FOLLOW UP  Future Appointments   Date Time Provider Department Center   1/5/2021  2:00 PM [facility] U[facility]ENT [facility] " [facility]   1/6/2021  2:00 PM [facility]   1/7/2021  2:00 PM [facility]   1/8/2021  2:00 PM [facility]   1/9/2021  2:00 PM [facility]   1/10/2021  2:00 PM [facility]   1/11/2021  2:00 PM [facility]   1/12/2021  2:00 PM [facility]   1/13/2021  2:00 PM [facility]   1/14/2021  2:00 PM [facility] URGENT CARE [facility]     Lonny Mckinney M.D.  [address] [place] [id number] [phone]    Schedule an appointment as soon as possible for a visit  [facility], [facility], [facility]  [address] [place] [address]  [place] [id number] [phone]    A referral has been sent to this office for cardiology. Please call to schedule an appointment.       MEDICATIONS ON DISCHARGE     Medication List      START taking these medications      Instructions   ALPRAZolam 0.25 MG Tabs  Commonly known as: XANAX   Take 1 Tab by mouth 1 time a day as needed for Sleep or Anxiety for up to 5 days.  Dose: 0.25 mg     dexamethasone 6 MG Tabs  Commonly known as: DECADRON   Take 1 Tab by mouth every day for 3 days.  Dose: 6 mg        CONTINUE taking these medications      Instructions   aspirin EC 81 MG Tbec  Commonly known as: ECOTRIN   Take 81 mg by mouth every evening.  Dose: 81 mg     losartan 25 MG Tabs  Commonly known as: COZAAR   Take 25 mg by mouth every evening.  Dose: 25 mg     ondansetron 4 MG Tbdp  Commonly known as: Zofran ODT   Take 1 Tab by mouth every 6 hours as needed for Nausea.  Dose: 4 mg     simvastatin 20 MG Tabs  Commonly known as: ZOCOR   Take 20 mg by mouth every evening.  Dose: 20 mg        STOP taking these medications    azithromycin 250 MG Tabs  Commonly known as: ZITHROMAX     predniSONE 20 MG Tabs  Commonly known as: DELTASONE     promethazine 25 MG Supp  Commonly known as: PHENERGAN     VITAMIN D3 PO             Allergies  Allergies   Allergen Reactions   • Codeine Vomiting and Nausea       DIET  Orders Placed This Encounter   Procedures   • Diet Order Diet: Regular     Standing Status:   Standing     Number of Occurrences:   1     Order Specific Question:   Diet:     Answer:   Regular [1]       ACTIVITY  As tolerated.  Weight bearing as tolerated    CONSULTATIONS  None    PROCEDURES  None    LABORATORY  Lab Results   Component Value Date    SODIUM 140 01/01/2021    POTASSIUM 4.1 01/01/2021    CHLORIDE 105 01/01/2021    CO2 25 01/01/2021    GLUCOSE 155 (H) 01/01/2021    BUN 19 01/01/2021    CREATININE 0.51 01/01/2021        Lab Results   Component Value Date    WBC 10.5 01/01/2021    HEMOGLOBIN 13.0 01/01/2021    HEMATOCRIT 38.9 01/01/2021    PLATELETCT 279 01/01/2021        Total time of the discharge process exceeds 31 minutes.

## 2021-01-05 NOTE — DISCHARGE INSTRUCTIONS
COVID-19  COVID-19 is a respiratory infection that is caused by a virus called severe acute respiratory syndrome coronavirus 2 (SARS-CoV-2). The disease is also known as coronavirus disease or novel coronavirus. In some people, the virus may not cause any symptoms. In others, it may cause a serious infection. The infection can get worse quickly and can lead to complications, such as:  · Pneumonia, or infection of the lungs.  · Acute respiratory distress syndrome or ARDS. This is fluid build-up in the lungs.  · Acute respiratory failure. This is a condition in which there is not enough oxygen passing from the lungs to the body.  · Sepsis or septic shock. This is a serious bodily reaction to an infection.  · Blood clotting problems.  · Secondary infections due to bacteria or fungus.  The virus that causes COVID-19 is contagious. This means that it can spread from person to person through droplets from coughs and sneezes (respiratory secretions).  What are the causes?  This illness is caused by a virus. You may catch the virus by:  · Breathing in droplets from an infected person's cough or sneeze.  · Touching something, like a table or a doorknob, that was exposed to the virus (contaminated) and then touching your mouth, nose, or eyes.  What increases the risk?  Risk for infection  You are more likely to be infected with this virus if you:  · Live in or travel to an area with a COVID-19 outbreak.  · Come in contact with a sick person who recently traveled to an area with a COVID-19 outbreak.  · Provide care for or live with a person who is infected with COVID-19.  Risk for serious illness  You are more likely to become seriously ill from the virus if you:  · Are 65 years of age or older.  · Have a long-term disease that lowers your body's ability to fight infection (immunocompromised).  · Live in a nursing home or long-term care facility.  · Have a long-term (chronic) disease such as:  ? Chronic lung disease, including  chronic obstructive pulmonary disease or asthma  ? Heart disease.  ? Diabetes.  ? Chronic kidney disease.  ? Liver disease.  · Are obese.  What are the signs or symptoms?  Symptoms of this condition can range from mild to severe. Symptoms may appear any time from 2 to 14 days after being exposed to the virus. They include:  · A fever.  · A cough.  · Difficulty breathing.  · Chills.  · Muscle pains.  · A sore throat.  · Loss of taste or smell.  Some people may also have stomach problems, such as nausea, vomiting, or diarrhea.  Other people may not have any symptoms of COVID-19.  How is this diagnosed?  This condition may be diagnosed based on:  · Your signs and symptoms, especially if:  ? You live in an area with a COVID-19 outbreak.  ? You recently traveled to or from an area where the virus is common.  ? You provide care for or live with a person who was diagnosed with COVID-19.  · A physical exam.  · Lab tests, which may include:  ? A nasal swab to take a sample of fluid from your nose.  ? A throat swab to take a sample of fluid from your throat.  ? A sample of mucus from your lungs (sputum).  ? Blood tests.  · Imaging tests, which may include, X-rays, CT scan, or ultrasound.  How is this treated?  At present, there is no medicine to treat COVID-19. Medicines that treat other diseases are being used on a trial basis to see if they are effective against COVID-19.  Your health care provider will talk with you about ways to treat your symptoms. For most people, the infection is mild and can be managed at home with rest, fluids, and over-the-counter medicines.  Treatment for a serious infection usually takes places in a hospital intensive care unit (ICU). It may include one or more of the following treatments. These treatments are given until your symptoms improve.  · Receiving fluids and medicines through an IV.  · Supplemental oxygen. Extra oxygen is given through a tube in the nose, a face mask, or a  ogden.  · Positioning you to lie on your stomach (prone position). This makes it easier for oxygen to get into the lungs.  · Continuous positive airway pressure (CPAP) or bi-level positive airway pressure (BPAP) machine. This treatment uses mild air pressure to keep the airways open. A tube that is connected to a motor delivers oxygen to the body.  · Ventilator. This treatment moves air into and out of the lungs by using a tube that is placed in your windpipe.  · Tracheostomy. This is a procedure to create a hole in the neck so that a breathing tube can be inserted.  · Extracorporeal membrane oxygenation (ECMO). This procedure gives the lungs a chance to recover by taking over the functions of the heart and lungs. It supplies oxygen to the body and removes carbon dioxide.  Follow these instructions at home:  Lifestyle  · If you are sick, stay home except to get medical care. Your health care provider will tell you how long to stay home. Call your health care provider before you go for medical care.  · Rest at home as told by your health care provider.  · Do not use any products that contain nicotine or tobacco, such as cigarettes, e-cigarettes, and chewing tobacco. If you need help quitting, ask your health care provider.  · Return to your normal activities as told by your health care provider. Ask your health care provider what activities are safe for you.  General instructions  · Take over-the-counter and prescription medicines only as told by your health care provider.  · Drink enough fluid to keep your urine pale yellow.  · Keep all follow-up visits as told by your health care provider. This is important.  How is this prevented?    There is no vaccine to help prevent COVID-19 infection. However, there are steps you can take to protect yourself and others from this virus.  To protect yourself:   · Do not travel to areas where COVID-19 is a risk. The areas where COVID-19 is reported change often. To identify  high-risk areas and travel restrictions, check the [facility] travel website: wwwnc.cdc.gov/travel/notices  · If you live in, or must travel to, an area where COVID-19 is a risk, take precautions to avoid infection.  ? Stay away from people who are sick.  ? Wash your hands often with soap and water for 20 seconds. If soap and water are not available, use an alcohol-based hand .  ? Avoid touching your mouth, face, eyes, or nose.  ? Avoid going out in public, follow guidance from your state and local health authorities.  ? If you must go out in public, wear a cloth face covering or face mask.  ? Disinfect objects and surfaces that are frequently touched every day. This may include:  § Counters and tables.  § Doorknobs and light switches.  § Sinks and faucets.  § Electronics, such as phones, remote controls, keyboards, computers, and tablets.  To protect others:  If you have symptoms of COVID-19, take steps to prevent the virus from spreading to others.  · If you think you have a COVID-19 infection, contact your health care provider right away. Tell your health care team that you think you may have a COVID-19 infection.  · Stay home. Leave your house only to seek medical care. Do not use public transport.  · Do not travel while you are sick.  · Wash your hands often with soap and water for 20 seconds. If soap and water are not available, use alcohol-based hand .  · Stay away from other members of your household. Let healthy household members care for children and pets, if possible. If you have to care for children or pets, wash your hands often and wear a mask. If possible, stay in your own room, separate from others. Use a different bathroom.  · Make sure that all people in your household wash their hands well and often.  · Cough or sneeze into a tissue or your sleeve or elbow. Do not cough or sneeze into your hand or into the air.  · Wear a cloth face covering or face mask.  Where to find more  information  · Centers for Disease Control and Prevention: www.cdc.gov/coronavirus/2019-ncov/index.html  · World Health Organization: www.who.int/health-topics/coronavirus  Contact a health care provider if:  · You live in or have traveled to an area where COVID-19 is a risk and you have symptoms of the infection.  · You have had contact with someone who has COVID-19 and you have symptoms of the infection.  Get help right away if:  · You have trouble breathing.  · You have pain or pressure in your chest.  · You have confusion.  · You have bluish lips and fingernails.  · You have difficulty waking from sleep.  · You have symptoms that get worse.  These symptoms may represent a serious problem that is an emergency. Do not wait to see if the symptoms will go away. Get medical help right away. Call your local emergency services (911 in the [place] Do not drive yourself to the hospital. Let the emergency medical personnel know if you think you have COVID-19.  Summary  · COVID-19 is a respiratory infection that is caused by a virus. It is also known as coronavirus disease or novel coronavirus. It can cause serious infections, such as pneumonia, acute respiratory distress syndrome, acute respiratory failure, or sepsis.  · The virus that causes COVID-19 is contagious. This means that it can spread from person to person through droplets from coughs and sneezes.  · You are more likely to develop a serious illness if you are 65 years of age or older, have a weak immunity, live in a nursing home, or have chronic disease.  · There is no medicine to treat COVID-19. Your health care provider will talk with you about ways to treat your symptoms.  · Take steps to protect yourself and others from infection. Wash your hands often and disinfect objects and surfaces that are frequently touched every day. Stay away from people who are sick and wear a mask if you are sick.  This information is not intended to replace advice given to you by  your health care provider. Make sure you discuss any questions you have with your health care provider.  Document Released: 01/23/2020 Document Revised: 05/14/2020 Document Reviewed: 01/23/2020  Elsevier Patient Education © 2020 [employer]  Home Oxygen Use, Adult  When a medical condition keeps you from getting enough oxygen, your health care provider may instruct you to take extra oxygen at home. Your health care provider will let you know:  · When to take oxygen.  · For how long to take oxygen.  · How quickly oxygen should be delivered (flow rate), in liters per minute (LPM or L/M).  Home oxygen can be given through:  · A mask.  · A nasal cannula. This is a device or tube that goes in the nostrils.  · A transtracheal catheter. This is a small, flexible tube placed in the trachea.  · A tracheostomy. This is a surgically made opening in the trachea.  These devices are connected with tubing to an oxygen source, such as:  · A tank. Tanks hold oxygen in gas form. They must be replaced when the oxygen is used up.  · A liquid oxygen device. This holds oxygen in liquid form. It must be replaced when the oxygen is used up.  · An oxygen concentrator machine. This filters oxygen in the room. It uses electricity, so you must have a backup cylinder of oxygen in case the power goes out.  Supplies needed:  To use oxygen, you will need:  · A mask, nasal cannula, transtracheal catheter, or tracheostomy.  · An oxygen tank, a liquid oxygen device, or an oxygen concentrator.  · The tape that your health care provider recommends (optional).  If you use a transtracheal catheter and your prescribed flow rate is 1 LPM or greater, you will also need a humidifier.  Risks and complications  · Fire. This can happen if the oxygen is exposed to a heat source, flame, or spark.  · Injury to skin. This can happen if liquid oxygen touches your skin.  · Organ damage. This can happen if you get too little oxygen.  How to use oxygen  Your health  care provider or a representative from your medical device company will show you how to use your oxygen device. Follow her or his instructions. The instructions may look something like this:  1. Wash your hands.  2. If you use an oxygen concentrator, make sure it is plugged in.  3. Place one end of the tube into the port on the tank, device, or machine.  4. Place the mask over your nose and mouth. Or, place the nasal cannula and secure it with tape if instructed. If you use a tracheostomy or transtracheal catheter, connect it to the oxygen source as directed.  5. Make sure the liter-flow setting on the machine is at the level prescribed by your health care provider.  6. Turn on the machine or adjust the knob on the tank or device to the correct liter-flow setting.  7. When you are done, turn off and unplug the machine, or turn the knob to OFF.  How to clean and care for the oxygen supplies  Nasal cannula  · Clean it with a warm, wet cloth daily or as needed.  · Wash it with a liquid soap once a week.  · Rinse it thoroughly once or twice a week.  · Replace it every 2-4 weeks.  · If you have an infection, such as a cold or pneumonia, change the cannula when you get better.  Mask  · Replace it every 2-4 weeks.  · If you have an infection, such as a cold or pneumonia, change the mask when you get better.  Humidifier bottle  · Wash the bottle between each refill:  ? Wash it with soap and warm water.  ? Rinse it thoroughly.  ? Disinfect it and its top.  ? Air-dry it.  · Make sure it is dry before you refill it.  Oxygen concentrator  · Clean the air filter at least twice a week according to directions from your home medical equipment and service company.  · Wipe down the cabinet every day. To do this:  ? Unplug the unit.  ? Wipe down the cabinet with a damp cloth.  ? Dry the cabinet.  Other equipment  · Change any extra tubing every 1-3 months.  · Follow instructions from your health care provider about taking care of any  "other equipment.  Safety tips  Fire safety tips    · Keep your oxygen and oxygen supplies at least 5 ft away from sources of heat, flames, and russell at all times.  · Do not allow smoking near your oxygen. Put up \"no smoking\" signs in your home. Avoid smoking areas when in public.  · Do not use materials that can burn (are flammable) while you use oxygen.  · When you go to a restaurant with portable oxygen, ask to be seated in the nonsmoking section.  · Keep a fire extinguisher close by. Let your fire department know that you have oxygen in your home.  · Test your home smoke detectors regularly.  Traveling  · Secure your oxygen tank in the vehicle so that it does not move around. Follow instructions from your medical device company about how to safely secure your tank.  · Make sure you have enough oxygen for the amount of time you will be away from home.  · If you are planning air travel, contact the airline to find out if they allow the use of an approved portable oxygen concentrator. You may also need documents from your health care provider and medical device company before you travel.  General safety tips  · If you use an oxygen cylinder, make sure it is in a stand or secured to an object that will not move (fixed object).  · If you use liquid oxygen, make sure its container is kept upright.  · If you use an oxygen concentrator:  ? Tell your electric company. Make sure you are given priority service in the event that your power goes out.  ? Avoid using extension cords, if possible.  Follow these instructions at home:  · Use oxygen only as told by your health care provider.  · Do not use alcohol or other drugs that make you relax (sedating drugs) unless instructed. They can slow down your breathing rate and make it hard to get in enough oxygen.  · Know how and when to order a refill of oxygen.  · Always keep a spare tank of oxygen. Plan ahead for holidays when you may not be able to get a prescription " filled.  · Use water-based lubricants on your lips or nostrils. Do not use oil-based products like petroleum jelly.  · To prevent skin irritation on your cheeks or behind your ears, tuck some gauze under the tubing.  Contact a health care provider if:  · You get headaches often.  · You have shortness of breath.  · You have a lasting cough.  · You have anxiety.  · You are sleepy all the time.  · You develop an illness that affects your breathing.  · You cannot exercise at your regular level.  · You are restless.  · You have difficult or irregular breathing, and it is getting worse.  · You have a fever.  · You have persistent redness under your nose.  Get help right away if:  · You are confused.  · You have blue lips or fingernails.  · You are struggling to breathe.  Summary  · Your health care provider or a representative from your medical device company will show you how to use your oxygen device. Follow her or his instructions.  · If you use an oxygen concentrator, make sure it is plugged in.  · Make sure the liter-flow setting on the machine is at the level prescribed by your health care provider.  · Keep your oxygen and oxygen supplies at least 5 ft away from sources of heat, flames, and russell at all times.  This information is not intended to replace advice given to you by your health care provider. Make sure you discuss any questions you have with your health care provider.  Document Released: 03/09/2005 Document Revised: 06/06/2019 Document Reviewed: 07/11/2017  [employer] Patient Education © 2020 [employer]  Discharge Instructions    Discharged to home by car with relative. Discharged via walking, hospital escort: Yes.  Special equipment needed: Oxygen    Be sure to schedule a follow-up appointment with your primary care doctor or any specialists as instructed.     Discharge Plan:   Influenza Vaccine Indication: Indicated: 65 years and older    I understand that a diet low in cholesterol, fat, and sodium is  recommended for good health. Unless I have been given specific instructions below for another diet, I accept this instruction as my diet prescription.   Other diet: regular    Special Instructions: None    · Is patient discharged on Warfarin / Coumadin?   No     Depression / Suicide Risk    As you are discharged from this [facility], it is important to learn how to keep safe from harming yourself.    Recognize the warning signs:  · Abrupt changes in personality, positive or negative- including increase in energy   · Giving away possessions  · Change in eating patterns- significant weight changes-  positive or negative  · Change in sleeping patterns- unable to sleep or sleeping all the time   · Unwillingness or inability to communicate  · Depression  · Unusual sadness, discouragement and loneliness  · Talk of wanting to die  · Neglect of personal appearance   · Rebelliousness- reckless behavior  · Withdrawal from people/activities they love  · Confusion- inability to concentrate     If you or a loved one observes any of these behaviors or has concerns about self-harm, here's what you can do:  · Talk about it- your feelings and reasons for harming yourself  · Remove any means that you might use to hurt yourself (examples: pills, rope, extension cords, firearm)  · Get professional help from the community (Mental Health, Substance Abuse, psychological counseling)  · Do not be alone:Call your Safe Contact- someone whom you trust who will be there for you.  · Call your local CRISIS HOTLINE 892-9829 or 294-691-2694  · Call your local Children's Mobile Crisis Response Team [place] (423) 605-0453 or www.Truffls  · Call the toll free National Suicide Prevention Hotlines   · National Suicide Prevention Lifeline 190-810-QQSZ (5705)  · National Hope Line Network 800-SUICIDE (899-2670)

## 2021-01-05 NOTE — DISCHARGE PLANNING
Anticipated Discharge Disposition: Home with Home Monitoring     Action: Call from RT Paez. Set up earlier today and awating O2 from [employer] for home since 11:30. Called to Jyothi and she notes that it is not completed and accepted it is missing Continuous vs Nocturnal on Order. Advised it is Continuous but the order needs to be redone.  is also planning DC with home monitoring program .    Barriers to Discharge: MD Order needs redone. Approval after hrs for [employer] needs done and O2 needs delivered    Plan: Will reach out to RN and MD for order fix.

## 2021-01-05 NOTE — DISCHARGE PLANNING
Anticipated Discharge Disposition: home w/ remote o2 monitoring     Action: Per call from ER CM Ramona the [employer] O[employer] order was incorrect last night and the company would not deliver the O2 even though the order got corrected and sent to them.   Dalia from [facility] called this CM now to say the order is processed and the O2 tank and concentrator to be delivered to bedside. Reviewed above with KARSTEN Samuel. Their televisits now have to redone since they may miss today’s monitoring appt.    Call placed to [facility]  to arrange an additional visit for a total of 10 virtual visits through tele medicaine at [facility] [phone]. The 10 visits and will print up on pt’s AVS at discharge.      Barriers to Discharge:      Plan: await delivery to bedside.

## 2021-01-05 NOTE — DISCHARGE PLANNING
"Anticipated Discharge Disposition: Home in am when O2 can be provided for safety    Action: ER CM notes new order. ER CM sent electronically.  Call to [facility]  spoke Jyothi . Medicare rep gone for the today, referral would go to on call supervisor. Request  This be referred and call back to this ER CM to see if can work it on. They reviewed and advised that \"they are unable to work on it after hr that it will have to wait till the morning\"    Barriers to Discharge: DME oxygen delay.    Plan: Dc in am after approval for o2 obtained   "

## 2021-01-06 ENCOUNTER — TELEPHONE (OUTPATIENT)
Dept: SCHEDULING | Facility: IMAGING CENTER | Age: 66
End: 2021-01-06

## 2021-01-06 ENCOUNTER — TELEMEDICINE (OUTPATIENT)
Dept: URGENT CARE | Facility: CLINIC | Age: 66
End: 2021-01-06
Payer: MEDICARE

## 2021-01-06 VITALS — TEMPERATURE: 97.4 F | RESPIRATION RATE: 17 BRPM | HEART RATE: 90 BPM | OXYGEN SATURATION: 95 %

## 2021-01-06 DIAGNOSIS — J12.82 PNEUMONIA DUE TO COVID-19 VIRUS: ICD-10-CM

## 2021-01-06 DIAGNOSIS — U07.1 PNEUMONIA DUE TO COVID-19 VIRUS: ICD-10-CM

## 2021-01-06 PROCEDURE — 99214 OFFICE O/P EST MOD 30 MIN: CPT | Performed by: PHYSICIAN ASSISTANT

## 2021-01-06 ASSESSMENT — ENCOUNTER SYMPTOMS
NAUSEA: 0
DIARRHEA: 0
MUSCULOSKELETAL NEGATIVE: 1
CHILLS: 0
COUGH: 1
ABDOMINAL PAIN: 0
SPUTUM PRODUCTION: 0
FEVER: 0
VOMITING: 0
DIZZINESS: 0
SHORTNESS OF BREATH: 1
SORE THROAT: 0

## 2021-01-06 NOTE — PROGRESS NOTES
Subjective:      Judy Blackburn is a 66 y.o. female who presents with covid-19.         This evaluation was conducted via Zoom using secure and encrypted videoconferencing technology. The patient was in a private location in the [facility].    The patient's identity was confirmed and verbal consent was obtained for this virtual visit.      HPI   Covid-19 Home Monitoring Program:  Date of Discharge: 1/5/21  Current O2 flow rate: 3 L  Symptom improvement: Yes   On Dexamethasone: Yes   On Anticoagulation therapy: No      Home Monitoring Patient Triage  COVID-19 Monitoring Level: Home with basic monitoring       [facility] Flowsheet   1. Record COVID-19 Severity Index (qCSI):  4  2.Confirm appropriate patient and chart with two identifiers: Yes - continue to question #3   3. Days Since Onset of Symptoms: 17  4. Does patient have another adult at home to help take care of you: Yes - continue to question #5  5. Confirm O2 supply is working and there are no cannula problems: Yes - continue to question #6  6. Is your breathing today better or worse? Better - continue to question #7  7. Are you able to tolerate fluids? Yes - Continue to question #8  8. Any vomiting or diarrhea in the last 24 hours? No - continue to question #9  9. Are you able to control your fevers? N/A  10. Are you able to control your cough? Yes - continue to question #11  11. Current O2 Flow Rate: 3  12. Review ER precautions: present to ED or call 911 if experiencing severe shortness of breath: Yes  13. Confirm next VV: Appointment scheduled  14. Final disposition: Stable at home  15. Time Spent: 15    Review of Systems   Constitutional: Positive for malaise/fatigue. Negative for chills and fever.   HENT: Negative for congestion, ear pain and sore throat.    Respiratory: Positive for cough and shortness of breath. Negative for sputum production.    Cardiovascular: Negative for chest pain.   Gastrointestinal: Negative for abdominal  pain, diarrhea, nausea and vomiting.   Genitourinary: Negative.    Musculoskeletal: Negative.    Skin: Negative for rash.   Neurological: Negative for dizziness.        Objective:     Pulse 90   Temp 36.3 °C (97.4 °F)   Resp 17   SpO2 95%      Physical Exam  Vitals signs and nursing note reviewed.   Constitutional:       General: She is not in acute distress.     Appearance: Normal appearance. She is well-developed. She is not diaphoretic.   HENT:      Head: Normocephalic and atraumatic.      Right Ear: External ear normal.      Left Ear: External ear normal.   Eyes:      Conjunctiva/sclera: Conjunctivae normal.   Neck:      Musculoskeletal: Normal range of motion.   Cardiovascular:      Rate and Rhythm: Normal rate.   Pulmonary:      Effort: Pulmonary effort is normal.   Musculoskeletal: Normal range of motion.   Skin:     General: Skin is warm and dry.   Neurological:      Mental Status: She is alert and oriented to person, place, and time.   Psychiatric:         Behavior: Behavior normal.          PMH:  has a past medical history of Hyperlipidemia and Hypertension.  MEDS:   Current Outpatient Medications:   •  ALPRAZolam (XANAX) 0.25 MG Tab, Take 1 Tab by mouth 1 time a day as needed for Sleep or Anxiety for up to 5 days., Disp: 5 Tab, Rfl: 0  •  dexamethasone (DECADRON) 6 MG Tab, Take 1 Tab by mouth every day for 3 days., Disp: 3 Tab, Rfl: 0  •  aspirin EC (ECOTRIN) 81 MG Tablet Delayed Response, Take 81 mg by mouth every evening., Disp: , Rfl:   •  ondansetron (ZOFRAN ODT) 4 MG TABLET DISPERSIBLE, Take 1 Tab by mouth every 6 hours as needed for Nausea., Disp: 10 Tab, Rfl: 0  •  simvastatin (ZOCOR) 20 MG TABS, Take 20 mg by mouth every evening., Disp: , Rfl:   •  losartan (COZAAR) 25 MG TABS, Take 25 mg by mouth every evening., Disp: , Rfl:   ALLERGIES:   Allergies   Allergen Reactions   • Codeine Vomiting and Nausea     SURGHX: History reviewed. No pertinent surgical history.  SOCHX:  reports that she has  never smoked. She has never used smokeless tobacco. She reports current alcohol use. She reports that she does not use drugs.  FH: family history is not on file.       Assessment/Plan:        1. Pneumonia due to COVID-19 virus    - Continue home monitoring  - Covid Severity Index (CSI) of 4 at today's visit   - Patient is currently on 3 L oxygen with oxygen saturation of 94-97% oxygen saturation, encouraged to wean to 2-2.5 L by tomorrow   - Continue current course of dexamethasone   - Encouraged increased fluids and rest, continue breathing exercises  - ED precautions discussed  - Follow up tomorrow, 1/7

## 2021-01-07 ENCOUNTER — TELEMEDICINE (OUTPATIENT)
Dept: URGENT CARE | Facility: CLINIC | Age: 66
End: 2021-01-07
Payer: MEDICARE

## 2021-01-07 VITALS — RESPIRATION RATE: 12 BRPM | TEMPERATURE: 97 F | HEART RATE: 78 BPM | OXYGEN SATURATION: 96 %

## 2021-01-07 DIAGNOSIS — J12.82 PNEUMONIA DUE TO COVID-19 VIRUS: ICD-10-CM

## 2021-01-07 DIAGNOSIS — Z99.81 REQUIRES SUPPLEMENTAL OXYGEN: ICD-10-CM

## 2021-01-07 DIAGNOSIS — U07.1 COVID-19 VIRUS INFECTION: ICD-10-CM

## 2021-01-07 DIAGNOSIS — U07.1 PNEUMONIA DUE TO COVID-19 VIRUS: ICD-10-CM

## 2021-01-07 PROCEDURE — 99214 OFFICE O/P EST MOD 30 MIN: CPT | Mod: 95 | Performed by: NURSE PRACTITIONER

## 2021-01-07 ASSESSMENT — ENCOUNTER SYMPTOMS
SHORTNESS OF BREATH: 0
PALPITATIONS: 0
EYE REDNESS: 0
TINGLING: 0
BACK PAIN: 0
FEVER: 0
SORE THROAT: 0
COUGH: 1
MYALGIAS: 0
VOMITING: 0
NAUSEA: 0
HEADACHES: 0

## 2021-01-07 ASSESSMENT — LIFESTYLE VARIABLES: SUBSTANCE_ABUSE: 0

## 2021-01-07 NOTE — PROGRESS NOTES
Judy Blackburn is a 66 y.o. female who presents for Cough and Pneumonia (FV RPM COVID infection )    Reviewed past medical, surgical and family history. Reviewed prescription and OTC medications with patient in electronic health record today  Allergies: Codeine              HPI  Covid-19 Home Monitoring Program:  Date of Discharge: 01/05/2021  Current O2 flow rate: 2 Liters   Symptom improvement:  overall improving .   On Dexamethasone: yes     Home Monitoring Patient Triage  COVID-19 Monitoring Level: Home with basic monitoring        [facility] Monitoring Flowsheet   1. Record COVID-19 Severity Index (qCSI):  0  2.Confirm appropriate patient and chart with two identifiers: Yes  3. Days Since Onset of Symptoms: 18  4. Does patient have another adult at home to help take care of you: yes  5. Confirm O2 supply is working and there are no cannula problems: no problems   6. Is your breathing today better or worse? improved  7. Are you able to tolerate fluids?  yes  8. Any vomiting or diarrhea in the last 24 hours? no  9.  Have you had any fevers in the past 24 hours ?     no .        Are you able to control your fevers? N\A  10. Are you able to control your cough? yes  11. Current O2 Flow Rate: 1 L N C prn  and RA        Review of Systems   Constitutional: Negative for fever and malaise/fatigue (mild).   HENT: Negative for congestion and sore throat.    Eyes: Negative for redness.   Respiratory: Positive for cough (mild). Negative for shortness of breath.    Cardiovascular: Negative for chest pain, palpitations and leg swelling.   Gastrointestinal: Negative for nausea and vomiting.   Musculoskeletal: Negative for back pain and myalgias.   Neurological: Negative for tingling and headaches.   Psychiatric/Behavioral: Negative for substance abuse.       Allergies   Allergen Reactions   • Codeine Vomiting and Nausea     Past Medical History:   Diagnosis Date   • Hyperlipidemia    • Hypertension      No past  "surgical history on file.  No family history on file.  Social History     Tobacco Use   • Smoking status: Never Smoker   • Smokeless tobacco: Never Used   Substance Use Topics   • Alcohol use: Yes     Comment: occasionally     Patient Active Problem List   Diagnosis   • Pneumonia due to COVID-19 virus   • Hyperlipidemia     Current Outpatient Medications on File Prior to Visit   Medication Sig Dispense Refill   • ALPRAZolam (XANAX) 0.25 MG Tab Take 1 Tab by mouth 1 time a day as needed for Sleep or Anxiety for up to 5 days. 5 Tab 0   • dexamethasone (DECADRON) 6 MG Tab Take 1 Tab by mouth every day for 3 days. 3 Tab 0   • aspirin EC (ECOTRIN) 81 MG Tablet Delayed Response Take 81 mg by mouth every evening.     • ondansetron (ZOFRAN ODT) 4 MG TABLET DISPERSIBLE Take 1 Tab by mouth every 6 hours as needed for Nausea. 10 Tab 0   • simvastatin (ZOCOR) 20 MG TABS Take 20 mg by mouth every evening.     • losartan (COZAAR) 25 MG TABS Take 25 mg by mouth every evening.       No current facility-administered medications on file prior to visit.           Objective:     Pulse 78   Temp 36.1 °C (97 °F)   Resp 12   SpO2 96%     This visit was conducted over the internet using a secure computer connection of [employer].. The patient's identity was confirmed.  The patient agrees to this type of \"hands off\" visit for the problem or chief complaint today and verbalizes understanding of limitations of the visit.   Consent was obtained.    The patient was in a private location in the [facility].      Gen:  Normal appearing.  Does not appear to be in any distress.   Resp:  No resp distress noted. Speaking in complete sentences. appears to have ease of respiration without audible wheezing or stridor  HEENT: Head is grossly normal.Conjunctival erythema or exudate, rhinorrhea  Skin:No rashes disclosed . Skin color appears normal.   MSK: ROM & Motor Strength: Appears grossly strong and equal. No deficits seen on video " today.   Psych: Normal mood; alert and oriented x 3. Judgment and insight appears to be normal.       Assessment /Associated Orders:      1. Pneumonia due to COVID-19 virus     2. Requires supplemental oxygen     3. COVID-19 virus infection           Medical Decision Making:      VSS at today's acute virtual urgent care visit. Stable and improving   Wean oxygen 1 Liter NC daytime today and tonight    Room Air prn during the daytime hours as tolerated.   Activity as tolerated.           12. Reviewed ER precautions: present to ED or call 911 if experiencing severe shortness of breath: Yes                Red flags discussed and indications to immediately call 911 or present to the Emergency Department.         13. Confirm next VV: tomorrow         14. Final disposition: stable at home . Continue RPM monitoring with [employer] data system.     Resume all prior  RX medications. Take as prescribed.     I personally reviewed prior external notes and test results pertinent to today's visit in EHR and [employer] monitoring data.  I have independently reviewed and interpreted all data.     Patient expresses understanding and agrees to plan. Questions were encouraged and answered to satisfaction.     Please note that this dictation was created using voice recognition software. I have made a reasonable attempt to correct obvious errors, but I expect that there are errors of grammar and possibly content that I did not discover before finalizing the note.    This note was electronically signed by Taya LEONARD, Urgent Care

## 2021-01-08 ENCOUNTER — TELEMEDICINE (OUTPATIENT)
Dept: URGENT CARE | Facility: CLINIC | Age: 66
End: 2021-01-08
Payer: MEDICARE

## 2021-01-08 VITALS — OXYGEN SATURATION: 94 % | RESPIRATION RATE: 18 BRPM | HEART RATE: 85 BPM

## 2021-01-08 DIAGNOSIS — J12.82 PNEUMONIA DUE TO COVID-19 VIRUS: ICD-10-CM

## 2021-01-08 DIAGNOSIS — U07.1 PNEUMONIA DUE TO COVID-19 VIRUS: ICD-10-CM

## 2021-01-08 DIAGNOSIS — Z99.81 REQUIRES SUPPLEMENTAL OXYGEN: ICD-10-CM

## 2021-01-08 PROCEDURE — 99213 OFFICE O/P EST LOW 20 MIN: CPT | Mod: 95 | Performed by: PHYSICIAN ASSISTANT

## 2021-01-08 ASSESSMENT — ENCOUNTER SYMPTOMS
VOMITING: 0
DIARRHEA: 0
FEVER: 0
COUGH: 1
SHORTNESS OF BREATH: 1

## 2021-01-08 NOTE — PROGRESS NOTES
Subjective:      Judy Blackburn is a 66 y.o. female who presents with Pneumonia (RPM)    This evaluation was conducted via Zoom using secure and encrypted videoconferencing technology. The patient was in a private location in the [facility].    The patient's identity was confirmed and verbal consent was obtained for this virtual visit.    Home Monitoring Patient Triage  COVID-19 Monitoring Level: Home with basic monitoring       [facility] Flowsheet   1. Record COVID-19 Severity Index (qCSI):  0  2.Confirm appropriate patient and chart with two identifiers: Yes - continue to question #3   3. Days Since Onset of Symptoms: 19  4. Does patient have another adult at home to help take care of you: Yes - continue to question #5  5. Confirm O2 supply is working and there are no cannula problems: Yes - continue to question #6  6. Is your breathing today better or worse? Better - continue to question #7  7. Are you able to tolerate fluids? Yes - Continue to question #8  8. Any vomiting or diarrhea in the last 24 hours? No - continue to question #9  9. Are you able to control your fevers? Yes - continue to question #10  10. Are you able to control your cough? Yes - continue to question #11  11. Current O2 Flow Rate: 1  12. Review ER precautions: present to ED or call 911 if experiencing severe shortness of breath: Yes  13. Confirm next VV: Appointment scheduled  14. Final disposition: Stable at home  15. Time Spent: 15          No overnight events.  Finished dexamethasone.  Has spent time off of oxygen.  She slept with 1 L.  No concerns or new symptoms today    Pneumonia  She complains of cough and shortness of breath (Improving). This is a new problem. The current episode started 1 to 4 weeks ago. The problem occurs constantly. The problem has been gradually improving. Pertinent negatives include no chest pain or fever. Her symptoms are alleviated by oral steroids.       PMH:  has a past medical history of  Hyperlipidemia and Hypertension.  MEDS:   Current Outpatient Medications:   •  ALPRAZolam (XANAX) 0.25 MG Tab, Take 1 Tab by mouth 1 time a day as needed for Sleep or Anxiety for up to 5 days., Disp: 5 Tab, Rfl: 0  •  dexamethasone (DECADRON) 6 MG Tab, Take 1 Tab by mouth every day for 3 days., Disp: 3 Tab, Rfl: 0  •  aspirin EC (ECOTRIN) 81 MG Tablet Delayed Response, Take 81 mg by mouth every evening., Disp: , Rfl:   •  ondansetron (ZOFRAN ODT) 4 MG TABLET DISPERSIBLE, Take 1 Tab by mouth every 6 hours as needed for Nausea., Disp: 10 Tab, Rfl: 0  •  simvastatin (ZOCOR) 20 MG TABS, Take 20 mg by mouth every evening., Disp: , Rfl:   •  losartan (COZAAR) 25 MG TABS, Take 25 mg by mouth every evening., Disp: , Rfl:   ALLERGIES:   Allergies   Allergen Reactions   • Codeine Vomiting and Nausea     SURGHX: No past surgical history on file.  SOCHX:  reports that she has never smoked. She has never used smokeless tobacco. She reports current alcohol use. She reports that she does not use drugs.  FH: family history is not on file.    Review of Systems   Constitutional: Negative for fever.   Respiratory: Positive for cough and shortness of breath (Improving).    Cardiovascular: Negative for chest pain.   Gastrointestinal: Negative for diarrhea and vomiting.       Medications, Allergies, and current problem list reviewed today in Epic     Objective:     Pulse 85   Resp 18   SpO2 94%      Physical Exam  Vitals signs and nursing note reviewed.   Constitutional:       General: She is not in acute distress.     Appearance: Normal appearance. She is well-developed. She is not ill-appearing or toxic-appearing.   HENT:      Head: Normocephalic and atraumatic.      Right Ear: External ear normal.      Left Ear: External ear normal.      Nose: Nose normal.   Eyes:      Conjunctiva/sclera: Conjunctivae normal.   Pulmonary:      Effort: Pulmonary effort is normal.   Neurological:      Mental Status: She is alert and oriented to  person, place, and time.   Psychiatric:         Mood and Affect: Mood normal.         Behavior: Behavior normal.         Thought Content: Thought content normal.         Judgment: Judgment normal.                 Assessment/Plan:         1. Pneumonia due to COVID-19 virus     2. Requires supplemental oxygen       No overnight events.  Patient stable on 1 L.  She has had significant time off of the oxygen.  She finished her dexamethasone.  Encourage patient to stop oxygen at this time with the caveat that she feels well and her O2 sats do not decrease significantly.  Follow-up tomorrow.  Likely discharge at that time    Please note that this dictation was created using voice recognition software. I have made every reasonable attempt to correct obvious errors, but I expect that there are errors of grammar and possibly content that I did not discover before finalizing the note.

## 2021-01-09 ENCOUNTER — TELEMEDICINE (OUTPATIENT)
Dept: URGENT CARE | Facility: CLINIC | Age: 66
End: 2021-01-09
Payer: MEDICARE

## 2021-01-09 VITALS — OXYGEN SATURATION: 94 % | RESPIRATION RATE: 17 BRPM | HEART RATE: 80 BPM

## 2021-01-09 DIAGNOSIS — J12.82 PNEUMONIA DUE TO COVID-19 VIRUS: ICD-10-CM

## 2021-01-09 DIAGNOSIS — U07.1 COVID-19 VIRUS INFECTION: ICD-10-CM

## 2021-01-09 DIAGNOSIS — Z99.81 REQUIRES SUPPLEMENTAL OXYGEN: ICD-10-CM

## 2021-01-09 DIAGNOSIS — U07.1 PNEUMONIA DUE TO COVID-19 VIRUS: ICD-10-CM

## 2021-01-09 PROCEDURE — 99457 RPM TX MGMT 1ST 20 MIN: CPT | Mod: 95 | Performed by: PHYSICIAN ASSISTANT

## 2021-01-09 ASSESSMENT — ENCOUNTER SYMPTOMS
SHORTNESS OF BREATH: 0
VOMITING: 0
NAUSEA: 0
COUGH: 0
CHILLS: 0
CONSTIPATION: 0
DIARRHEA: 0
ABDOMINAL PAIN: 0
FEVER: 0

## 2021-01-12 LAB
GLUCOSE BLD-MCNC: 178 MG/DL (ref 65–99)
GLUCOSE BLD-MCNC: 93 MG/DL (ref 65–99)

## 2021-01-15 ENCOUNTER — TELEPHONE (OUTPATIENT)
Dept: CARDIOLOGY | Facility: MEDICAL CENTER | Age: 66
End: 2021-01-15

## 2021-01-15 NOTE — TELEPHONE ENCOUNTER
Spoke with pt and confirmed last time seeing by cardiologist in 2018, records in pt chart under care everywhere.     Pt has upcoming NP appt with [facility] on 1-18-21.

## 2021-01-18 ENCOUNTER — OFFICE VISIT (OUTPATIENT)
Dept: CARDIOLOGY | Facility: MEDICAL CENTER | Age: 66
End: 2021-01-18
Payer: MEDICARE

## 2021-01-18 VITALS
OXYGEN SATURATION: 94 % | HEART RATE: 94 BPM | SYSTOLIC BLOOD PRESSURE: 168 MMHG | BODY MASS INDEX: 26.37 KG/M2 | WEIGHT: 168 LBS | DIASTOLIC BLOOD PRESSURE: 96 MMHG | HEIGHT: 67 IN | RESPIRATION RATE: 14 BRPM

## 2021-01-18 DIAGNOSIS — E78.5 HYPERLIPIDEMIA, UNSPECIFIED HYPERLIPIDEMIA TYPE: ICD-10-CM

## 2021-01-18 DIAGNOSIS — I10 ESSENTIAL HYPERTENSION: ICD-10-CM

## 2021-01-18 PROBLEM — I70.1 RAS (RENAL ARTERY STENOSIS) (HCC): Status: ACTIVE | Noted: 2021-01-18

## 2021-01-18 PROBLEM — R73.03 PREDIABETES: Status: ACTIVE | Noted: 2017-02-21

## 2021-01-18 PROBLEM — H43.813 POSTERIOR VITREOUS DETACHMENT OF BOTH EYES: Status: ACTIVE | Noted: 2017-03-10

## 2021-01-18 PROBLEM — Z96.1 PSEUDOPHAKIA OF RIGHT EYE: Status: ACTIVE | Noted: 2017-03-10

## 2021-01-18 PROBLEM — N95.9 MENOPAUSAL AND POSTMENOPAUSAL DISORDER: Status: ACTIVE | Noted: 2021-01-18

## 2021-01-18 LAB — EKG IMPRESSION: NORMAL

## 2021-01-18 PROCEDURE — 99204 OFFICE O/P NEW MOD 45 MIN: CPT | Performed by: INTERNAL MEDICINE

## 2021-01-18 PROCEDURE — 93000 ELECTROCARDIOGRAM COMPLETE: CPT | Performed by: INTERNAL MEDICINE

## 2021-01-18 RX ORDER — ATORVASTATIN CALCIUM 10 MG/1
10 TABLET, FILM COATED ORAL DAILY
COMMUNITY
Start: 2020-11-09 | End: 2021-01-22

## 2021-01-18 RX ORDER — PROPRANOLOL HCL 60 MG
60 CAPSULE, EXTENDED RELEASE 24HR ORAL DAILY
Qty: 30 CAP | Refills: 11 | Status: SHIPPED | OUTPATIENT
Start: 2021-01-18 | End: 2021-11-29

## 2021-01-18 RX ORDER — LOSARTAN POTASSIUM 50 MG/1
50 TABLET ORAL EVERY EVENING
COMMUNITY
Start: 2020-11-09 | End: 2022-01-21 | Stop reason: SDUPTHER

## 2021-01-18 RX ORDER — MULTIVITAMIN
1 CAPSULE ORAL DAILY
Status: ON HOLD | COMMUNITY
End: 2022-04-26

## 2021-01-18 RX ORDER — LOSARTAN POTASSIUM 50 MG/1
TABLET ORAL
COMMUNITY
Start: 2020-11-12 | End: 2021-01-18

## 2021-01-18 RX ORDER — ATORVASTATIN CALCIUM 10 MG/1
TABLET, FILM COATED ORAL
COMMUNITY
Start: 2020-11-12 | End: 2021-01-18

## 2021-01-18 ASSESSMENT — FIBROSIS 4 INDEX: FIB4 SCORE: 1.78

## 2021-01-18 NOTE — PROGRESS NOTES
Cardiology Initial Consultation Note    Date of note:    1/18/2021    Primary Care Provider: Lonny Mckinney M.D.  Referring Provider: Te Ross M.D.     Patient Name: Judy Blackburn   YOB: 1955  MRN:              [id number]    Chief Complaint: Establish care, hypertension, renal artery stenosis, carotid atherosclerosis    Judy Blackburn is a 66 y.o. female  patient presented today to [facility].  She was recently hospitalized with COVID-19 pneumonia, recovering currently.  She still has significant headache at night, dyspnea on exertion.  She was diagnosed with renal artery stenosis in the past, mild carotid artery disease, hypertension.  She does have whitecoat hypertension, her blood pressure in the office is high usually.  But her home blood pressure also running high around 140.  She has been having lot of anxiety, trouble sleeping.    ROS  Positive for rash, weakness, headaches, blurred vision, shortness of breath, heartburn, anxiety, insomnia.    All other systems reviewed and discussed using a comprehensive questionnaire and are negative.     Past medical history, family history, social history, allergies and labs are reviewed and updated as needed as documented below.    Past Medical History:   Diagnosis Date   • Hyperlipidemia    • Hypertension          No past surgical history on file.      Current Outpatient Medications   Medication Sig Dispense Refill   • Multiple Vitamin (MULTIVITAMIN) capsule Take 1 Cap by mouth every day.     • losartan (COZAAR) 50 MG Tab Take 50 mg by mouth every day.     • Magnesium 250 MG Tab Take  by mouth every bedtime.     • atorvastatin (LIPITOR) 10 MG Tab Take 10 mg by mouth every day.     • Coenzyme Q10 (COQ10 PO) Take  by mouth.     • propranolol LA (INDERAL LA) 60 MG CAPSULE SR 24 HR Take 1 Cap by mouth every day. 30 Cap 11   • aspirin EC (ECOTRIN) 81 MG Tablet Delayed Response Take 81 mg by mouth every evening.     • ondansetron  "(ZOFRAN ODT) 4 MG TABLET DISPERSIBLE Take 1 Tab by mouth every 6 hours as needed for Nausea. 10 Tab 0     No current facility-administered medications for this visit.          Allergies   Allergen Reactions   • Codeine Vomiting and Nausea         No family history on file.      Social History     Socioeconomic History   • Marital status:      Spouse name: Not on file   • Number of children: Not on file   • Years of education: Not on file   • Highest education level: Not on file   Occupational History   • Not on file   Social Needs   • Financial resource strain: Not on file   • Food insecurity     Worry: Not on file     Inability: Not on file   • Transportation needs     Medical: Not on file     Non-medical: Not on file   Tobacco Use   • Smoking status: Never Smoker   • Smokeless tobacco: Never Used   Substance and Sexual Activity   • Alcohol use: Yes     Comment: occasionally   • Drug use: Never   • Sexual activity: Not on file   Lifestyle   • Physical activity     Days per week: Not on file     Minutes per session: Not on file   • Stress: Not on file   Relationships   • Social connections     Talks on phone: Not on file     Gets together: Not on file     Attends Buddhism service: Not on file     Active member of club or organization: Not on file     Attends meetings of clubs or organizations: Not on file     Relationship status: Not on file   • Intimate partner violence     Fear of current or ex partner: Not on file     Emotionally abused: Not on file     Physically abused: Not on file     Forced sexual activity: Not on file   Other Topics Concern   • Not on file   Social History Narrative   • Not on file         Physical Exam:  Ambulatory Vitals  BP (!) 168/96 (BP Location: Right arm, Patient Position: Sitting, BP Cuff Size: Adult)   Pulse 94   Resp 14   Ht 1.702 m (5' 7\")   Wt 76.2 kg (168 lb)   SpO2 94%    Oxygen Therapy:  Pulse Oximetry: 94 %  BP Readings from Last 4 Encounters:   01/18/21 (!) " 168/96   21 129/76   20 144/77   12 128/80       Weight/BMI: Body mass index is 26.31 kg/m².  Wt Readings from Last 4 Encounters:   21 76.2 kg (168 lb)   20 75.5 kg (166 lb 7.2 oz)   20 75 kg (165 lb 5.5 oz)       General: Well appearing and in no apparent distress  Head: atrumatic  Eyes: No conjunctival pallor   ENT: normal external appearance of nose and ears  Neck: JVD absent, carotid bruits absent  Lungs: respiratory sounds  normal, additional breath sounds absent  Heart: Regular rhythm,   No palpable thrills on palpation, murmurs absent, no rubs,   Lower extremity edema absent.   Abdomen: soft, non tender, non distended.  Extremities/MSK: no clubbing, no cyanosis  Neurological: normal orientation, Gait normal   Psychiatric: Appropriate affect, intact judgement and insight  Skin: Warm extremities      Lab Data Review:  No results found for: CHOLSTRLTOT, LDL, HDL, TRIGLYCERIDE    Lab Results   Component Value Date/Time    SODIUM 140 2021 01:24 AM    POTASSIUM 4.1 2021 01:24 AM    CHLORIDE 105 2021 01:24 AM    CO2 25 2021 01:24 AM    GLUCOSE 155 (H) 2021 01:24 AM    BUN 19 2021 01:24 AM    CREATININE 0.51 2021 01:24 AM     Lab Results   Component Value Date/Time    ALKPHOSPHAT 100 (H) 2021 01:24 AM    ASTSGOT 61 (H) 2021 01:24 AM    ALTSGPT 68 (H) 2021 01:24 AM    TBILIRUBIN 0.2 2021 01:24 AM      Lab Results   Component Value Date/Time    WBC 10.5 2021 01:24 AM     Chest x-ray from 2020 reviewed, personally interpreted shows bilateral infiltrates suggestive of pneumonia    Hospital notes, discharge summary are reviewed.    Medical Decision Makin-year-old female patient here to establish care.  1.  Hypertension uncontrolled  2.  Anxiety  3.  History of renal artery stenosis  4.  Carotid atherosclerosis  5.  Hyperlipidemia    -Her blood pressure is uncontrolled, likely due to significant anxiety.   Continue losartan 50 mg daily, we will add propranolol 60 mg long-acting daily to help with anxiety and blood pressure.  Continue Lipitor for hyperlipidemia, check cholesterol panel  Continue aspirin for primary prevention.  We will repeat renal artery Dopplers, carotid ultrasound to evaluate stenosis it has been 3 years since she had those done.  She had mildly abnormal LFTs, will repeat CMP again because she likes to use Tylenol for her headache.    Return in about 6 months (around 7/18/2021).    This note was dictated using Dragon speech recognition software.    Dwight SOMMER  Interventional cardiologist  [facility], [address]  [address] [address]  Phone: [phone]  Fax: [phone]

## 2021-01-18 NOTE — PATIENT INSTRUCTIONS
We will check carotid, renal dopplers to see any narrowing.  Will check CMP, lipid panel.  Take propranolol in the afternoon.

## 2021-01-21 ENCOUNTER — HOSPITAL ENCOUNTER (OUTPATIENT)
Dept: LAB | Facility: MEDICAL CENTER | Age: 66
End: 2021-01-21
Attending: INTERNAL MEDICINE
Payer: MEDICARE

## 2021-01-21 DIAGNOSIS — I10 ESSENTIAL HYPERTENSION: ICD-10-CM

## 2021-01-21 LAB
ALBUMIN SERPL BCP-MCNC: 4.2 G/DL (ref 3.2–4.9)
ALBUMIN/GLOB SERPL: 1.6 G/DL
ALP SERPL-CCNC: 73 U/L (ref 30–99)
ALT SERPL-CCNC: 36 U/L (ref 2–50)
ANION GAP SERPL CALC-SCNC: 8 MMOL/L (ref 7–16)
AST SERPL-CCNC: 21 U/L (ref 12–45)
BILIRUB SERPL-MCNC: 0.5 MG/DL (ref 0.1–1.5)
BUN SERPL-MCNC: 22 MG/DL (ref 8–22)
CALCIUM SERPL-MCNC: 9.4 MG/DL (ref 8.4–10.2)
CHLORIDE SERPL-SCNC: 107 MMOL/L (ref 96–112)
CHOLEST SERPL-MCNC: 197 MG/DL (ref 100–199)
CO2 SERPL-SCNC: 27 MMOL/L (ref 20–33)
CREAT SERPL-MCNC: 0.6 MG/DL (ref 0.5–1.4)
FASTING STATUS PATIENT QL REPORTED: NORMAL
GLOBULIN SER CALC-MCNC: 2.7 G/DL (ref 1.9–3.5)
GLUCOSE SERPL-MCNC: 117 MG/DL (ref 65–99)
HDLC SERPL-MCNC: 48 MG/DL
LDLC SERPL CALC-MCNC: 96 MG/DL
POTASSIUM SERPL-SCNC: 4.4 MMOL/L (ref 3.6–5.5)
PROT SERPL-MCNC: 6.9 G/DL (ref 6–8.2)
SODIUM SERPL-SCNC: 142 MMOL/L (ref 135–145)
TRIGL SERPL-MCNC: 265 MG/DL (ref 0–149)

## 2021-01-21 PROCEDURE — 80061 LIPID PANEL: CPT

## 2021-01-21 PROCEDURE — 36415 COLL VENOUS BLD VENIPUNCTURE: CPT

## 2021-01-21 PROCEDURE — 80053 COMPREHEN METABOLIC PANEL: CPT

## 2021-01-22 ENCOUNTER — PATIENT MESSAGE (OUTPATIENT)
Dept: OPHTHALMOLOGY | Facility: CLINIC | Age: 66
End: 2021-01-22

## 2021-01-22 ENCOUNTER — HOSPITAL ENCOUNTER (OUTPATIENT)
Dept: RADIOLOGY | Facility: MEDICAL CENTER | Age: 66
End: 2021-01-22
Attending: INTERNAL MEDICINE
Payer: MEDICARE

## 2021-01-22 ENCOUNTER — TELEPHONE (OUTPATIENT)
Dept: CARDIOLOGY | Facility: MEDICAL CENTER | Age: 66
End: 2021-01-22

## 2021-01-22 DIAGNOSIS — E78.5 HYPERLIPIDEMIA, UNSPECIFIED HYPERLIPIDEMIA TYPE: ICD-10-CM

## 2021-01-22 DIAGNOSIS — I10 ESSENTIAL HYPERTENSION: ICD-10-CM

## 2021-01-22 PROCEDURE — 93880 EXTRACRANIAL BILAT STUDY: CPT

## 2021-01-22 PROCEDURE — 93880 EXTRACRANIAL BILAT STUDY: CPT | Mod: 26 | Performed by: INTERNAL MEDICINE

## 2021-01-22 RX ORDER — ATORVASTATIN CALCIUM 20 MG/1
20 TABLET, FILM COATED ORAL EVERY EVENING
Qty: 90 TAB | Refills: 3 | Status: SHIPPED | OUTPATIENT
Start: 2021-01-22 | End: 2021-02-04

## 2021-01-22 NOTE — TELEPHONE ENCOUNTER
Message  Received: Yesterday  Message Contents   Dwight Duran M.D.  Harmony Wagner R.N.             Please increase lipitor to 20 mg daily. Lipid panel is not satisfactory.   Advise to decrease sugars, white bread etc for TG.     --------------------------------------------------------------------    Contacted pt and explained results and recommendations. She states understanding and agrees to increase atorvastatin to 20mg per day. She says her diet is low in sugar and carbs and she does not drink ETOH but has been lacking exercise with recent move to [facility], stress, and covid infection.     CMP and lipid ordered to be completed in 4 months with statin adjustment, med rec updated. Lab slips mailed to pt.

## 2021-01-25 ENCOUNTER — HOSPITAL ENCOUNTER (OUTPATIENT)
Dept: RADIOLOGY | Facility: MEDICAL CENTER | Age: 66
End: 2021-01-25
Attending: INTERNAL MEDICINE
Payer: MEDICARE

## 2021-01-25 DIAGNOSIS — I10 ESSENTIAL HYPERTENSION: ICD-10-CM

## 2021-01-25 PROCEDURE — 93975 VASCULAR STUDY: CPT | Mod: 26 | Performed by: INTERNAL MEDICINE

## 2021-01-25 PROCEDURE — 93975 VASCULAR STUDY: CPT

## 2021-01-26 ENCOUNTER — TELEPHONE (OUTPATIENT)
Dept: CARDIOLOGY | Facility: MEDICAL CENTER | Age: 66
End: 2021-01-26

## 2021-01-26 NOTE — TELEPHONE ENCOUNTER
----- Message -----  From: Dwight Duran M.D.  Sent: 1/26/2021  10:04 AM PST  To: Harmony Wagner R.N.    Please call patient and notify no stenosis in renal arteries.  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 If she wants more information, I can call her, let me know.

## 2021-01-26 NOTE — TELEPHONE ENCOUNTER
Notified patient of results. She denies postprandial abdominal pain. She found her old medical records and states she will bring them in at her next appointment.

## 2021-02-01 SDOH — ECONOMIC STABILITY: HOUSING INSECURITY
IN THE LAST 12 MONTHS, WAS THERE A TIME WHEN YOU DID NOT HAVE A STEADY PLACE TO SLEEP OR SLEPT IN A SHELTER (INCLUDING NOW)?: NO

## 2021-02-01 SDOH — ECONOMIC STABILITY: HOUSING INSECURITY: IN THE LAST 12 MONTHS, HOW MANY PLACES HAVE YOU LIVED?: 2

## 2021-02-01 SDOH — HEALTH STABILITY: PHYSICAL HEALTH: ON AVERAGE, HOW MANY DAYS PER WEEK DO YOU ENGAGE IN MODERATE TO STRENUOUS EXERCISE (LIKE A BRISK WALK)?: 4 DAYS

## 2021-02-01 SDOH — ECONOMIC STABILITY: INCOME INSECURITY: IN THE LAST 12 MONTHS, WAS THERE A TIME WHEN YOU WERE NOT ABLE TO PAY THE MORTGAGE OR RENT ON TIME?: NO

## 2021-02-01 SDOH — ECONOMIC STABILITY: TRANSPORTATION INSECURITY
IN THE PAST 12 MONTHS, HAS LACK OF RELIABLE TRANSPORTATION KEPT YOU FROM MEDICAL APPOINTMENTS, MEETINGS, WORK OR FROM GETTING THINGS NEEDED FOR DAILY LIVING?: NO

## 2021-02-01 SDOH — HEALTH STABILITY: MENTAL HEALTH
STRESS IS WHEN SOMEONE FEELS TENSE, NERVOUS, ANXIOUS, OR CAN'T SLEEP AT NIGHT BECAUSE THEIR MIND IS TROUBLED. HOW STRESSED ARE YOU?: RATHER MUCH

## 2021-02-01 SDOH — ECONOMIC STABILITY: TRANSPORTATION INSECURITY
IN THE PAST 12 MONTHS, HAS THE LACK OF TRANSPORTATION KEPT YOU FROM MEDICAL APPOINTMENTS OR FROM GETTING MEDICATIONS?: NO

## 2021-02-01 SDOH — HEALTH STABILITY: PHYSICAL HEALTH: ON AVERAGE, HOW MANY MINUTES DO YOU ENGAGE IN EXERCISE AT THIS LEVEL?: 50 MINUTES

## 2021-02-01 ASSESSMENT — SOCIAL DETERMINANTS OF HEALTH (SDOH)
HOW OFTEN DO YOU HAVE A DRINK CONTAINING ALCOHOL: MONTHLY OR LESS
WITHIN THE PAST 12 MONTHS, YOU WORRIED THAT YOUR FOOD WOULD RUN OUT BEFORE YOU GOT THE MONEY TO BUY MORE: NEVER TRUE
HOW OFTEN DO YOU GET TOGETHER WITH FRIENDS OR RELATIVES?: MORE THAN THREE TIMES A WEEK
WITHIN THE PAST 12 MONTHS, THE FOOD YOU BOUGHT JUST DIDN'T LAST AND YOU DIDN'T HAVE MONEY TO GET MORE: NEVER TRUE
HOW OFTEN DO YOU ATTEND CHURCH OR RELIGIOUS SERVICES?: NEVER
IN A TYPICAL WEEK, HOW MANY TIMES DO YOU TALK ON THE PHONE WITH FAMILY, FRIENDS, OR NEIGHBORS?: MORE THAN THREE TIMES A WEEK
HOW MANY DRINKS CONTAINING ALCOHOL DO YOU HAVE ON A TYPICAL DAY WHEN YOU ARE DRINKING: 1 OR 2
HOW OFTEN DO YOU HAVE SIX OR MORE DRINKS ON ONE OCCASION: NEVER

## 2021-02-02 ENCOUNTER — TELEPHONE (OUTPATIENT)
Dept: MEDICAL GROUP | Age: 66
End: 2021-02-02

## 2021-02-02 NOTE — TELEPHONE ENCOUNTER
Left message for patient to call back regarding pre-visit planning. Please transfer call to [phone].

## 2021-02-04 ENCOUNTER — OFFICE VISIT (OUTPATIENT)
Dept: MEDICAL GROUP | Age: 66
End: 2021-02-04
Payer: MEDICARE

## 2021-02-04 VITALS
OXYGEN SATURATION: 97 % | DIASTOLIC BLOOD PRESSURE: 98 MMHG | SYSTOLIC BLOOD PRESSURE: 140 MMHG | WEIGHT: 168.4 LBS | BODY MASS INDEX: 26.43 KG/M2 | TEMPERATURE: 98.9 F | RESPIRATION RATE: 14 BRPM | HEIGHT: 67 IN | HEART RATE: 72 BPM

## 2021-02-04 DIAGNOSIS — E78.49 OTHER HYPERLIPIDEMIA: ICD-10-CM

## 2021-02-04 DIAGNOSIS — Z23 NEED FOR VACCINATION: ICD-10-CM

## 2021-02-04 DIAGNOSIS — Z12.12 SCREENING FOR COLORECTAL CANCER: ICD-10-CM

## 2021-02-04 DIAGNOSIS — Z00.00 ENCOUNTER FOR MEDICAL EXAMINATION TO ESTABLISH CARE: ICD-10-CM

## 2021-02-04 DIAGNOSIS — R73.03 PREDIABETES: ICD-10-CM

## 2021-02-04 DIAGNOSIS — I70.1 RAS (RENAL ARTERY STENOSIS) (HCC): ICD-10-CM

## 2021-02-04 DIAGNOSIS — I10 ESSENTIAL HYPERTENSION: ICD-10-CM

## 2021-02-04 DIAGNOSIS — Z12.31 ENCOUNTER FOR SCREENING MAMMOGRAM FOR BREAST CANCER: ICD-10-CM

## 2021-02-04 DIAGNOSIS — Z12.11 SCREENING FOR COLORECTAL CANCER: ICD-10-CM

## 2021-02-04 DIAGNOSIS — L57.8 SUN-DAMAGED SKIN: ICD-10-CM

## 2021-02-04 DIAGNOSIS — N95.1 MENOPAUSAL STATE: ICD-10-CM

## 2021-02-04 DIAGNOSIS — Z11.59 NEED FOR HEPATITIS C SCREENING TEST: ICD-10-CM

## 2021-02-04 DIAGNOSIS — F51.01 PRIMARY INSOMNIA: ICD-10-CM

## 2021-02-04 DIAGNOSIS — Z78.0 POSTMENOPAUSAL STATUS (AGE-RELATED) (NATURAL): ICD-10-CM

## 2021-02-04 DIAGNOSIS — H25.013 CORTICAL AGE-RELATED CATARACT OF BOTH EYES: ICD-10-CM

## 2021-02-04 PROBLEM — H25.9 AGE-RELATED CATARACT: Status: ACTIVE | Noted: 2021-02-04

## 2021-02-04 PROCEDURE — 90472 IMMUNIZATION ADMIN EACH ADD: CPT | Performed by: FAMILY MEDICINE

## 2021-02-04 PROCEDURE — 99214 OFFICE O/P EST MOD 30 MIN: CPT | Mod: 25 | Performed by: FAMILY MEDICINE

## 2021-02-04 PROCEDURE — 90750 HZV VACC RECOMBINANT IM: CPT | Performed by: FAMILY MEDICINE

## 2021-02-04 PROCEDURE — 90662 IIV NO PRSV INCREASED AG IM: CPT | Performed by: FAMILY MEDICINE

## 2021-02-04 PROCEDURE — G0008 ADMIN INFLUENZA VIRUS VAC: HCPCS | Performed by: FAMILY MEDICINE

## 2021-02-04 RX ORDER — ATORVASTATIN CALCIUM 40 MG/1
40 TABLET, FILM COATED ORAL
Qty: 90 TAB | Refills: 2 | Status: SHIPPED | OUTPATIENT
Start: 2021-02-04 | End: 2021-08-19 | Stop reason: SDUPTHER

## 2021-02-04 RX ORDER — ALPRAZOLAM 0.5 MG/1
TABLET ORAL
Qty: 30 TAB | Refills: 2 | Status: SHIPPED | OUTPATIENT
Start: 2021-02-04 | End: 2021-02-04 | Stop reason: SDUPTHER

## 2021-02-04 RX ORDER — ALPRAZOLAM 0.5 MG/1
TABLET ORAL
Qty: 30 TAB | Refills: 2 | Status: SHIPPED | OUTPATIENT
Start: 2021-02-04 | End: 2021-05-24 | Stop reason: SDUPTHER

## 2021-02-04 RX ORDER — METFORMIN HYDROCHLORIDE 500 MG/1
500 TABLET, EXTENDED RELEASE ORAL DAILY
Qty: 90 TAB | Refills: 0 | Status: SHIPPED | OUTPATIENT
Start: 2021-02-04 | End: 2021-04-26 | Stop reason: SDUPTHER

## 2021-02-04 ASSESSMENT — PATIENT HEALTH QUESTIONNAIRE - PHQ9: CLINICAL INTERPRETATION OF PHQ2 SCORE: 0

## 2021-02-04 ASSESSMENT — FIBROSIS 4 INDEX: FIB4 SCORE: 0.84

## 2021-02-04 NOTE — PROGRESS NOTES
This medical record contains text that has been entered with the assistance of computer voice recognition and dictation software.  Therefore, it may contain unintended errors in text, spelling, punctuation, or grammar      Chief Complaint   Patient presents with   • Establish Care         Adán Lopez is a 66 y.o. female here evaluation and management of:  establish care      HPI:     1. Encounter for medical examination to establish care  Patient is a 66-year-old female who presents to clinic to establish care.  She has significant past medical history of prediabetes, renal artery stenosis, hypertension, anxiety insomnia, hyperlipidemia and cataracts.    Today the patient denied any fevers chills night sweats, cough, headaches, changes in taste or appetite, no nausea or vomiting, no    Past medical history see above and below    Family History of Cancer---Maternal Aunt with breast cancer    Family History of CAD---none      Social History        Occupation----health     Exercise---not consistently because helping sister with Dementia    Colonoscopy---overdue    Pets---1 [place] named [facility], 4 y/o     Favorite sports team---[employer], and [employer]       2. Essential hypertension  Losartan 50 mg p.o. daily  Propanolol long-acting 60 mg daily    The propranolol was added to help with her anxiety by cardiology.  Overall her BP is controlled except when she comes into the clinic she does have a history of whitecoat hypertension.  She states her home BP logs are always normal.  She denies any fatigue, no syncopal presyncopal episodes.    3. Other hyperlipidemia  Atorvastatin 20 mg p.o. nightly    Patient states she has had been on statin for many years she has been doing fine but her cholesterol is not at goal.  Specifically triglycerides      Results for ADÁN LOPEZ (MRN [id number]) as of 2/4/2021 09:53   Ref. Range 1/21/2021 08:14   Cholesterol,Tot Latest Ref Range: 100 - 199 mg/dL 197   Triglycerides Latest Ref  Range: 0 - 149 mg/dL 265 (H)   HDL Latest Ref Range: >=40 mg/dL 48   LDL Latest Ref Range: <100 mg/dL 96     4. ARTEMIO (renal artery stenosis) (HCC)  Patient states that her blood pressure issues were related to renal artery stenosis and she is status post stenting.      5. Encounter for screening mammogram for breast cancer  The patient states she is up-to-date on her mammogram    6. Screening for colorectal cancer  Patient is due for colon cancer screening    7. Need for hepatitis C screening test  The patient was born between 1946 and 1966  So is due for hepatitis C screening*       The USPSTF recommends offering 1-time screening for HCV infection to adults born between 1945 and 1965 (baby boomers).    10. Need for vaccination  Due for flu     11. Prediabetes    Patient states she did take Metformin in the past after 3000 mg daily she did lose 50 pounds on it.  Since moving from [place] to here about 6 months ago she has been very stressed from the move, she is also taking care of her sister who has dementia and has not been eating well.  As a result she eats more to motivate her sister to eat.  She denies any polyphagia no polydipsia no polyuria no unintentional weight loss    12.  Insomnia    The patient states that she has been having severe sleep issues waking up every 2-3 hours averaging 4 to 5 hours of sleep at night best.  She is tried over-the-counter melatonin Tylenol PM and other over-the-counter's with no benefit.  She has tried Xanax in the past with great benefit.    Current medicines (including changes today)  Current Outpatient Medications   Medication Sig Dispense Refill   • metFORMIN ER (GLUCOPHAGE XR) 500 MG TABLET SR 24 HR Take 1 Tab by mouth every day. 90 Tab 0   • atorvastatin (LIPITOR) 40 MG Tab Take 1 Tab by mouth every bedtime. 90 Tab 2   • ALPRAZolam (XANAX) 0.5 MG Tab Take one tab po q48 prn severe anxiety not for daily use 30 Tab 2   • Multiple Vitamin (MULTIVITAMIN) capsule Take 1 Cap  "by mouth every day.     • losartan (COZAAR) 50 MG Tab Take 50 mg by mouth every day.     • Magnesium 250 MG Tab Take  by mouth every bedtime.     • Coenzyme Q10 (COQ10 PO) Take  by mouth.     • propranolol LA (INDERAL LA) 60 MG CAPSULE SR 24 HR Take 1 Cap by mouth every day. 30 Cap 11   • aspirin EC (ECOTRIN) 81 MG Tablet Delayed Response Take 81 mg by mouth every evening.       No current facility-administered medications for this visit.      She  has a past medical history of Hyperlipidemia and Hypertension.  She  has no past surgical history on file.  Social History     Tobacco Use   • Smoking status: Never Smoker   • Smokeless tobacco: Never Used   Substance Use Topics   • Alcohol use: Yes     Frequency: Monthly or less     Drinks per session: 1 or 2     Binge frequency: Never     Comment: occasionally   • Drug use: Never     Social History     Social History Narrative   • Not on file     History reviewed. No pertinent family history.  No family status information on file.         ROS    The pertinent  ROS findings can be seen in the HPI above.     All other systems reviewed and are negative     Objective:     /98 (BP Location: Left arm, Patient Position: Sitting, BP Cuff Size: Adult)   Pulse 72   Temp 37.2 °C (98.9 °F) (Temporal)   Resp 14   Ht 1.702 m (5' 7\")   Wt 76.4 kg (168 lb 6.4 oz)   SpO2 97%  Body mass index is 26.38 kg/m².      Physical Exam:    Constitutional: Alert, no distress.  Skin: no rashes  Eye: Equal, round and reactive, conjunctiva clear, lids normal.  ENMT: Lips without lesions, good dentition, oropharynx clear.  Neck: Trachea midline, no masses, no thyromegaly. No cervical or supraclavicular lymphadenopathy.  Respiratory: Unlabored respiratory effort, lungs clear to auscultation, no wheezes, no ronchi.  Cardiovascular: Normal S1, S2, no murmur, no edema  Abdomen: Soft, non-tender, no masses, no hepatosplenomegaly.        Assessment and Plan:   The following treatment plan was " discussed      1. Encounter for medical examination to establish care  Care has been established  We need  Updated labs to establish a clinical profile    Continue daily aspirin     The ASCVD Risk score (Wilmette JORGE Jr, et al., 2013) failed to calculate.      Age-appropriate preventive services recommended by USPTF guidelines and ACIP were discussed today.      Requested any outside Medical records to be sent to us  [place] intimate partner miranda  Discussed seat belt safety        2. Essential hypertension    Continue to monitor BP at home  She does have a history of documented whitecoat hypertension  The propranolol is helping with anxiety    3. Other hyperlipidemia    We will obtain new labs to update clinical profile.  Then we will adjust therapy as needed.    - Comp Metabolic Panel; Future  - CBC WITHOUT DIFFERENTIAL; Future  - Lipid Profile; Future  - atorvastatin (LIPITOR) 40 MG Tab; Take 1 Tab by mouth every bedtime.  Dispense: 90 Tab; Refill: 2    4. ARTEMIO (renal artery stenosis) (HCC)  Patient has been stable with current management  We will make no changes for now      5. Encounter for screening mammogram for breast cancer    - MA-SCREENING MAMMO BILAT W/CAD; Future    6. Screening for colorectal cancer    - REFERRAL TO GI FOR COLONOSCOPY    7. Postmenopausal status (age-related) (natural)    - DS-BONE DENSITY STUDY (DEXA)      9. Need for hepatitis C screening test    - HEP C VIRUS ANTIBODY; Future    10. Need for vaccination    Vaccine was administered today without adverse event.      - INFLUENZA VACCINE, HIGH DOSE (65+ ONLY)  - Shingrix Vaccine    11. Prediabetes    Begin Metformin until she is at goal weight  She will benefit from weight loss and from decreasing the progression to diabetes    - metFORMIN ER (GLUCOPHAGE XR) 500 MG TABLET SR 24 HR; Take 1 Tab by mouth every day.  Dispense: 90 Tab; Refill: 0        - INFLUENZA VACCINE, HIGH DOSE (65+ ONLY)  - Shingrix Vaccine      12. Cortical age-related  cataract of both eyes  The patient would like to establish an ophthalmology for cataract surgery    - REFERRAL TO OPHTHALMOLOGY    13. Primary insomnia    - ALPRAZolam (XANAX) 0.5 MG Tab; Take one tab po q48 prn severe anxiety not for daily use  Dispense: 30 Tab; Refill: 2    14. Sun-damaged skin    - REFERRAL TO DERMATOLOGY    Instructed to Follow up in clinic or ER for worsening symptoms, difficulty breathing, lack of expected recovery, or should new symptoms or problems arise.    Followup: Return in about 6 months (around 8/4/2021) for Reevaluation, labs.

## 2021-02-09 ENCOUNTER — NURSE TRIAGE (OUTPATIENT)
Dept: HEALTH INFORMATION MANAGEMENT | Facility: OTHER | Age: 66
End: 2021-02-09

## 2021-02-09 ENCOUNTER — PATIENT MESSAGE (OUTPATIENT)
Dept: OPHTHALMOLOGY | Facility: CLINIC | Age: 66
End: 2021-02-09

## 2021-02-09 NOTE — TELEPHONE ENCOUNTER
" called regarding return of oxygen equipment in the home.  Pt and  had covid and were part of the home monitoring system.    Number provided to oxygen monitoring program.    Reason for Disposition  • Information only question and nurse able to answer    Additional Information  • Negative: Nursing judgment  • Negative: Nursing judgment  • Negative: Nursing judgment  • Negative: Nursing judgment    Answer Assessment - Initial Assessment Questions  1. REASON FOR CALL or QUESTION: \"What is your reason for calling today?\" or \"How can I best help you?\" or \"What question do you have that I can help answer?\"      Requesting information on who to contact to return oxygen concentrator    Protocols used: INFORMATION ONLY CALL - NO TRIAGE-A-OH      "

## 2021-03-03 DIAGNOSIS — Z23 NEED FOR VACCINATION: ICD-10-CM

## 2021-03-12 ENCOUNTER — IMMUNIZATION (OUTPATIENT)
Dept: FAMILY PLANNING/WOMEN'S HEALTH CLINIC | Facility: IMMUNIZATION CENTER | Age: 66
End: 2021-03-12
Attending: INTERNAL MEDICINE
Payer: MEDICARE

## 2021-03-12 DIAGNOSIS — Z23 ENCOUNTER FOR VACCINATION: Primary | ICD-10-CM

## 2021-03-12 DIAGNOSIS — Z23 NEED FOR VACCINATION: ICD-10-CM

## 2021-03-12 PROCEDURE — 0011A MODERNA SARS-COV-2 VACCINE: CPT

## 2021-03-12 PROCEDURE — 91301 MODERNA SARS-COV-2 VACCINE: CPT

## 2021-04-10 ENCOUNTER — IMMUNIZATION (OUTPATIENT)
Dept: FAMILY PLANNING/WOMEN'S HEALTH CLINIC | Facility: IMMUNIZATION CENTER | Age: 66
End: 2021-04-10
Attending: INTERNAL MEDICINE
Payer: MEDICARE

## 2021-04-10 DIAGNOSIS — Z23 ENCOUNTER FOR VACCINATION: Primary | ICD-10-CM

## 2021-04-26 DIAGNOSIS — R73.03 PREDIABETES: ICD-10-CM

## 2021-04-27 RX ORDER — METFORMIN HYDROCHLORIDE 500 MG/1
500 TABLET, EXTENDED RELEASE ORAL DAILY
Qty: 90 TABLET | Refills: 0 | Status: SHIPPED | OUTPATIENT
Start: 2021-04-27 | End: 2021-07-19

## 2021-05-24 DIAGNOSIS — F51.01 PRIMARY INSOMNIA: ICD-10-CM

## 2021-05-25 RX ORDER — ALPRAZOLAM 0.5 MG/1
TABLET ORAL
Qty: 30 TABLET | Refills: 2 | Status: SHIPPED | OUTPATIENT
Start: 2021-05-25 | End: 2021-08-01

## 2021-06-04 ENCOUNTER — HOSPITAL ENCOUNTER (OUTPATIENT)
Dept: RADIOLOGY | Facility: MEDICAL CENTER | Age: 66
End: 2021-06-04
Attending: FAMILY MEDICINE
Payer: MEDICARE

## 2021-06-04 DIAGNOSIS — Z12.31 ENCOUNTER FOR SCREENING MAMMOGRAM FOR BREAST CANCER: ICD-10-CM

## 2021-06-04 PROCEDURE — 77063 BREAST TOMOSYNTHESIS BI: CPT

## 2021-06-04 PROCEDURE — 77080 DXA BONE DENSITY AXIAL: CPT

## 2021-06-11 DIAGNOSIS — R92.8 ABNORMAL MAMMOGRAM: ICD-10-CM

## 2021-06-14 ENCOUNTER — HOSPITAL ENCOUNTER (OUTPATIENT)
Dept: RADIOLOGY | Facility: MEDICAL CENTER | Age: 66
End: 2021-06-14
Payer: MEDICARE

## 2021-06-17 ENCOUNTER — TELEPHONE (OUTPATIENT)
Dept: MEDICAL GROUP | Age: 66
End: 2021-06-17

## 2021-06-17 NOTE — TELEPHONE ENCOUNTER
----- Message from Lonny Mckinney M.D. sent at 6/17/2021  2:26 PM PDT -----  Call this patient to see if she plans on getting her ultrasound of the breast and the diagnostic mammogram.  Her regular mammogram was abnormal.

## 2021-06-17 NOTE — TELEPHONE ENCOUNTER
Phone Number Called: [phone]    Call outcome: Spoke to patient regarding message below.    Message: Patient states that she received a phone call from the ultrasound  stating that after comparing the mammogram from [place] to the abnormal mammogram received on 6/14 there is no need to complete the US of the breast or the diagnostic mammogram.

## 2021-06-18 NOTE — TELEPHONE ENCOUNTER
Patient states that the radiologist would reach out to you to inform you of her findings after comparing the two mammograms. I will follow up with patient and radiology.

## 2021-06-22 ENCOUNTER — HOSPITAL ENCOUNTER (OUTPATIENT)
Dept: LAB | Facility: MEDICAL CENTER | Age: 66
End: 2021-06-22
Attending: FAMILY MEDICINE
Payer: MEDICARE

## 2021-06-22 DIAGNOSIS — E78.49 OTHER HYPERLIPIDEMIA: ICD-10-CM

## 2021-06-22 DIAGNOSIS — Z11.59 NEED FOR HEPATITIS C SCREENING TEST: ICD-10-CM

## 2021-06-22 LAB
ALBUMIN SERPL BCP-MCNC: 4.6 G/DL (ref 3.2–4.9)
ALBUMIN/GLOB SERPL: 1.8 G/DL
ALP SERPL-CCNC: 71 U/L (ref 30–99)
ALT SERPL-CCNC: 27 U/L (ref 2–50)
ANION GAP SERPL CALC-SCNC: 9 MMOL/L (ref 7–16)
AST SERPL-CCNC: 31 U/L (ref 12–45)
BILIRUB SERPL-MCNC: 0.4 MG/DL (ref 0.1–1.5)
BUN SERPL-MCNC: 20 MG/DL (ref 8–22)
CALCIUM SERPL-MCNC: 9.5 MG/DL (ref 8.4–10.2)
CHLORIDE SERPL-SCNC: 105 MMOL/L (ref 96–112)
CHOLEST SERPL-MCNC: 146 MG/DL (ref 100–199)
CO2 SERPL-SCNC: 27 MMOL/L (ref 20–33)
CREAT SERPL-MCNC: 0.61 MG/DL (ref 0.5–1.4)
ERYTHROCYTE [DISTWIDTH] IN BLOOD BY AUTOMATED COUNT: 44.4 FL (ref 35.9–50)
FASTING STATUS PATIENT QL REPORTED: NORMAL
GLOBULIN SER CALC-MCNC: 2.5 G/DL (ref 1.9–3.5)
GLUCOSE SERPL-MCNC: 107 MG/DL (ref 65–99)
HCT VFR BLD AUTO: 44.1 % (ref 37–47)
HDLC SERPL-MCNC: 54 MG/DL
HGB BLD-MCNC: 14.5 G/DL (ref 12–16)
LDLC SERPL CALC-MCNC: 53 MG/DL
MCH RBC QN AUTO: 30 PG (ref 27–33)
MCHC RBC AUTO-ENTMCNC: 32.9 G/DL (ref 33.6–35)
MCV RBC AUTO: 91.3 FL (ref 81.4–97.8)
PLATELET # BLD AUTO: 263 K/UL (ref 164–446)
PMV BLD AUTO: 10.7 FL (ref 9–12.9)
POTASSIUM SERPL-SCNC: 4.3 MMOL/L (ref 3.6–5.5)
PROT SERPL-MCNC: 7.1 G/DL (ref 6–8.2)
RBC # BLD AUTO: 4.83 M/UL (ref 4.2–5.4)
SODIUM SERPL-SCNC: 141 MMOL/L (ref 135–145)
TRIGL SERPL-MCNC: 193 MG/DL (ref 0–149)
WBC # BLD AUTO: 5.1 K/UL (ref 4.8–10.8)

## 2021-06-22 PROCEDURE — 86803 HEPATITIS C AB TEST: CPT

## 2021-06-22 PROCEDURE — 80053 COMPREHEN METABOLIC PANEL: CPT

## 2021-06-22 PROCEDURE — 36415 COLL VENOUS BLD VENIPUNCTURE: CPT

## 2021-06-22 PROCEDURE — 85027 COMPLETE CBC AUTOMATED: CPT

## 2021-06-22 PROCEDURE — 80061 LIPID PANEL: CPT

## 2021-06-26 LAB — HCV AB SER QL: NORMAL

## 2021-07-07 RX ORDER — PHENAZOPYRIDINE HYDROCHLORIDE 200 MG/1
200 TABLET, FILM COATED ORAL 3 TIMES DAILY PRN
Qty: 6 TABLET | Refills: 0 | Status: SHIPPED | OUTPATIENT
Start: 2021-07-07 | End: 2021-07-09

## 2021-07-07 RX ORDER — SULFAMETHOXAZOLE AND TRIMETHOPRIM 800; 160 MG/1; MG/1
1 TABLET ORAL 2 TIMES DAILY
Qty: 20 TABLET | Refills: 0 | Status: SHIPPED | OUTPATIENT
Start: 2021-07-07 | End: 2021-08-19

## 2021-07-07 RX ORDER — PHENAZOPYRIDINE HYDROCHLORIDE 200 MG/1
200 TABLET, FILM COATED ORAL 3 TIMES DAILY PRN
Qty: 6 TABLET | Refills: 0 | Status: SHIPPED | OUTPATIENT
Start: 2021-07-07 | End: 2021-07-07 | Stop reason: SDUPTHER

## 2021-07-07 RX ORDER — SULFAMETHOXAZOLE AND TRIMETHOPRIM 800; 160 MG/1; MG/1
1 TABLET ORAL 2 TIMES DAILY
Qty: 20 TABLET | Refills: 0 | Status: SHIPPED | OUTPATIENT
Start: 2021-07-07 | End: 2021-07-07 | Stop reason: SDUPTHER

## 2021-07-14 DIAGNOSIS — Z12.31 OTHER SCREENING MAMMOGRAM: ICD-10-CM

## 2021-07-16 ENCOUNTER — PATIENT OUTREACH (OUTPATIENT)
Dept: ADMINISTRATIVE | Facility: HOSPITAL | Age: 66
End: 2021-07-16

## 2021-07-18 DIAGNOSIS — R73.03 PREDIABETES: ICD-10-CM

## 2021-07-20 ENCOUNTER — OFFICE VISIT (OUTPATIENT)
Dept: CARDIOLOGY | Facility: MEDICAL CENTER | Age: 66
End: 2021-07-20
Payer: MEDICARE

## 2021-07-20 VITALS
HEART RATE: 62 BPM | OXYGEN SATURATION: 97 % | DIASTOLIC BLOOD PRESSURE: 82 MMHG | BODY MASS INDEX: 25.9 KG/M2 | WEIGHT: 165 LBS | HEIGHT: 67 IN | SYSTOLIC BLOOD PRESSURE: 136 MMHG | RESPIRATION RATE: 14 BRPM

## 2021-07-20 DIAGNOSIS — I10 ESSENTIAL HYPERTENSION: ICD-10-CM

## 2021-07-20 PROCEDURE — 99214 OFFICE O/P EST MOD 30 MIN: CPT | Performed by: INTERNAL MEDICINE

## 2021-07-20 RX ORDER — FLUOCINOLONE ACETONIDE 0.25 MG/G
OINTMENT TOPICAL
Status: ON HOLD | COMMUNITY
Start: 2021-06-14 | End: 2022-04-26

## 2021-07-20 RX ORDER — METFORMIN HYDROCHLORIDE 500 MG/1
500 TABLET, EXTENDED RELEASE ORAL DAILY
Qty: 90 TABLET | Refills: 0 | Status: SHIPPED | OUTPATIENT
Start: 2021-07-20 | End: 2021-08-01

## 2021-07-20 ASSESSMENT — FIBROSIS 4 INDEX: FIB4 SCORE: 1.52

## 2021-07-20 NOTE — PROGRESS NOTES
Cardiology Follow-up Consultation Note    Date of note:    7/20/2021    Primary Care Provider: Lonny Mckinney M.D.    Name:             Judy Blackburn   YOB: 1955  MRN:               [id number]    CC: Follow-up hypertension, dyslipidemia, atherosclerosis     Patient ID/HPI:   66-year-old female patient here for follow-up.  She feels well, her main issue is anxiety.  Blood pressure is better.  No particular complaints.      ROS  All other systems reviewed and negative.    Past Medical History:   Diagnosis Date   • Hyperlipidemia    • Hypertension          History reviewed. No pertinent surgical history.      Current Outpatient Medications   Medication Sig Dispense Refill   • sulfamethoxazole-trimethoprim (BACTRIM DS) 800-160 MG tablet Take 1 tablet by mouth 2 times a day. (Patient taking differently: Take 1 tablet by mouth as needed.) 20 tablet 0   • ALPRAZolam (XANAX) 0.5 MG Tab Take one tab po q48 prn severe anxiety not for daily use 30 tablet 2   • metFORMIN ER (GLUCOPHAGE XR) 500 MG TABLET SR 24 HR Take 1 tablet by mouth every day. 90 tablet 0   • atorvastatin (LIPITOR) 40 MG Tab Take 1 Tab by mouth every bedtime. (Patient taking differently: Take 80 mg by mouth at bedtime.) 90 Tab 2   • Multiple Vitamin (MULTIVITAMIN) capsule Take 1 Cap by mouth every day.     • losartan (COZAAR) 50 MG Tab Take 50 mg by mouth every evening.     • Magnesium 250 MG Tab Take  by mouth every bedtime.     • Coenzyme Q10 (COQ10 PO) Take  by mouth.     • propranolol LA (INDERAL LA) 60 MG CAPSULE SR 24 HR Take 1 Cap by mouth every day. 30 Cap 11   • aspirin EC (ECOTRIN) 81 MG Tablet Delayed Response Take 81 mg by mouth every evening.     • fluocinolone (SYNALAR) 0.025 % ointment        No current facility-administered medications for this visit.         Allergies   Allergen Reactions   • Codeine Vomiting and Nausea         History reviewed. No pertinent family history.      Social History     Socioeconomic  "History   • Marital status:      Spouse name: Not on file   • Number of children: Not on file   • Years of education: Not on file   • Highest education level: Associate degree: occupational, technical, or vocational program   Occupational History   • Not on file   Tobacco Use   • Smoking status: Never Smoker   • Smokeless tobacco: Never Used   Vaping Use   • Vaping Use: Never used   Substance and Sexual Activity   • Alcohol use: Yes     Comment: occasionally   • Drug use: Never   • Sexual activity: Not on file   Other Topics Concern   • Not on file   Social History Narrative   • Not on file     Social Determinants of Health     Financial Resource Strain:    • Difficulty of Paying Living Expenses:    Food Insecurity: No Food Insecurity   • Worried About Running Out of Food in the Last Year: Never true   • Ran Out of Food in the Last Year: Never true   Transportation Needs: No Transportation Needs   • Lack of Transportation (Medical): No   • Lack of Transportation (Non-Medical): No   Physical Activity: Sufficiently Active   • Days of Exercise per Week: 4 days   • Minutes of Exercise per Session: 50 min   Stress: Stress Concern Present   • Feeling of Stress : Rather much   Social Connections: Unknown   • Frequency of Communication with Friends and Family: More than three times a week   • Frequency of Social Gatherings with Friends and Family: More than three times a week   • Attends Mandaen Services: Never   • Active Member of Clubs or Organizations: Not on file   • Attends Club or Organization Meetings: Not on file   • Marital Status:    Intimate Partner Violence:    • Fear of Current or Ex-Partner:    • Emotionally Abused:    • Physically Abused:    • Sexually Abused:          Physical Exam:  Ambulatory Vitals  /82 (BP Location: Left arm, Patient Position: Sitting, BP Cuff Size: Adult)   Pulse 62   Resp 14   Ht 1.702 m (5' 7\")   Wt 74.8 kg (165 lb)   SpO2 97%    Oxygen Therapy:  Pulse " Oximetry: 97 %  BP Readings from Last 4 Encounters:   07/20/21 136/82   02/04/21 140/98   01/18/21 (!) 168/96   01/05/21 129/76       Weight/BMI: Body mass index is 25.84 kg/m².  Wt Readings from Last 4 Encounters:   07/20/21 74.8 kg (165 lb)   02/04/21 76.4 kg (168 lb 6.4 oz)   01/18/21 76.2 kg (168 lb)   12/29/20 75.5 kg (166 lb 7.2 oz)       General: Well appearing and in no apparent distress  Head: atrumatic  Eyes: No conjunctival pallor   ENT: normal external appearance of nose and ears  Neck: JVD absent, carotid bruits absent  Lungs: respiratory sounds  normal, additional breath sounds absent  Heart: Regular rhythm,   No palpable thrills on palpation, murmurs absent, no rubs,   Lower extremity edema absent.   Pedal pulses normal  Abdomen: soft, non tender, non distended.  Extremities/MSK: no clubbing, no cyanosis  Neurological: normal orientation, Gait normal   Psychiatric: Appropriate affect, intact judgement and insight  Skin: Warm extremities        Lab Data Review:  Lab Results   Component Value Date/Time    CHOLSTRLTOT 146 06/22/2021 07:09 AM    LDL 53 06/22/2021 07:09 AM    HDL 54 06/22/2021 07:09 AM    TRIGLYCERIDE 193 (H) 06/22/2021 07:09 AM       Lab Results   Component Value Date/Time    SODIUM 141 06/22/2021 07:09 AM    POTASSIUM 4.3 06/22/2021 07:09 AM    CHLORIDE 105 06/22/2021 07:09 AM    CO2 27 06/22/2021 07:09 AM    GLUCOSE 107 (H) 06/22/2021 07:09 AM    BUN 20 06/22/2021 07:09 AM    CREATININE 0.61 06/22/2021 07:09 AM     Lab Results   Component Value Date/Time    ALKPHOSPHAT 71 06/22/2021 07:09 AM    ASTSGOT 31 06/22/2021 07:09 AM    ALTSGPT 27 06/22/2021 07:09 AM    TBILIRUBIN 0.4 06/22/2021 07:09 AM      Lab Results   Component Value Date/Time    WBC 5.1 06/22/2021 07:09 AM     US vascular 1/25/21:  CONCLUSIONS   Velocities of the celiac and superior mesenteric arteries are consistent with   a     >= 75% stenosis.       No evidence of renal artery stenosis.    US carotid 1/22/21   Vascular  Laboratory   CONCLUSIONS   No prior study is available for comparison.   Mild soft plaque bilaterally. No significant stenosis    Medical Decision Makin-year-old female patient here to establish care.  1.  Hypertension  2.  Anxiety  3.  Celiac, superior mesenteric artery stenosis  4.  Carotid atherosclerosis  5.  Hyperlipidemia    She is doing better with her blood pressure, cholesterol numbers.  Triglycerides are slightly high.  I would recommend continue taking Lipitor 40 mg daily, cut down on carbohydrates.  Blood pressure is better controlled continue losartan, propranolol.  Dr. Mckinney notes reviewed from 2021  She is asymptomatic from celiac, superior mesenteric artery stenosis.  No intervention is necessary at this time.  Continue baby aspirin daily.    Return in about 1 year (around 2022).      Dwight SOMMER  Interventional cardiologist  [facility]  [facility]  [address] [address]  Phone: [phone]  Fax: [phone]

## 2021-07-31 DIAGNOSIS — R73.03 PREDIABETES: ICD-10-CM

## 2021-08-01 RX ORDER — METFORMIN HYDROCHLORIDE 500 MG/1
500 TABLET, EXTENDED RELEASE ORAL DAILY
Qty: 90 TABLET | Refills: 3 | Status: SHIPPED | OUTPATIENT
Start: 2021-08-01 | End: 2022-01-21 | Stop reason: SDUPTHER

## 2021-08-07 RX ORDER — AZITHROMYCIN 250 MG/1
TABLET, FILM COATED ORAL
Qty: 6 TABLET | Refills: 0 | Status: SHIPPED | OUTPATIENT
Start: 2021-08-07 | End: 2021-08-19

## 2021-08-07 RX ORDER — PREDNISONE 20 MG/1
TABLET ORAL
Qty: 12 TABLET | Refills: 0 | Status: SHIPPED | OUTPATIENT
Start: 2021-08-07 | End: 2021-08-19

## 2021-08-19 ENCOUNTER — OFFICE VISIT (OUTPATIENT)
Dept: MEDICAL GROUP | Age: 66
End: 2021-08-19
Payer: MEDICARE

## 2021-08-19 VITALS
SYSTOLIC BLOOD PRESSURE: 130 MMHG | TEMPERATURE: 97.8 F | WEIGHT: 166 LBS | RESPIRATION RATE: 16 BRPM | BODY MASS INDEX: 26.06 KG/M2 | HEIGHT: 67 IN | DIASTOLIC BLOOD PRESSURE: 82 MMHG | HEART RATE: 69 BPM | OXYGEN SATURATION: 96 %

## 2021-08-19 DIAGNOSIS — I10 ESSENTIAL HYPERTENSION: ICD-10-CM

## 2021-08-19 DIAGNOSIS — Z12.4 CERVICAL CANCER SCREENING: ICD-10-CM

## 2021-08-19 DIAGNOSIS — Z23 NEED FOR VACCINATION: ICD-10-CM

## 2021-08-19 DIAGNOSIS — E78.49 OTHER HYPERLIPIDEMIA: ICD-10-CM

## 2021-08-19 DIAGNOSIS — F51.01 PRIMARY INSOMNIA: ICD-10-CM

## 2021-08-19 PROCEDURE — 99214 OFFICE O/P EST MOD 30 MIN: CPT | Performed by: FAMILY MEDICINE

## 2021-08-19 RX ORDER — CLOSTRIDIUM TETANI TOXOID ANTIGEN (FORMALDEHYDE INACTIVATED), CORYNEBACTERIUM DIPHTHERIAE TOXOID ANTIGEN (FORMALDEHYDE INACTIVATED), BORDETELLA PERTUSSIS TOXOID ANTIGEN (GLUTARALDEHYDE INACTIVATED), BORDETELLA PERTUSSIS FILAMENTOUS HEMAGGLUTININ ANTIGEN (FORMALDEHYDE INACTIVATED), BORDETELLA PERTUSSIS PERTACTIN ANTIGEN, AND BORDETELLA PERTUSSIS FIMBRIAE 2/3 ANTIGEN 5; 2; 2.5; 5; 3; 5 [LF]/.5ML; [LF]/.5ML; UG/.5ML; UG/.5ML; UG/.5ML; UG/.5ML
0.5 INJECTION, SUSPENSION INTRAMUSCULAR ONCE
Qty: 0.5 ML | Refills: 0 | Status: SHIPPED
Start: 2021-08-19 | End: 2021-08-19

## 2021-08-19 RX ORDER — ATORVASTATIN CALCIUM 40 MG/1
80 TABLET, FILM COATED ORAL
Qty: 90 TABLET | Refills: 2 | Status: SHIPPED | OUTPATIENT
Start: 2021-08-19 | End: 2022-01-21 | Stop reason: SDUPTHER

## 2021-08-19 RX ORDER — ALPRAZOLAM 0.5 MG/1
TABLET ORAL
Qty: 30 TABLET | Refills: 2 | Status: SHIPPED | OUTPATIENT
Start: 2021-08-19 | End: 2022-01-21 | Stop reason: SDUPTHER

## 2021-08-19 RX ORDER — TRAZODONE HYDROCHLORIDE 100 MG/1
TABLET ORAL
Qty: 30 TABLET | Refills: 3 | Status: SHIPPED | OUTPATIENT
Start: 2021-08-19 | End: 2021-12-13

## 2021-08-19 ASSESSMENT — FIBROSIS 4 INDEX: FIB4 SCORE: 1.52

## 2021-08-19 NOTE — PROGRESS NOTES
This medical record contains text that has been entered with the assistance of computer voice recognition and dictation software.  Therefore, it may contain unintended errors in text, spelling, punctuation, or grammar      Chief Complaint   Patient presents with   • Blood Pressure Problem   • Lab Results         Judy Blackburn is a 66 y.o. female here evaluation and management of: labs      HPI:     1. Essential hypertension  Losartan 50 mg p.o. daily  Propanolol long-acting 60 mg p.o. daily    Patient has been tolerating this regimen overall blood pressure is controlled.  She denies any excessive fatigue no cough, no syncopal presyncopal episodes.    2. Primary insomnia  The patient states that she has been using Xanax almost every night for the past 2 weeks.  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  She would like to try something that is nonhabit-forming and keep Xanax as needed.    3. Other hyperlipidemia  Atorvastatin 80 mg p.o. nightly    Triglycerides are still mildly elevated however the patient knows she can do a little better with her lifestyle modification.    4. Cervical cancer screening  Patient is due for her last Pap smear, she will schedule with a female in our clinic.    5. Need for vaccination  Due for Shingrix, and Tdap    Current medicines (including changes today)  Current Outpatient Medications   Medication Sig Dispense Refill   • Zoster Vac Recomb Adjuvanted (SHINGRIX) 50 MCG/0.5ML Recon Susp Inject 0.5 mL into the shoulder, thigh, or buttocks one time for 1 dose. 0.5 mL 0   • tetanus-dipth-acell pertussis (ADACEL) 5-2-15.5 LF-MCG/0.5 Suspension Inject 0.5 mL into the shoulder, thigh, or buttocks one time for 1 dose. 0.5 mL 0   • atorvastatin (LIPITOR) 40 MG Tab Take 2 Tablets by mouth at bedtime. 90 Tablet 2   • traZODone (DESYREL) 100 MG Tab TAKE 1/2 TAB PO QHS FOR 7 DAYS THEN CONTINUE WITH ONE TAB  "PO QHS 30 Tablet 3   • ALPRAZolam (XANAX) 0.5 MG Tab Take one tab po q48 prn severe anxiety not for daily use 30 Tablet 2   • metFORMIN ER (GLUCOPHAGE XR) 500 MG TABLET SR 24 HR TAKE 1 TABLET BY MOUTH EVERY DAY 90 tablet 3   • fluocinolone (SYNALAR) 0.025 % ointment      • Multiple Vitamin (MULTIVITAMIN) capsule Take 1 Cap by mouth every day.     • losartan (COZAAR) 50 MG Tab Take 50 mg by mouth every evening.     • Magnesium 250 MG Tab Take  by mouth every bedtime.     • Coenzyme Q10 (COQ10 PO) Take  by mouth.     • propranolol LA (INDERAL LA) 60 MG CAPSULE SR 24 HR Take 1 Cap by mouth every day. 30 Cap 11   • aspirin EC (ECOTRIN) 81 MG Tablet Delayed Response Take 81 mg by mouth every evening.       No current facility-administered medications for this visit.     She  has a past medical history of Hyperlipidemia and Hypertension.  She  has no past surgical history on file.  Social History     Tobacco Use   • Smoking status: Never Smoker   • Smokeless tobacco: Never Used   Vaping Use   • Vaping Use: Never used   Substance Use Topics   • Alcohol use: Yes     Comment: occasionally   • Drug use: Never     Social History     Social History Narrative   • Not on file     History reviewed. No pertinent family history.  No family status information on file.         ROS    The pertinent  ROS findings can be seen in the HPI above.     All other systems reviewed and are negative     Objective:     /82 (BP Location: Right arm, Patient Position: Sitting, BP Cuff Size: Adult)   Pulse 69   Temp 36.6 °C (97.8 °F) (Temporal)   Resp 16   Ht 1.702 m (5' 7\")   Wt 75.3 kg (166 lb)   SpO2 96%  Body mass index is 26 kg/m².      Physical Exam:    Constitutional: Alert, no distress.  Skin: No suspicious lesions  Eye: Equal, round and reactive, conjunctiva clear, lids normal.  ENMT: Lips without lesions, good dentition, oropharynx clear.  Neck: Trachea midline, no masses, no thyromegaly. No cervical or supraclavicular " lymphadenopathy.  Respiratory: Unlabored respiratory effort, lungs clear to auscultation, no wheezes, no ronchi.  Cardiovascular: Normal S1, S2, no murmur, no edema  Abdomen: Soft, non-tender, no masses, no hepatosplenomegaly.        Assessment and Plan:   The following treatment plan was discussed      1. Essential hypertension  Patient has been stable with current management  We will make no changes for now      2. Primary insomnia  We will try trazodone nightly  She may use Xanax as needed      - traZODone (DESYREL) 100 MG Tab; TAKE 1/2 TAB PO QHS FOR 7 DAYS THEN CONTINUE WITH ONE TAB PO QHS  Dispense: 30 Tablet; Refill: 3  - ALPRAZolam (XANAX) 0.5 MG Tab; Take one tab po q48 prn severe anxiety not for daily use  Dispense: 30 Tablet; Refill: 2    3. Other hyperlipidemia    Patient has been stable with current management  We will make no changes for now    - atorvastatin (LIPITOR) 40 MG Tab; Take 2 Tablets by mouth at bedtime.  Dispense: 90 Tablet; Refill: 2    4. Cervical cancer screening  She states that she will reschedule Pap smear here    5. Need for vaccination    Rx printed for vaccines    - Zoster Vac Recomb Adjuvanted (SHINGRIX) 50 MCG/0.5ML Recon Susp; Inject 0.5 mL into the shoulder, thigh, or buttocks one time for 1 dose.  Dispense: 0.5 mL; Refill: 0  - tetanus-dipth-acell pertussis (ADACEL) 5-2-15.5 LF-MCG/0.5 Suspension; Inject 0.5 mL into the shoulder, thigh, or buttocks one time for 1 dose.  Dispense: 0.5 mL; Refill: 0        Instructed to Follow up in clinic or ER for worsening symptoms, difficulty breathing, lack of expected recovery, or should new symptoms or problems arise.    Followup: Return in about 6 months (around 2/19/2022) for Reevaluation, labs.

## 2021-09-03 ENCOUNTER — OFFICE VISIT (OUTPATIENT)
Dept: MEDICAL GROUP | Age: 66
End: 2021-09-03
Payer: MEDICARE

## 2021-09-03 ENCOUNTER — HOSPITAL ENCOUNTER (OUTPATIENT)
Facility: MEDICAL CENTER | Age: 66
End: 2021-09-03
Attending: PHYSICIAN ASSISTANT
Payer: MEDICARE

## 2021-09-03 VITALS
TEMPERATURE: 96.7 F | BODY MASS INDEX: 26.62 KG/M2 | SYSTOLIC BLOOD PRESSURE: 126 MMHG | HEIGHT: 67 IN | HEART RATE: 70 BPM | WEIGHT: 169.6 LBS | OXYGEN SATURATION: 97 % | DIASTOLIC BLOOD PRESSURE: 78 MMHG

## 2021-09-03 DIAGNOSIS — Z12.4 ENCOUNTER FOR PAPANICOLAOU SMEAR OF CERVIX: ICD-10-CM

## 2021-09-03 DIAGNOSIS — Z12.4 SCREENING FOR CERVICAL CANCER: ICD-10-CM

## 2021-09-03 PROCEDURE — 88175 CYTOPATH C/V AUTO FLUID REDO: CPT

## 2021-09-03 PROCEDURE — G0101 CA SCREEN;PELVIC/BREAST EXAM: HCPCS | Performed by: PHYSICIAN ASSISTANT

## 2021-09-03 PROCEDURE — 87624 HPV HI-RISK TYP POOLED RSLT: CPT

## 2021-09-03 ASSESSMENT — FIBROSIS 4 INDEX: FIB4 SCORE: 1.52

## 2021-09-03 NOTE — PROGRESS NOTES
[facility] to go over latest labs and recommendations.    Subjective:     CC:   Chief Complaint   Patient presents with   • Gynecologic Exam   • Annual Exam       HPI:   Judy Blackburn is a 66 y.o. female who presents for PAP  Patient has GYN provider: No   Last Pap Smear: 5/2020   H/O Abnormal Pap: No  Last Mammogram: 6/2021   Last Bone Density Test: 6/2021  Last Colorectal Cancer Screening: 3/2021  Last Tdap: 8/2021    No LMP recorded. Patient is postmenopausal.  She has not utilized hormone replacement therapy.  Denies any menopausal symptoms.  No significant bloating/fluid retention, pelvic pain, or dyspareunia. No abnormal vaginal discharge.   No breast tenderness, mass, nipple discharge or changes in size or contour.    OB History   No obstetric history on file.      She  has no history on file for sexual activity.    She  has a past medical history of Hyperlipidemia and Hypertension.  She  has no past surgical history on file.    History reviewed. No pertinent family history.  Social History     Tobacco Use   • Smoking status: Never Smoker   • Smokeless tobacco: Never Used   Vaping Use   • Vaping Use: Never used   Substance Use Topics   • Alcohol use: Yes     Comment: occasionally   • Drug use: Never       Patient Active Problem List    Diagnosis Date Noted   • Age-related cataract 02/04/2021   • Primary insomnia 02/04/2021   • Sun-damaged skin 02/04/2021   • Essential hypertension 01/18/2021   • Menopausal and postmenopausal disorder 01/18/2021   • ARTEMIO (renal artery stenosis) (HCC) 01/18/2021   • Pneumonia due to COVID-19 virus 12/29/2020   • Other hyperlipidemia    • Posterior vitreous detachment of both eyes 03/10/2017   • Pseudophakia of right eye 03/10/2017   • Prediabetes 02/21/2017   • History of blepharoplasty 09/25/2014   • Nuclear sclerosis 09/25/2014     Current Outpatient Medications   Medication Sig Dispense Refill   • atorvastatin (LIPITOR) 40 MG Tab Take 2 Tablets by mouth at bedtime. 90 Tablet 2   • traZODone (DESYREL) 100 MG Tab TAKE 1/2 TAB PO QHS  "FOR 7 DAYS THEN CONTINUE WITH ONE TAB PO QHS 30 Tablet 3   • ALPRAZolam (XANAX) 0.5 MG Tab Take one tab po q48 prn severe anxiety not for daily use 30 Tablet 2   • metFORMIN ER (GLUCOPHAGE XR) 500 MG TABLET SR 24 HR TAKE 1 TABLET BY MOUTH EVERY DAY 90 tablet 3   • fluocinolone (SYNALAR) 0.025 % ointment      • Multiple Vitamin (MULTIVITAMIN) capsule Take 1 Cap by mouth every day.     • losartan (COZAAR) 50 MG Tab Take 50 mg by mouth every evening.     • Magnesium 250 MG Tab Take  by mouth every bedtime.     • Coenzyme Q10 (COQ10 PO) Take  by mouth.     • propranolol LA (INDERAL LA) 60 MG CAPSULE SR 24 HR Take 1 Cap by mouth every day. 30 Cap 11   • aspirin EC (ECOTRIN) 81 MG Tablet Delayed Response Take 81 mg by mouth every evening.       No current facility-administered medications for this visit.     Allergies   Allergen Reactions   • Codeine Vomiting and Nausea       Review of Systems   Constitutional: Negative for fever, chills and malaise/fatigue.   Respiratory: Negative for cough and shortness of breath.    Cardiovascular: Negative for chest pain and leg swelling.   Gastrointestinal: Negative for nausea, vomiting, abdominal pain and diarrhea.   Genitourinary: Negative for dysuria and hematuria. .   Psychiatric/Behavioral: Negative for depression.  The patient is not nervous/anxious.      Objective:   /78 (BP Location: Left arm, Patient Position: Sitting, BP Cuff Size: Adult)   Pulse 70   Temp 35.9 °C (96.7 °F) (Temporal)   Ht 1.702 m (5' 7\")   Wt 76.9 kg (169 lb 9.6 oz)   SpO2 97%   Breastfeeding No   BMI 26.56 kg/m²     Wt Readings from Last 4 Encounters:   09/03/21 76.9 kg (169 lb 9.6 oz)   08/19/21 75.3 kg (166 lb)   07/20/21 74.8 kg (165 lb)   02/04/21 76.4 kg (168 lb 6.4 oz)       A chaperone was offered to the patient during today's exam. Chaperone name: Maverick was present.    Physical Exam:  Constitutional: Well-developed and well-nourished. Not diaphoretic. No distress.   Skin: Skin is " warm and dry. No rash noted.   Neck: Supple, trachea midline. Normal range of motion. No thyromegaly present. No lymphadenopathy--cervical or supraclavicular.  Cardiovascular: Regular rate and rhythm, S1 and S2 without murmur, rubs, or gallops.    Respiratory: Effort normal. Clear to auscultation throughout. No adventitious sounds.   Breast: Breasts examined seated and supine. No skin changes, peau d'orange or nipple retraction. No discharge. No axillary or supraclavicular adenopathy. No masses or nodularity palpable.  Abdomen: Soft, non tender, and without distention. Active bowel sounds in all four quadrants. No rebound, guarding, masses or HSM.  : Perineum and external genitalia normal without rash. Vagina with normal and physiologic discharge. Cervix without visible lesions or discharge. Bimanual exam without adnexal masses or cervical motion tenderness.  Extremities: No cyanosis, clubbing, erythema, nor edema. Distal pulses intact and symmetric.    Psychiatric:  Behavior, mood, and affect are appropriate.    Assessment and Plan:     1. Encounter for Papanicolaou smear of cervix  -We will call with results of Pap.  If it is negative she will be done with screenings.  - THINPREP PAP WITH HPV; Future    2. Screening for cervical cancer  - THINPREP PAP WITH HPV; Future      Health maintenance:    Labs per orders  Immunizations per orders  Patient counseled about skin care, diet, supplements, and exercise.  Discussed  breast self exam, mammography screening, menopause, osteoporosis, adequate intake of calcium and vitamin D, diet and exercise     Follow-up: Return in about 6 months (around 3/3/2022) for AWV w/pcp.

## 2021-09-07 DIAGNOSIS — Z12.4 ENCOUNTER FOR PAPANICOLAOU SMEAR OF CERVIX: ICD-10-CM

## 2021-09-07 DIAGNOSIS — Z12.4 SCREENING FOR CERVICAL CANCER: ICD-10-CM

## 2021-09-07 LAB
CYTOLOGY REG CYTOL: NORMAL
HPV HR 12 DNA CVX QL NAA+PROBE: NEGATIVE
HPV16 DNA SPEC QL NAA+PROBE: NEGATIVE
HPV18 DNA SPEC QL NAA+PROBE: NEGATIVE
SPECIMEN SOURCE: NORMAL

## 2021-11-28 DIAGNOSIS — I10 ESSENTIAL HYPERTENSION: ICD-10-CM

## 2021-11-28 DIAGNOSIS — Z98.890 HISTORY OF BLEPHAROPLASTY: ICD-10-CM

## 2021-11-28 DIAGNOSIS — E78.49 OTHER HYPERLIPIDEMIA: ICD-10-CM

## 2021-11-29 RX ORDER — PROPRANOLOL HCL 60 MG
CAPSULE, EXTENDED RELEASE 24HR ORAL
Qty: 90 CAPSULE | Refills: 2 | Status: SHIPPED | OUTPATIENT
Start: 2021-11-29 | End: 2022-01-21 | Stop reason: SDUPTHER

## 2021-12-11 DIAGNOSIS — F51.01 PRIMARY INSOMNIA: ICD-10-CM

## 2021-12-13 RX ORDER — TRAZODONE HYDROCHLORIDE 100 MG/1
TABLET ORAL
Qty: 90 TABLET | Refills: 0 | Status: SHIPPED | OUTPATIENT
Start: 2021-12-13 | End: 2022-01-21 | Stop reason: SDUPTHER

## 2022-01-21 DIAGNOSIS — F51.01 PRIMARY INSOMNIA: ICD-10-CM

## 2022-01-21 DIAGNOSIS — I10 ESSENTIAL HYPERTENSION: ICD-10-CM

## 2022-01-21 DIAGNOSIS — R73.03 PREDIABETES: ICD-10-CM

## 2022-01-21 DIAGNOSIS — E78.49 OTHER HYPERLIPIDEMIA: ICD-10-CM

## 2022-01-21 RX ORDER — ATORVASTATIN CALCIUM 40 MG/1
80 TABLET, FILM COATED ORAL
Qty: 90 TABLET | Refills: 2 | Status: SHIPPED | OUTPATIENT
Start: 2022-01-21 | End: 2022-04-19

## 2022-01-21 RX ORDER — ALPRAZOLAM 0.5 MG/1
TABLET ORAL
Qty: 30 TABLET | Refills: 2 | Status: SHIPPED | OUTPATIENT
Start: 2022-01-21 | End: 2022-03-31

## 2022-01-21 RX ORDER — PROPRANOLOL HCL 60 MG
60 CAPSULE, EXTENDED RELEASE 24HR ORAL
Qty: 90 CAPSULE | Refills: 2 | Status: SHIPPED | OUTPATIENT
Start: 2022-01-21 | End: 2022-04-18 | Stop reason: SDUPTHER

## 2022-01-21 RX ORDER — TRAZODONE HYDROCHLORIDE 100 MG/1
TABLET ORAL
Qty: 90 TABLET | Refills: 0 | Status: SHIPPED | OUTPATIENT
Start: 2022-01-21 | End: 2022-01-21 | Stop reason: SDUPTHER

## 2022-01-21 RX ORDER — TRAZODONE HYDROCHLORIDE 100 MG/1
TABLET ORAL
Qty: 90 TABLET | Refills: 2 | Status: ON HOLD | OUTPATIENT
Start: 2022-01-21 | End: 2022-04-26

## 2022-01-21 RX ORDER — METFORMIN HYDROCHLORIDE 500 MG/1
500 TABLET, EXTENDED RELEASE ORAL DAILY
Qty: 90 TABLET | Refills: 3 | Status: SHIPPED | OUTPATIENT
Start: 2022-01-21 | End: 2023-01-03

## 2022-01-21 RX ORDER — LOSARTAN POTASSIUM 50 MG/1
50 TABLET ORAL EVERY EVENING
Qty: 90 TABLET | Refills: 2 | Status: SHIPPED | OUTPATIENT
Start: 2022-01-21 | End: 2022-05-19

## 2022-02-05 ENCOUNTER — PATIENT MESSAGE (OUTPATIENT)
Dept: CARDIOLOGY | Facility: MEDICAL CENTER | Age: 67
End: 2022-02-05

## 2022-02-08 DIAGNOSIS — R73.03 PREDIABETES: ICD-10-CM

## 2022-02-08 DIAGNOSIS — E78.49 OTHER HYPERLIPIDEMIA: ICD-10-CM

## 2022-02-08 DIAGNOSIS — E55.9 VITAMIN D DEFICIENCY: ICD-10-CM

## 2022-02-09 ENCOUNTER — HOSPITAL ENCOUNTER (OUTPATIENT)
Dept: LAB | Facility: MEDICAL CENTER | Age: 67
End: 2022-02-09
Attending: FAMILY MEDICINE
Payer: MEDICARE

## 2022-02-09 ENCOUNTER — OFFICE VISIT (OUTPATIENT)
Dept: MEDICAL GROUP | Age: 67
End: 2022-02-09

## 2022-02-09 VITALS
RESPIRATION RATE: 16 BRPM | TEMPERATURE: 98.2 F | HEART RATE: 72 BPM | WEIGHT: 167 LBS | HEIGHT: 67 IN | BODY MASS INDEX: 26.21 KG/M2 | SYSTOLIC BLOOD PRESSURE: 128 MMHG | OXYGEN SATURATION: 95 % | DIASTOLIC BLOOD PRESSURE: 88 MMHG

## 2022-02-09 DIAGNOSIS — K92.89 GAS BLOAT SYNDROME: ICD-10-CM

## 2022-02-09 DIAGNOSIS — R73.03 PREDIABETES: ICD-10-CM

## 2022-02-09 DIAGNOSIS — F51.01 PRIMARY INSOMNIA: ICD-10-CM

## 2022-02-09 DIAGNOSIS — E55.9 VITAMIN D DEFICIENCY: ICD-10-CM

## 2022-02-09 DIAGNOSIS — Z00.00 MEDICARE ANNUAL WELLNESS VISIT, INITIAL: ICD-10-CM

## 2022-02-09 DIAGNOSIS — I10 ESSENTIAL HYPERTENSION: ICD-10-CM

## 2022-02-09 DIAGNOSIS — E78.49 OTHER HYPERLIPIDEMIA: ICD-10-CM

## 2022-02-09 DIAGNOSIS — L30.9 DERMATITIS: ICD-10-CM

## 2022-02-09 DIAGNOSIS — Z91.89 OTHER SPECIFIED PERSONAL RISK FACTORS, NOT ELSEWHERE CLASSIFIED: ICD-10-CM

## 2022-02-09 PROBLEM — Z98.890 H/O BILATERAL BREAST REDUCTION SURGERY: Status: ACTIVE | Noted: 2022-02-09

## 2022-02-09 LAB
25(OH)D3 SERPL-MCNC: 32 NG/ML (ref 30–100)
ALBUMIN SERPL BCP-MCNC: 4.8 G/DL (ref 3.2–4.9)
ALBUMIN/GLOB SERPL: 2.2 G/DL
ALP SERPL-CCNC: 73 U/L (ref 30–99)
ALT SERPL-CCNC: 18 U/L (ref 2–50)
ANION GAP SERPL CALC-SCNC: 10 MMOL/L (ref 7–16)
AST SERPL-CCNC: 21 U/L (ref 12–45)
BASOPHILS # BLD AUTO: 1 % (ref 0–1.8)
BASOPHILS # BLD: 0.06 K/UL (ref 0–0.12)
BILIRUB SERPL-MCNC: 0.5 MG/DL (ref 0.1–1.5)
BUN SERPL-MCNC: 18 MG/DL (ref 8–22)
CALCIUM SERPL-MCNC: 9.6 MG/DL (ref 8.5–10.5)
CHLORIDE SERPL-SCNC: 107 MMOL/L (ref 96–112)
CHOLEST SERPL-MCNC: 126 MG/DL (ref 100–199)
CO2 SERPL-SCNC: 27 MMOL/L (ref 20–33)
CREAT SERPL-MCNC: 0.72 MG/DL (ref 0.5–1.4)
EOSINOPHIL # BLD AUTO: 0.29 K/UL (ref 0–0.51)
EOSINOPHIL NFR BLD: 4.7 % (ref 0–6.9)
ERYTHROCYTE [DISTWIDTH] IN BLOOD BY AUTOMATED COUNT: 43.7 FL (ref 35.9–50)
EST. AVERAGE GLUCOSE BLD GHB EST-MCNC: 128 MG/DL
FASTING STATUS PATIENT QL REPORTED: NORMAL
GLOBULIN SER CALC-MCNC: 2.2 G/DL (ref 1.9–3.5)
GLUCOSE SERPL-MCNC: 104 MG/DL (ref 65–99)
HBA1C MFR BLD: 6.1 % (ref 4–5.6)
HCT VFR BLD AUTO: 44.5 % (ref 37–47)
HDLC SERPL-MCNC: 46 MG/DL
HGB BLD-MCNC: 14.8 G/DL (ref 12–16)
IMM GRANULOCYTES # BLD AUTO: 0.01 K/UL (ref 0–0.11)
IMM GRANULOCYTES NFR BLD AUTO: 0.2 % (ref 0–0.9)
LDLC SERPL CALC-MCNC: 47 MG/DL
LYMPHOCYTES # BLD AUTO: 2.5 K/UL (ref 1–4.8)
LYMPHOCYTES NFR BLD: 40.2 % (ref 22–41)
MCH RBC QN AUTO: 31.2 PG (ref 27–33)
MCHC RBC AUTO-ENTMCNC: 33.3 G/DL (ref 33.6–35)
MCV RBC AUTO: 93.7 FL (ref 81.4–97.8)
MONOCYTES # BLD AUTO: 0.58 K/UL (ref 0–0.85)
MONOCYTES NFR BLD AUTO: 9.3 % (ref 0–13.4)
NEUTROPHILS # BLD AUTO: 2.78 K/UL (ref 2–7.15)
NEUTROPHILS NFR BLD: 44.6 % (ref 44–72)
NRBC # BLD AUTO: 0 K/UL
NRBC BLD-RTO: 0 /100 WBC
PLATELET # BLD AUTO: 262 K/UL (ref 164–446)
PMV BLD AUTO: 10.7 FL (ref 9–12.9)
POTASSIUM SERPL-SCNC: 4.1 MMOL/L (ref 3.6–5.5)
PROT SERPL-MCNC: 7 G/DL (ref 6–8.2)
RBC # BLD AUTO: 4.75 M/UL (ref 4.2–5.4)
SODIUM SERPL-SCNC: 144 MMOL/L (ref 135–145)
TRIGL SERPL-MCNC: 164 MG/DL (ref 0–149)
WBC # BLD AUTO: 6.2 K/UL (ref 4.8–10.8)

## 2022-02-09 PROCEDURE — 8041 PR SCP AHA: Performed by: FAMILY MEDICINE

## 2022-02-09 PROCEDURE — 80053 COMPREHEN METABOLIC PANEL: CPT

## 2022-02-09 PROCEDURE — 80061 LIPID PANEL: CPT

## 2022-02-09 PROCEDURE — 36415 COLL VENOUS BLD VENIPUNCTURE: CPT

## 2022-02-09 PROCEDURE — G0438 PPPS, INITIAL VISIT: HCPCS | Performed by: FAMILY MEDICINE

## 2022-02-09 PROCEDURE — 83036 HEMOGLOBIN GLYCOSYLATED A1C: CPT | Mod: GA

## 2022-02-09 PROCEDURE — 85025 COMPLETE CBC W/AUTO DIFF WBC: CPT

## 2022-02-09 PROCEDURE — 87338 HPYLORI STOOL AG IA: CPT

## 2022-02-09 PROCEDURE — 82306 VITAMIN D 25 HYDROXY: CPT

## 2022-02-09 RX ORDER — OMEPRAZOLE 20 MG/1
20 CAPSULE, DELAYED RELEASE ORAL DAILY
Qty: 30 CAPSULE | Refills: 2 | Status: SHIPPED | OUTPATIENT
Start: 2022-02-09 | End: 2022-03-08

## 2022-02-09 ASSESSMENT — FIBROSIS 4 INDEX: FIB4 SCORE: 1.54

## 2022-02-09 ASSESSMENT — ENCOUNTER SYMPTOMS: GENERAL WELL-BEING: EXCELLENT

## 2022-02-09 ASSESSMENT — PATIENT HEALTH QUESTIONNAIRE - PHQ9: CLINICAL INTERPRETATION OF PHQ2 SCORE: 0

## 2022-02-09 ASSESSMENT — ACTIVITIES OF DAILY LIVING (ADL): BATHING_REQUIRES_ASSISTANCE: 0

## 2022-02-09 NOTE — PROGRESS NOTES
Chief Complaint   Patient presents with   • Annual Exam         HPI:  Judy is a 67 y.o. here for Medicare Annual Wellness Visit        Patient Active Problem List    Diagnosis Date Noted   • Dermatitis 02/09/2022   • H/O bilateral breast reduction surgery 02/09/2022   • Age-related cataract 02/04/2021   • Primary insomnia 02/04/2021   • Sun-damaged skin 02/04/2021   • Essential hypertension 01/18/2021   • Menopausal and postmenopausal disorder 01/18/2021   • ARTEMIO (renal artery stenosis) (HCC) 01/18/2021   • Pneumonia due to COVID-19 virus 12/29/2020   • Other hyperlipidemia    • Posterior vitreous detachment of both eyes 03/10/2017   • Pseudophakia of right eye 03/10/2017   • Prediabetes 02/21/2017   • History of blepharoplasty 09/25/2014   • Nuclear sclerosis 09/25/2014       Current Outpatient Medications   Medication Sig Dispense Refill   • omeprazole (PRILOSEC) 20 MG delayed-release capsule Take 1 Capsule by mouth every day. 30 Capsule 2   • propranolol LA (INDERAL LA) 60 MG CAPSULE SR 24 HR Take 1 Capsule by mouth every day. 90 Capsule 2   • atorvastatin (LIPITOR) 40 MG Tab Take 2 Tablets by mouth at bedtime. 90 Tablet 2   • traZODone (DESYREL) 100 MG Tab TAKE 1/2 TABLET BY MOUTH EVERY NIGHT AT BEDTIME FOR 7 DAYS. CONTINUE WITH 1 TABLET BY MOUTH EVERY NIGHT AT BEDTIME 90 Tablet 2   • metFORMIN ER (GLUCOPHAGE XR) 500 MG TABLET SR 24 HR Take 1 Tablet by mouth every day. 90 Tablet 3   • losartan (COZAAR) 50 MG Tab Take 1 Tablet by mouth every evening. 90 Tablet 2   • ALPRAZolam (XANAX) 0.5 MG Tab Take one tab po q48 prn severe anxiety not for daily use 30 Tablet 2   • fluocinolone (SYNALAR) 0.025 % ointment      • Multiple Vitamin (MULTIVITAMIN) capsule Take 1 Cap by mouth every day.     • Magnesium 250 MG Tab Take  by mouth every bedtime.     • Coenzyme Q10 (COQ10 PO) Take  by mouth.     • aspirin EC (ECOTRIN) 81 MG Tablet Delayed Response Take 81 mg by mouth every evening.       No current facility-administered  medications for this visit.        Patient is taking medications as noted in medication list.  Current supplements as per medication list.     Allergies: Codeine    Current social contact/activities: yes     Is patient current with immunizations? Yes.    She  reports that she has never smoked. She has never used smokeless tobacco. She reports current alcohol use. She reports that she does not use drugs.  Counseling given: Not Answered        DPA/Advanced directive: Patient has Advanced Directive, but it is not on file. Instructed to bring in a copy to scan into their chart.    ROS:    Gait: Uses no assistive device   Ostomy: No   Other tubes: No   Amputations: No   Chronic oxygen use No   Last eye exam 4/2021   Wears hearing aids: No   : Denies any urinary leakage during the last 6 months      Screening:        Depression Screening    Little interest or pleasure in doing things?  0 - not at all  Feeling down, depressed, or hopeless? 0 - not at all  Patient Health Questionnaire Score: 0    If depressive symptoms identified deferred to follow up visit unless specifically addressed in assessment and plan.    Interpretation of PHQ-9 Total Score   Score Severity   1-4 No Depression   5-9 Mild Depression   10-14 Moderate Depression   15-19 Moderately Severe Depression   20-27 Severe Depression    Screening for Cognitive Impairment    Three Minute Recall (captain, garden, picture)  3/3    Vic clock face with all 12 numbers and set the hands to show 5 past 8.  Yes 5/5  If cognitive concerns identified, deferred for follow up unless specifically addressed in assessment and plan.    Fall Risk Assessment    Has the patient had two or more falls in the last year or any fall with injury in the last year?  No  If fall risk identified, deferred for follow up unless specifically addressed in assessment and plan.    Safety Assessment    Throw rugs on floor.  No  Handrails on all stairs.  Yes  Good lighting in all hallways.   Yes  Difficulty hearing.  No  Patient counseled about all safety risks that were identified.    Functional Assessment ADLs    Are there any barriers preventing you from cooking for yourself or meeting nutritional needs?  No.    Are there any barriers preventing you from driving safely or obtaining transportation?  No.    Are there any barriers preventing you from using a telephone or calling for help?  No.    Are there any barriers preventing you from shopping?  No.    Are there any barriers preventing you from taking care of your own finances?  No.    Are there any barriers preventing you from managing your medications?  No.    Are there any barriers preventing you from showering, bathing or dressing yourself?  No.    Are you currently engaging in any exercise or physical activity?  Yes.  Yes, walking, weights, gym  What is your perception of your health?  Excellent.    Health Maintenance Summary          Overdue - IMM INFLUENZA (1) Overdue since 9/1/2021 02/04/2021  Imm Admin: Influenza Vaccine Adult HD    12/14/2015  Imm Admin: Influenza Vaccine Quad Inj (Preserved)    11/29/2012  Imm Admin: Influenza Vaccine Quad Inj (Pf)    11/29/2012  Imm Admin: Influenza Vaccine Pediatric Split - Historical Data    11/23/2011  Imm Admin: Influenza Vaccine Pediatric Split - Historical Data          Overdue - COVID-19 Vaccine (3 - Booster for Moderna series) Overdue since 9/10/2021    04/10/2021  Imm Admin: Moderna SARS-CoV-2 Vaccine    03/12/2021  Imm Admin: Moderna SARS-CoV-2 Vaccine          Ordered - MAMMOGRAM (Yearly) Ordered on 6/11/2021 06/04/2021  MA-SCREENING MAMMO BILAT W/TOMOSYNTHESIS W/CAD          PAP SMEAR (Every 3 Years) Next due on 9/3/2024    09/03/2021  Pathology Gynecology Specimen    09/03/2021  THINPREP PAP WITH HPV          BONE DENSITY (Every 5 Years) Tentatively due on 6/4/2026 06/04/2021  DS-BONE DENSITY STUDY (DEXA)          COLORECTAL CANCER SCREENING (COLONOSCOPY - Every 7 Years) Next due  "on 3/11/2028    03/11/2021  REFERRAL TO GI FOR COLONOSCOPY          IMM DTaP/Tdap/Td Vaccine (3 - Td or Tdap) Next due on 8/19/2031 08/19/2021  Imm Admin: Tdap Vaccine    04/11/2011  Imm Admin: Tdap Vaccine          IMM PNEUMOCOCCAL VACCINE: 65+ Years (Series Information) Completed    03/14/2019  Imm Admin: Pneumococcal polysaccharide vaccine (PPSV-23)    02/21/2017  Imm Admin: Pneumococcal Conjugate Vaccine (Prevnar/PCV-13)          HEPATITIS C SCREENING  Completed    06/22/2021  HEP C VIRUS ANTIBODY          IMM ZOSTER VACCINES (Series Information) Completed    08/19/2021  Imm Admin: Zoster Vaccine Recombinant (RZV) (SHINGRIX)    02/04/2021  Imm Admin: Zoster Vaccine Recombinant (RZV) (SHINGRIX)    12/14/2015  Imm Admin: Zoster Vaccine Live (ZVL) (Zostavax) - HISTORICAL DATA          Annual Wellness Visit  Completed    02/09/2022  Visit Dx: Medicare annual wellness visit, initial          IMM HEP B VACCINE (Series Information) Aged Out    No completion history exists for this topic.          IMM MENINGOCOCCAL VACCINE (MCV4) (Series Information) Aged Out    No completion history exists for this topic.                Patient Care Team:  Lonny Mckinney M.D. as PCP - General (Family Medicine)    Social History     Tobacco Use   • Smoking status: Never Smoker   • Smokeless tobacco: Never Used   Vaping Use   • Vaping Use: Never used   Substance Use Topics   • Alcohol use: Yes     Comment: occasionally   • Drug use: Never     History reviewed. No pertinent family history.  She  has a past medical history of Hyperlipidemia and Hypertension.   Past Surgical History:   Procedure Laterality Date   • LUMPECTOMY             Exam:     /88 (BP Location: Right arm, Patient Position: Sitting, BP Cuff Size: Adult)   Pulse 72   Temp 36.8 °C (98.2 °F) (Temporal)   Resp 16   Ht 1.702 m (5' 7\")   Wt 75.8 kg (167 lb)   SpO2 95%  Body mass index is 26.16 kg/m².    Hearing excellent.    Dentition good  Alert, oriented in " no acute distress.  Eye contact is good, speech goal directed, affect calm      Assessment and Plan. The following treatment and monitoring plan is recommended:    1. Medicare annual wellness visit, initial     2. Gas bloat syndrome  omeprazole (PRILOSEC) 20 MG delayed-release capsule    H.PYLORI STOOL ANTIGEN   3. Essential hypertension     4. Other hyperlipidemia     5. Prediabetes     6. Primary insomnia     7. Dermatitis  Referral to Dermatology    CANCELED: Referral to Dermatology   8. Other specified personal risk factors, not elsewhere classified  CT-CARDIAC SCORING     Plan    1. Completed    2. Try PPI x 2 wks    3. Patient has been stable with current management  We will make no changes for now    4. Patient has been stable with current management  We will make no changes for now    5. Patient has been stable with current management  We will make no changes for now    6.Patient has been stable with current management  We will make no changes for now    7.refer to Dermatology    8. Cardiac risk stratification        Services suggested: No services needed at this time  Health Care Screening recommendations as per orders if indicated.  Referrals offered: PT/OT/Nutrition counseling/Behavioral Health/Smoking cessation as per orders if indicated.    Discussion today about general wellness and lifestyle habits:    · Prevent falls and reduce trip hazards; Cautioned about securing or removing rugs.  · Have a working fire alarm and carbon monoxide detector;   · Engage in regular physical activity and social activities       Follow-up: Return in about 6 months (around 8/9/2022) for Reevaluation, labs.

## 2022-02-11 LAB — H PYLORI AG STL QL IA: NOT DETECTED

## 2022-02-14 ENCOUNTER — HOSPITAL ENCOUNTER (OUTPATIENT)
Dept: RADIOLOGY | Facility: MEDICAL CENTER | Age: 67
End: 2022-02-14
Attending: FAMILY MEDICINE
Payer: COMMERCIAL

## 2022-02-14 DIAGNOSIS — Z91.89 OTHER SPECIFIED PERSONAL RISK FACTORS, NOT ELSEWHERE CLASSIFIED: ICD-10-CM

## 2022-02-14 PROCEDURE — 4410556 CT-CARDIAC SCORING (SELF PAY ONLY)

## 2022-03-08 DIAGNOSIS — K92.89 GAS BLOAT SYNDROME: ICD-10-CM

## 2022-03-08 RX ORDER — OMEPRAZOLE 20 MG/1
20 CAPSULE, DELAYED RELEASE ORAL DAILY
Qty: 30 CAPSULE | Refills: 2 | Status: SHIPPED | OUTPATIENT
Start: 2022-03-08 | End: 2022-04-18

## 2022-04-17 DIAGNOSIS — E78.49 OTHER HYPERLIPIDEMIA: ICD-10-CM

## 2022-04-17 DIAGNOSIS — K92.89 GAS BLOAT SYNDROME: ICD-10-CM

## 2022-04-18 DIAGNOSIS — I10 ESSENTIAL HYPERTENSION: ICD-10-CM

## 2022-04-19 RX ORDER — OMEPRAZOLE 20 MG/1
20 CAPSULE, DELAYED RELEASE ORAL DAILY
Qty: 90 CAPSULE | Refills: 0 | Status: SHIPPED | OUTPATIENT
Start: 2022-04-19 | End: 2022-08-26

## 2022-04-19 RX ORDER — ATORVASTATIN CALCIUM 40 MG/1
80 TABLET, FILM COATED ORAL
Qty: 180 TABLET | Refills: 1 | Status: SHIPPED | OUTPATIENT
Start: 2022-04-19 | End: 2022-10-13

## 2022-04-19 RX ORDER — PROPRANOLOL HCL 60 MG
60 CAPSULE, EXTENDED RELEASE 24HR ORAL
Qty: 90 CAPSULE | Refills: 2 | Status: SHIPPED | OUTPATIENT
Start: 2022-04-19 | End: 2022-10-13 | Stop reason: SDUPTHER

## 2022-04-26 ENCOUNTER — ANESTHESIA EVENT (OUTPATIENT)
Dept: SURGERY | Facility: MEDICAL CENTER | Age: 67
End: 2022-04-26
Payer: MEDICARE

## 2022-04-26 ENCOUNTER — APPOINTMENT (OUTPATIENT)
Dept: CARDIOLOGY | Facility: MEDICAL CENTER | Age: 67
End: 2022-04-26
Attending: INTERNAL MEDICINE
Payer: MEDICARE

## 2022-04-26 ENCOUNTER — HOSPITAL ENCOUNTER (OUTPATIENT)
Facility: MEDICAL CENTER | Age: 67
End: 2022-04-26
Attending: STUDENT IN AN ORGANIZED HEALTH CARE EDUCATION/TRAINING PROGRAM | Admitting: INTERNAL MEDICINE
Payer: MEDICARE

## 2022-04-26 ENCOUNTER — APPOINTMENT (OUTPATIENT)
Dept: RADIOLOGY | Facility: MEDICAL CENTER | Age: 67
End: 2022-04-26
Attending: STUDENT IN AN ORGANIZED HEALTH CARE EDUCATION/TRAINING PROGRAM
Payer: MEDICARE

## 2022-04-26 ENCOUNTER — ANESTHESIA (OUTPATIENT)
Dept: SURGERY | Facility: MEDICAL CENTER | Age: 67
End: 2022-04-26
Payer: MEDICARE

## 2022-04-26 VITALS
OXYGEN SATURATION: 92 % | HEIGHT: 67 IN | DIASTOLIC BLOOD PRESSURE: 64 MMHG | SYSTOLIC BLOOD PRESSURE: 126 MMHG | BODY MASS INDEX: 27.02 KG/M2 | TEMPERATURE: 97.7 F | RESPIRATION RATE: 18 BRPM | WEIGHT: 172.18 LBS | HEART RATE: 68 BPM

## 2022-04-26 DIAGNOSIS — I48.91 ATRIAL FIBRILLATION WITH RVR (HCC): ICD-10-CM

## 2022-04-26 DIAGNOSIS — I48.0 PAF (PAROXYSMAL ATRIAL FIBRILLATION) (HCC): ICD-10-CM

## 2022-04-26 DIAGNOSIS — R07.9 CHEST PAIN, UNSPECIFIED TYPE: ICD-10-CM

## 2022-04-26 PROBLEM — F41.9 ANXIETY: Status: ACTIVE | Noted: 2022-04-26

## 2022-04-26 PROBLEM — K21.9 GERD (GASTROESOPHAGEAL REFLUX DISEASE): Status: ACTIVE | Noted: 2022-04-26

## 2022-04-26 LAB
ALBUMIN SERPL BCP-MCNC: 4.6 G/DL (ref 3.2–4.9)
ALBUMIN/GLOB SERPL: 2.1 G/DL
ALP SERPL-CCNC: 86 U/L (ref 30–99)
ALT SERPL-CCNC: 24 U/L (ref 2–50)
ANION GAP SERPL CALC-SCNC: 11 MMOL/L (ref 7–16)
APPEARANCE UR: CLEAR
AST SERPL-CCNC: 26 U/L (ref 12–45)
BACTERIA #/AREA URNS HPF: ABNORMAL /HPF
BASOPHILS # BLD AUTO: 0.5 % (ref 0–1.8)
BASOPHILS # BLD: 0.04 K/UL (ref 0–0.12)
BILIRUB SERPL-MCNC: 0.3 MG/DL (ref 0.1–1.5)
BILIRUB UR QL STRIP.AUTO: NEGATIVE
BUN SERPL-MCNC: 20 MG/DL (ref 8–22)
CALCIUM SERPL-MCNC: 9.8 MG/DL (ref 8.4–10.2)
CHLORIDE SERPL-SCNC: 107 MMOL/L (ref 96–112)
CO2 SERPL-SCNC: 24 MMOL/L (ref 20–33)
COLOR UR: YELLOW
CREAT SERPL-MCNC: 0.5 MG/DL (ref 0.5–1.4)
EKG IMPRESSION: NORMAL
EOSINOPHIL # BLD AUTO: 0.29 K/UL (ref 0–0.51)
EOSINOPHIL NFR BLD: 3.6 % (ref 0–6.9)
EPI CELLS #/AREA URNS HPF: ABNORMAL /HPF
ERYTHROCYTE [DISTWIDTH] IN BLOOD BY AUTOMATED COUNT: 42.5 FL (ref 35.9–50)
GFR SERPLBLD CREATININE-BSD FMLA CKD-EPI: 102 ML/MIN/1.73 M 2
GLOBULIN SER CALC-MCNC: 2.2 G/DL (ref 1.9–3.5)
GLUCOSE BLD STRIP.AUTO-MCNC: 74 MG/DL (ref 65–99)
GLUCOSE SERPL-MCNC: 106 MG/DL (ref 65–99)
GLUCOSE UR STRIP.AUTO-MCNC: NEGATIVE MG/DL
HCT VFR BLD AUTO: 43.2 % (ref 37–47)
HGB BLD-MCNC: 14.5 G/DL (ref 12–16)
IMM GRANULOCYTES # BLD AUTO: 0.01 K/UL (ref 0–0.11)
IMM GRANULOCYTES NFR BLD AUTO: 0.1 % (ref 0–0.9)
KETONES UR STRIP.AUTO-MCNC: NEGATIVE MG/DL
LEUKOCYTE ESTERASE UR QL STRIP.AUTO: NEGATIVE
LV EJECT FRACT  99904: 65
LYMPHOCYTES # BLD AUTO: 3.46 K/UL (ref 1–4.8)
LYMPHOCYTES NFR BLD: 42.8 % (ref 22–41)
MCH RBC QN AUTO: 31 PG (ref 27–33)
MCHC RBC AUTO-ENTMCNC: 33.6 G/DL (ref 33.6–35)
MCV RBC AUTO: 92.3 FL (ref 81.4–97.8)
MICRO URNS: ABNORMAL
MONOCYTES # BLD AUTO: 0.73 K/UL (ref 0–0.85)
MONOCYTES NFR BLD AUTO: 9 % (ref 0–13.4)
MUCOUS THREADS #/AREA URNS HPF: ABNORMAL /HPF
NEUTROPHILS # BLD AUTO: 3.55 K/UL (ref 2–7.15)
NEUTROPHILS NFR BLD: 44 % (ref 44–72)
NITRITE UR QL STRIP.AUTO: NEGATIVE
NRBC # BLD AUTO: 0 K/UL
NRBC BLD-RTO: 0 /100 WBC
PH UR STRIP.AUTO: 7.5 [PH] (ref 5–8)
PLATELET # BLD AUTO: 252 K/UL (ref 164–446)
PMV BLD AUTO: 10.3 FL (ref 9–12.9)
POTASSIUM SERPL-SCNC: 3.8 MMOL/L (ref 3.6–5.5)
PROT SERPL-MCNC: 6.8 G/DL (ref 6–8.2)
PROT UR QL STRIP: NEGATIVE MG/DL
RBC # BLD AUTO: 4.68 M/UL (ref 4.2–5.4)
RBC # URNS HPF: ABNORMAL /HPF
RBC UR QL AUTO: ABNORMAL
SODIUM SERPL-SCNC: 142 MMOL/L (ref 135–145)
SP GR UR STRIP.AUTO: 1.01
T4 FREE SERPL-MCNC: 1.35 NG/DL (ref 0.93–1.7)
TROPONIN T SERPL-MCNC: 8 NG/L (ref 6–19)
TROPONIN T SERPL-MCNC: 9 NG/L (ref 6–19)
TROPONIN T SERPL-MCNC: <6 NG/L (ref 6–19)
TSH SERPL DL<=0.005 MIU/L-ACNC: 1.39 UIU/ML (ref 0.38–5.33)
WBC # BLD AUTO: 8.1 K/UL (ref 4.8–10.8)
WBC #/AREA URNS HPF: ABNORMAL /HPF

## 2022-04-26 PROCEDURE — 81001 URINALYSIS AUTO W/SCOPE: CPT

## 2022-04-26 PROCEDURE — 71045 X-RAY EXAM CHEST 1 VIEW: CPT

## 2022-04-26 PROCEDURE — 92960 CARDIOVERSION ELECTRIC EXT: CPT | Performed by: INTERNAL MEDICINE

## 2022-04-26 PROCEDURE — 00410 ANES INTEG SYS CONV ARRHYT: CPT | Performed by: ANESTHESIOLOGY

## 2022-04-26 PROCEDURE — 160002 HCHG RECOVERY MINUTES (STAT): Performed by: INTERNAL MEDICINE

## 2022-04-26 PROCEDURE — A9270 NON-COVERED ITEM OR SERVICE: HCPCS | Performed by: INTERNAL MEDICINE

## 2022-04-26 PROCEDURE — 82962 GLUCOSE BLOOD TEST: CPT

## 2022-04-26 PROCEDURE — 160048 HCHG OR STATISTICAL LEVEL 1-5: Performed by: INTERNAL MEDICINE

## 2022-04-26 PROCEDURE — 93306 TTE W/DOPPLER COMPLETE: CPT | Mod: 26 | Performed by: INTERNAL MEDICINE

## 2022-04-26 PROCEDURE — 99215 OFFICE O/P EST HI 40 MIN: CPT | Mod: 25 | Performed by: INTERNAL MEDICINE

## 2022-04-26 PROCEDURE — 700105 HCHG RX REV CODE 258: Performed by: INTERNAL MEDICINE

## 2022-04-26 PROCEDURE — G0378 HOSPITAL OBSERVATION PER HR: HCPCS

## 2022-04-26 PROCEDURE — 36415 COLL VENOUS BLD VENIPUNCTURE: CPT

## 2022-04-26 PROCEDURE — 700105 HCHG RX REV CODE 258: Performed by: STUDENT IN AN ORGANIZED HEALTH CARE EDUCATION/TRAINING PROGRAM

## 2022-04-26 PROCEDURE — 700111 HCHG RX REV CODE 636 W/ 250 OVERRIDE (IP): Performed by: INTERNAL MEDICINE

## 2022-04-26 PROCEDURE — 84443 ASSAY THYROID STIM HORMONE: CPT

## 2022-04-26 PROCEDURE — 96372 THER/PROPH/DIAG INJ SC/IM: CPT

## 2022-04-26 PROCEDURE — 93312 ECHO TRANSESOPHAGEAL: CPT

## 2022-04-26 PROCEDURE — 700102 HCHG RX REV CODE 250 W/ 637 OVERRIDE(OP): Performed by: INTERNAL MEDICINE

## 2022-04-26 PROCEDURE — 160009 HCHG ANES TIME/MIN: Performed by: INTERNAL MEDICINE

## 2022-04-26 PROCEDURE — 93312 ECHO TRANSESOPHAGEAL: CPT | Mod: 26 | Performed by: INTERNAL MEDICINE

## 2022-04-26 PROCEDURE — A9270 NON-COVERED ITEM OR SERVICE: HCPCS | Performed by: STUDENT IN AN ORGANIZED HEALTH CARE EDUCATION/TRAINING PROGRAM

## 2022-04-26 PROCEDURE — 99285 EMERGENCY DEPT VISIT HI MDM: CPT

## 2022-04-26 PROCEDURE — 99236 HOSP IP/OBS SAME DATE HI 85: CPT | Performed by: INTERNAL MEDICINE

## 2022-04-26 PROCEDURE — 700102 HCHG RX REV CODE 250 W/ 637 OVERRIDE(OP): Performed by: STUDENT IN AN ORGANIZED HEALTH CARE EDUCATION/TRAINING PROGRAM

## 2022-04-26 PROCEDURE — 84484 ASSAY OF TROPONIN QUANT: CPT

## 2022-04-26 PROCEDURE — 93306 TTE W/DOPPLER COMPLETE: CPT

## 2022-04-26 PROCEDURE — 85025 COMPLETE CBC W/AUTO DIFF WBC: CPT

## 2022-04-26 PROCEDURE — 80053 COMPREHEN METABOLIC PANEL: CPT

## 2022-04-26 PROCEDURE — 110305 HCHG STAT TEE: Performed by: INTERNAL MEDICINE

## 2022-04-26 PROCEDURE — 93005 ELECTROCARDIOGRAM TRACING: CPT | Performed by: STUDENT IN AN ORGANIZED HEALTH CARE EDUCATION/TRAINING PROGRAM

## 2022-04-26 PROCEDURE — 84439 ASSAY OF FREE THYROXINE: CPT

## 2022-04-26 PROCEDURE — 160035 HCHG PACU - 1ST 60 MINS PHASE I: Performed by: INTERNAL MEDICINE

## 2022-04-26 PROCEDURE — 700111 HCHG RX REV CODE 636 W/ 250 OVERRIDE (IP): Performed by: ANESTHESIOLOGY

## 2022-04-26 RX ORDER — HYDROMORPHONE HYDROCHLORIDE 1 MG/ML
0.1 INJECTION, SOLUTION INTRAMUSCULAR; INTRAVENOUS; SUBCUTANEOUS
Status: DISCONTINUED | OUTPATIENT
Start: 2022-04-26 | End: 2022-04-26 | Stop reason: HOSPADM

## 2022-04-26 RX ORDER — ONDANSETRON 2 MG/ML
4 INJECTION INTRAMUSCULAR; INTRAVENOUS
Status: DISCONTINUED | OUTPATIENT
Start: 2022-04-26 | End: 2022-04-26 | Stop reason: HOSPADM

## 2022-04-26 RX ORDER — ONDANSETRON 4 MG/1
4 TABLET, ORALLY DISINTEGRATING ORAL EVERY 4 HOURS PRN
Status: DISCONTINUED | OUTPATIENT
Start: 2022-04-26 | End: 2022-04-26 | Stop reason: HOSPADM

## 2022-04-26 RX ORDER — AMOXICILLIN 250 MG
2 CAPSULE ORAL 2 TIMES DAILY
Status: DISCONTINUED | OUTPATIENT
Start: 2022-04-26 | End: 2022-04-26 | Stop reason: HOSPADM

## 2022-04-26 RX ORDER — ATORVASTATIN CALCIUM 40 MG/1
80 TABLET, FILM COATED ORAL
Status: DISCONTINUED | OUTPATIENT
Start: 2022-04-26 | End: 2022-04-26 | Stop reason: HOSPADM

## 2022-04-26 RX ORDER — SODIUM CHLORIDE 9 MG/ML
1000 INJECTION, SOLUTION INTRAVENOUS ONCE
Status: COMPLETED | OUTPATIENT
Start: 2022-04-26 | End: 2022-04-26

## 2022-04-26 RX ORDER — OXYCODONE HCL 5 MG/5 ML
10 SOLUTION, ORAL ORAL
Status: DISCONTINUED | OUTPATIENT
Start: 2022-04-26 | End: 2022-04-26 | Stop reason: HOSPADM

## 2022-04-26 RX ORDER — BISACODYL 10 MG
10 SUPPOSITORY, RECTAL RECTAL
Status: DISCONTINUED | OUTPATIENT
Start: 2022-04-26 | End: 2022-04-26 | Stop reason: HOSPADM

## 2022-04-26 RX ORDER — ALPRAZOLAM 0.5 MG/1
0.5 TABLET ORAL 3 TIMES DAILY PRN
COMMUNITY
End: 2023-02-15 | Stop reason: SDUPTHER

## 2022-04-26 RX ORDER — HYDROMORPHONE HYDROCHLORIDE 1 MG/ML
0.4 INJECTION, SOLUTION INTRAMUSCULAR; INTRAVENOUS; SUBCUTANEOUS
Status: DISCONTINUED | OUTPATIENT
Start: 2022-04-26 | End: 2022-04-26 | Stop reason: HOSPADM

## 2022-04-26 RX ORDER — LOSARTAN POTASSIUM 25 MG/1
50 TABLET ORAL EVERY EVENING
Status: DISCONTINUED | OUTPATIENT
Start: 2022-04-26 | End: 2022-04-26 | Stop reason: HOSPADM

## 2022-04-26 RX ORDER — POLYETHYLENE GLYCOL 3350 17 G/17G
1 POWDER, FOR SOLUTION ORAL
Status: DISCONTINUED | OUTPATIENT
Start: 2022-04-26 | End: 2022-04-26 | Stop reason: HOSPADM

## 2022-04-26 RX ORDER — HALOPERIDOL 5 MG/ML
1 INJECTION INTRAMUSCULAR
Status: DISCONTINUED | OUTPATIENT
Start: 2022-04-26 | End: 2022-04-26 | Stop reason: HOSPADM

## 2022-04-26 RX ORDER — TRAZODONE HYDROCHLORIDE 50 MG/1
50 TABLET ORAL
Status: DISCONTINUED | OUTPATIENT
Start: 2022-04-26 | End: 2022-04-26 | Stop reason: HOSPADM

## 2022-04-26 RX ORDER — SODIUM CHLORIDE, SODIUM LACTATE, POTASSIUM CHLORIDE, CALCIUM CHLORIDE 600; 310; 30; 20 MG/100ML; MG/100ML; MG/100ML; MG/100ML
INJECTION, SOLUTION INTRAVENOUS CONTINUOUS
Status: DISCONTINUED | OUTPATIENT
Start: 2022-04-26 | End: 2022-04-26 | Stop reason: HOSPADM

## 2022-04-26 RX ORDER — SODIUM CHLORIDE, SODIUM LACTATE, POTASSIUM CHLORIDE, CALCIUM CHLORIDE 600; 310; 30; 20 MG/100ML; MG/100ML; MG/100ML; MG/100ML
INJECTION, SOLUTION INTRAVENOUS CONTINUOUS
Status: DISCONTINUED | OUTPATIENT
Start: 2022-04-26 | End: 2022-04-26

## 2022-04-26 RX ORDER — DIPHENHYDRAMINE HYDROCHLORIDE 50 MG/ML
12.5 INJECTION INTRAMUSCULAR; INTRAVENOUS
Status: DISCONTINUED | OUTPATIENT
Start: 2022-04-26 | End: 2022-04-26 | Stop reason: HOSPADM

## 2022-04-26 RX ORDER — HYDROMORPHONE HYDROCHLORIDE 1 MG/ML
0.2 INJECTION, SOLUTION INTRAMUSCULAR; INTRAVENOUS; SUBCUTANEOUS
Status: DISCONTINUED | OUTPATIENT
Start: 2022-04-26 | End: 2022-04-26 | Stop reason: HOSPADM

## 2022-04-26 RX ORDER — OMEPRAZOLE 20 MG/1
20 CAPSULE, DELAYED RELEASE ORAL DAILY
Status: DISCONTINUED | OUTPATIENT
Start: 2022-04-26 | End: 2022-04-26 | Stop reason: HOSPADM

## 2022-04-26 RX ORDER — TRAZODONE HYDROCHLORIDE 100 MG/1
100 TABLET ORAL NIGHTLY
COMMUNITY
End: 2022-07-12

## 2022-04-26 RX ORDER — METOPROLOL TARTRATE 1 MG/ML
5 INJECTION, SOLUTION INTRAVENOUS
Status: DISCONTINUED | OUTPATIENT
Start: 2022-04-26 | End: 2022-04-26 | Stop reason: HOSPADM

## 2022-04-26 RX ORDER — ONDANSETRON 2 MG/ML
4 INJECTION INTRAMUSCULAR; INTRAVENOUS EVERY 4 HOURS PRN
Status: DISCONTINUED | OUTPATIENT
Start: 2022-04-26 | End: 2022-04-26 | Stop reason: HOSPADM

## 2022-04-26 RX ORDER — MEPERIDINE HYDROCHLORIDE 25 MG/ML
6.25 INJECTION INTRAMUSCULAR; INTRAVENOUS; SUBCUTANEOUS
Status: DISCONTINUED | OUTPATIENT
Start: 2022-04-26 | End: 2022-04-26 | Stop reason: HOSPADM

## 2022-04-26 RX ORDER — ACETAMINOPHEN 325 MG/1
650 TABLET ORAL EVERY 6 HOURS PRN
Status: DISCONTINUED | OUTPATIENT
Start: 2022-04-26 | End: 2022-04-26 | Stop reason: HOSPADM

## 2022-04-26 RX ORDER — LORAZEPAM 2 MG/ML
0.5 INJECTION INTRAMUSCULAR EVERY 4 HOURS PRN
Status: DISCONTINUED | OUTPATIENT
Start: 2022-04-26 | End: 2022-04-26 | Stop reason: HOSPADM

## 2022-04-26 RX ORDER — OXYCODONE HCL 5 MG/5 ML
5 SOLUTION, ORAL ORAL
Status: DISCONTINUED | OUTPATIENT
Start: 2022-04-26 | End: 2022-04-26 | Stop reason: HOSPADM

## 2022-04-26 RX ORDER — SODIUM CHLORIDE 9 MG/ML
INJECTION, SOLUTION INTRAVENOUS CONTINUOUS
Status: DISCONTINUED | OUTPATIENT
Start: 2022-04-26 | End: 2022-04-26 | Stop reason: HOSPADM

## 2022-04-26 RX ADMIN — SODIUM CHLORIDE 1000 ML: 9 INJECTION, SOLUTION INTRAVENOUS at 01:40

## 2022-04-26 RX ADMIN — SENNOSIDES AND DOCUSATE SODIUM 2 TABLET: 50; 8.6 TABLET ORAL at 17:52

## 2022-04-26 RX ADMIN — METOPROLOL TARTRATE 25 MG: 25 TABLET, FILM COATED ORAL at 17:53

## 2022-04-26 RX ADMIN — PROPOFOL 80 MG: 10 INJECTION, EMULSION INTRAVENOUS at 16:42

## 2022-04-26 RX ADMIN — RIVAROXABAN 20 MG: 20 TABLET, FILM COATED ORAL at 18:03

## 2022-04-26 RX ADMIN — OMEPRAZOLE 20 MG: 20 CAPSULE, DELAYED RELEASE ORAL at 06:17

## 2022-04-26 RX ADMIN — LOSARTAN POTASSIUM 50 MG: 25 TABLET, FILM COATED ORAL at 17:53

## 2022-04-26 RX ADMIN — SODIUM CHLORIDE, POTASSIUM CHLORIDE, SODIUM LACTATE AND CALCIUM CHLORIDE: 600; 310; 30; 20 INJECTION, SOLUTION INTRAVENOUS at 16:35

## 2022-04-26 RX ADMIN — SODIUM CHLORIDE: 9 INJECTION, SOLUTION INTRAVENOUS at 06:14

## 2022-04-26 RX ADMIN — ENOXAPARIN SODIUM 80 MG: 80 INJECTION SUBCUTANEOUS at 06:18

## 2022-04-26 RX ADMIN — METOPROLOL TARTRATE 25 MG: 25 TABLET, FILM COATED ORAL at 02:22

## 2022-04-26 ASSESSMENT — LIFESTYLE VARIABLES
DO YOU DRINK ALCOHOL: NO
HAVE YOU EVER FELT YOU SHOULD CUT DOWN ON YOUR DRINKING: NO
EVER HAD A DRINK FIRST THING IN THE MORNING TO STEADY YOUR NERVES TO GET RID OF A HANGOVER: NO
ON A TYPICAL DAY WHEN YOU DRINK ALCOHOL HOW MANY DRINKS DO YOU HAVE: 0
ALCOHOL_USE: NO
CONSUMPTION TOTAL: NEGATIVE
TOTAL SCORE: 0
HAVE PEOPLE ANNOYED YOU BY CRITICIZING YOUR DRINKING: NO
TOTAL SCORE: 0
TOTAL SCORE: 0
EVER FELT BAD OR GUILTY ABOUT YOUR DRINKING: NO
TOTAL SCORE: 0
HAVE PEOPLE ANNOYED YOU BY CRITICIZING YOUR DRINKING: NO
EVER HAD A DRINK FIRST THING IN THE MORNING TO STEADY YOUR NERVES TO GET RID OF A HANGOVER: NO
AVERAGE NUMBER OF DAYS PER WEEK YOU HAVE A DRINK CONTAINING ALCOHOL: 0
HOW MANY TIMES IN THE PAST YEAR HAVE YOU HAD 5 OR MORE DRINKS IN A DAY: 0
HAVE YOU EVER FELT YOU SHOULD CUT DOWN ON YOUR DRINKING: NO
CONSUMPTION TOTAL: INCOMPLETE
TOTAL SCORE: 0
TOTAL SCORE: 0
EVER FELT BAD OR GUILTY ABOUT YOUR DRINKING: NO

## 2022-04-26 ASSESSMENT — CHA2DS2 SCORE
VASCULAR DISEASE: NO
AGE 65 TO 74: YES
AGE 75 OR GREATER: NO
PRIOR STROKE OR TIA OR THROMBOEMBOLISM: NO
DIABETES: YES
CHF OR LEFT VENTRICULAR DYSFUNCTION: NO
CHA2DS2 VASC SCORE: 3
HYPERTENSION: YES

## 2022-04-26 ASSESSMENT — ENCOUNTER SYMPTOMS
NAUSEA: 1
LOSS OF CONSCIOUSNESS: 0
WEAKNESS: 0
DIZZINESS: 1
ABDOMINAL PAIN: 0
NERVOUS/ANXIOUS: 1
VOMITING: 0
PALPITATIONS: 1
COUGH: 0
SORE THROAT: 0
FEVER: 0
MYALGIAS: 0
DOUBLE VISION: 0
DEPRESSION: 0
SPUTUM PRODUCTION: 0
FALLS: 0
NAUSEA: 0
DIARRHEA: 0
STRIDOR: 0
CONSTIPATION: 0
DIZZINESS: 0
HEADACHES: 0
BLURRED VISION: 0
NECK PAIN: 0
SHORTNESS OF BREATH: 1
SHORTNESS OF BREATH: 0
TINGLING: 0
CHILLS: 0

## 2022-04-26 ASSESSMENT — FIBROSIS 4 INDEX
FIB4 SCORE: 1.43
FIB4 SCORE: 1.43

## 2022-04-26 ASSESSMENT — PAIN DESCRIPTION - PAIN TYPE
TYPE: SURGICAL PAIN
TYPE: SURGICAL PAIN

## 2022-04-26 ASSESSMENT — COGNITIVE AND FUNCTIONAL STATUS - GENERAL
MOBILITY SCORE: 24
SUGGESTED CMS G CODE MODIFIER MOBILITY: CH
SUGGESTED CMS G CODE MODIFIER DAILY ACTIVITY: CH
DAILY ACTIVITIY SCORE: 24

## 2022-04-26 ASSESSMENT — PATIENT HEALTH QUESTIONNAIRE - PHQ9
2. FEELING DOWN, DEPRESSED, IRRITABLE, OR HOPELESS: NOT AT ALL
SUM OF ALL RESPONSES TO PHQ9 QUESTIONS 1 AND 2: 0
1. LITTLE INTEREST OR PLEASURE IN DOING THINGS: NOT AT ALL

## 2022-04-26 NOTE — ANESTHESIA POSTPROCEDURE EVALUATION
Patient: Judy Blackburn    Procedure Summary     Date: 04/26/22 Room / Location:  OR 03 / SURGERY [facility]    Anesthesia Start: 1635 Anesthesia Stop: 1650    Procedure: ECHOCARDIOGRAM, TRANSESOPHAGEAL (N/A ) Diagnosis: (atrial fibrillation rapid ventricular response)    Surgeons: Reymundo Garcia M.D. Responsible Provider: Stew Vickers M.D.    Anesthesia Type: general ASA Status: 3          Final Anesthesia Type: general  Last vitals  BP   Blood Pressure : 128/86    Temp   36.6 °C (97.9 °F)    Pulse   95   Resp   16    SpO2   94 %      Anesthesia Post Evaluation    Patient location during evaluation: PACU  Patient participation: complete - patient participated  Level of consciousness: awake and alert    Airway patency: patent  Anesthetic complications: no  Cardiovascular status: hemodynamically stable  Respiratory status: acceptable  Hydration status: euvolemic    PONV: none          No complications documented.     Nurse Pain Score: 0 (NPRS)

## 2022-04-26 NOTE — ANESTHESIA TIME REPORT
Anesthesia Start and Stop Event Times     Date Time Event    4/26/2022 1630 Ready for Procedure     1635 Anesthesia Start     1650 Anesthesia Stop        Responsible Staff  04/26/22    Name Role Begin End    Stew Vickers M.D. Anesth 1635 1650        Overtime Reason:  no overtime (within assigned shift)    Comments:

## 2022-04-26 NOTE — PROGRESS NOTES
Telemetry Shift Summary    Rhythm AFib  HR Range 100s-120s, up to 187 non-sustained  Ectopy rare PVC  Measurements --/0.08/--        Normal Values  Rhythm SR  HR Range    Measurements 0.12-0.20 / 0.06-0.10  / 0.30-0.52

## 2022-04-26 NOTE — ASSESSMENT & PLAN NOTE
- She does have some chronic anxiety, does use Xanax  -Acutely worsened due to atrial fibrillation with RVR  -Start as needed Ativan

## 2022-04-26 NOTE — ED NOTES
Chief Complaint   Patient presents with   • Irregular Heart Beat     Pt states felt irregular HR and anxious about one hour PTA.  Pt stated felt heart was pounding.     BP (!) 174/104   Pulse 76   Resp 16   SpO2 95%     Pt ambulated to ED w/ visitor for c/o irregular HR, feelings of anxiety and nausea started just prior to arrival.  Pt currently denies c/o CP, denies feeling short of breath.

## 2022-04-26 NOTE — ED PROVIDER NOTES
ED Provider Note    Chief Complaint:   Palpitations    HPI:  Judy Blackburn is a very pleasant 67-year-old female with past medical history of diabetes, hyperlipidemia, hypertension who presents with irregular heartbeat, shortness of breath, lightheadedness, left-sided chest wall pain.  Patient reports that the symptoms started out of nowhere 1 hour ago.  Patient also endorses nausea.  Patient thought she was experiencing A. fib given that her sister has a diagnosis of atrial fibrillation.  Patient also endorses some weakness.  Patient denies lower extremity swelling or pain.    Review of Systems:  Review of Systems   Constitutional: Positive for malaise/fatigue. Negative for chills and fever.   HENT: Negative for congestion and sore throat.    Eyes: Negative for blurred vision and double vision.   Respiratory: Positive for shortness of breath. Negative for cough.    Cardiovascular: Positive for chest pain. Negative for leg swelling.   Gastrointestinal: Positive for nausea. Negative for abdominal pain and vomiting.   Genitourinary: Negative for dysuria and hematuria.   Musculoskeletal: Negative for falls and neck pain.   Skin: Negative for itching and rash.   Neurological: Positive for dizziness. Negative for loss of consciousness and headaches.       Past Medical History:   has a past medical history of Anxiety, Diabetes (HCC), Hyperlipidemia, and Hypertension.    Social History:  Social History     Tobacco Use   • Smoking status: Never Smoker   • Smokeless tobacco: Never Used   Vaping Use   • Vaping Use: Never used   Substance and Sexual Activity   • Alcohol use: Yes   • Drug use: Never   • Sexual activity: Not on file       Surgical History:   has a past surgical history that includes lumpectomy.    Allergies:  Allergies   Allergen Reactions   • Codeine Vomiting and Nausea       Physical Exam:  Vital Signs: /71   Pulse 89   Resp 18   SpO2 94%   Physical Exam  Vitals and nursing note reviewed.    Constitutional:       Comments: Patient is lying in bed supine, pleasant, conversant, speaking in complete sentences   HENT:      Head: Normocephalic and atraumatic.   Eyes:      Extraocular Movements: Extraocular movements intact.      Conjunctiva/sclera: Conjunctivae normal.      Pupils: Pupils are equal, round, and reactive to light.   Cardiovascular:      Rate and Rhythm: Rhythm irregular.      Pulses: Normal pulses.      Comments:   Pulmonary:      Effort: Pulmonary effort is normal. No respiratory distress.   Musculoskeletal:         General: No swelling or tenderness. Normal range of motion.      Cervical back: Normal range of motion. No rigidity.      Right lower leg: No edema.      Left lower leg: No edema.   Skin:     General: Skin is warm and dry.      Capillary Refill: Capillary refill takes less than 2 seconds.   Neurological:      Mental Status: She is alert.         Medical records reviewed for continuity of care.     Results for orders placed or performed during the hospital encounter of 04/26/22   CBC WITH DIFFERENTIAL   Result Value Ref Range    WBC 8.1 4.8 - 10.8 K/uL    RBC 4.68 4.20 - 5.40 M/uL    Hemoglobin 14.5 12.0 - 16.0 g/dL    Hematocrit 43.2 37.0 - 47.0 %    MCV 92.3 81.4 - 97.8 fL    MCH 31.0 27.0 - 33.0 pg    MCHC 33.6 33.6 - 35.0 g/dL    RDW 42.5 35.9 - 50.0 fL    Platelet Count 252 164 - 446 K/uL    MPV 10.3 9.0 - 12.9 fL    Neutrophils-Polys 44.00 44.00 - 72.00 %    Lymphocytes 42.80 (H) 22.00 - 41.00 %    Monocytes 9.00 0.00 - 13.40 %    Eosinophils 3.60 0.00 - 6.90 %    Basophils 0.50 0.00 - 1.80 %    Immature Granulocytes 0.10 0.00 - 0.90 %    Nucleated RBC 0.00 /100 WBC    Neutrophils (Absolute) 3.55 2.00 - 7.15 K/uL    Lymphs (Absolute) 3.46 1.00 - 4.80 K/uL    Monos (Absolute) 0.73 0.00 - 0.85 K/uL    Eos (Absolute) 0.29 0.00 - 0.51 K/uL    Baso (Absolute) 0.04 0.00 - 0.12 K/uL    Immature Granulocytes (abs) 0.01 0.00 - 0.11 K/uL    NRBC (Absolute) 0.00 K/uL   Comp  Metabolic Panel   Result Value Ref Range    Sodium 142 135 - 145 mmol/L    Potassium 3.8 3.6 - 5.5 mmol/L    Chloride 107 96 - 112 mmol/L    Co2 24 20 - 33 mmol/L    Anion Gap 11.0 7.0 - 16.0    Glucose 106 (H) 65 - 99 mg/dL    Bun 20 8 - 22 mg/dL    Creatinine 0.50 0.50 - 1.40 mg/dL    Calcium 9.8 8.4 - 10.2 mg/dL    AST(SGOT) 26 12 - 45 U/L    ALT(SGPT) 24 2 - 50 U/L    Alkaline Phosphatase 86 30 - 99 U/L    Total Bilirubin 0.3 0.1 - 1.5 mg/dL    Albumin 4.6 3.2 - 4.9 g/dL    Total Protein 6.8 6.0 - 8.2 g/dL    Globulin 2.2 1.9 - 3.5 g/dL    A-G Ratio 2.1 g/dL   TROPONIN   Result Value Ref Range    Troponin T <6 6 - 19 ng/L   URINALYSIS    Specimen: Urine, Cath   Result Value Ref Range    Color Yellow     Character Clear     Specific Gravity 1.010 <1.035    Ph 7.5 5.0 - 8.0    Glucose Negative Negative mg/dL    Ketones Negative Negative mg/dL    Protein Negative Negative mg/dL    Bilirubin Negative Negative    Nitrite Negative Negative    Leukocyte Esterase Negative Negative    Occult Blood Trace (A) Negative    Micro Urine Req Microscopic    URINE MICROSCOPIC (W/UA)   Result Value Ref Range    WBC 0-2 /hpf    RBC 2-5 (A) /hpf    Bacteria Few (A) None /hpf    Epithelial Cells Rare Few /hpf    Mucous Threads Rare /hpf   ESTIMATED GFR   Result Value Ref Range    GFR (CKD-EPI) 102 >60 mL/min/1.73 m 2   EKG   Result Value Ref Range    Report       [facility].    Test Date:  2022  Pt Name:    ADÁN LOPEZ                Department: [facility]  MRN:        [id number]                      Room:       -ROOM 6  Gender:     Female                       Technician: HALIMA  :        [id number]                   Requested By:AUNG WISEMAN  Order #:    [id number]                    Reading MD: Aung Wiseman MD    Measurements  Intervals                                Axis  Rate:       137                          P:  WV:                                      QRS:        2  QRSD:        92                           T:          -18  QT:         314  QTc:        474    Interpretive Statements  Atrial fibrillation  Normal axis  No ST elevations  ST depressions present in V3 and V4    Electronically Signed On 4- 3:25:50 PDT by Reymundo Wiseman MD         Radiology:  DX-CHEST-PORTABLE (1 VIEW)   Final Result      1.  Hypoventilatory changes without definite acute cardiopulmonary abnormality.   2.  Cardiomegaly.           MDM:  Chest x-ray to evaluate for acute cardiopulmonary process such as pneumonia, pulmonary edema, pneumothorax.  CBC to evaluate for acute anemia and leukocytosis.  CMP to evaluate for acute electrolyte abnormality, acute kidney injury, acute liver failure or dysfunction.  Troponin to evaluate for ACS, EKG demonstrates findings consistent with atrial fibrillation and LVH but no obvious ischemic changes.  IV fluids given, metoprolol will likely be required.  Disposition pending work-up and symptom control.    Electronically signed by: Reymundo Wiseman M.D., 4/26/2022, 1:37 AM    CMP demonstrates no evidence of acute kidney injury, acute electrolyte abnormality, acute liver failure, CBC demonstrates no evidence of acute anemia or leukocytosis.  Urinalysis demonstrates no evidence of UTI.  Chest x-ray demonstrates no evidence of acute cardiopulmonary process.  EKG demonstrates atrial fibrillation and some ST depressions however troponin is negative, NSTEMI less likely at this time.  Dr. Diaz of cardiology has been consulted and recommends inpatient admission for cardioversion.  Dr. Torrez has been consulted and has graciously accepted the patient to his service for admission.    This dictation has been created using voice recognition software. I am continuously working with the software to minimize the number of voice recognition errors and I have made every attempt to manually correct the errors within my dictation. However errors  related to this voice  recognition software may still exist and should be interpreted within the appropriate context.     Electronically signed by: Reymundo Wiseman M.D., 4/26/2022 3:27 AM    Critical Care    I spent 41 minutes of critical care time with this patient. This does not include time spent on separately reported billable procedures.   This includes pulse ox interpretation, medical record review when available, interpretation of labs and imaging, specialist consultation, and hemodynamic monitoring.      Disposition:  Hospital medicine floor    Final Impression:  1. Atrial fibrillation with RVR (HCC)    2. Chest pain, unspecified type

## 2022-04-26 NOTE — HOSPITAL COURSE
67-year-old female [facility] anxiety, T2DM, hyperlipidemia, hypertension admitted 4/26/2022 with palpitations.  Patient was found to have A. fib with RVR on admission, given metoprolol with improvement in heart rate, atrial fibrillation persisting.  Cardiology consulted.  Recommending cardioversion.

## 2022-04-26 NOTE — H&P
Hospital Medicine History & Physical Note    Date of Service  4/26/2022    Primary Care Physician  Lonny Mckinney M.D.    Consultants  cardiology    Specialist Names: Dr Diaz    Code Status  Full Code    Chief Complaint  Chief Complaint   Patient presents with   • Irregular Heart Beat     Pt states felt irregular HR and anxious about one hour PTA.  Pt stated felt heart was pounding.       History of Presenting Illness  Judy Blackburn is a 67 y.o. female who presented 4/26/2022 with palpitations.  Patient states she was in her usual state of health until approximately midnight whenever she began having palpitations.  Patient states her heart was pounding and felt irregular, the sensation went to her neck.  She states she became very anxious because of this.  Symptoms persisted so she presented to the emergency department.  She has no history of atrial fibrillation however upon arrival, EKG did show atrial fibrillation with RVR.  Patient was given metoprolol with an improvement in her rate however her atrial fibrillation persists.  ERP did discuss the case with cardiology.  I did discuss the case including labs and imaging with the ER physician.    I discussed the plan of care with patient and family.    Review of Systems  Review of Systems   Constitutional: Negative for chills, fever and malaise/fatigue.   HENT: Negative for congestion.    Respiratory: Negative for cough, sputum production, shortness of breath and stridor.    Cardiovascular: Positive for palpitations. Negative for chest pain and leg swelling.   Gastrointestinal: Negative for abdominal pain, constipation, diarrhea, nausea and vomiting.   Genitourinary: Negative for dysuria and urgency.   Musculoskeletal: Negative for falls and myalgias.   Neurological: Negative for dizziness, tingling, loss of consciousness, weakness and headaches.   Psychiatric/Behavioral: Negative for depression and suicidal ideas. The patient is nervous/anxious.    All other  systems reviewed and are negative.      Past Medical History   has a past medical history of Anxiety, Diabetes (HCC), Hyperlipidemia, and Hypertension.    Surgical History   has a past surgical history that includes lumpectomy.     Family History  family history is not on file.   Family history reviewed with patient. There is no family history that is pertinent to the chief complaint.     Social History   reports that she has never smoked. She has never used smokeless tobacco. She reports current alcohol use. She reports that she does not use drugs.    Allergies  Allergies   Allergen Reactions   • Codeine Vomiting and Nausea       Medications  Prior to Admission Medications   Prescriptions Last Dose Informant Patient Reported? Taking?   Coenzyme Q10 (COQ10 PO)   Yes No   Sig: Take  by mouth.   Magnesium 250 MG Tab 4/25/2022 at Unknown time  Yes No   Sig: Take  by mouth every bedtime.   Multiple Vitamin (MULTIVITAMIN) capsule 4/25/2022 at Unknown time  Yes No   Sig: Take 1 Cap by mouth every day.   aspirin EC (ECOTRIN) 81 MG Tablet Delayed Response 4/26/2022 at Unknown time Patient Yes No   Sig: Take 81 mg by mouth every evening.   atorvastatin (LIPITOR) 40 MG Tab 4/25/2022 at Unknown time  No No   Sig: TAKE 2 TABLETS BY MOUTH AT BEDTIME   fluocinolone (SYNALAR) 0.025 % ointment   Yes No   losartan (COZAAR) 50 MG Tab 4/25/2022 at Unknown time  No No   Sig: Take 1 Tablet by mouth every evening.   metFORMIN ER (GLUCOPHAGE XR) 500 MG TABLET SR 24 HR 4/25/2022 at Unknown time  No No   Sig: Take 1 Tablet by mouth every day.   omeprazole (PRILOSEC) 20 MG delayed-release capsule 4/25/2022 at Unknown time  No No   Sig: TAKE 1 CAPSULE BY MOUTH EVERY DAY   propranolol LA (INDERAL LA) 60 MG CAPSULE SR 24 HR 4/25/2022 at Unknown time  No No   Sig: Take 1 Capsule by mouth every day.   traZODone (DESYREL) 100 MG Tab 4/25/2022 at Unknown time  No No   Sig: TAKE 1/2 TABLET BY MOUTH EVERY NIGHT AT BEDTIME FOR 7 DAYS. CONTINUE WITH 1  TABLET BY MOUTH EVERY NIGHT AT BEDTIME      Facility-Administered Medications: None       Physical Exam  Pulse:  [] 86  Resp:  [16-18] 16  BP: (126-174)/() 126/71  SpO2:  [93 %-95 %] 93 %  Blood Pressure : 126/71       Pulse: 86   Respiration: 16   Pulse Oximetry: 93 %       Physical Exam  Vitals and nursing note reviewed.   Constitutional:       General: She is not in acute distress.     Appearance: She is well-developed. She is not diaphoretic.   HENT:      Head: Normocephalic and atraumatic.      Right Ear: External ear normal.      Left Ear: External ear normal.      Nose: Nose normal. No congestion or rhinorrhea.      Mouth/Throat:      Mouth: Mucous membranes are dry.      Pharynx: No oropharyngeal exudate.   Eyes:      General:         Right eye: No discharge.         Left eye: No discharge.      Extraocular Movements: Extraocular movements intact.   Neck:      Trachea: No tracheal deviation.   Cardiovascular:      Rate and Rhythm: Tachycardia present. Rhythm irregularly irregular.      Heart sounds: No murmur heard.    No friction rub. No gallop.   Pulmonary:      Effort: Pulmonary effort is normal. No respiratory distress.      Breath sounds: Normal breath sounds. No stridor. No wheezing or rales.   Chest:      Chest wall: No tenderness.   Abdominal:      General: Bowel sounds are normal. There is no distension.      Palpations: Abdomen is soft.      Tenderness: There is no abdominal tenderness.   Musculoskeletal:         General: No tenderness. Normal range of motion.      Cervical back: Normal range of motion and neck supple.      Right lower leg: No edema.      Left lower leg: No edema.   Lymphadenopathy:      Cervical: No cervical adenopathy.   Skin:     General: Skin is warm and dry.      Findings: No erythema or rash.   Neurological:      General: No focal deficit present.      Mental Status: She is alert and oriented to person, place, and time.      Cranial Nerves: No cranial nerve  deficit.   Psychiatric:         Mood and Affect: Mood normal.         Behavior: Behavior normal.         Thought Content: Thought content normal.         Judgment: Judgment normal.         Laboratory:  Recent Labs     04/26/22 0149   WBC 8.1   RBC 4.68   HEMOGLOBIN 14.5   HEMATOCRIT 43.2   MCV 92.3   MCH 31.0   MCHC 33.6   RDW 42.5   PLATELETCT 252   MPV 10.3     Recent Labs     04/26/22 0149   SODIUM 142   POTASSIUM 3.8   CHLORIDE 107   CO2 24   GLUCOSE 106*   BUN 20   CREATININE 0.50   CALCIUM 9.8     Recent Labs     04/26/22 0149   ALTSGPT 24   ASTSGOT 26   ALKPHOSPHAT 86   TBILIRUBIN 0.3   GLUCOSE 106*         No results for input(s): NTPROBNP in the last 72 hours.      Recent Labs     04/26/22 0149   TROPONINT <6       Imaging:  DX-CHEST-PORTABLE (1 VIEW)   Final Result      1.  Hypoventilatory changes without definite acute cardiopulmonary abnormality.   2.  Cardiomegaly.      EC-ECHOCARDIOGRAM COMPLETE W/O CONT    (Results Pending)       EKG:  I have personally reviewed the images and compared with prior images.    Assessment/Plan:  I anticipate this patient is appropriate for observation status at this time.    * Atrial fibrillation with RVR (HCC)- (present on admission)  Assessment & Plan  - Rate is not improved yet still slightly tachycardic  -Remains in atrial fibrillation  -Start full dose Lovenox  -ERP did discuss the case with cardiology who is planning cardioversion  -Start scheduled oral metoprolol as well as as needed IV metoprolol  -Obtain echocardiogram  -Obtain TSH and free T4    Anxiety- (present on admission)  Assessment & Plan  - She does have some chronic anxiety, does use Xanax  -Acutely worsened due to atrial fibrillation with RVR  -Start as needed Ativan    GERD (gastroesophageal reflux disease)- (present on admission)  Assessment & Plan  - Continue home PPI    Essential hypertension- (present on admission)  Assessment & Plan  - Continue home Cozaar  -Start metoprolol    Dehydration-  (present on admission)  Assessment & Plan  - Start IV fluids    Other hyperlipidemia- (present on admission)  Assessment & Plan  - Continue home statin      VTE prophylaxis: therapeutic anticoagulation with Lovenox

## 2022-04-26 NOTE — OR NURSING
Pt allergies and NPO status verified, home meds reviewed. Belongings secured. Pt verbalizes understanding of the pain scale, expected course of stay, and plan of care.  Procedure verified with pt.  IV access assessed, patent.

## 2022-04-26 NOTE — ED NOTES
Attempted to give report to ELISA Ferro he is unable to take report at this time and has requested to call this RN back.  Report pending.

## 2022-04-26 NOTE — ED NOTES
Pt transported to room 309-01, BP prior to transport 100/60, -130 irregular.  Upon arrival to room, pt stable, ambulated to br steady gait. ELISA Ferro @bs.

## 2022-04-26 NOTE — CONSULTS
Reason for Consult:  Asked by Dr Jero Jarrell M.D. to see this patient with atrial fibrillation rapid ventricular response  Patient's PCP: Lonny Mckinney M.D.    CC:   Chief Complaint   Patient presents with   • Irregular Heart Beat     Pt states felt irregular HR and anxious about one hour PTA.  Pt stated felt heart was pounding.       HPI:  [This is a very pleasant 67-year-old woman with hypertension and seen Dr. Duran last year presents with an hour of palpitation and was found to have atrial fibrillation rapid ventricular response in the ER she is given beta-blockers with some response but remains with a heart rate of 120s she did improve with anxiety as that was initially quite severe when symptoms presented she has never had anything like this before her twin sister does have atrial fibrillation as well so she understands the treatment option and treatment plan.  She has no contraindication to anticoagulation she would prefer to take as few medications as possible but understands the importance of stroke prevention reason she presented last evening in the middle the night with because of her concern for stroke.  She recently started  omeprazole was concerned that might of prompted her current episode    Medications / Drug list prior to admission:  No current facility-administered medications on file prior to encounter.     Current Outpatient Medications on File Prior to Encounter   Medication Sig Dispense Refill   • traZODone (DESYREL) 100 MG Tab Take 100 mg by mouth every evening.     • Naproxen (NAPROSYN PO) Take 1 Tablet by mouth 2 times a day as needed (For pain). OTC, pt is not sure the strength     • ALPRAZolam (XANAX) 0.5 MG Tab Take 0.5 mg by mouth 3 times a day as needed.     • omeprazole (PRILOSEC) 20 MG delayed-release capsule TAKE 1 CAPSULE BY MOUTH EVERY DAY 90 Capsule 0   • atorvastatin (LIPITOR) 40 MG Tab TAKE 2 TABLETS BY MOUTH AT BEDTIME 180 Tablet 1   • propranolol LA (INDERAL  LA) 60 MG CAPSULE SR 24 HR Take 1 Capsule by mouth every day. 90 Capsule 2   • metFORMIN ER (GLUCOPHAGE XR) 500 MG TABLET SR 24 HR Take 1 Tablet by mouth every day. (Patient taking differently: Take 500 mg by mouth at bedtime.) 90 Tablet 3   • losartan (COZAAR) 50 MG Tab Take 1 Tablet by mouth every evening. 90 Tablet 2   • MAGNESIUM PO Take 1 Tablet by mouth at bedtime.     • aspirin EC (ECOTRIN) 81 MG Tablet Delayed Response Take 81 mg by mouth every evening.         Current list of administered Medications:    Current Facility-Administered Medications:   •  atorvastatin (LIPITOR) tablet 80 mg, 80 mg, Oral, QHS, BOB SniderO.  •  losartan (COZAAR) tablet 50 mg, 50 mg, Oral, Q EVENING, Ken Torrez D.O.  •  omeprazole (PRILOSEC) capsule 20 mg, 20 mg, Oral, DAILY, BOB SniderO., 20 mg at 04/26/22 0617  •  traZODone (DESYREL) tablet 50 mg, 50 mg, Oral, QHS, DINA Snider.O.  •  senna-docusate (PERICOLACE or SENOKOT S) 8.6-50 MG per tablet 2 Tablet, 2 Tablet, Oral, BID **AND** polyethylene glycol/lytes (MIRALAX) PACKET 1 Packet, 1 Packet, Oral, QDAY PRN **AND** magnesium hydroxide (MILK OF MAGNESIA) suspension 30 mL, 30 mL, Oral, QDAY PRN **AND** bisacodyl (DULCOLAX) suppository 10 mg, 10 mg, Rectal, QDAY PRN, BOB SniderO.  •  NS infusion, , Intravenous, Continuous, BOB SniderOEvie, Last Rate: 75 mL/hr at 04/26/22 0614, New Bag at 04/26/22 0614  •  acetaminophen (Tylenol) tablet 650 mg, 650 mg, Oral, Q6HRS PRN, BOB SniderO.  •  metoprolol tartrate (LOPRESSOR) tablet 25 mg, 25 mg, Oral, TWICE DAILY, BOB SniderO.  •  Metoprolol Tartrate (LOPRESSOR) injection 5 mg, 5 mg, Intravenous, Q5 MIN PRN, Ken Torrez D.O.  •  enoxaparin (LOVENOX) inj 80 mg, 1 mg/kg, Subcutaneous, Q12HRS, Ken Torrez D.O., 80 mg at 04/26/22 0618  •  ondansetron (ZOFRAN) syringe/vial injection 4 mg, 4 mg, Intravenous, Q4HRS PRN, Jero Jarrell M.D.  •  ondansetron (ZOFRAN ODT)  "dispertab 4 mg, 4 mg, Oral, Q4HRS PRN, Ken Torrez D.O.  •  LORazepam (ATIVAN) injection 0.5 mg, 0.5 mg, Intravenous, Q4HRS PRN, Ken Torrez D.O.    Past Medical History:   Diagnosis Date   • Anxiety    • Diabetes (HCC)    • Hyperlipidemia    • Hypertension        Past Surgical History:   Procedure Laterality Date   • LUMPECTOMY         Family History   Problem Relation Age of Onset   • Arrythmia Sister      Patient family history was personally reviewed, no pertinent family history to current presentation    Social History     Tobacco Use   • Smoking status: Never Smoker   • Smokeless tobacco: Never Used   Vaping Use   • Vaping Use: Never used   Substance Use Topics   • Alcohol use: Yes   • Drug use: Never       ALLERGIES:  Allergies   Allergen Reactions   • Codeine Vomiting and Nausea       Review of systems:  A complete review of symptoms was reviewed with patient. This is reviewed in H&P and PMH. ALL OTHERS reviewed and negative    Physical exam:  Patient Vitals for the past 24 hrs:   BP Temp Temp src Pulse Resp SpO2 Height Weight   04/26/22 1239 118/77 -- -- 83 18 93 % -- --   04/26/22 0753 123/76 36.3 °C (97.4 °F) Oral (!) 102 18 92 % -- --   04/26/22 0613 103/68 36.2 °C (97.2 °F) Oral 80 16 94 % 1.702 m (5' 7\") 78.1 kg (172 lb 2.9 oz)   04/26/22 0504 (!) 98/59 -- -- 80 (!) 24 92 % -- --   04/26/22 0419 -- -- -- -- -- -- -- 74.8 kg (165 lb)   04/26/22 0332 126/71 -- -- 86 16 93 % -- --   04/26/22 0258 126/71 -- -- 89 18 94 % -- --   04/26/22 0222 132/84 -- -- (!) 138 -- -- -- --   04/26/22 0117 (!) 174/104 -- -- 76 16 95 % -- --     General: No acute distress.   EYES: no jaundice  HEENT: OP clear   Neck:  No JVD.   CVS: Irregular S1 + S2. No M/R/G  Resp: Normal respiratory effort, CTAB. No wheezing or crackles/rhonchi.  Abdomen: Soft, ND,  Skin: Grossly nothing acute no obvious rashes  Neurological: Alert, Moves all extremities  Extremities:   No edema. No cyanosis.       Data:  Laboratory studies " personally reviewed by me:  Recent Results (from the past 24 hour(s))   EKG    Collection Time: 22  1:08 AM   Result Value Ref Range    Report       [facility] Emergency Dept.    Test Date:  2022  Pt Name:    ADÁN LOPEZ                Department: [facility]  MRN:        [id number]                      Room:       [place] [address]  Gender:     Female                       Technician: HALIMA  :        1955                   Requested By:AUNG WISEMAN  Order #:    [id number]                    Reading MD: Aung Wiseman MD    Measurements  Intervals                                Axis  Rate:       137                          P:  DC:                                      QRS:        2  QRSD:       92                           T:          -18  QT:         314  QTc:        474    Interpretive Statements  Atrial fibrillation  Normal axis  No ST elevations  ST depressions present in V3 and V4    Electronically Signed On 2022 3:25:50 PDT by Aung Wiseman MD     URINALYSIS    Collection Time: 22  1:35 AM    Specimen: Urine, Cath   Result Value Ref Range    Color Yellow     Character Clear     Specific Gravity 1.010 <1.035    Ph 7.5 5.0 - 8.0    Glucose Negative Negative mg/dL    Ketones Negative Negative mg/dL    Protein Negative Negative mg/dL    Bilirubin Negative Negative    Nitrite Negative Negative    Leukocyte Esterase Negative Negative    Occult Blood Trace (A) Negative    Micro Urine Req Microscopic    URINE MICROSCOPIC (W/UA)    Collection Time: 22  1:35 AM   Result Value Ref Range    WBC 0-2 /hpf    RBC 2-5 (A) /hpf    Bacteria Few (A) None /hpf    Epithelial Cells Rare Few /hpf    Mucous Threads Rare /hpf   CBC WITH DIFFERENTIAL    Collection Time: 22  1:49 AM   Result Value Ref Range    WBC 8.1 4.8 - 10.8 K/uL    RBC 4.68 4.20 - 5.40 M/uL    Hemoglobin 14.5 12.0 - 16.0 g/dL    Hematocrit 43.2 37.0 - 47.0 %    MCV 92.3 81.4 - 97.8 fL    MCH  31.0 27.0 - 33.0 pg    MCHC 33.6 33.6 - 35.0 g/dL    RDW 42.5 35.9 - 50.0 fL    Platelet Count 252 164 - 446 K/uL    MPV 10.3 9.0 - 12.9 fL    Neutrophils-Polys 44.00 44.00 - 72.00 %    Lymphocytes 42.80 (H) 22.00 - 41.00 %    Monocytes 9.00 0.00 - 13.40 %    Eosinophils 3.60 0.00 - 6.90 %    Basophils 0.50 0.00 - 1.80 %    Immature Granulocytes 0.10 0.00 - 0.90 %    Nucleated RBC 0.00 /100 WBC    Neutrophils (Absolute) 3.55 2.00 - 7.15 K/uL    Lymphs (Absolute) 3.46 1.00 - 4.80 K/uL    Monos (Absolute) 0.73 0.00 - 0.85 K/uL    Eos (Absolute) 0.29 0.00 - 0.51 K/uL    Baso (Absolute) 0.04 0.00 - 0.12 K/uL    Immature Granulocytes (abs) 0.01 0.00 - 0.11 K/uL    NRBC (Absolute) 0.00 K/uL   Comp Metabolic Panel    Collection Time: 04/26/22  1:49 AM   Result Value Ref Range    Sodium 142 135 - 145 mmol/L    Potassium 3.8 3.6 - 5.5 mmol/L    Chloride 107 96 - 112 mmol/L    Co2 24 20 - 33 mmol/L    Anion Gap 11.0 7.0 - 16.0    Glucose 106 (H) 65 - 99 mg/dL    Bun 20 8 - 22 mg/dL    Creatinine 0.50 0.50 - 1.40 mg/dL    Calcium 9.8 8.4 - 10.2 mg/dL    AST(SGOT) 26 12 - 45 U/L    ALT(SGPT) 24 2 - 50 U/L    Alkaline Phosphatase 86 30 - 99 U/L    Total Bilirubin 0.3 0.1 - 1.5 mg/dL    Albumin 4.6 3.2 - 4.9 g/dL    Total Protein 6.8 6.0 - 8.2 g/dL    Globulin 2.2 1.9 - 3.5 g/dL    A-G Ratio 2.1 g/dL   TROPONIN    Collection Time: 04/26/22  1:49 AM   Result Value Ref Range    Troponin T <6 6 - 19 ng/L   ESTIMATED GFR    Collection Time: 04/26/22  1:49 AM   Result Value Ref Range    GFR (CKD-EPI) 102 >60 mL/min/1.73 m 2   Troponin    Collection Time: 04/26/22  8:44 AM   Result Value Ref Range    Troponin T 9 6 - 19 ng/L   FREE THYROXINE    Collection Time: 04/26/22  8:44 AM   Result Value Ref Range    Free T-4 1.35 0.93 - 1.70 ng/dL   TSH    Collection Time: 04/26/22  8:44 AM   Result Value Ref Range    TSH 1.390 0.380 - 5.330 uIU/mL   EC-ECHOCARDIOGRAM COMPLETE W/O CONT    Collection Time: 04/26/22  9:22 AM   Result Value Ref  Range    Left Ventrical Ejection Fraction 65        Imaging:  EC-ECHOCARDIOGRAM COMPLETE W/O CONT   Final Result      DX-CHEST-PORTABLE (1 VIEW)   Final Result      1.  Hypoventilatory changes without definite acute cardiopulmonary abnormality.   2.  Cardiomegaly.      EC-FAHAD W/O CONT    (Results Pending)   CL-CARDIOVERSION    (Results Pending)           EKG tracings personally reviewed by me atrial fibrillation new compared to prior    Echocardiogram images personally reviewed by me show preserved ejection fraction, no significant valve disease    All pertinent features of laboratory and imaging reviewed including primary images where applicable      Principal Problem:    Atrial fibrillation with RVR (HCC) POA: Yes  Active Problems:    Other hyperlipidemia POA: Yes    Dehydration POA: Yes    Essential hypertension POA: Yes      Overview: with white coat effect            Formatting of this note might be different from the original.      with white coat effect    GERD (gastroesophageal reflux disease) POA: Yes    Anxiety POA: Yes  Resolved Problems:    * No resolved hospital problems. *      Assessment / Plan:  Atrial fibrillation with RVR we discussed the treatment options she is agrees to FAHAD guided cardioversion    The risks, benefits, and alternatives to electrical cardioversion were discussed in great detail. We discussed that conversion of atrial fibrillation to normal rhythm, at least transiently, is successful in 90 to 95% of patients. However, maintaining a normal rhythm depends on a number of factors, including underlying heart disease and antiarrhythmic medications. Atrial fibrillation often recurs with time and other treatments may be necessary. Risks of cardioversion are low as long as anticoagulation issues are handled appropriately. There is a small (less than 1%) risk of embolic events, including stroke. Risks of electrical shock include mild muscle soreness and mild skin burning at the site of  electrode placement. There is also a risk that cardioversion can stimulate more dangerous arrhythmias. The patient verbalized understanding of these potential complications and wishes to proceed with this procedure.    The risks, benefits, and alternatives to transesophageal echocardiogram (FAHAD) with IV sedation were discussed with the patient in specific detail, including oropharyngeal and esophageal traumas including hoarseness and dysphagia after the procedure. Rare cases demonstrating serious or fatal complications associated with FAHAD have been reported in the adult population, including cardiac, pulmonary and bleeding complications in less than 1% of people. Patients with an identified intracardiac thrombus are at increased risk for embolic events (absolute risk uncertain, range 0%-38%), and this appears to be reduced with anticoagulation therapy (absolute risk reduction uncertain). The patient verbalized understanding about these possible complications, and wishes to proceed with this procedure.      I personally discussed her case with  Dr Jero Jarrell M.D.      It is my pleasure to participate in the care of Ms. Blackburn.  Please do not hesitate to contact me with questions or concerns.    Reymundo Garcia MD PhD [facility]  Cardiologist [facility]    4/26/2022    Please note that this dictation was created using voice recognition software. There may be errors I did not discover before finalizing the note.

## 2022-04-26 NOTE — PROGRESS NOTES
Pt arrives to unit from ER via [place]. Ambulated from [place] to bed, accompanied by . PT A&Ox4. Tele monitor applied, vitals taken. History, allergies and med rec reviewed and admission profile completed.     Pt oriented to unit and POC discussed, including medications, diet, and labs. Welcome folder provided and discussed. Communication board filled out. Pt instructed to call light usage and encouraged to call for any questions, needs, or concerns and prior to getting out of bed. Fall precautions in place. Pt agrees with the plan and declines any needs at this time. Bed locked and low.

## 2022-04-26 NOTE — ANESTHESIA PREPROCEDURE EVALUATION
" Case: [id number] Date/Time: 04/26/22 4565    Procedure: ECHOCARDIOGRAM, TRANSESOPHAGEAL    Location: [facility] OR 03 / SURGERY [facility]    Surgeons: Reymundo Garcia M.D.          Relevant Problems   PULMONARY   (positive) Pneumonia due to COVID-19 virus      CARDIAC   (positive) Atrial fibrillation with RVR (HCC)   (positive) Essential hypertension   (positive) ARTEMIO (renal artery stenosis) (HCC)      GI   (positive) GERD (gastroesophageal reflux disease)     /86   Pulse 95   Temp 36.6 °C (97.9 °F) (Temporal)   Resp 16   Ht 1.702 m (5' 7\")   Wt 78.1 kg (172 lb 2.9 oz)   SpO2 94%   BMI 26.97 kg/m²     Physical Exam    Airway   Mallampati: II  TM distance: >3 FB  Neck ROM: full       Cardiovascular - normal exam  Rhythm: regular  Rate: normal  (-) murmur     Dental - normal exam           Pulmonary - normal exam  Breath sounds clear to auscultation     Abdominal    Neurological - normal exam                 Anesthesia Plan    ASA 3       Plan - general       Airway plan will be natural airway          Induction: intravenous    Postoperative Plan: Postoperative administration of opioids is intended.    Pertinent diagnostic labs and testing reviewed    Informed Consent:    Anesthetic plan and risks discussed with patient.    Use of blood products discussed with: patient whom consented to blood products.         "

## 2022-04-26 NOTE — DISCHARGE PLANNING
Anticipated discharge disposition: home     Action: discussed pt in IDT rounds, per MD pt is scheduled for cardioversion and will likely be sent home on oral anticoagulation per cardiology reccomendations    Barriers to discharge: medical clearance    Plan: will follow up with oral anticoagulation for potential prior auth once orders are placed.

## 2022-04-26 NOTE — ASSESSMENT & PLAN NOTE
- Rate is not improved yet still slightly tachycardic  -Remains in atrial fibrillation  -Start full dose Lovenox  -ERP did discuss the case with cardiology who is planning cardioversion  -Start scheduled oral metoprolol as well as as needed IV metoprolol  -Obtain echocardiogram  -Obtain TSH and free T4

## 2022-04-26 NOTE — PROGRESS NOTES
Hospital Medicine Daily Progress Note    Date of Service  4/26/2022    Chief Complaint  Judy Blackburn is a 67 y.o. female admitted 4/26/2022 with palpitations    Hospital Course  67-year-old female [facility] anxiety, T2DM, hyperlipidemia, hypertension admitted 4/26/2022 with palpitations.  Patient was found to have A. fib with RVR on admission, given metoprolol with improvement in heart rate, atrial fibrillation persisting.  Cardiology consulted.  Recommending cardioversion.    Interval Problem Update  Patient stated still feeling some skipped beats causing some shortness of breath.  Denied chest pain.  NPO for cardioversion with cardiology at 4 PM    I have personally seen and examined the patient at bedside. I discussed the plan of care with patient, bedside RN, charge RN,  and pharmacy.    Consultants/Specialty  cardiology    Code Status  Full Code    Disposition  Patient is not medically cleared for discharge.   Anticipate discharge to to home with close outpatient follow-up.  I have placed the appropriate orders for post-discharge needs.    Review of Systems  ROS  Please see HPI    Physical Exam  Temp:  [36.2 °C (97.2 °F)] 36.2 °C (97.2 °F)  Pulse:  [] 80  Resp:  [16-24] 16  BP: ()/() 103/68  SpO2:  [92 %-95 %] 94 %    Physical Exam  Please see HPI    Fluids  No intake or output data in the 24 hours ending 04/26/22 0648    Laboratory  Recent Labs     04/26/22  0149   WBC 8.1   RBC 4.68   HEMOGLOBIN 14.5   HEMATOCRIT 43.2   MCV 92.3   MCH 31.0   MCHC 33.6   RDW 42.5   PLATELETCT 252   MPV 10.3     Recent Labs     04/26/22  0149   SODIUM 142   POTASSIUM 3.8   CHLORIDE 107   CO2 24   GLUCOSE 106*   BUN 20   CREATININE 0.50   CALCIUM 9.8                   Imaging  DX-CHEST-PORTABLE (1 VIEW)   Final Result      1.  Hypoventilatory changes without definite acute cardiopulmonary abnormality.   2.  Cardiomegaly.      EC-ECHOCARDIOGRAM COMPLETE W/O CONT    (Results Pending)         Assessment/Plan  * Atrial fibrillation with RVR (HCC)- (present on admission)  Assessment & Plan  - Rate is not improved yet still slightly tachycardic  -Remains in atrial fibrillation  -Start full dose Lovenox  -ERP did discuss the case with cardiology who is planning cardioversion  -Start scheduled oral metoprolol as well as as needed IV metoprolol  -Obtain echocardiogram  -Obtain TSH and free T4    Anxiety- (present on admission)  Assessment & Plan  - She does have some chronic anxiety, does use Xanax  -Acutely worsened due to atrial fibrillation with RVR  -Start as needed Ativan    GERD (gastroesophageal reflux disease)- (present on admission)  Assessment & Plan  - Continue home PPI    Essential hypertension- (present on admission)  Assessment & Plan  - Continue home Cozaar  -Start metoprolol    Dehydration- (present on admission)  Assessment & Plan  - Start IV fluids    Other hyperlipidemia- (present on admission)  Assessment & Plan  - Continue home statin         VTE prophylaxis: SCDs/TEDs and therapeutic anticoagulation with lovenox    I have performed a physical exam and reviewed and updated ROS and Plan today (4/26/2022). In review of yesterday's note (4/25/2022), there are no changes except as documented above.

## 2022-04-26 NOTE — PROGRESS NOTES
Med rec updated and complete, per pt   Allergies reviewed, per pt   Pt reports that she was given samples for eye drops, not sure the name of the eye drop

## 2022-04-26 NOTE — CARE PLAN
The patient is Stable - Low risk of patient condition declining or worsening    Shift Goals  Clinical Goals: Successful cardioversion  Patient Goals: Update from doctor  Family Goals: Visit with patient    Progress made toward(s) clinical / shift goals:    Problem: Knowledge Deficit - Standard  Goal: Patient and family/care givers will demonstrate understanding of plan of care, disease process/condition, diagnostic tests and medications  Outcome: Progressing  Note: Patient's knowledge of plan of care, diagnostics, and medications regularly assessed. RN answers patient questions appropriately, education provided. Patient verbalizes better understanding of their condition.  Upcoming FAHAD/cardioversion.     Problem: Communication  Goal: The ability to communicate needs accurately and effectively will improve  Outcome: Progressing  Flowsheets (Taken 4/26/2022 1003)  Communication:   Assessed patient's ability to understand and communicate   Oriented patient to call light   Oriented family/support system to call light   Reoriented patient to environment  Note: Patient oriented to call light system and all relevant hospital policies. Call light within reach and used appropriately when in need of assistance.         Patient is not progressing towards the following goals:

## 2022-04-27 ENCOUNTER — PATIENT OUTREACH (OUTPATIENT)
Dept: MEDICAL GROUP | Age: 67
End: 2022-04-27
Payer: MEDICARE

## 2022-04-27 NOTE — DISCHARGE PLANNING
note:  Correction: prescription was sent but when TONO called Fox at [facility] on [address] he said that Eliquis in not under formulary so not covered by insurance. Fox said that PA will not help as it is not included in formulary.  Fox said that Xarelto is covered by insurance. MD notified.     Fox said they are running behind so the prescription will not be filled until tomorrow. MD notified.

## 2022-04-27 NOTE — DISCHARGE PLANNING
note:  Received a call from Cheryl Bang RN to obtain a prescription for Eliquis. Message sent to Dr. Jarrell and ELISA Luna to send prescription to [facility].

## 2022-04-27 NOTE — OR NURSING
1647- Patient arrived from OR via bed. No dressing.    Sedation/Resp Status: Non responsive to verbal with no OPA in place Respirations spontaneous and non-labored. Airway patent.     Patient maintaining sinus rhythm at 70 on arrival.    Patient resting comfortably in bed. She does not appear to be in pain or nauseous.    1700- Patient denies nausea. Patient denies chest pain or discomfort. Resting in bed. Responds to verbal. A/OX3    1715- Patient meets criteria to transfer to the floor. Patient A/OX3. Patient denies pain or nausea. Report called to Cheryl PÉREZ    1729- Patient transported to room. Tele monitor on. Patient on 2L O2. 328 in tank.

## 2022-04-27 NOTE — PROGRESS NOTES
Patient outreach for Transitional Care Management.  Reviewed medication and discharge instructions, verbalized understanding.  Patient will message PCP for an appointment.

## 2022-04-27 NOTE — DISCHARGE PLANNING
note:  Found coupon   ID # [id number]  BIN: [id number]  GRP: [id number]  Was ran by pharmacist at [facility] but did not work. Per pharmacist, the copay is $539 with the coupon but $381 with insurance. Pharmacist said it is because pt has state insurance for prescription coverage.    But CM found another coupon from  and pharmacist will try another coupon: Per John, pharmacist at [facility] Sue, the coupon below worked for pt. Copy of this coupon has been hand delivered to RN Sugar Bang and Dr. Jarrell notified.

## 2022-04-27 NOTE — PROCEDURES
Procedure performed: External DC Cardioversion    : Reymundo Garcia MD PhD FACC    Assistant: None    Anesthesia: Per anesthesia services    Indication: Atrial fibrillation    Preprocedural Diagnosis:   Atrial Fibrillation  Postprocedural Diagnosis: Sinus Rhythm    Description of procedure:    Ms. Blackburn was brought to the pre/post procedure area of the cath lab. Informed consent was obtained. Defibrillator pads were placed in the anterior and posterior position.  A TIME-OUT was performed. Adequate sedation was obtained with assistance of anesthesia. A FAHAD performed confirmed that there was NO left atrial thrombus. Patient was successfully cardioverted with 200 J synchronized biphasic energy into sinus rhythm. She was monitored in the recovery area until criteria met and will be discharged home.    Conclusion: Successful DC cardioversion    EBL: None    Complications: None apparent      Electronically signed: Reymundo Garcia MD PhD [facility]  Cardiologist [facility]

## 2022-04-27 NOTE — DISCHARGE SUMMARY
Discharge Summary    CHIEF COMPLAINT ON ADMISSION  Chief Complaint   Patient presents with   • Irregular Heart Beat     Pt states felt irregular HR and anxious about one hour PTA.  Pt stated felt heart was pounding.       Reason for Admission  Irregular Heart Rate     Admission Date  4/26/2022    CODE STATUS  Full Code    HPI & HOSPITAL COURSE  This is a 67-year-old female PMH anxiety, T2DM, hyperlipidemia, hypertension admitted 4/26/2022 with palpitations.  Patient was found to have A. fib with RVR on admission, given metoprolol with improvement in heart rate, atrial fibrillation persisting.  Cardiology consulted.  Recommending cardioversion.    Patient underwent FAHAD with DCCV successfully with Dr. Garcia. Patient had no acute complications after procedure. She was discharged home safely as recommended by cardiology. Eliquis not covered by insurance. Xarelto was sent to patients pharmacy [facility] on [address].  Instructions given to patient on adverse reactions. Patient is accepting of risk and benefits.    Therefore, she is discharged in fair and stable condition to home with close outpatient follow-up.    The patient recovered much more quickly than anticipated on admission.    Discharge Date  04/26/2022    FOLLOW UP ITEMS POST DISCHARGE  Listed below    DISCHARGE DIAGNOSES  Principal Problem (Resolved):    Atrial fibrillation with RVR (HCC) POA: Yes  Active Problems:    Other hyperlipidemia POA: Yes    Essential hypertension POA: Yes      Overview: with white coat effect            Formatting of this note might be different from the original.      with white coat effect    GERD (gastroesophageal reflux disease) POA: Yes    Anxiety POA: Yes    Atrial fibrillation status post cardioversion (HCC) POA: Yes  Resolved Problems:    Dehydration POA: Yes      FOLLOW UP  Future Appointments   Date Time Provider Department Center   5/16/2022 12:45 PM KEVIN Longoria. [facility] None     Reymundo Garcia M.D.  [address]  [address]  [place] [id number]  [phone]    Schedule an appointment as soon as possible for a visit  follow up for post cardioversion and atrial fibrillation    Lonny Mckinney M.D.  [address] [place] [id number] [phone]    Schedule an appointment as soon as possible for a visit  follow up for new medication Xarelto, HTN management      MEDICATIONS ON DISCHARGE     Medication List      START taking these medications      Instructions   rivaroxaban 20 MG Tabs tablet  Commonly known as: Xarelto   Take 1 Tablet by mouth with dinner.  Dose: 20 mg        CHANGE how you take these medications      Instructions   metFORMIN  MG Tb24  What changed: when to take this  Commonly known as: GLUCOPHAGE XR   Take 1 Tablet by mouth every day.  Dose: 500 mg        CONTINUE taking these medications      Instructions   ALPRAZolam 0.5 MG Tabs  Commonly known as: XANAX   Take 0.5 mg by mouth 3 times a day as needed.  Dose: 0.5 mg     atorvastatin 40 MG Tabs  Commonly known as: LIPITOR   TAKE 2 TABLETS BY MOUTH AT BEDTIME  Dose: 80 mg     losartan 50 MG Tabs  Commonly known as: COZAAR   Take 1 Tablet by mouth every evening.  Dose: 50 mg     MAGNESIUM PO   Take 1 Tablet by mouth at bedtime.  Dose: 1 Tablet     omeprazole 20 MG delayed-release capsule  Commonly known as: PRILOSEC   TAKE 1 CAPSULE BY MOUTH EVERY DAY  Dose: 20 mg     propranolol LA 60 MG Cp24  Commonly known as: INDERAL LA   Take 1 Capsule by mouth every day.  Dose: 60 mg     traZODone 100 MG Tabs  Commonly known as: DESYREL   Take 100 mg by mouth every evening.  Dose: 100 mg        STOP taking these medications    aspirin EC 81 MG Tbec  Commonly known as: ECOTRIN     NAPROSYN PO            Allergies  Allergies   Allergen Reactions   • Codeine Vomiting and Nausea       DIET  Orders Placed This Encounter   Procedures   • Diet Order Diet: Cardiac     Standing Status:   Standing     Number of Occurrences:   1     Order Specific Question:   Diet:     Answer:    Cardiac [6]       ACTIVITY  As tolerated.  Weight bearing as tolerated    CONSULTATIONS  Cardiology - Dr. Garcia    PROCEDURES  FAHAD and DCCV    LABORATORY  Lab Results   Component Value Date    SODIUM 142 04/26/2022    POTASSIUM 3.8 04/26/2022    CHLORIDE 107 04/26/2022    CO2 24 04/26/2022    GLUCOSE 106 (H) 04/26/2022    BUN 20 04/26/2022    CREATININE 0.50 04/26/2022        Lab Results   Component Value Date    WBC 8.1 04/26/2022    HEMOGLOBIN 14.5 04/26/2022    HEMATOCRIT 43.2 04/26/2022    PLATELETCT 252 04/26/2022        Total time of the discharge process exceeds 60 minutes.  More than 50% of time was spent face to face with patient.   This included but not limited to review of hospital course with patient, procedure patient underwent, treatment goals upon discharge, recommendations to PCP, continued and new medications and their adverse reactions including Xarelto, and nursing instructions for patient.   Other time spent with specialist Dr. Garcia on plan for discharge.

## 2022-04-27 NOTE — DISCHARGE INSTRUCTIONS
Discharge Instructions    Discharged to home by car with relative. Discharged via wheelchair, hospital escort: Yes.  Special equipment needed: Not Applicable    Be sure to schedule a follow-up appointment with your primary care doctor or any specialists as instructed.     Discharge Plan:   Diet Plan: Discussed  Activity Level: Discussed  Confirmed Follow up Appointment: Patient to Call and Schedule Appointment  Confirmed Symptoms Management: Discussed  Medication Reconciliation Updated: Yes    I understand that a diet low in cholesterol, fat, and sodium is recommended for good health. Unless I have been given specific instructions below for another diet, I accept this instruction as my diet prescription.   Other diet: Cardiac, diabetic    Special Instructions: None    · Is patient discharged on Warfarin / Coumadin?   No     Depression / Suicide Risk    As you are discharged from this [facility] facility, it is important to learn how to keep safe from harming yourself.    Recognize the warning signs:  · Abrupt changes in personality, positive or negative- including increase in energy   · Giving away possessions  · Change in eating patterns- significant weight changes-  positive or negative  · Change in sleeping patterns- unable to sleep or sleeping all the time   · Unwillingness or inability to communicate  · Depression  · Unusual sadness, discouragement and loneliness  · Talk of wanting to die  · Neglect of personal appearance   · Rebelliousness- reckless behavior  · Withdrawal from people/activities they love  · Confusion- inability to concentrate     If you or a loved one observes any of these behaviors or has concerns about self-harm, here's what you can do:  · Talk about it- your feelings and reasons for harming yourself  · Remove any means that you might use to hurt yourself (examples: pills, rope, extension cords, firearm)  · Get professional help from the community (Mental Health, Substance Abuse,  psychological counseling)  · Do not be alone:Call your Safe Contact- someone whom you trust who will be there for you.  · Call your local CRISIS HOTLINE 767-2017 or 541-494-0181  · Call your local Children's Mobile Crisis Response Team [place] (290) 015-3723 or www.[facility]`  · Call the toll free National Suicide Prevention Hotlines   · National Suicide Prevention Lifeline 456-072-SJJU (6102)  · [employer] Line Network 800-SUICIDE (814-9571)    Atrial Fibrillation    Atrial fibrillation is a type of heartbeat that is irregular or fast (rapid). If you have this condition, your heart beats without any order. This makes it hard for your heart to pump blood in a normal way. Having this condition gives you more risk for stroke, heart failure, and other heart problems.  Atrial fibrillation may start all of a sudden and then stop on its own, or it may become a long-lasting problem.  What are the causes?  This condition may be caused by heart conditions, such as:  · High blood pressure.  · Heart failure.  · Heart valve disease.  · Heart surgery.  Other causes include:  · Pneumonia.  · Obstructive sleep apnea.  · Lung cancer.  · Thyroid disease.  · Drinking too much alcohol.  Sometimes the cause is not known.  What increases the risk?  You are more likely to develop this condition if:  · You smoke.  · You are older.  · You have diabetes.  · You are overweight.  · You have a family history of this condition.  · You exercise often and hard.  What are the signs or symptoms?  Common symptoms of this condition include:  · A feeling like your heart is beating very fast.  · Chest pain.  · Feeling short of breath.  · Feeling light-headed or weak.  · Getting tired easily.  Follow these instructions at home:  Medicines  · Take over-the-counter and prescription medicines only as told by your doctor.  · If your doctor gives you a blood-thinning medicine, take it exactly as told. Taking too much of it can cause bleeding.  "Taking too little of it does not protect you against clots. Clots can cause a stroke.  Lifestyle         · Do not use any tobacco products. These include cigarettes, chewing tobacco, and e-cigarettes. If you need help quitting, ask your doctor.  · Do not drink alcohol.  · Do not drink beverages that have caffeine. These include coffee, soda, and tea.  · Follow diet instructions as told by your doctor.  · Exercise regularly as told by your doctor.  General instructions  · If you have a condition that causes breathing to stop for a short period of time (apnea), treat it as told by your doctor.  · Keep a healthy weight. Do not use diet pills unless your doctor says they are safe for you. Diet pills may make heart problems worse.  · Keep all follow-up visits as told by your doctor. This is important.  Contact a doctor if:  · You notice a change in the speed, rhythm, or strength of your heartbeat.  · You are taking a blood-thinning medicine and you see more bruising.  · You get tired more easily when you move or exercise.  · You have a sudden change in weight.  Get help right away if:    · You have pain in your chest or your belly (abdomen).  · You have trouble breathing.  · You have blood in your vomit, poop, or pee (urine).  · You have any signs of a stroke. \"BE FAST\" is an easy way to remember the main warning signs:  ? B - Balance. Signs are dizziness, sudden trouble walking, or loss of balance.  ? E - Eyes. Signs are trouble seeing or a change in how you see.  ? F - Face. Signs are sudden weakness or loss of feeling in the face, or the face or eyelid drooping on one side.  ? A - Arms. Signs are weakness or loss of feeling in an arm. This happens suddenly and usually on one side of the body.  ? S - Speech. Signs are sudden trouble speaking, slurred speech, or trouble understanding what people say.  ? T - Time. Time to call emergency services. Write down what time symptoms started.  · You have other signs of a stroke, " such as:  ? A sudden, very bad headache with no known cause.  ? Feeling sick to your stomach (nausea).  ? Throwing up (vomiting).  ? Jerky movements you cannot control (seizure).  These symptoms may be an emergency. Do not wait to see if the symptoms will go away. Get medical help right away. Call your local emergency services (911 in the [place] Do not drive yourself to the hospital.  Summary  · Atrial fibrillation is a type of heartbeat that is irregular or fast (rapid).  · You are at higher risk of this condition if you smoke, are older, have diabetes, or are overweight.  · Follow your doctor's instructions about medicines, diet, exercise, and follow-up visits.  · Get help right away if you think that you have signs of a stroke.  This information is not intended to replace advice given to you by your health care provider. Make sure you discuss any questions you have with your health care provider.  Document Released: 09/26/2009 Document Revised: 02/21/2019 Document Reviewed: 02/08/2019  [employer] Patient Education © 2020 [employer]      Rivaroxaban oral tablets  What is this medicine?  RIVAROXABAN (ri va JESSE a ban) is an anticoagulant (blood thinner). It is used to treat blood clots in the lungs or in the veins. It is also used to prevent blood clots in the lungs or in the veins. It is also used to lower the chance of stroke in people with a medical condition called atrial fibrillation.  This medicine may be used for other purposes; ask your health care provider or pharmacist if you have questions.  COMMON BRAND NAME(S): Xarelto, Xarelto Starter Pack  What should I tell my health care provider before I take this medicine?  They need to know if you have any of these conditions:  · antiphospholipid antibody syndrome  · artificial heart valve  · bleeding disorders  · bleeding in the brain  · blood in your stools (black or tarry stools) or if you have blood in your vomit  · history of blood clots  · history of  stomach bleeding  · kidney disease  · liver disease  · low blood counts, like low white cell, platelet, or red cell counts  · recent or planned spinal or epidural procedure  · take medicines that treat or prevent blood clots  · an unusual or allergic reaction to rivaroxaban, other medicines, foods, dyes, or preservatives  · pregnant or trying to get pregnant  · breast-feeding  How should I use this medicine?  Take this medicine by mouth with a glass of water. Follow the directions on the prescription label. Take your medicine at regular intervals. Do not take it more often than directed. Do not stop taking except on your doctor's advice. Stopping this medicine may increase your risk of a blood clot. Be sure to refill your prescription before you run out of medicine.  If you are taking this medicine after hip or knee replacement surgery, take it with or without food. If you are taking this medicine for atrial fibrillation, take it with your evening meal. If you are taking this medicine to treat blood clots, take it with food at the same time each day. If you are unable to swallow your tablet, you may crush the tablet and mix it in applesauce. Then, immediately eat the applesauce. You should eat more food right after you eat the applesauce containing the crushed tablet.  Talk to your pediatrician regarding the use of this medicine in children. Special care may be needed.  Overdosage: If you think you have taken too much of this medicine contact a poison control center or emergency room at once.  NOTE: This medicine is only for you. Do not share this medicine with others.  What if I miss a dose?  If you take your medicine once a day and miss a dose, take the missed dose as soon as you remember. If it is almost time for your next dose, take only that dose. Do not take double or extra doses.  If you take your medicine twice a day and miss a dose, take the missed dose immediately. In this instance, 2 tablets may be taken  at the same time. The next day you should take 1 tablet twice a day as directed.  What may interact with this medicine?  Do not take this medicine with any of the following medications:  · defibrotide  This medicine may also interact with the following medications:  · aspirin and aspirin-like medicines  · certain antibiotics like erythromycin, azithromycin, and clarithromycin  · certain medicines for fungal infections like ketoconazole and itraconazole  · certain medicines for irregular heart beat like amiodarone, quinidine, dronedarone  · certain medicines for seizures like carbamazepine, phenytoin  · certain medicines that treat or prevent blood clots like warfarin, enoxaparin, and dalteparin  · conivaptan  · felodipine  · indinavir  · lopinavir; ritonavir  · NSAIDS, medicines for pain and inflammation, like ibuprofen or naproxen  · ranolazine  · rifampin  · ritonavir  · SNRIs, medicines for depression, like desvenlafaxine, duloxetine, levomilnacipran, venlafaxine  · SSRIs, medicines for depression, like citalopram, escitalopram, fluoxetine, fluvoxamine, paroxetine, sertraline  · Luna's wort  · verapamil  This list may not describe all possible interactions. Give your health care provider a list of all the medicines, herbs, non-prescription drugs, or dietary supplements you use. Also tell them if you smoke, drink alcohol, or use illegal drugs. Some items may interact with your medicine.  What should I watch for while using this medicine?  Visit your healthcare professional for regular checks on your progress. You may need blood work done while you are taking this medicine. Your condition will be monitored carefully while you are receiving this medicine. It is important not to miss any appointments.  Avoid sports and activities that might cause injury while you are using this medicine. Severe falls or injuries can cause unseen bleeding. Be careful when using sharp tools or knives. Consider using an electric  razor. Take special care brushing or flossing your teeth. Report any injuries, bruising, or red spots on the skin to your healthcare professional.  If you are going to need surgery or other procedure, tell your healthcare professional that you are taking this medicine.  Wear a medical ID bracelet or chain. Carry a card that describes your disease and details of your medicine and dosage times.  What side effects may I notice from receiving this medicine?  Side effects that you should report to your doctor or health care professional as soon as possible:  · allergic reactions like skin rash, itching or hives, swelling of the face, lips, or tongue  · back pain  · redness, blistering, peeling or loosening of the skin, including inside the mouth  · signs and symptoms of bleeding such as bloody or black, tarry stools; red or dark-brown urine; spitting up blood or brown material that looks like coffee grounds; red spots on the skin; unusual bruising or bleeding from the eye, gums, or nose  · signs and symptoms of a blood clot such as chest pain; shortness of breath; pain, swelling, or warmth in the leg  · signs and symptoms of a stroke such as changes in vision; confusion; trouble speaking or understanding; severe headaches; sudden numbness or weakness of the face, arm or leg; trouble walking; dizziness; loss of coordination  Side effects that usually do not require medical attention (report to your doctor or health care professional if they continue or are bothersome):  · dizziness  · muscle pain  This list may not describe all possible side effects. Call your doctor for medical advice about side effects. You may report side effects to FDA at 3-428-FDA-6332.  Where should I keep my medicine?  Keep out of the reach of children.  Store at room temperature between 15 and 30 degrees C (59 and 86 degrees F). Throw away any unused medicine after the expiration date.  NOTE: This sheet is a summary. It may not cover all possible  information. If you have questions about this medicine, talk to your doctor, pharmacist, or health care provider.  © 2020 [employer] (2020-03-16 09:45:59)

## 2022-04-27 NOTE — PROGRESS NOTES
Patient in SR following cardioversion, eating dinner comfortably in bed. Patient's  at bedside visiting. Due medications administered, including new Xarelto. Pending possible discharge, awaiting cost of Xarelto from pharmacy. Patient updated.

## 2022-04-27 NOTE — DISCHARGE PLANNING
note:  Received a message from ELISA Luna to try and get Eliquis so we can dc pt but pt has not been sent to pharmacy yet.

## 2022-05-19 ENCOUNTER — OFFICE VISIT (OUTPATIENT)
Dept: CARDIOLOGY | Facility: MEDICAL CENTER | Age: 67
End: 2022-05-19
Payer: MEDICARE

## 2022-05-19 VITALS
OXYGEN SATURATION: 94 % | RESPIRATION RATE: 16 BRPM | SYSTOLIC BLOOD PRESSURE: 150 MMHG | DIASTOLIC BLOOD PRESSURE: 64 MMHG | HEIGHT: 67 IN | WEIGHT: 170 LBS | HEART RATE: 72 BPM | BODY MASS INDEX: 26.68 KG/M2

## 2022-05-19 DIAGNOSIS — Z79.01 CHRONIC ANTICOAGULATION: ICD-10-CM

## 2022-05-19 DIAGNOSIS — I48.0 PAROXYSMAL ATRIAL FIBRILLATION (HCC): ICD-10-CM

## 2022-05-19 DIAGNOSIS — I48.91 ATRIAL FIBRILLATION STATUS POST CARDIOVERSION (HCC): ICD-10-CM

## 2022-05-19 DIAGNOSIS — F41.9 ANXIETY: ICD-10-CM

## 2022-05-19 DIAGNOSIS — I10 ESSENTIAL HYPERTENSION: ICD-10-CM

## 2022-05-19 PROCEDURE — 99214 OFFICE O/P EST MOD 30 MIN: CPT | Performed by: NURSE PRACTITIONER

## 2022-05-19 RX ORDER — LOSARTAN POTASSIUM 50 MG/1
75 TABLET ORAL EVERY EVENING
Qty: 180 TABLET | Refills: 2 | Status: SHIPPED | OUTPATIENT
Start: 2022-05-19 | End: 2022-05-31

## 2022-05-19 ASSESSMENT — FIBROSIS 4 INDEX: FIB4 SCORE: 1.43

## 2022-05-19 NOTE — PATIENT INSTRUCTIONS
Increase losartan to 1.5 tabs daily    Checking Blood Pressure:  -Blood pressure cuff, spend in the $40-65, with good return policy  -It should be automatic, upper arm, measure your arm to get the correct size, probably adult Large but your arm should be under 16.5 cm. If you need an XL cuff, you will have to have it special ordered from a pharmacy or durable medical equipment company.  -Put the cuff in place, rest arm on table near height of your heart, sit quietly for 5 min, legs uncrossed, with back support, then take your blood pressure, write it down, keep a log  -Check no more than 1 time day, maybe 4-5 times per week, try different times of day.  -Can bring your cuff to at least one appointment where it can be calibrated to a manual cuff if you are concerned.  -Goal blood pressure is at least under 130/80, ideally under 120/80.  If you think your BP is overall too high, let us know in the office, we can adjust medications, can use [employer] or call the [employer] office: [phone].    Salt=sodium=sea salt, guidelines say stay under 2,500 mg daily but I ask for under 4,000 mg daily.  Get salt smart, start looking at labels, count it up.  Salt is hidden in everything, salad dressing, sauces, cheese, most canned food, any processed meat.
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
## 2022-05-20 ENCOUNTER — NON-PROVIDER VISIT (OUTPATIENT)
Dept: CARDIOLOGY | Facility: MEDICAL CENTER | Age: 67
End: 2022-05-20
Attending: NURSE PRACTITIONER
Payer: MEDICARE

## 2022-05-20 DIAGNOSIS — I47.10 PSVT (PAROXYSMAL SUPRAVENTRICULAR TACHYCARDIA) (HCC): ICD-10-CM

## 2022-05-20 DIAGNOSIS — I47.29 NSVT (NONSUSTAINED VENTRICULAR TACHYCARDIA) (HCC): ICD-10-CM

## 2022-05-20 DIAGNOSIS — I49.1 PREMATURE ATRIAL CONTRACTIONS: ICD-10-CM

## 2022-05-20 PROCEDURE — RXMED WILLOW AMBULATORY MEDICATION CHARGE: Performed by: NURSE PRACTITIONER

## 2022-05-20 NOTE — PROGRESS NOTES
Home enrollment completed in the 30 day [facility] heart monitoring program, per KEVIN Longoria.  Monitor will be shipped to patient via [employer].  >Pending Baseline Transmission.  >Pending EOS.

## 2022-05-23 ENCOUNTER — PHARMACY VISIT (OUTPATIENT)
Dept: PHARMACY | Facility: MEDICAL CENTER | Age: 67
End: 2022-05-23
Payer: COMMERCIAL

## 2022-05-23 PROBLEM — Z79.01 CHRONIC ANTICOAGULATION: Status: ACTIVE | Noted: 2022-05-23

## 2022-05-31 ENCOUNTER — OFFICE VISIT (OUTPATIENT)
Dept: MEDICAL GROUP | Age: 67
End: 2022-05-31
Payer: MEDICARE

## 2022-05-31 VITALS
OXYGEN SATURATION: 98 % | BODY MASS INDEX: 26.53 KG/M2 | DIASTOLIC BLOOD PRESSURE: 80 MMHG | HEIGHT: 67 IN | HEART RATE: 71 BPM | RESPIRATION RATE: 16 BRPM | WEIGHT: 169 LBS | SYSTOLIC BLOOD PRESSURE: 132 MMHG | TEMPERATURE: 97.7 F

## 2022-05-31 DIAGNOSIS — L97.121 VARICOSE VEINS OF LEFT LOWER EXTREMITY WITH ULCER OF THIGH LIMITED TO BREAKDOWN OF SKIN (HCC): ICD-10-CM

## 2022-05-31 DIAGNOSIS — Z83.49 FAMILY HISTORY OF HYPOTHYROIDISM: ICD-10-CM

## 2022-05-31 DIAGNOSIS — B00.1 COLD SORE: ICD-10-CM

## 2022-05-31 DIAGNOSIS — I83.021 VARICOSE VEINS OF LEFT LOWER EXTREMITY WITH ULCER OF THIGH LIMITED TO BREAKDOWN OF SKIN (HCC): ICD-10-CM

## 2022-05-31 DIAGNOSIS — R73.03 PREDIABETES: ICD-10-CM

## 2022-05-31 DIAGNOSIS — E78.49 OTHER HYPERLIPIDEMIA: ICD-10-CM

## 2022-05-31 DIAGNOSIS — G62.9 NEUROPATHY: ICD-10-CM

## 2022-05-31 DIAGNOSIS — I77.1 ILIAC ARTERY STENOSIS, BILATERAL (HCC): ICD-10-CM

## 2022-05-31 DIAGNOSIS — I10 ESSENTIAL HYPERTENSION: ICD-10-CM

## 2022-05-31 PROBLEM — I83.92 VARICOSE VEINS OF LEFT LOWER EXTREMITY: Status: ACTIVE | Noted: 2022-05-31

## 2022-05-31 PROCEDURE — 99214 OFFICE O/P EST MOD 30 MIN: CPT | Performed by: FAMILY MEDICINE

## 2022-05-31 RX ORDER — VALACYCLOVIR HYDROCHLORIDE 1 G/1
TABLET, FILM COATED ORAL
Qty: 90 TABLET | Refills: 0 | Status: SHIPPED | OUTPATIENT
Start: 2022-05-31 | End: 2022-07-12

## 2022-05-31 RX ORDER — ACYCLOVIR 50 MG/G
OINTMENT TOPICAL
Qty: 30 G | Refills: 2 | Status: SHIPPED | OUTPATIENT
Start: 2022-05-31 | End: 2023-05-23

## 2022-05-31 RX ORDER — LOSARTAN POTASSIUM 100 MG/1
100 TABLET ORAL DAILY
Qty: 90 TABLET | Refills: 0 | Status: SHIPPED | OUTPATIENT
Start: 2022-05-31 | End: 2022-08-30

## 2022-05-31 ASSESSMENT — FIBROSIS 4 INDEX: FIB4 SCORE: 1.43

## 2022-05-31 NOTE — PROGRESS NOTES
This medical record contains text that has been entered with the assistance of computer voice recognition and dictation software.  Therefore, it may contain unintended errors in text, spelling, punctuation, or grammar      Chief Complaint   Patient presents with   • Follow-Up   • Atrial Fibrillation     Patient dx is on heart monitor for 30 days, has had episodes, rashes, a lot of symptoms concerning   • Immunotherapy     Concerns about hashimotos disease         Judy Blackburn is a 67 y.o. female here evaluation and management of: Several issues      HPI:     1. Essential hypertension  Judy is a very pleasant 67-year-old female with a history of atrial fibrillation.  Her losartan was increased to 75 mg on 19 May.  Her BP goal is under 130/80 it still not at goal.    2. Family history of hypothyroidism  The patient is very concerned of developing hypothyroidism.  She states she has a family history of Hashimoto's disease.  And once a month she gets a fever blister and a rash on her neck she states this makes her very depressed.     Latest Reference Range & Units 04/26/22 08:44   TSH 0.380 - 5.330 uIU/mL 1.390 [1]   Free T-4 0.93 - 1.70 ng/dL 1.35   [1] Please note new reference ranges effective 12/19/2017 12:30 PM      Pregnant Females, 1st Trimester  0.050-3.700   Pregnant Females, 2nd Trimester  0.310-4.350   Pregnant Females, 3rd Trimester  0.410-5.180     3. Iliac artery stenosis, bilateral (HCC)  Ultrasound of the renal arteries on January 24, 2021 revealed greater than 75% stenosis.    Ultrasound of the renal arteries on January 25, 2021     FINDINGS   No evidence of abdominal aortic aneurysm.    Velocities of the celiac and superior mesenteric arteries are consistent with   a     >= 75% stenosis.       Velocities and waveforms are normal through the bilateral renal arteries.    Velocities attained from flank position with a zero degree angle.   Waveforms of the bilateral renal veins are normal.     Resistive indices are normal at the bilateral renal nash.   Bilateral kidney size, perfusion and tissue densities appear normal.      Dwight Duran MD   (Electronically Signed)   Final Date:      25 January 2021 14:34             Specimen Collected: 01/25/21  9:07 AM Last Resulted: 01/25/21  2:35 PM                    4. 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  However it is growing and becoming more painful now.        5. Neuropathy  NEW UNDIAGNOSED PROBLEM    Patient states that she has been having episodic numbness and tingling in the left lateral thigh as well as the right lateral thigh sometimes her right ankle.  This usually happens at night and wakes her up.        Current medicines (including changes today)  Current Outpatient Medications   Medication Sig Dispense Refill   • apixaban (ELIQUIS) 5mg Tab Take 5 mg by mouth 2 times a day.     • losartan (COZAAR) 100 MG Tab Take 1 Tablet by mouth every day. 90 Tablet 0   • valacyclovir (VALTREX) 1 GM Tab Take 1 tab po bid x 3 days prn outbreak 90 Tablet 0   • acyclovir (ZOVIRAX) 5 % Ointment AAA QID x 5 days prn outbreak 30 g 2   • rivaroxaban (XARELTO) 20 MG Tab tablet Take 1 Tablet by mouth with dinner. 30 Tablet 0   • traZODone (DESYREL) 100 MG Tab Take 100 mg by mouth every evening.     • ALPRAZolam (XANAX) 0.5 MG Tab Take 0.5 mg by mouth 3 times a day as needed.     • omeprazole (PRILOSEC) 20 MG delayed-release capsule TAKE 1 CAPSULE BY MOUTH EVERY DAY 90 Capsule 0   • atorvastatin (LIPITOR) 40 MG Tab TAKE 2 TABLETS BY MOUTH AT BEDTIME 180 Tablet 1   • propranolol LA (INDERAL LA) 60 MG CAPSULE SR 24 HR Take 1 Capsule by mouth every day. 90 Capsule 2   • metFORMIN ER (GLUCOPHAGE XR) 500 MG TABLET SR 24 HR Take 1 Tablet by mouth every day. 90 Tablet 3   • MAGNESIUM PO Take 1 Tablet by mouth at bedtime.       No current  "facility-administered medications for this visit.     She  has a past medical history of Anxiety, Diabetes (HCC), Hyperlipidemia, and Hypertension.  She  has a past surgical history that includes lumpectomy; trevor (N/A, 4/26/2022); and cardioversion (N/A, 4/26/2022).  Social History     Tobacco Use   • Smoking status: Never Smoker   • Smokeless tobacco: Never Used   Vaping Use   • Vaping Use: Never used   Substance Use Topics   • Alcohol use: Yes   • Drug use: Never     Social History     Social History Narrative   • Not on file     Family History   Problem Relation Age of Onset   • Arrythmia Sister      Family Status   Relation Name Status   • Sis  (Not Specified)         ROS    The pertinent  ROS findings can be seen in the HPI above.     All other systems reviewed and are negative     Objective:     /80 (BP Location: Right arm, Patient Position: Sitting, BP Cuff Size: Adult)   Pulse 71   Temp 36.5 °C (97.7 °F) (Temporal)   Resp 16   Ht 1.702 m (5' 7\")   Wt 76.7 kg (169 lb)   SpO2 98%  Body mass index is 26.47 kg/m².      Physical Exam:    Constitutional: Alert, no distress.  Skin: Varicosities seen on left shin measuring about 4 x 6 cm  Eye: Equal, round and reactive, conjunctiva clear, lids normal.  ENMT: Lips without lesions, good dentition, oropharynx clear.  Neck: Trachea midline, no masses, no thyromegaly. No cervical or supraclavicular lymphadenopathy.  Respiratory: Unlabored respiratory effort, lungs clear to auscultation, no wheezes, no ronchi.  Cardiovascular: Normal S1, S2, no murmur, no edema  Abdomen: Soft, non-tender, no masses, no hepatosplenomegaly.        Assessment and Plan:   The following treatment plan was discussed      1. Essential hypertension    Not at goal  Increase losartan to 100 mg p.o. daily  Hypotensive precautions given  - losartan (COZAAR) 100 MG Tab; Take 1 Tablet by mouth every day.  Dispense: 90 Tablet; Refill: 0    2. Family history of hypothyroidism      The patient " would like to be evaluated for Hashimoto's disease.  - HEMOGLOBIN A1C; Future  - ANTI-THYROID ANTIBODIES; Future  - US-THYROID; Future    3. Iliac artery stenosis, bilateral (HCC)    Maintain blood pressure control  Maintain healthy body weight  Avoid high salt diet  Increase cardiovascular exercise to 40 minutes a day 4 times a week  Refer to vascular medicine  - Referral to Vascular Medicine    4. Varicose veins of left lower extremity with ulcer of thigh limited to breakdown of skin (HCC)    - Referral to Vascular Surgery      5. Other hyperlipidemia    - Lipid Profile; Future  - THYROID PEROXIDASE  (TPO) AB; Future    7. Neuropathy    We will refer to physiatry for EMG and appropriate treatment    - Referral to Pain Clinic    8. Cold sore    - valacyclovir (VALTREX) 1 GM Tab; Take 1 tab po bid x 3 days prn outbreak  Dispense: 90 Tablet; Refill: 0  - acyclovir (ZOVIRAX) 5 % Ointment; AAA QID x 5 days prn outbreak  Dispense: 30 g; Refill: 2            Instructed to Follow up in clinic or ER for worsening symptoms, difficulty breathing, lack of expected recovery, or should new symptoms or problems arise.    Followup: Return in about 3 months (around 8/31/2022) for Reevaluation, labs.

## 2022-06-02 ENCOUNTER — HOSPITAL ENCOUNTER (OUTPATIENT)
Dept: LAB | Facility: MEDICAL CENTER | Age: 67
End: 2022-06-02
Attending: FAMILY MEDICINE
Payer: MEDICARE

## 2022-06-02 DIAGNOSIS — Z83.49 FAMILY HISTORY OF HYPOTHYROIDISM: ICD-10-CM

## 2022-06-02 DIAGNOSIS — E78.49 OTHER HYPERLIPIDEMIA: ICD-10-CM

## 2022-06-02 LAB
CHOLEST SERPL-MCNC: 142 MG/DL (ref 100–199)
EST. AVERAGE GLUCOSE BLD GHB EST-MCNC: 123 MG/DL
FASTING STATUS PATIENT QL REPORTED: NORMAL
HBA1C MFR BLD: 5.9 % (ref 4–5.6)
HDLC SERPL-MCNC: 47 MG/DL
LDLC SERPL CALC-MCNC: 58 MG/DL
THYROPEROXIDASE AB SERPL-ACNC: <9 IU/ML (ref 0–9)
TRIGL SERPL-MCNC: 183 MG/DL (ref 0–149)

## 2022-06-02 PROCEDURE — 86376 MICROSOMAL ANTIBODY EACH: CPT

## 2022-06-02 PROCEDURE — 86800 THYROGLOBULIN ANTIBODY: CPT

## 2022-06-02 PROCEDURE — 83036 HEMOGLOBIN GLYCOSYLATED A1C: CPT | Mod: GA

## 2022-06-02 PROCEDURE — 80061 LIPID PANEL: CPT

## 2022-06-02 PROCEDURE — 36415 COLL VENOUS BLD VENIPUNCTURE: CPT

## 2022-06-04 LAB
THYROGLOB AB SERPL-ACNC: <0.9 IU/ML (ref 0–4)
THYROPEROXIDASE AB SERPL-ACNC: 1.7 IU/ML (ref 0–9)

## 2022-06-21 ENCOUNTER — HOSPITAL ENCOUNTER (OUTPATIENT)
Dept: RADIOLOGY | Facility: MEDICAL CENTER | Age: 67
End: 2022-06-21
Attending: FAMILY MEDICINE
Payer: MEDICARE

## 2022-06-21 ENCOUNTER — TELEPHONE (OUTPATIENT)
Dept: MEDICAL GROUP | Age: 67
End: 2022-06-21
Payer: MEDICARE

## 2022-06-21 DIAGNOSIS — Z83.49 FAMILY HISTORY OF HYPOTHYROIDISM: ICD-10-CM

## 2022-06-21 PROCEDURE — 76536 US EXAM OF HEAD AND NECK: CPT

## 2022-06-21 NOTE — TELEPHONE ENCOUNTER
FINAL PRIOR AUTHORIZATION STATUS:    1.  Name of Medication & Dose:  acyclovir (ZOVIRAX) 5 % Ointment    2. Prior Auth Status: Denied.  Reason: must have diagnosis of genital herpes or herpes simplex and has tried and failed oral acyclovir.    3. Action Taken: Pharmacy Notified: no Patient Notified: no

## 2022-06-28 ENCOUNTER — HOSPITAL ENCOUNTER (OUTPATIENT)
Dept: RADIOLOGY | Facility: MEDICAL CENTER | Age: 67
End: 2022-06-28
Attending: FAMILY MEDICINE
Payer: MEDICARE

## 2022-06-28 DIAGNOSIS — Z12.31 VISIT FOR SCREENING MAMMOGRAM: ICD-10-CM

## 2022-06-28 PROCEDURE — 77063 BREAST TOMOSYNTHESIS BI: CPT

## 2022-06-29 ENCOUNTER — TELEPHONE (OUTPATIENT)
Dept: CARDIOLOGY | Facility: MEDICAL CENTER | Age: 67
End: 2022-06-29
Payer: MEDICARE

## 2022-06-30 PROCEDURE — 93268 ECG RECORD/REVIEW: CPT | Performed by: INTERNAL MEDICINE

## 2022-07-09 DIAGNOSIS — B00.1 COLD SORE: ICD-10-CM

## 2022-07-09 NOTE — TELEPHONE ENCOUNTER
Received request via: Pharmacy    Was the patient seen in the last year in this department? Yes 5/31/22    Does the patient have an active prescription (recently filled or refills available) for medication(s) requested? No

## 2022-07-12 RX ORDER — VALACYCLOVIR HYDROCHLORIDE 1 G/1
TABLET, FILM COATED ORAL
Qty: 90 TABLET | Refills: 0 | Status: SHIPPED | OUTPATIENT
Start: 2022-07-12 | End: 2022-07-14

## 2022-07-14 ENCOUNTER — OFFICE VISIT (OUTPATIENT)
Dept: VASCULAR LAB | Facility: MEDICAL CENTER | Age: 67
End: 2022-07-14
Attending: FAMILY MEDICINE
Payer: MEDICARE

## 2022-07-14 VITALS
SYSTOLIC BLOOD PRESSURE: 115 MMHG | HEART RATE: 68 BPM | BODY MASS INDEX: 25.9 KG/M2 | HEIGHT: 67 IN | WEIGHT: 165 LBS | DIASTOLIC BLOOD PRESSURE: 65 MMHG

## 2022-07-14 DIAGNOSIS — Z79.01 CHRONIC ANTICOAGULATION: ICD-10-CM

## 2022-07-14 DIAGNOSIS — R93.1 AGATSTON CAC SCORE, <100: ICD-10-CM

## 2022-07-14 DIAGNOSIS — I48.91 ATRIAL FIBRILLATION STATUS POST CARDIOVERSION (HCC): ICD-10-CM

## 2022-07-14 DIAGNOSIS — K55.1 SUPERIOR MESENTERIC ARTERY STENOSIS (HCC): ICD-10-CM

## 2022-07-14 DIAGNOSIS — E11.9 TYPE 2 DIABETES MELLITUS WITHOUT COMPLICATION, WITHOUT LONG-TERM CURRENT USE OF INSULIN (HCC): ICD-10-CM

## 2022-07-14 DIAGNOSIS — I77.1 CELIAC ARTERY STENOSIS (HCC): ICD-10-CM

## 2022-07-14 DIAGNOSIS — E78.2 MIXED HYPERLIPIDEMIA: ICD-10-CM

## 2022-07-14 PROBLEM — I70.1 RAS (RENAL ARTERY STENOSIS) (HCC): Status: RESOLVED | Noted: 2021-01-18 | Resolved: 2022-07-14

## 2022-07-14 PROCEDURE — 99205 OFFICE O/P NEW HI 60 MIN: CPT | Performed by: FAMILY MEDICINE

## 2022-07-14 PROCEDURE — 99212 OFFICE O/P EST SF 10 MIN: CPT

## 2022-07-14 RX ORDER — CHLORAL HYDRATE 500 MG
1000 CAPSULE ORAL
COMMUNITY
End: 2022-09-29

## 2022-07-14 ASSESSMENT — ENCOUNTER SYMPTOMS
CHILLS: 0
NAUSEA: 0
WEAKNESS: 0
PALPITATIONS: 0
FEVER: 0
CLAUDICATION: 0
ORTHOPNEA: 0
COUGH: 0
MYALGIAS: 0
BRUISES/BLEEDS EASILY: 0

## 2022-07-14 ASSESSMENT — FIBROSIS 4 INDEX: FIB4 SCORE: 1.43

## 2022-07-14 NOTE — PROGRESS NOTES
INITIAL VASCULAR MEDICINE VISIT  Judy Blackburn is a 67 y.o. female  who presents today 22  for   Chief Complaint   Patient presents with   • Follow-Up     initially referred by No ref. provider found for eval and med mgmt of mesenteric aa stenoses      Subjective         Mesenteric aa stenoses  No prior postprandial abd pain or recurrent bloody diarrhea.   No prior episodes of mes aa ischemia  Completed prior ARTEMIO duplex scanning 2021 as noted below     HTN:  No current concerns, stable, tolerating meds.   Home BP lo-120s/70s     Hyperlipidemia:  Stable, no current concerns  Current treatment: lifestyle changes  and High intensity atorva     Antiplatelet/anticoagulation:  eliquis     Type 2 DM:No     CKD:  No     Afib:  Stable, seeing cardiology  Admitted [facility] 22 with palpittaions noted to have afib and had FAHAD w/o SERVANDO thrombus and s/p cardioversion.    Started on OAC.     Past Medical History:   Diagnosis Date   • Anxiety    • Diabetes (HCC)    • Hyperlipidemia    • Hypertension      Past Surgical History:   Procedure Laterality Date   • FAHAD N/A 2022    Procedure: ECHOCARDIOGRAM, TRANSESOPHAGEAL;  Surgeon: Reymundo Garcia M.D.;  Location: SURGERY [facility];  Service: Cardiac   • CARDIOVERSION N/A 2022    Procedure: CARDIOVERSION;  Surgeon: Reymundo Garcia M.D.;  Location: SURGERY [facility];  Service: Cardiac   • LUMPECTOMY          Current Outpatient Medications:   •  fish oil, 1,000 mg, Oral, TID WITH MEALS, Taking  •  Potassium, Take  by mouth., Taking  •  traZODone, 100 mg, Oral, Nightly, Taking  •  apixaban, 5 mg, Oral, BID, Taking  •  losartan, 100 mg, Oral, DAILY, Taking  •  acyclovir, AAA QID x 5 days prn outbreak, PRN  •  ALPRAZolam, 0.5 mg, Oral, TID PRN, PRN  •  omeprazole, 20 mg, Oral, DAILY, Taking  •  atorvastatin, 80 mg, Oral, QHS, Taking  •  propranolol LA, 60 mg, Oral, QDAY, Taking  •  metFORMIN ER, 500 mg, Oral, DAILY, Taking  •  MAGNESIUM PO,  "1 Tablet, Oral, QHS, Taking   Allergies   Allergen Reactions   • Codeine Vomiting and Nausea     Family History   Problem Relation Age of Onset   • Arrythmia Sister       Social History     Tobacco Use   • Smoking status: Never Smoker   • Smokeless tobacco: Never Used   Vaping Use   • Vaping Use: Never used   Substance Use Topics   • Alcohol use: Yes   • Drug use: Never     Review of Systems   Constitutional: Negative for chills and fever.   Respiratory: Negative for cough.    Cardiovascular: Negative for chest pain, palpitations, orthopnea, claudication and leg swelling.   Gastrointestinal: Negative for nausea.   Musculoskeletal: Negative for myalgias.   Neurological: Negative for weakness.   Endo/Heme/Allergies: Does not bruise/bleed easily.           Objective   Vitals:    07/14/22 1315   BP: 115/65   BP Location: Left arm   Patient Position: Sitting   BP Cuff Size: Adult   Pulse: 68   Weight: 74.8 kg (165 lb)   Height: 1.702 m (5' 7\")      BP Readings from Last 5 Encounters:   07/14/22 115/65   05/31/22 132/80   05/19/22 (!) 150/64   04/26/22 126/64   02/09/22 128/88      Body mass index is 25.84 kg/m².  Physical Exam  Vitals reviewed.   Constitutional:       General: She is not in acute distress.     Appearance: Normal appearance.   HENT:      Head: Normocephalic and atraumatic.   Neck:      Thyroid: No thyroid mass.      Vascular: Normal carotid pulses.      Trachea: Trachea normal.   Cardiovascular:      Rate and Rhythm: Normal rate and regular rhythm.      Chest Wall: PMI is not displaced.      Pulses: Normal pulses.           Carotid pulses are 2+ on the right side and 2+ on the left side.       Radial pulses are 2+ on the right side and 2+ on the left side.        Dorsalis pedis pulses are 2+ on the right side and 2+ on the left side.        Posterior tibial pulses are 2+ on the right side and 2+ on the left side.      Heart sounds: Normal heart sounds.   Pulmonary:      Effort: Pulmonary effort is normal. "      Breath sounds: Normal breath sounds.   Musculoskeletal:      Cervical back: Full passive range of motion without pain.      Right lower leg: No edema.      Left lower leg: No edema.   Skin:     General: Skin is warm and dry.      Capillary Refill: Capillary refill takes less than 2 seconds.      Coloration: Skin is not cyanotic.      Nails: There is no clubbing.   Neurological:      General: No focal deficit present.      Mental Status: She is alert and oriented to person, place, and time. Mental status is at baseline.      Cranial Nerves: Cranial nerves are intact. No cranial nerve deficit.      Coordination: Coordination is intact. Coordination normal.      Gait: Gait is intact. Gait normal.   Psychiatric:         Mood and Affect: Mood normal.         Behavior: Behavior normal.       Lab Results   Component Value Date    CHOLSTRLTOT 142 06/02/2022    LDL 58 06/02/2022    HDL 47 06/02/2022    TRIGLYCERIDE 183 (H) 06/02/2022      No results found for: LIPOPROTA   No results found for: APOB         Lab Results   Component Value Date    HBA1C 5.9 (H) 06/02/2022      Lab Results   Component Value Date    SODIUM 142 04/26/2022    POTASSIUM 3.8 04/26/2022    CHLORIDE 107 04/26/2022    CO2 24 04/26/2022    GLUCOSE 106 (H) 04/26/2022    BUN 20 04/26/2022    CREATININE 0.50 04/26/2022    IFAFRICA >60 02/09/2022    IFNOTAFR >60 02/09/2022        Lab Results   Component Value Date    WBC 8.1 04/26/2022    RBC 4.68 04/26/2022    HEMOGLOBIN 14.5 04/26/2022    HEMATOCRIT 43.2 04/26/2022    MCV 92.3 04/26/2022    MCH 31.0 04/26/2022    MCHC 33.6 04/26/2022    MPV 10.3 04/26/2022     Lab Results   Component Value Date    HBA1C 5.9 (H) 06/02/2022      No results found for: MICROALBCALC, MALBCRT, MALBEXCR, EOHUCX09, MICROALBUR, MICRALB, UMICROALBUM, MICROALBTIM     VASCULAR IMAGING:   Last EKG:   Results for orders placed or performed during the hospital encounter of 04/26/22   EKG   Result Value Ref Range    Report        [facility] Emergency Dept.    Test Date:  2022  Pt Name:    ADÁN LOPEZ                Department: [facility]  MRN:        [id number]                      Room:       -ROOM 6  Gender:     Female                       Technician: HALIMA  :        1955                   Requested By:REYMUNDO WISEMAN  Order #:    [id number]                    Reading MD: Reymundo Wiseman MD    Measurements  Intervals                                Axis  Rate:       137                          P:  TX:                                      QRS:        2  QRSD:       92                           T:          -18  QT:         314  QTc:        474    Interpretive Statements  Atrial fibrillation  Normal axis  No ST elevations  ST depressions present in V3 and V4    Electronically Signed On 2022 3:25:50 PDT by Reymundo Wiseman MD       Carotid 2021   No prior study is available for comparison.   Mild soft plaque bilaterally. No significant stenosis    ARTEMIO duplex 2021    Velocities of the celiac and superior mesenteric arteries are consistent with   a   >= 75% stenosis.   No evidence of renal artery stenosis.    CAC 2022   Coronary calcification:  LMA - 0.0  LCX - 0.0  LAD - 5.9  RCA - 5.7  PDA - 0.0   Total Calcium Score: 11.6   Percentile: Calcium score is above the 25th percentile for the patient's age and sex.   Other findings:  Heart: Normal size. Calcifications aortic valve  Lungs: Clear.  Mediastinum: Normal.  Upper abdomen: Normal.    FAHAD 2022   No thrombus detected in the left atrial appendage or left atrial Structures.    TTE 2022   Normal left ventricular size, wall thickness, systolic, and diastolic   function.  Normal right ventricular size and systolic function.  Mild tricuspid regurgitation.  No pericardial effusion.   No prior study is available for comparison.            Medical Decision Making:  Today's Assessment / Status / Plan:     1. Celiac artery stenosis (HCC)   CTA ABDOMEN PELVIS W & W/O POST PROCESS   2. Superior mesenteric artery stenosis (HCC)  CTA ABDOMEN PELVIS W & W/O POST PROCESS   3. Agatston CAC score, <100     4. Type 2 diabetes mellitus without complication, without long-term current use of insulin (HCC)  MICROALBUMIN CREAT RATIO URINE    Lipoprotein (a)    Lipid Profile    HEMOGLOBIN A1C (Glycohemoglobin GHB Total/A1C with MBG Estimate)   5. Mixed hyperlipidemia  Comp Metabolic Panel    APOLIPOPROTEIN B    CTA ABDOMEN PELVIS W & W/O POST PROCESS   6. Atrial fibrillation status post cardioversion (HCC)     7. Chronic anticoagulation         Patient Type: Primary Prevention    Etiology of Established CVD if Present:     1) SMA and celiac aa stenoses w/o symptoms or hx of ischemia   -reviewed anatomy, presumed has sufficient collateral circulation.  Assured there is no aortic stenosis, renal aa stenosis  - review s/s to watch for with mesenteric ischemia - gave handouts  - continue med mgmt and antiplat vs OAC   - update CTA abd for more formal assessment of stenosis of arteries     2) Afib, s/p cardioversion 4/2022   - continue rate-control and OAC per cardiology     BLOOD PRESSURE MANAGEMENT:  ACC/AHA (2017) goal <130/80  Home BP at goal:  yes  Office BP at goal:  yes  24h ABPM: not ordered to date  Plan:   Monitoring:   - start/continue home BP monitoring, reviewed correct technique, provide BP log and instructions  - order 24h ABPM:  NO  - monitor lytes/gfr routinely   - contact office if BP consistently >140/>90 for discussion of tx adjustments   Medications:  - continue losartan 100mg daily   - continue propranolol 80mg ER - defer BB decisions for rate-control to cardiology      LIPID MANAGEMENT:   Qualifies for Statin Therapy Based on 2018 ACC/AHA Guidelines: yes, Primary Prevention - 40-74yo, LDLc >70, <190 w/o DM  The 10-year ASCVD risk score (Stanley JORGE Jr., et al., 2013) is: 7.6%  Major ASCVD events: None  High-risk condition: N/A  Risk-enhancers:  Persistently elevated trigs >174  Currently on Statin: Yes  Tx goals: LDL-C <100 (consider non-HDL-C <130, apoB <90)  At goal? yes  Plan:   - reinforced ongoing TLC measures as noted   - monitor labs   Meds:   - continue atorva 40mg     ANTITHROMBOTIC/ANTIPLATELET THERAPY: continue eliquis 5mg BID, LOT deferred to cardiology   - update CBC     GLYCEMIC STATUS: Diabetic, now preDM levels   T2D:  Stable. No current symptoms reported.   Tolerating meds and no recent med changes.   Last A1c   Lab Results   Component Value Date    HBA1C 5.9 (H) 06/02/2022     No results found for: MICROALBCALC, MALBCRT, MALBEXCR, [id number], MICROALBUR, MICRALB, UMICROALBUM, MICROALBTIM      LIFESTYLE INTERVENTIONS:    SMOKING:   reports that she has never smoked. She has never used smokeless tobacco.   - continued complete avoidance of all tobacco products     PHYSICAL ACTIVITY: continue healthy activity to improve CV fitness.  In general, targeting >150min/week of moderate-level activity.  Additional details reviewed with patient and/or outlined in care instructions     WEIGHT MANAGEMENT AND NUTRITION:  Mediterranean style dietary approach     OTHER: none     Instructed to follow-up with PCP for remainder of adult medical needs: yes  We will partner with other providers in the management of established vascular disease and cardiometabolic risk factors.    Studies to Be Obtained: CTA abd/pelvis    Labs to Be Obtained: as noted above     Follow up in: 2 months with cardio and vasc med, sooner prn     Total time: 65min - chart review/prep, review of other providers' records, imaging/lab review, face-to-face time for history/examination, ordering, prescribing,  review of results/meds/ treatment plan with patient/family/caregiver, documentation in EMR, care coordination (as needed)    Handy Coker M.D.  [facility]   [place] [place] [facility]   [phone]

## 2022-07-15 ENCOUNTER — HOSPITAL ENCOUNTER (OUTPATIENT)
Dept: LAB | Facility: MEDICAL CENTER | Age: 67
End: 2022-07-15
Attending: FAMILY MEDICINE
Payer: MEDICARE

## 2022-07-15 DIAGNOSIS — E11.9 TYPE 2 DIABETES MELLITUS WITHOUT COMPLICATION, WITHOUT LONG-TERM CURRENT USE OF INSULIN (HCC): ICD-10-CM

## 2022-07-15 DIAGNOSIS — E78.2 MIXED HYPERLIPIDEMIA: ICD-10-CM

## 2022-07-15 LAB
ALBUMIN SERPL BCP-MCNC: 5.3 G/DL (ref 3.2–4.9)
ALBUMIN/GLOB SERPL: 2 G/DL
ALP SERPL-CCNC: 85 U/L (ref 30–99)
ALT SERPL-CCNC: 29 U/L (ref 2–50)
ANION GAP SERPL CALC-SCNC: 11 MMOL/L (ref 7–16)
AST SERPL-CCNC: 22 U/L (ref 12–45)
BILIRUB SERPL-MCNC: 0.3 MG/DL (ref 0.1–1.5)
BUN SERPL-MCNC: 22 MG/DL (ref 8–22)
CALCIUM SERPL-MCNC: 10.1 MG/DL (ref 8.5–10.5)
CHLORIDE SERPL-SCNC: 105 MMOL/L (ref 96–112)
CHOLEST SERPL-MCNC: 144 MG/DL (ref 100–199)
CO2 SERPL-SCNC: 25 MMOL/L (ref 20–33)
CREAT SERPL-MCNC: 0.61 MG/DL (ref 0.5–1.4)
FASTING STATUS PATIENT QL REPORTED: NORMAL
GFR SERPLBLD CREATININE-BSD FMLA CKD-EPI: 97 ML/MIN/1.73 M 2
GLOBULIN SER CALC-MCNC: 2.6 G/DL (ref 1.9–3.5)
GLUCOSE SERPL-MCNC: 85 MG/DL (ref 65–99)
HDLC SERPL-MCNC: 46 MG/DL
LDLC SERPL CALC-MCNC: 70 MG/DL
POTASSIUM SERPL-SCNC: 4 MMOL/L (ref 3.6–5.5)
PROT SERPL-MCNC: 7.9 G/DL (ref 6–8.2)
SODIUM SERPL-SCNC: 141 MMOL/L (ref 135–145)
TRIGL SERPL-MCNC: 138 MG/DL (ref 0–149)

## 2022-07-15 PROCEDURE — 80061 LIPID PANEL: CPT

## 2022-07-15 PROCEDURE — 82172 ASSAY OF APOLIPOPROTEIN: CPT

## 2022-07-15 PROCEDURE — 83695 ASSAY OF LIPOPROTEIN(A): CPT

## 2022-07-15 PROCEDURE — 36415 COLL VENOUS BLD VENIPUNCTURE: CPT

## 2022-07-15 PROCEDURE — 80053 COMPREHEN METABOLIC PANEL: CPT

## 2022-07-17 LAB — APO B100 SERPL-MCNC: 72 MG/DL (ref 55–125)

## 2022-07-18 ENCOUNTER — HOSPITAL ENCOUNTER (OUTPATIENT)
Facility: MEDICAL CENTER | Age: 67
End: 2022-07-18
Attending: FAMILY MEDICINE
Payer: MEDICARE

## 2022-07-18 LAB
CREAT UR-MCNC: 143.08 MG/DL
LPA SERPL-MCNC: <6 MG/DL
MICROALBUMIN UR-MCNC: 2.2 MG/DL
MICROALBUMIN/CREAT UR: 15 MG/G (ref 0–30)

## 2022-07-18 PROCEDURE — 82570 ASSAY OF URINE CREATININE: CPT

## 2022-07-18 PROCEDURE — 82043 UR ALBUMIN QUANTITATIVE: CPT

## 2022-07-21 ENCOUNTER — PATIENT MESSAGE (OUTPATIENT)
Dept: CARDIOLOGY | Facility: MEDICAL CENTER | Age: 67
End: 2022-07-21
Payer: MEDICARE

## 2022-07-21 ENCOUNTER — HOSPITAL ENCOUNTER (OUTPATIENT)
Dept: RADIOLOGY | Facility: MEDICAL CENTER | Age: 67
End: 2022-07-21
Attending: FAMILY MEDICINE
Payer: MEDICARE

## 2022-07-21 DIAGNOSIS — K55.1 SUPERIOR MESENTERIC ARTERY STENOSIS (HCC): ICD-10-CM

## 2022-07-21 DIAGNOSIS — E78.2 MIXED HYPERLIPIDEMIA: ICD-10-CM

## 2022-07-21 DIAGNOSIS — I77.1 CELIAC ARTERY STENOSIS (HCC): ICD-10-CM

## 2022-07-21 PROCEDURE — 74174 CTA ABD&PLVS W/CONTRAST: CPT | Mod: ME

## 2022-07-21 PROCEDURE — 700117 HCHG RX CONTRAST REV CODE 255: Performed by: FAMILY MEDICINE

## 2022-07-21 RX ADMIN — IOHEXOL 100 ML: 350 INJECTION, SOLUTION INTRAVENOUS at 11:47

## 2022-08-15 ENCOUNTER — PHARMACY VISIT (OUTPATIENT)
Dept: PHARMACY | Facility: MEDICAL CENTER | Age: 67
End: 2022-08-15
Payer: COMMERCIAL

## 2022-08-15 ENCOUNTER — PATIENT MESSAGE (OUTPATIENT)
Dept: CARDIOLOGY | Facility: MEDICAL CENTER | Age: 67
End: 2022-08-15
Payer: MEDICARE

## 2022-08-15 PROCEDURE — RXMED WILLOW AMBULATORY MEDICATION CHARGE: Performed by: NURSE PRACTITIONER

## 2022-08-16 DIAGNOSIS — I48.91 ATRIAL FIBRILLATION STATUS POST CARDIOVERSION (HCC): ICD-10-CM

## 2022-08-16 DIAGNOSIS — I77.1 ILIAC ARTERY STENOSIS, BILATERAL (HCC): ICD-10-CM

## 2022-08-26 DIAGNOSIS — K92.89 GAS BLOAT SYNDROME: ICD-10-CM

## 2022-08-26 RX ORDER — OMEPRAZOLE 20 MG/1
20 CAPSULE, DELAYED RELEASE ORAL DAILY
Qty: 90 CAPSULE | Refills: 0 | Status: SHIPPED | OUTPATIENT
Start: 2022-08-26 | End: 2023-01-03

## 2022-08-27 DIAGNOSIS — I10 ESSENTIAL HYPERTENSION: ICD-10-CM

## 2022-08-30 RX ORDER — LOSARTAN POTASSIUM 100 MG/1
100 TABLET ORAL DAILY
Qty: 90 TABLET | Refills: 0 | Status: SHIPPED | OUTPATIENT
Start: 2022-08-30 | End: 2022-10-13 | Stop reason: SDUPTHER

## 2022-09-29 ENCOUNTER — OFFICE VISIT (OUTPATIENT)
Dept: VASCULAR LAB | Facility: MEDICAL CENTER | Age: 67
End: 2022-09-29
Attending: FAMILY MEDICINE
Payer: MEDICARE

## 2022-09-29 VITALS
SYSTOLIC BLOOD PRESSURE: 115 MMHG | DIASTOLIC BLOOD PRESSURE: 77 MMHG | BODY MASS INDEX: 25.99 KG/M2 | WEIGHT: 165.6 LBS | HEART RATE: 64 BPM | HEIGHT: 67 IN

## 2022-09-29 DIAGNOSIS — I77.1 CELIAC ARTERY STENOSIS (HCC): Chronic | ICD-10-CM

## 2022-09-29 DIAGNOSIS — Z79.01 CHRONIC ANTICOAGULATION: ICD-10-CM

## 2022-09-29 DIAGNOSIS — K55.1 SUPERIOR MESENTERIC ARTERY STENOSIS (HCC): ICD-10-CM

## 2022-09-29 DIAGNOSIS — R93.1 AGATSTON CAC SCORE, <100: ICD-10-CM

## 2022-09-29 DIAGNOSIS — I48.91 ATRIAL FIBRILLATION STATUS POST CARDIOVERSION (HCC): ICD-10-CM

## 2022-09-29 DIAGNOSIS — D68.69 SECONDARY HYPERCOAGULABILITY DISORDER (HCC): Chronic | ICD-10-CM

## 2022-09-29 DIAGNOSIS — E78.2 MIXED HYPERLIPIDEMIA: ICD-10-CM

## 2022-09-29 DIAGNOSIS — E11.9 TYPE 2 DIABETES MELLITUS WITHOUT COMPLICATION, WITHOUT LONG-TERM CURRENT USE OF INSULIN (HCC): ICD-10-CM

## 2022-09-29 PROBLEM — I77.4 CELIAC ARTERY STENOSIS (HCC): Status: ACTIVE | Noted: 2022-09-29

## 2022-09-29 PROCEDURE — 99212 OFFICE O/P EST SF 10 MIN: CPT

## 2022-09-29 PROCEDURE — 99214 OFFICE O/P EST MOD 30 MIN: CPT | Performed by: FAMILY MEDICINE

## 2022-09-29 ASSESSMENT — ENCOUNTER SYMPTOMS
FEVER: 0
BRUISES/BLEEDS EASILY: 0
CLAUDICATION: 0
WEAKNESS: 0
MYALGIAS: 0
CHILLS: 0
ORTHOPNEA: 0
NAUSEA: 0
COUGH: 0
PALPITATIONS: 0

## 2022-09-29 ASSESSMENT — FIBROSIS 4 INDEX: FIB4 SCORE: 1.1

## 2022-09-29 NOTE — PROGRESS NOTES
FOLLOW-UP VASCULAR MEDICINE VISIT  Judy Blackburn is a  female  who presents 22 for   Chief Complaint   Patient presents with    Follow-Up       initially referred by No ref. provider found for eval and med mgmt of mesenteric aa stenoses      Subjective         Interval hx/concerns: denies, had scans in 2022.  Has reduced exercise, rashes.     Mesenteric aa stenoses  Had CT scan done   No prior postprandial abd pain or recurrent bloody diarrhea.   No prior episodes of mes aa ischemia    HTN:  No current concerns, stable, tolerating meds.   Home BP lo-120s/70s     Hyperlipidemia:  Stable, no current concerns  Current treatment: lifestyle changes  and High intensity atorva     Antiplatelet/anticoagulation:  eliquis      Afib:  Stable, seeing cardiology  Pmhx:   Admitted [facility] 22 with palpittaions noted to have afib and had FAHAD w/o SERVANDO thrombus and s/p cardioversion.    Started on OAC.            Current Outpatient Medications:     losartan, 100 mg, Oral, DAILY, Taking    omeprazole, 20 mg, Oral, DAILY, Taking    apixaban, 5 mg, Oral, BID, Taking    Potassium, Take  by mouth., Taking    traZODone, 100 mg, Oral, Nightly, Taking    acyclovir, AAA QID x 5 days prn outbreak, PRN    ALPRAZolam, 0.5 mg, Oral, TID PRN, PRN    atorvastatin, 80 mg, Oral, QHS, Taking    propranolol LA, 60 mg, Oral, QDAY, Taking    metFORMIN ER, 500 mg, Oral, DAILY, Taking    MAGNESIUM PO, 1 Tablet, Oral, QHS, Taking      Social History     Tobacco Use    Smoking status: Never    Smokeless tobacco: Never   Vaping Use    Vaping Use: Never used   Substance Use Topics    Alcohol use: Yes    Drug use: Never     Review of Systems   Constitutional:  Negative for chills and fever.   Respiratory:  Negative for cough.    Cardiovascular:  Negative for chest pain, palpitations, orthopnea, claudication and leg swelling.   Gastrointestinal:  Negative for nausea.   Musculoskeletal:  Negative for myalgias.   Neurological:  Negative for  "weakness.   Endo/Heme/Allergies:  Does not bruise/bleed easily.         Objective   Vitals:    09/29/22 1014   BP: 115/77   BP Location: Left arm   Patient Position: Sitting   BP Cuff Size: Adult   Pulse: 64   Weight: 75.1 kg (165 lb 9.6 oz)   Height: 1.702 m (5' 7\")        BP Readings from Last 5 Encounters:   09/29/22 115/77   07/14/22 115/65   05/31/22 132/80   05/19/22 (!) 150/64   04/26/22 126/64      Body mass index is 25.94 kg/m².  Physical Exam  Vitals reviewed.   Constitutional:       General: She is not in acute distress.     Appearance: Normal appearance.   HENT:      Head: Normocephalic and atraumatic.   Neck:      Thyroid: No thyroid mass.      Vascular: Normal carotid pulses.      Trachea: Trachea normal.   Cardiovascular:      Rate and Rhythm: Normal rate and regular rhythm.      Chest Wall: PMI is not displaced.      Pulses: Normal pulses.           Carotid pulses are 2+ on the right side and 2+ on the left side.       Radial pulses are 2+ on the right side and 2+ on the left side.        Dorsalis pedis pulses are 2+ on the right side and 2+ on the left side.        Posterior tibial pulses are 2+ on the right side and 2+ on the left side.      Heart sounds: Normal heart sounds.   Pulmonary:      Effort: Pulmonary effort is normal.      Breath sounds: Normal breath sounds.   Musculoskeletal:      Cervical back: Full passive range of motion without pain.      Right lower leg: No edema.      Left lower leg: No edema.   Skin:     General: Skin is warm and dry.      Capillary Refill: Capillary refill takes less than 2 seconds.      Coloration: Skin is not cyanotic.      Nails: There is no clubbing.   Neurological:      General: No focal deficit present.      Mental Status: She is alert and oriented to person, place, and time. Mental status is at baseline.      Cranial Nerves: No cranial nerve deficit.      Coordination: Coordination is intact. Coordination normal.      Gait: Gait is intact. Gait normal. "   Psychiatric:         Mood and Affect: Mood normal.         Behavior: Behavior normal.     Lab Results   Component Value Date    CHOLSTRLTOT 144 07/15/2022    LDL 70 07/15/2022    HDL 46 07/15/2022    TRIGLYCERIDE 138 07/15/2022      Lab Results   Component Value Date/Time    LIPOPROTA <6 07/15/2022 03:15 PM      Lab Results   Component Value Date/Time    APOB 72 07/15/2022 03:15 PM            Lab Results   Component Value Date    HBA1C 5.9 (H) 2022      Lab Results   Component Value Date    SODIUM 141 07/15/2022    POTASSIUM 4.0 07/15/2022    CHLORIDE 105 07/15/2022    CO2 25 07/15/2022    GLUCOSE 85 07/15/2022    BUN 22 07/15/2022    CREATININE 0.61 07/15/2022    IFAFRICA >60 2022    IFNOTAFR >60 2022        Lab Results   Component Value Date    WBC 8.1 2022    RBC 4.68 2022    HEMOGLOBIN 14.5 2022    HEMATOCRIT 43.2 2022    MCV 92.3 2022    MCH 31.0 2022    MCHC 33.6 2022    MPV 10.3 2022     Lab Results   Component Value Date    HBA1C 5.9 (H) 2022      Lab Results   Component Value Date/Time    MALBCRT 15 2022 12:00 PM    MICROALBUR 2.2 2022 12:00 PM        VASCULAR IMAGING:   Last EKG:   Results for orders placed or performed during the hospital encounter of 22   EKG   Result Value Ref Range    Report       [facility] Emergency Dept.    Test Date:  2022  Pt Name:    ADÁN LOPEZ                Department: [facility]  MRN:        [id number]                      Room:       -ROOM 6  Gender:     Female                       Technician: HALIMA  :        1955                   Requested By:AUNG WISEMAN  Order #:    [id number]                    Reading MD: Aung Wiseman MD    Measurements  Intervals                                Axis  Rate:       137                          P:  NE:                                      QRS:        2  QRSD:       92                           T:           -18  QT:         314  QTc:        474    Interpretive Statements  Atrial fibrillation  Normal axis  No ST elevations  ST depressions present in V3 and V4    Electronically Signed On 4- 3:25:50 PDT by Reymundo Wiseman MD       Carotid 1/2021   No prior study is available for comparison.   Mild soft plaque bilaterally. No significant stenosis    ARTEMIO duplex 1/2021    Velocities of the celiac and superior mesenteric arteries are consistent with   a   >= 75% stenosis.   No evidence of renal artery stenosis.    CAC 2/2022   Coronary calcification:  LMA - 0.0  LCX - 0.0  LAD - 5.9  RCA - 5.7  PDA - 0.0   Total Calcium Score: 11.6   Percentile: Calcium score is above the 25th percentile for the patient's age and sex.   Other findings:  Heart: Normal size. Calcifications aortic valve  Lungs: Clear.  Mediastinum: Normal.  Upper abdomen: Normal.    FAHAD 4/2022   No thrombus detected in the left atrial appendage or left atrial Structures.    TTE 4/2022   Normal left ventricular size, wall thickness, systolic, and diastolic   function.  Normal right ventricular size and systolic function.  Mild tricuspid regurgitation.  No pericardial effusion.   No prior study is available for comparison.     CTA abd/pelvis 7/2022   1.  Moderate focal stenosis of the origin of celiac trunk.  2.  Superior and inferior mesenteric arteries are normal in caliber and patent.  3.  Duplicated bilateral renal arteries, patent.  4.  Colonic diverticulosis without evidence for diverticulitis.         Medical Decision Making:  Today's Assessment / Status / Plan:     1. Celiac artery stenosis (HCC)        2. Superior mesenteric artery stenosis (HCC)      resolved per CTA       3. Agatston CAC score, <100        4. Type 2 diabetes mellitus without complication, without long-term current use of insulin (HCC)        5. Mixed hyperlipidemia        6. Atrial fibrillation status post cardioversion ([facility])        7. Chronic anticoagulation         8. Secondary hypercoagulability disorder (HCC)            Patient Type: Primary Prevention    Etiology of Established CVD if Present:     1)  moderate, chronic celiac a stenoses w/o symptoms or hx of ischemia - STABLE   - resolved SMA stenosis CTA 7/2022   previously reviewed anatomy, presumed has sufficient collateral circulation.  Assured there is no aortic stenosis, renal aa stenosis  - review s/s to watch for with mesenteric ischemia - gave handouts  - continue med mgmt and antiplat vs OAC   - consider Q2-3yr Ao/iliac duplex for surveillance, sooner if post-prandial pain or acute abd pain, consider repeat CTA in 5yrs     2) Afib, s/p cardioversion 4/2022 - stable   - continue rate-control and OAC per cardiology     3) subclinical CAD, CAC = 11   - no further w/u, continue med mgmt     BLOOD PRESSURE MANAGEMENT:  ACC/AHA (2017) goal <130/80  Home BP at goal:  yes  Office BP at goal:  yes  24h ABPM: not ordered to date  Plan:   Monitoring:   - start/continue home BP monitoring, reviewed correct technique, provide BP log and instructions  - order 24h ABPM:  NO  - monitor lytes/gfr routinely   - contact office if BP consistently >140/>90 for discussion of tx adjustments   Medications:  - continue losartan 100mg daily   - continue propranolol 80mg ER - defer BB decisions for rate-control to cardiology      LIPID MANAGEMENT:   Qualifies for Statin Therapy Based on 2018 ACC/AHA Guidelines: yes, Primary Prevention - 40-76yo, LDLc >70, <190 w/o DM  The 10-year ASCVD risk score (Margaritamaria ines PATEL Jr., et al., 2013) is: 14.4%  Major ASCVD events: None  High-risk condition: N/A  Risk-enhancers: Persistently elevated trigs >174  Currently on Statin: Yes  Tx goals: LDL-C <100 (consider non-HDL-C <130, apoB <90)  At goal? yes  Plan:   - reinforced ongoing TLC measures as noted   - monitor labs   Meds:   - continue atorva 40mg     ANTITHROMBOTIC/ANTIPLATELET THERAPY: continue eliquis 5mg BID, LOT deferred to cardiology    GLYCEMIC  STATUS: prior Diabetic, now preDM levels   Goal A1c < 7.0  Lab Results   Component Value Date    HBA1C 5.9 (H) 06/02/2022     Plan:  - continue current medication plan   - recommmend for routine care with PCP (or endocrine) to include regular A1c monitoring, annual albumin/creatinine ratio (ACR), annual diabetic retinopathy screening, foot exams, annual flu vaccine, and updates to pneumonia vaccines as appropriate     LIFESTYLE INTERVENTIONS:    SMOKING:   reports that she has never smoked. She has never used smokeless tobacco.   - continued complete avoidance of all tobacco products     PHYSICAL ACTIVITY: continue healthy activity to improve CV fitness.  In general, targeting >150min/week of moderate-level activity.     WEIGHT MANAGEMENT AND NUTRITION:  Mediterranean style dietary approach     OTHER: none     Instructed to follow-up with PCP for remainder of adult medical needs: yes  We will partner with other providers in the management of established vascular disease and cardiometabolic risk factors.    Studies to Be Obtained: repeat Ao/iliac in 2yrs (2024)  Labs to Be Obtained: as noted above     Follow up in: 1 yr, sooner yash Coker M.D.  [facility]   [place] [facility]   [phone]

## 2022-10-13 ENCOUNTER — OFFICE VISIT (OUTPATIENT)
Dept: CARDIOLOGY | Facility: MEDICAL CENTER | Age: 67
End: 2022-10-13
Payer: MEDICARE

## 2022-10-13 VITALS
WEIGHT: 163 LBS | BODY MASS INDEX: 25.58 KG/M2 | DIASTOLIC BLOOD PRESSURE: 72 MMHG | SYSTOLIC BLOOD PRESSURE: 126 MMHG | HEIGHT: 67 IN | HEART RATE: 67 BPM | RESPIRATION RATE: 16 BRPM | OXYGEN SATURATION: 95 %

## 2022-10-13 DIAGNOSIS — I10 ESSENTIAL HYPERTENSION: ICD-10-CM

## 2022-10-13 DIAGNOSIS — I48.91 ATRIAL FIBRILLATION STATUS POST CARDIOVERSION (HCC): ICD-10-CM

## 2022-10-13 DIAGNOSIS — E78.49 OTHER HYPERLIPIDEMIA: ICD-10-CM

## 2022-10-13 DIAGNOSIS — D68.69 SECONDARY HYPERCOAGULABLE STATE (HCC): ICD-10-CM

## 2022-10-13 DIAGNOSIS — E11.9 TYPE 2 DIABETES MELLITUS WITHOUT COMPLICATION, WITHOUT LONG-TERM CURRENT USE OF INSULIN (HCC): ICD-10-CM

## 2022-10-13 PROCEDURE — 99214 OFFICE O/P EST MOD 30 MIN: CPT | Performed by: NURSE PRACTITIONER

## 2022-10-13 RX ORDER — LOSARTAN POTASSIUM 100 MG/1
100 TABLET ORAL DAILY
Qty: 90 TABLET | Refills: 3 | Status: SHIPPED | OUTPATIENT
Start: 2022-10-13 | End: 2023-12-28

## 2022-10-13 RX ORDER — PROPRANOLOL HCL 60 MG
60 CAPSULE, EXTENDED RELEASE 24HR ORAL
Qty: 90 CAPSULE | Refills: 3 | Status: SHIPPED | OUTPATIENT
Start: 2022-10-13 | End: 2023-12-28

## 2022-10-13 RX ORDER — ATORVASTATIN CALCIUM 80 MG/1
80 TABLET, FILM COATED ORAL
Qty: 90 TABLET | Refills: 3 | Status: SHIPPED | OUTPATIENT
Start: 2022-10-13 | End: 2023-07-27

## 2022-10-13 ASSESSMENT — FIBROSIS 4 INDEX: FIB4 SCORE: 1.1
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
## 2022-11-07 ENCOUNTER — PATIENT OUTREACH (OUTPATIENT)
Dept: HEALTH INFORMATION MANAGEMENT | Facility: OTHER | Age: 67
End: 2022-11-07
Payer: MEDICARE

## 2022-11-07 ENCOUNTER — PATIENT MESSAGE (OUTPATIENT)
Dept: HEALTH INFORMATION MANAGEMENT | Facility: OTHER | Age: 67
End: 2022-11-07

## 2022-11-08 NOTE — PATIENT INSTRUCTIONS

## 2022-11-11 ENCOUNTER — PATIENT MESSAGE (OUTPATIENT)
Dept: HEALTH INFORMATION MANAGEMENT | Facility: OTHER | Age: 67
End: 2022-11-11

## 2022-11-15 ENCOUNTER — PATIENT OUTREACH (OUTPATIENT)
Dept: HEALTH INFORMATION MANAGEMENT | Facility: OTHER | Age: 67
End: 2022-11-15
Payer: MEDICARE

## 2022-11-15 NOTE — PROGRESS NOTES
CHW called pt to discuss CCM services. Pt states she is doing well with managing her health and declined CCM services at this time. Pt has the Contact information to call back int he even she would like these services.

## 2022-12-26 DIAGNOSIS — R73.03 PREDIABETES: ICD-10-CM

## 2022-12-26 DIAGNOSIS — K92.89 GAS BLOAT SYNDROME: ICD-10-CM

## 2022-12-29 DIAGNOSIS — R73.03 PREDIABETES: ICD-10-CM

## 2022-12-29 DIAGNOSIS — K92.89 GAS BLOAT SYNDROME: ICD-10-CM

## 2023-01-03 RX ORDER — METFORMIN HYDROCHLORIDE 500 MG/1
500 TABLET, EXTENDED RELEASE ORAL DAILY
Qty: 90 TABLET | Refills: 3 | Status: SHIPPED | OUTPATIENT
Start: 2023-01-03 | End: 2023-05-16

## 2023-01-03 RX ORDER — OMEPRAZOLE 20 MG/1
20 CAPSULE, DELAYED RELEASE ORAL DAILY
Qty: 90 CAPSULE | Refills: 0 | Status: SHIPPED | OUTPATIENT
Start: 2023-01-03 | End: 2023-05-17 | Stop reason: SDUPTHER

## 2023-01-03 NOTE — TELEPHONE ENCOUNTER
Received request via: Pharmacy    Was the patient seen in the last year in this department? Yes    Does the patient have an active prescription (recently filled or refills available) for medication(s) requested? No    Does the patient have [facility] and need 100 day supply (blood pressure, diabetes and cholesterol meds only)? Patient does not have SCP

## 2023-01-04 RX ORDER — METFORMIN HYDROCHLORIDE 500 MG/1
500 TABLET, EXTENDED RELEASE ORAL DAILY
Qty: 90 TABLET | Refills: 3 | Status: SHIPPED | OUTPATIENT
Start: 2023-01-04 | End: 2024-03-06

## 2023-01-04 RX ORDER — OMEPRAZOLE 20 MG/1
20 CAPSULE, DELAYED RELEASE ORAL DAILY
Qty: 90 CAPSULE | Refills: 0 | Status: SHIPPED | OUTPATIENT
Start: 2023-01-04 | End: 2023-02-15

## 2023-02-14 ENCOUNTER — APPOINTMENT (OUTPATIENT)
Dept: LAB | Facility: MEDICAL CENTER | Age: 68
End: 2023-02-14
Attending: FAMILY MEDICINE
Payer: MEDICARE

## 2023-02-15 ENCOUNTER — OFFICE VISIT (OUTPATIENT)
Dept: MEDICAL GROUP | Age: 68
End: 2023-02-15
Payer: MEDICARE

## 2023-02-15 VITALS
SYSTOLIC BLOOD PRESSURE: 130 MMHG | DIASTOLIC BLOOD PRESSURE: 80 MMHG | TEMPERATURE: 98.3 F | HEIGHT: 67 IN | BODY MASS INDEX: 26.49 KG/M2 | RESPIRATION RATE: 16 BRPM | HEART RATE: 70 BPM | WEIGHT: 168.8 LBS | OXYGEN SATURATION: 94 %

## 2023-02-15 DIAGNOSIS — N64.4 BREAST PAIN: ICD-10-CM

## 2023-02-15 DIAGNOSIS — E04.1 THYROID NODULE: ICD-10-CM

## 2023-02-15 LAB — HBA1C MFR BLD: 6.2 % (ref 4.8–5.6)

## 2023-02-15 PROCEDURE — 99214 OFFICE O/P EST MOD 30 MIN: CPT | Performed by: FAMILY MEDICINE

## 2023-02-15 RX ORDER — ALPRAZOLAM 0.5 MG/1
0.5 TABLET ORAL NIGHTLY PRN
Qty: 30 TABLET | Refills: 0 | Status: SHIPPED | OUTPATIENT
Start: 2023-02-15 | End: 2023-03-17

## 2023-02-15 ASSESSMENT — PATIENT HEALTH QUESTIONNAIRE - PHQ9
5. POOR APPETITE OR OVEREATING: 0 - NOT AT ALL
SUM OF ALL RESPONSES TO PHQ QUESTIONS 1-9: 2
CLINICAL INTERPRETATION OF PHQ2 SCORE: 2

## 2023-02-15 ASSESSMENT — FIBROSIS 4 INDEX: FIB4 SCORE: 1.12

## 2023-02-15 NOTE — PROGRESS NOTES
This medical record contains text that has been entered with the assistance of computer voice recognition and dictation software.  Therefore, it may contain unintended errors in text, spelling, punctuation, or grammar      Chief Complaint   Patient presents with    Follow-Up     Follow up/ med refills/ Blood work and scan results/ Right sided breast pain and tenderness.          Judy Blackburn is a 68 y.o. female here evaluation and management of: Breast pain      HPI:     HCC Gap Form    Last edited 02/15/23 10:31 PST by Lonny Mckinney M.D.           1. Breast pain  NEW UNDIAGNOSED PROBLEM    Judy is a very pleasant 68-year-old female who presents to clinic with chief plaint of having medial right breast pain that started several months ago.  She states that she did have breast reduction many years ago she is wondering if this is scar tissue.  She denies any discharge from the nipple, no unintentional weight loss no fevers chills night sweats.    2. Thyroid nodule  We obtained an ultrasound in the past June 2022 for enlarged thyroid which revealed a nodule below.    Nodules >= 1cm:     Nodule #1  Location:  Right  lower  Size:  2.1 cm x 1.8 cm x 1.3 cm  Composition:  Solid-2  Echogenicity:  Isoechoic-1  Shape:  Wider than tall-0  Margins:  Smooth-0  Echogenic Foci:  None-0     ACR TIRADS points/category:  3 - TR3 - Mildly Suspicious     IMPRESSION:     Right lower pole solid thyroid nodule measuring 2.1 x 1.8 x 1.3 cm in size.     ACR TI-RADS Recommendations  TR3 (1.5 -2.4cm) - follow up ultrasound in 1, 3, and 5 years.     Recommendations based on the American College of Radiology Thyroid imaging, reporting and Data System (TI-RADS) 2017.     INTERPRETING LOCATION: [facility] [place], [address]           Exam Ended: 06/21/22  2:03 PM Last Resulted: 06/21/22  2:16 PM              Current medicines (including changes today)  Current Outpatient Medications   Medication Sig Dispense Refill    ALPRAZolam  (XANAX) 0.5 MG Tab Take 1 Tablet by mouth at bedtime as needed for Anxiety or Sleep for up to 30 days. 30 Tablet 0    metFORMIN ER (GLUCOPHAGE XR) 500 MG TABLET SR 24 HR Take 1 Tablet by mouth every day. 90 Tablet 3    metFORMIN ER (GLUCOPHAGE XR) 500 MG TABLET SR 24 HR TAKE 1 TABLET BY MOUTH EVERY DAY 90 Tablet 3    omeprazole (PRILOSEC) 20 MG delayed-release capsule TAKE 1 CAPSULE BY MOUTH EVERY DAY 90 Capsule 0    losartan (COZAAR) 100 MG Tab Take 1 Tablet by mouth every day. 90 Tablet 3    apixaban (ELIQUIS) 5mg Tab Take 1 Tablet by mouth 2 times a day. 180 Tablet 3    propranolol LA (INDERAL LA) 60 MG CAPSULE SR 24 HR Take 1 Capsule by mouth every day. 90 Capsule 3    atorvastatin (LIPITOR) 80 MG tablet Take 1 Tablet by mouth at bedtime. 90 Tablet 3    Potassium 99 MG Tab Take  by mouth.      traZODone (DESYREL) 100 MG Tab Take 1 Tablet by mouth every evening. 90 Tablet 2    acyclovir (ZOVIRAX) 5 % Ointment AAA QID x 5 days prn outbreak 30 g 2    MAGNESIUM PO Take 1 Tablet by mouth at bedtime.       No current facility-administered medications for this visit.     She  has a past medical history of Anxiety, Diabetes (HCC), Hyperlipidemia, and Hypertension.  She  has a past surgical history that includes lumpectomy; trevor (N/A, 4/26/2022); and cardioversion (N/A, 4/26/2022).  Social History     Tobacco Use    Smoking status: Never    Smokeless tobacco: Never   Vaping Use    Vaping Use: Never used   Substance Use Topics    Alcohol use: Yes    Drug use: Never     Social History     Social History Narrative    Not on file     Family History   Problem Relation Age of Onset    Arrythmia Sister      Family Status   Relation Name Status    Sis  (Not Specified)         ROS    The pertinent  ROS findings can be seen in the HPI above.     All other systems reviewed and are negative     Objective:     /80 (BP Location: Right arm, Patient Position: Sitting, BP Cuff Size: Adult)   Pulse 70   Temp 36.8 °C (98.3 °F)  "(Temporal)   Resp 16   Ht 1.702 m (5' 7\")   Wt 76.6 kg (168 lb 12.8 oz)   SpO2 94%  Body mass index is 26.44 kg/m².      Physical Exam:    Constitutional: Alert, no distress.  Skin: No suspicious lesions  Eye: Equal, round and reactive, conjunctiva clear, lids normal.  ENMT: Lips without lesions, good dentition, oropharynx clear.  Neck: Trachea midline, no masses, no thyromegaly. No cervical or supraclavicular lymphadenopathy.  Respiratory: Unlabored respiratory effort, lungs clear to auscultation, no wheezes, no ronchi.  Cardiovascular: Normal S1, S2, no murmur, no edema  Abdomen: Soft, non-tender, no masses, no hepatosplenomegaly.      Female chaperone  offered, patient refused    Breast:     No skin changes, no discharge, no dimpling, no masses/nodules palpated, no lymphadenopathy      Assessment and Plan:   The following treatment plan was discussed    All recent labs and provider notes reviewed    1. Breast pain    We will proceed with a diagnostic mammogram and an ultrasound.      - ALPRAZolam (XANAX) 0.5 MG Tab; Take 1 Tablet by mouth at bedtime as needed for Anxiety or Sleep for up to 30 days.  Dispense: 30 Tablet; Refill: 0  - US-BREAST BILAT-COMPLETE; Future  - MA DIAGNOSTIC MAMMO BILAT W/CAD; Future    2. Thyroid nodule    Repeat US    - US-THYROID; Future             Instructed to Follow up in clinic or ER for worsening symptoms, difficulty breathing, lack of expected recovery, or should new symptoms or problems arise.    Followup: Return in about 3 months (around 5/15/2023) for Reevaluation, labs.               "

## 2023-03-15 ENCOUNTER — HOSPITAL ENCOUNTER (OUTPATIENT)
Dept: RADIOLOGY | Facility: MEDICAL CENTER | Age: 68
End: 2023-03-15
Attending: FAMILY MEDICINE
Payer: MEDICARE

## 2023-03-15 DIAGNOSIS — E04.1 THYROID NODULE: ICD-10-CM

## 2023-03-15 PROCEDURE — 76536 US EXAM OF HEAD AND NECK: CPT

## 2023-03-20 ENCOUNTER — HOSPITAL ENCOUNTER (OUTPATIENT)
Dept: RADIOLOGY | Facility: MEDICAL CENTER | Age: 68
End: 2023-03-20
Attending: FAMILY MEDICINE
Payer: MEDICARE

## 2023-03-20 DIAGNOSIS — N64.4 BREAST PAIN: ICD-10-CM

## 2023-03-20 PROCEDURE — G0279 TOMOSYNTHESIS, MAMMO: HCPCS

## 2023-03-20 PROCEDURE — 76642 ULTRASOUND BREAST LIMITED: CPT | Mod: RT

## 2023-04-05 DIAGNOSIS — F51.01 PRIMARY INSOMNIA: ICD-10-CM

## 2023-04-06 RX ORDER — TRAZODONE HYDROCHLORIDE 100 MG/1
TABLET ORAL
Qty: 90 TABLET | Refills: 2 | Status: SHIPPED | OUTPATIENT
Start: 2023-04-06 | End: 2023-05-23

## 2023-04-14 ENCOUNTER — HOSPITAL ENCOUNTER (OUTPATIENT)
Facility: MEDICAL CENTER | Age: 68
End: 2023-04-14
Attending: FAMILY MEDICINE
Payer: MEDICARE

## 2023-04-14 ENCOUNTER — PATIENT MESSAGE (OUTPATIENT)
Dept: MEDICAL GROUP | Age: 68
End: 2023-04-14
Payer: MEDICARE

## 2023-04-14 DIAGNOSIS — R19.7 DIARRHEA, UNSPECIFIED TYPE: ICD-10-CM

## 2023-04-14 PROCEDURE — 87324 CLOSTRIDIUM AG IA: CPT | Mod: XU

## 2023-04-14 PROCEDURE — 87493 C DIFF AMPLIFIED PROBE: CPT

## 2023-04-15 DIAGNOSIS — R19.7 DIARRHEA, UNSPECIFIED TYPE: ICD-10-CM

## 2023-04-15 LAB
C DIFF DNA SPEC QL NAA+PROBE: NORMAL
C DIFF TOX A+B STL QL IA: POSITIVE
C DIFF TOX GENS STL QL NAA+PROBE: NORMAL

## 2023-04-16 ENCOUNTER — OFFICE VISIT (OUTPATIENT)
Dept: URGENT CARE | Facility: CLINIC | Age: 68
End: 2023-04-16
Payer: MEDICARE

## 2023-04-16 VITALS
HEIGHT: 67 IN | SYSTOLIC BLOOD PRESSURE: 104 MMHG | HEART RATE: 78 BPM | BODY MASS INDEX: 25.9 KG/M2 | OXYGEN SATURATION: 95 % | TEMPERATURE: 98.7 F | WEIGHT: 165 LBS | DIASTOLIC BLOOD PRESSURE: 62 MMHG | RESPIRATION RATE: 16 BRPM

## 2023-04-16 DIAGNOSIS — A04.72 C. DIFFICILE DIARRHEA: ICD-10-CM

## 2023-04-16 PROCEDURE — 99214 OFFICE O/P EST MOD 30 MIN: CPT | Performed by: REGISTERED NURSE

## 2023-04-16 RX ORDER — COVID-19 MOLECULAR TEST ASSAY
KIT MISCELLANEOUS
COMMUNITY
Start: 2023-04-05 | End: 2023-05-16

## 2023-04-16 RX ORDER — ALPRAZOLAM 0.5 MG/1
0.5 TABLET ORAL
COMMUNITY
Start: 2023-02-15

## 2023-04-16 RX ORDER — METFORMIN HYDROCHLORIDE 500 MG/1
1 TABLET, EXTENDED RELEASE ORAL
COMMUNITY
Start: 2023-03-25 | End: 2023-04-16

## 2023-04-16 RX ORDER — DICYCLOMINE HYDROCHLORIDE 10 MG/1
10 CAPSULE ORAL
Qty: 28 CAPSULE | Refills: 0 | Status: SHIPPED | OUTPATIENT
Start: 2023-04-16 | End: 2023-04-23

## 2023-04-16 RX ORDER — VANCOMYCIN HYDROCHLORIDE 125 MG/1
125 CAPSULE ORAL 4 TIMES DAILY
Qty: 40 CAPSULE | Refills: 0 | Status: SHIPPED | OUTPATIENT
Start: 2023-04-16 | End: 2023-04-26

## 2023-04-16 RX ORDER — AMOXICILLIN AND CLAVULANATE POTASSIUM 875; 125 MG/1; MG/1
TABLET, FILM COATED ORAL
COMMUNITY
Start: 2023-04-05 | End: 2023-04-19

## 2023-04-16 RX ORDER — LOSARTAN POTASSIUM 100 MG/1
1 TABLET ORAL
COMMUNITY
Start: 2023-03-29 | End: 2023-05-16

## 2023-04-16 RX ORDER — OMEPRAZOLE 20 MG/1
1 CAPSULE, DELAYED RELEASE ORAL
COMMUNITY
Start: 2023-01-03 | End: 2023-04-16

## 2023-04-16 RX ORDER — ATORVASTATIN CALCIUM 80 MG/1
1 TABLET, FILM COATED ORAL
COMMUNITY
Start: 2023-01-20 | End: 2023-04-16

## 2023-04-16 ASSESSMENT — ENCOUNTER SYMPTOMS
SHORTNESS OF BREATH: 0
DIARRHEA: 1
DIZZINESS: 0
FEVER: 0
NAUSEA: 0
MYALGIAS: 0
PALPITATIONS: 0
CHILLS: 0
VOMITING: 0
BLOOD IN STOOL: 0
HEADACHES: 0
ABDOMINAL PAIN: 1

## 2023-04-16 ASSESSMENT — FIBROSIS 4 INDEX: FIB4 SCORE: 1.12

## 2023-04-16 NOTE — PROGRESS NOTES
Chief Complaint   Patient presents with    Diarrhea     After traveling, x 3/25/23, started abx on 4/06/23 from  a visit: 4/05/23, constant diarrhea, cramping, food and liquids trigger cramping: did some labs and positive for C-Diff. Had Covid: tested herself again for Covid yesterday: Negative, concerned about being dehydrated, headache, mucous BM, lower abdominal cramping, little BM output, feels warm        HPI:   Judy Blackburn is a 68 y.o. female who is presenting for concerns of c-diff. Started on augmentin 4/6/23. Developed diarrhea, stomach cramping and had stool test on 4/14/23 that came out positive for C-dif. Is having light brown/mucousy stools. Roughly 15-20 BM's per day. Mild pain with the abdominal cramping, but it is worsened with food. Also tested positive for COVID-19 on 4/8/23.  Denies a history of kidney disease, last reported GFR 9 months ago was 97 mL/min.     Denies sudden onset abdominal pain, bloody or coffee ground emesis, bloody or black tarry or shannon colored or mucousy stools, persistent unexplained vomiting, not passing wind/absolute constipation.    Patient Active Problem List    Diagnosis Date Noted    Breast pain 02/15/2023    Secondary hypercoagulability disorder (HCC) 09/29/2022    Type 2 diabetes mellitus without complication, without long-term current use of insulin ([facility]) 09/29/2022    Agatston CAC score, <100 09/29/2022    Superior mesenteric artery stenosis ([facility]) 09/29/2022    Celiac artery stenosis ([facility]) 09/29/2022    Neuropathy 05/31/2022    Varicose veins of left lower extremity 05/31/2022    Chronic anticoagulation 05/23/2022    GERD (gastroesophageal reflux disease) 04/26/2022    Anxiety 04/26/2022    Atrial fibrillation status post cardioversion ([facility]) 04/26/2022    Dermatitis 02/09/2022    H/O bilateral breast reduction surgery 02/09/2022    Age-related cataract 02/04/2021    Primary insomnia 02/04/2021    Sun-damaged skin 02/04/2021    Essential hypertension  01/18/2021    Menopausal and postmenopausal disorder 01/18/2021    Pneumonia due to COVID-19 virus 12/29/2020    Mixed hyperlipidemia     Posterior vitreous detachment of both eyes 03/10/2017    Pseudophakia of right eye 03/10/2017    Prediabetes 02/21/2017    History of blepharoplasty 09/25/2014    Nuclear sclerosis 09/25/2014     Allergies   Allergen Reactions    Codeine Vomiting and Nausea      Current Outpatient Medications Ordered in Epic   Medication Sig Dispense Refill    ALPRAZolam (XANAX) 0.5 MG Tab TAKE 1 TABLET BY MOUTH AT BEDTIME AS NEEDED FOR ANXIETY OR SLEEP FOR UP TO 30 DAYS.      losartan (COZAAR) 100 MG Tab Take 1 Tablet by mouth every day.      vancomycin (VANCOCIN) 125 MG capsule Take 1 Capsule by mouth 4 times a day for 10 days. 40 Capsule 0    dicyclomine (BENTYL) 10 MG Cap Take 1 Capsule by mouth 4 Times a Day,Before Meals and at Bedtime for 7 days. 28 Capsule 0    traZODone (DESYREL) 100 MG Tab TAKE 1 TABLET BY MOUTH EVERY DAY IN THE EVENING 90 Tablet 2    metFORMIN ER (GLUCOPHAGE XR) 500 MG TABLET SR 24 HR Take 1 Tablet by mouth every day. 90 Tablet 3    omeprazole (PRILOSEC) 20 MG delayed-release capsule TAKE 1 CAPSULE BY MOUTH EVERY DAY 90 Capsule 0    losartan (COZAAR) 100 MG Tab Take 1 Tablet by mouth every day. 90 Tablet 3    apixaban (ELIQUIS) 5mg Tab Take 1 Tablet by mouth 2 times a day. 180 Tablet 3    propranolol LA (INDERAL LA) 60 MG CAPSULE SR 24 HR Take 1 Capsule by mouth every day. 90 Capsule 3    atorvastatin (LIPITOR) 80 MG tablet Take 1 Tablet by mouth at bedtime. 90 Tablet 3    MAGNESIUM PO Take 1 Tablet by mouth at bedtime.      amoxicillin-clavulanate (AUGMENTIN) 875-125 MG Tab TAKE 1 TABLET BY MOUTH TWICE A DAY FOR 10 DAYS (Patient not taking: Reported on 4/16/2023)      BINAXNOW COVID-19 AG HOME TEST Kit REFER TO  INSTRUCTIONS INCLUDED IN PACKAGING      metFORMIN ER (GLUCOPHAGE XR) 500 MG TABLET SR 24 HR TAKE 1 TABLET BY MOUTH EVERY DAY 90 Tablet 3     Potassium 99 MG Tab Take  by mouth.      acyclovir (ZOVIRAX) 5 % Ointment AAA QID x 5 days prn outbreak 30 g 2     No current Epic-ordered facility-administered medications on file.     Pertinent history: Confirmed C-Dif, ordering provider told her to come to the urgent care.     Social History     Tobacco Use    Smoking status: Never    Smokeless tobacco: Never   Vaping Use    Vaping Use: Never used   Substance Use Topics    Alcohol use: Not Currently    Drug use: Never     Review of Systems   Constitutional:  Negative for chills and fever.   Respiratory:  Negative for shortness of breath.    Cardiovascular:  Negative for chest pain, palpitations and leg swelling.   Gastrointestinal:  Positive for abdominal pain and diarrhea. Negative for blood in stool, nausea and vomiting.   Musculoskeletal:  Negative for myalgias.   Skin:  Negative for rash.   Neurological:  Negative for dizziness and headaches.      Vitals:    04/16/23 1014   BP: 104/62   Pulse: 78   Resp: 16   Temp: 37.1 °C (98.7 °F)   SpO2: 95%       Physical Exam  Vitals and nursing note reviewed.   Constitutional:       General: She is not in acute distress.     Appearance: Normal appearance. She is not ill-appearing, toxic-appearing or diaphoretic.   HENT:      Head: Normocephalic.      Right Ear: Tympanic membrane normal.      Left Ear: Tympanic membrane normal.      Nose: Nose normal.      Mouth/Throat:      Mouth: Mucous membranes are moist.   Eyes:      Pupils: Pupils are equal, round, and reactive to light.   Cardiovascular:      Rate and Rhythm: Normal rate and regular rhythm.      Heart sounds: Normal heart sounds.   Pulmonary:      Effort: Pulmonary effort is normal. No respiratory distress.      Breath sounds: Normal breath sounds.   Abdominal:      General: Abdomen is flat. There is no distension.      Palpations: Abdomen is soft.      Tenderness: There is abdominal tenderness (Mild generalized tenderness). There is no right CVA tenderness, left  CVA tenderness, guarding or rebound.      Comments: No suprapubic tenderness   Musculoskeletal:         General: Normal range of motion.      Cervical back: Normal range of motion and neck supple.   Skin:     General: Skin is warm and dry.      Capillary Refill: Capillary refill takes less than 2 seconds.      Coloration: Skin is not jaundiced.      Findings: No rash.   Neurological:      General: No focal deficit present.      Mental Status: She is alert and oriented to person, place, and time. Mental status is at baseline.   Psychiatric:         Mood and Affect: Mood normal.     Assessment/Plan:  1. C. difficile diarrhea  vancomycin (VANCOCIN) 125 MG capsule    dicyclomine (BENTYL) 10 MG Cap         Judy chen 68-year-old female presenting with confirmed C. difficile from stool sample.  Her provider is out of town and recommended she come to urgent care for treatment.  She also endorses generalized cramping that is worse after eating.  Her exam is fairly unremarkable other than some generalized mild tenderness of the abdomen.  Her mucous membranes are moist.  Vital signs are stable.  She does have history of A-fib, diabetes, recent COVID.  Estimated GFR 9 months ago of 97 mL/min.  She has had some mucus with her stools but is not consistent.  We did discuss CT imaging but at this time will defer.  We will treat cramping with Bentyl, also provide vancomycin per up-to-date guidelines.  Recommended increasing fluids.  [place] diet.  Patient is given strict ER precautions.    Return to clinic or go to ED if symptoms worsen or persist. Indications for ED discussed at length. Patient/Parent/Guardian voices understanding. Follow-up with your primary care provider in 3-5 days. Red flag symptoms discussed. All side effects of medication discussed including allergic response, GI upset, tendon injury, rash, sedation etc.    I personally reviewed prior external notes and test results pertinent to today's  visit as well as additional imaging and testing completed in clinic today.     Please note that this dictation was created using voice recognition software. I have made every reasonable attempt to correct obvious errors, but I expect that there are errors of grammar and possibly content that I did not discover before finalizing the note.

## 2023-05-04 ENCOUNTER — HOSPITAL ENCOUNTER (OUTPATIENT)
Facility: MEDICAL CENTER | Age: 68
End: 2023-05-04
Attending: INTERNAL MEDICINE
Payer: MEDICARE

## 2023-05-04 DIAGNOSIS — T36.95XA ANTIBIOTIC-ASSOCIATED DIARRHEA: ICD-10-CM

## 2023-05-04 DIAGNOSIS — A04.72 C. DIFFICILE ENTERITIS: ICD-10-CM

## 2023-05-04 DIAGNOSIS — K52.1 ANTIBIOTIC-ASSOCIATED DIARRHEA: ICD-10-CM

## 2023-05-04 PROCEDURE — 87324 CLOSTRIDIUM AG IA: CPT | Mod: XU

## 2023-05-04 PROCEDURE — 87493 C DIFF AMPLIFIED PROBE: CPT

## 2023-05-04 NOTE — PROGRESS NOTES
Patient recently treated with vancomycin for C. difficile enteritis.  Diarrhea resolved but now has recurred.  May have consistent or recurrent C. difficile enteritis.  Repeat stool for C. difficile toxin sent.  Prescription for  Fidaxomicin 200 mg orally twice daily for 10 days sent to pharmacy, as this is now the preferred treatment initially for C. difficile enteritis.

## 2023-05-05 DIAGNOSIS — T36.95XA ANTIBIOTIC-ASSOCIATED DIARRHEA: ICD-10-CM

## 2023-05-05 DIAGNOSIS — K52.1 ANTIBIOTIC-ASSOCIATED DIARRHEA: ICD-10-CM

## 2023-05-06 ENCOUNTER — HOSPITAL ENCOUNTER (EMERGENCY)
Facility: MEDICAL CENTER | Age: 68
End: 2023-05-06
Attending: EMERGENCY MEDICINE
Payer: MEDICARE

## 2023-05-06 ENCOUNTER — OFFICE VISIT (OUTPATIENT)
Dept: URGENT CARE | Facility: CLINIC | Age: 68
End: 2023-05-06
Payer: MEDICARE

## 2023-05-06 VITALS
BODY MASS INDEX: 25.9 KG/M2 | OXYGEN SATURATION: 96 % | RESPIRATION RATE: 16 BRPM | HEIGHT: 67 IN | SYSTOLIC BLOOD PRESSURE: 114 MMHG | TEMPERATURE: 97.3 F | HEART RATE: 74 BPM | DIASTOLIC BLOOD PRESSURE: 80 MMHG | WEIGHT: 165 LBS

## 2023-05-06 VITALS
DIASTOLIC BLOOD PRESSURE: 92 MMHG | WEIGHT: 168.43 LBS | OXYGEN SATURATION: 97 % | SYSTOLIC BLOOD PRESSURE: 147 MMHG | TEMPERATURE: 97.4 F | BODY MASS INDEX: 26.44 KG/M2 | HEIGHT: 67 IN | RESPIRATION RATE: 16 BRPM | HEART RATE: 68 BPM

## 2023-05-06 DIAGNOSIS — A04.72 C. DIFFICILE ENTERITIS: ICD-10-CM

## 2023-05-06 DIAGNOSIS — A04.72 C. DIFFICILE DIARRHEA: ICD-10-CM

## 2023-05-06 DIAGNOSIS — R10.30 LOWER ABDOMINAL PAIN: ICD-10-CM

## 2023-05-06 LAB
ALBUMIN SERPL BCP-MCNC: 4.3 G/DL (ref 3.2–4.9)
ALBUMIN/GLOB SERPL: 1.5 G/DL
ALP SERPL-CCNC: 86 U/L (ref 30–99)
ALT SERPL-CCNC: 23 U/L (ref 2–50)
ANION GAP SERPL CALC-SCNC: 7 MMOL/L (ref 7–16)
AST SERPL-CCNC: 22 U/L (ref 12–45)
BASOPHILS # BLD AUTO: 0.4 % (ref 0–1.8)
BASOPHILS # BLD: 0.04 K/UL (ref 0–0.12)
BILIRUB SERPL-MCNC: 0.3 MG/DL (ref 0.1–1.5)
BUN SERPL-MCNC: 14 MG/DL (ref 8–22)
CALCIUM ALBUM COR SERPL-MCNC: 9.2 MG/DL (ref 8.5–10.5)
CALCIUM SERPL-MCNC: 9.4 MG/DL (ref 8.4–10.2)
CHLORIDE SERPL-SCNC: 105 MMOL/L (ref 96–112)
CO2 SERPL-SCNC: 27 MMOL/L (ref 20–33)
CREAT SERPL-MCNC: 0.49 MG/DL (ref 0.5–1.4)
EOSINOPHIL # BLD AUTO: 0.21 K/UL (ref 0–0.51)
EOSINOPHIL NFR BLD: 2.3 % (ref 0–6.9)
ERYTHROCYTE [DISTWIDTH] IN BLOOD BY AUTOMATED COUNT: 42.4 FL (ref 35.9–50)
GFR SERPLBLD CREATININE-BSD FMLA CKD-EPI: 102 ML/MIN/1.73 M 2
GLOBULIN SER CALC-MCNC: 2.8 G/DL (ref 1.9–3.5)
GLUCOSE SERPL-MCNC: 98 MG/DL (ref 65–99)
HCT VFR BLD AUTO: 45.7 % (ref 37–47)
HGB BLD-MCNC: 15.4 G/DL (ref 12–16)
IMM GRANULOCYTES # BLD AUTO: 0.02 K/UL (ref 0–0.11)
IMM GRANULOCYTES NFR BLD AUTO: 0.2 % (ref 0–0.9)
LACTATE SERPL-SCNC: 0.8 MMOL/L (ref 0.5–2)
LIPASE SERPL-CCNC: 38 U/L (ref 7–58)
LYMPHOCYTES # BLD AUTO: 2.3 K/UL (ref 1–4.8)
LYMPHOCYTES NFR BLD: 25.2 % (ref 22–41)
MCH RBC QN AUTO: 30.8 PG (ref 27–33)
MCHC RBC AUTO-ENTMCNC: 33.7 G/DL (ref 33.6–35)
MCV RBC AUTO: 91.4 FL (ref 81.4–97.8)
MONOCYTES # BLD AUTO: 0.74 K/UL (ref 0–0.85)
MONOCYTES NFR BLD AUTO: 8.1 % (ref 0–13.4)
NEUTROPHILS # BLD AUTO: 5.81 K/UL (ref 2–7.15)
NEUTROPHILS NFR BLD: 63.8 % (ref 44–72)
NRBC # BLD AUTO: 0 K/UL
NRBC BLD-RTO: 0 /100 WBC
PLATELET # BLD AUTO: 259 K/UL (ref 164–446)
PMV BLD AUTO: 10 FL (ref 9–12.9)
POTASSIUM SERPL-SCNC: 4.1 MMOL/L (ref 3.6–5.5)
PROT SERPL-MCNC: 7.1 G/DL (ref 6–8.2)
RBC # BLD AUTO: 5 M/UL (ref 4.2–5.4)
SODIUM SERPL-SCNC: 139 MMOL/L (ref 135–145)
WBC # BLD AUTO: 9.1 K/UL (ref 4.8–10.8)

## 2023-05-06 PROCEDURE — 80053 COMPREHEN METABOLIC PANEL: CPT

## 2023-05-06 PROCEDURE — 83605 ASSAY OF LACTIC ACID: CPT

## 2023-05-06 PROCEDURE — 700111 HCHG RX REV CODE 636 W/ 250 OVERRIDE (IP): Performed by: EMERGENCY MEDICINE

## 2023-05-06 PROCEDURE — A9270 NON-COVERED ITEM OR SERVICE: HCPCS | Performed by: EMERGENCY MEDICINE

## 2023-05-06 PROCEDURE — 85025 COMPLETE CBC W/AUTO DIFF WBC: CPT

## 2023-05-06 PROCEDURE — 99215 OFFICE O/P EST HI 40 MIN: CPT | Performed by: PHYSICIAN ASSISTANT

## 2023-05-06 PROCEDURE — 99284 EMERGENCY DEPT VISIT MOD MDM: CPT

## 2023-05-06 PROCEDURE — 700105 HCHG RX REV CODE 258: Performed by: EMERGENCY MEDICINE

## 2023-05-06 PROCEDURE — 36415 COLL VENOUS BLD VENIPUNCTURE: CPT

## 2023-05-06 PROCEDURE — 700102 HCHG RX REV CODE 250 W/ 637 OVERRIDE(OP): Performed by: EMERGENCY MEDICINE

## 2023-05-06 PROCEDURE — 83690 ASSAY OF LIPASE: CPT

## 2023-05-06 PROCEDURE — 700101 HCHG RX REV CODE 250: Performed by: EMERGENCY MEDICINE

## 2023-05-06 RX ORDER — ONDANSETRON 2 MG/ML
4 INJECTION INTRAMUSCULAR; INTRAVENOUS ONCE
Status: DISCONTINUED | OUTPATIENT
Start: 2023-05-06 | End: 2023-05-06 | Stop reason: HOSPADM

## 2023-05-06 RX ORDER — DICYCLOMINE HCL 20 MG
20 TABLET ORAL EVERY 6 HOURS
Qty: 120 TABLET | Refills: 0 | Status: SHIPPED | OUTPATIENT
Start: 2023-05-06 | End: 2023-05-06 | Stop reason: SDUPTHER

## 2023-05-06 RX ORDER — METRONIDAZOLE 500 MG/1
500 TABLET ORAL 3 TIMES DAILY
Qty: 42 TABLET | Refills: 0 | Status: SHIPPED | OUTPATIENT
Start: 2023-05-06 | End: 2023-05-06 | Stop reason: SDUPTHER

## 2023-05-06 RX ORDER — METRONIDAZOLE 500 MG/1
500 TABLET ORAL 3 TIMES DAILY
Qty: 42 TABLET | Refills: 0 | Status: SHIPPED | OUTPATIENT
Start: 2023-05-06 | End: 2023-05-20

## 2023-05-06 RX ORDER — SODIUM CHLORIDE, SODIUM LACTATE, POTASSIUM CHLORIDE, CALCIUM CHLORIDE 600; 310; 30; 20 MG/100ML; MG/100ML; MG/100ML; MG/100ML
1000 INJECTION, SOLUTION INTRAVENOUS ONCE
Status: COMPLETED | OUTPATIENT
Start: 2023-05-06 | End: 2023-05-06

## 2023-05-06 RX ORDER — METRONIDAZOLE 500 MG/1
500 TABLET ORAL ONCE
Status: COMPLETED | OUTPATIENT
Start: 2023-05-06 | End: 2023-05-06

## 2023-05-06 RX ORDER — VANCOMYCIN HYDROCHLORIDE 125 MG/1
125 CAPSULE ORAL 4 TIMES DAILY
Qty: 40 CAPSULE | Refills: 0 | Status: SHIPPED | OUTPATIENT
Start: 2023-05-06 | End: 2023-05-06 | Stop reason: SDUPTHER

## 2023-05-06 RX ORDER — DICYCLOMINE HCL 20 MG
20 TABLET ORAL EVERY 6 HOURS
Qty: 120 TABLET | Refills: 0 | Status: SHIPPED | OUTPATIENT
Start: 2023-05-06 | End: 2023-06-05

## 2023-05-06 RX ORDER — VANCOMYCIN HYDROCHLORIDE 125 MG/1
125 CAPSULE ORAL 4 TIMES DAILY
Qty: 56 CAPSULE | Refills: 0 | Status: SHIPPED | OUTPATIENT
Start: 2023-05-06 | End: 2023-05-16 | Stop reason: SDUPTHER

## 2023-05-06 RX ORDER — DICYCLOMINE HCL 20 MG
20 TABLET ORAL ONCE
Status: COMPLETED | OUTPATIENT
Start: 2023-05-06 | End: 2023-05-06

## 2023-05-06 RX ADMIN — VANCOMYCIN HYDROCHLORIDE 125 MG: 500 INJECTION, POWDER, LYOPHILIZED, FOR SOLUTION INTRAVENOUS at 16:28

## 2023-05-06 RX ADMIN — SODIUM CHLORIDE, POTASSIUM CHLORIDE, SODIUM LACTATE AND CALCIUM CHLORIDE 1000 ML: 600; 310; 30; 20 INJECTION, SOLUTION INTRAVENOUS at 15:31

## 2023-05-06 RX ADMIN — DICYCLOMINE HYDROCHLORIDE 20 MG: 20 TABLET ORAL at 15:37

## 2023-05-06 RX ADMIN — METRONIDAZOLE 500 MG: 500 TABLET ORAL at 15:37

## 2023-05-06 ASSESSMENT — FIBROSIS 4 INDEX
FIB4 SCORE: 1.12
FIB4 SCORE: 1.12

## 2023-05-06 ASSESSMENT — ENCOUNTER SYMPTOMS
CHILLS: 0
SHORTNESS OF BREATH: 0
DIARRHEA: 1
CONSTIPATION: 0
FEVER: 0
MYALGIAS: 0
COUGH: 0
ABDOMINAL PAIN: 1
VOMITING: 0
BLOOD IN STOOL: 0
NAUSEA: 0
WHEEZING: 0

## 2023-05-06 NOTE — PROGRESS NOTES
Subjective     Judy Blackburn is a 68 y.o. female who presents with Diarrhea ( Diarrhea, cramping, cramping, requesting change of medication until pt can be seen GI specialist. Positive foe C diff. )            The patient is here with recurrent C.diff. She was treated with Vancomycin for 14 days. She tested positive for C.diff again yesterday. She states her diarrhea started up again a few day ago. She has been off of Vancomycin for over 1 week. A Doctor on call called in some Fidaxomicin. She states her insurance is going to make her pay $1,400 for the medication. She states she cannot afford it. She reports that she is having difficulty drinking water because of her cramping. She reports at least 5 episodes of diarrhea this morning. No vomiting, fever or chills. She is awaiting to see a GI specialist. A referral was placed yesterday.         Past Medical History:   Diagnosis Date    Anxiety     Diabetes (HCC)     Hyperlipidemia     Hypertension          Past Surgical History:   Procedure Laterality Date    FAHAD N/A 4/26/2022    Procedure: ECHOCARDIOGRAM, TRANSESOPHAGEAL;  Surgeon: Reymundo Garcia M.D.;  Location: [facility];  Service: Cardiac    CARDIOVERSION N/A 4/26/2022    Procedure: CARDIOVERSION;  Surgeon: Reymundo Garcia M.D.;  Location: SURGERY [facility];  Service: Cardiac    LUMPECTOMY           Family History   Problem Relation Age of Onset    Arrythmia Sister        Allergies:  Codeine    /Medications, Allergies, and current problem list reviewed today in Epic    Review of Systems   Constitutional:  Negative for chills, fever and malaise/fatigue.   Respiratory:  Negative for cough, shortness of breath and wheezing.    Cardiovascular:  Negative for chest pain and leg swelling.   Gastrointestinal:  Positive for abdominal pain and diarrhea. Negative for blood in stool, constipation, melena, nausea and vomiting.   Musculoskeletal:  Negative for myalgias.      All other  "systems reviewed and are negative.         Objective     /80   Pulse 74   Temp 36.3 °C (97.3 °F) (Temporal)   Resp 16   Ht 1.702 m (5' 7\")   Wt 74.8 kg (165 lb)   SpO2 96%   BMI 25.84 kg/m²      Physical Exam  Constitutional:       General: She is not in acute distress.     Appearance: She is not ill-appearing.   HENT:      Head: Normocephalic and atraumatic.   Eyes:      Conjunctiva/sclera: Conjunctivae normal.   Cardiovascular:      Rate and Rhythm: Normal rate and regular rhythm.   Pulmonary:      Effort: Pulmonary effort is normal. No respiratory distress.      Breath sounds: No wheezing or rales.   Skin:     General: Skin is warm and dry.   Neurological:      General: No focal deficit present.      Mental Status: She is alert and oriented to person, place, and time.   Psychiatric:         Mood and Affect: Mood normal.         Behavior: Behavior normal.         Thought Content: Thought content normal.         Judgment: Judgment normal.                           Assessment & Plan        1. C. difficile diarrhea    Patient with failed outpatient treatment for C.diff. She is unable to afford the new RX for Fidaxomicin. At this point, I feel that the patient's only option is Inpatient treatment for C.Diff. Recommend ER evaluation and possible Admission. Risks of not going including dehydration, sepsis and death discussed. Patient agrees to go to the ED without delay/    Autumn Palacios P.A.-C.                    "

## 2023-05-06 NOTE — ED NOTES
Pt AO4, sitting up in bed, nad noted. C/o increasing abd cramping and diarrhea.   PIV established, blood work sent to lab.   Safety precautions in place including gurney locked in lowest position, call light within reach. Pt educated to use call light if requiring any assistance with getting oob.

## 2023-05-08 DIAGNOSIS — A04.71 RECURRENT ENTEROCOLITIS DUE TO CLOSTRIDIOIDES DIFFICILE: ICD-10-CM

## 2023-05-16 ENCOUNTER — OFFICE VISIT (OUTPATIENT)
Dept: MEDICAL GROUP | Age: 68
End: 2023-05-16
Payer: MEDICARE

## 2023-05-16 VITALS
WEIGHT: 172 LBS | SYSTOLIC BLOOD PRESSURE: 140 MMHG | HEIGHT: 67 IN | HEART RATE: 60 BPM | BODY MASS INDEX: 27 KG/M2 | OXYGEN SATURATION: 98 % | DIASTOLIC BLOOD PRESSURE: 74 MMHG | TEMPERATURE: 97.8 F

## 2023-05-16 DIAGNOSIS — A04.72 C. DIFFICILE ENTERITIS: ICD-10-CM

## 2023-05-16 PROCEDURE — 3078F DIAST BP <80 MM HG: CPT | Performed by: FAMILY MEDICINE

## 2023-05-16 PROCEDURE — 99214 OFFICE O/P EST MOD 30 MIN: CPT | Performed by: FAMILY MEDICINE

## 2023-05-16 PROCEDURE — 3077F SYST BP >= 140 MM HG: CPT | Performed by: FAMILY MEDICINE

## 2023-05-16 RX ORDER — VANCOMYCIN HYDROCHLORIDE 125 MG/1
125 CAPSULE ORAL 4 TIMES DAILY
Qty: 16 CAPSULE | Refills: 0 | Status: SHIPPED | OUTPATIENT
Start: 2023-05-16 | End: 2023-05-20

## 2023-05-16 ASSESSMENT — FIBROSIS 4 INDEX: FIB4 SCORE: 1.22

## 2023-05-16 NOTE — PROGRESS NOTES
This medical record contains text that has been entered with the assistance of computer voice recognition and dictation software.  Therefore, it may contain unintended errors in text, spelling, punctuation, or grammar      Chief Complaint   Patient presents with    Follow-Up     Follow up / updates on c-diff          Judy Blackburn is a 68 y.o. female here evaluation and management of: Follow-up on C. difficile      HPI:           1. C. difficile enteritis-recurrent    Judy is a very pleasant 68-year-old female who has been struggling with C. difficile infection.  This C. difficile is associated with diarrhea abdominal cramping, she was given a 14-day course of vancomycin she again tested positive for C. difficile antigen after being off vancomycin for a week.  She was seen in our clinic and the Doxy mycin was ordered however her insurance did not cover it and it was too expensive for her.  She was sent to gastroenterology were she was given a new prescription for vancomycin 14-day course however she states that she only received 10-day course.  She states the diarrhea has resolved now it is more formed but still loose.  Her gastroenterologist has ordered repeat labs after she is done with the vancomycin course.    Current medicines (including changes today)  Current Outpatient Medications   Medication Sig Dispense Refill    vancomycin (VANCOCIN) 125 MG capsule Take 1 Capsule by mouth 4 times a day for 4 days. 16 Capsule 0    metroNIDAZOLE (FLAGYL) 500 MG Tab Take 1 Tablet by mouth 3 times a day for 14 days. 42 Tablet 0    dicyclomine (BENTYL) 20 MG Tab Take 1 Tablet by mouth every 6 hours for 30 days. 120 Tablet 0    ALPRAZolam (XANAX) 0.5 MG Tab TAKE 1 TABLET BY MOUTH AT BEDTIME AS NEEDED FOR ANXIETY OR SLEEP FOR UP TO 30 DAYS.      traZODone (DESYREL) 100 MG Tab TAKE 1 TABLET BY MOUTH EVERY DAY IN THE EVENING 90 Tablet 2    metFORMIN ER (GLUCOPHAGE XR) 500 MG TABLET SR 24 HR Take 1 Tablet by mouth every  "day. 90 Tablet 3    omeprazole (PRILOSEC) 20 MG delayed-release capsule TAKE 1 CAPSULE BY MOUTH EVERY DAY 90 Capsule 0    apixaban (ELIQUIS) 5mg Tab Take 1 Tablet by mouth 2 times a day. 180 Tablet 3    propranolol LA (INDERAL LA) 60 MG CAPSULE SR 24 HR Take 1 Capsule by mouth every day. 90 Capsule 3    atorvastatin (LIPITOR) 80 MG tablet Take 1 Tablet by mouth at bedtime. 90 Tablet 3    Potassium 99 MG Tab Take  by mouth.      acyclovir (ZOVIRAX) 5 % Ointment AAA QID x 5 days prn outbreak 30 g 2    MAGNESIUM PO Take 1 Tablet by mouth at bedtime.      losartan (COZAAR) 100 MG Tab Take 1 Tablet by mouth every day. 90 Tablet 3     No current facility-administered medications for this visit.     She  has a past medical history of Anxiety, Diabetes (HCC), Hyperlipidemia, and Hypertension.  She  has a past surgical history that includes lumpectomy; trevor (N/A, 4/26/2022); and cardioversion (N/A, 4/26/2022).  Social History     Tobacco Use    Smoking status: Never    Smokeless tobacco: Never   Vaping Use    Vaping Use: Never used   Substance Use Topics    Alcohol use: Not Currently    Drug use: Never     Social History     Social History Narrative    Not on file     Family History   Problem Relation Age of Onset    Arrythmia Sister      Family Status   Relation Name Status    Sis  (Not Specified)         ROS    The pertinent  ROS findings can be seen in the HPI above.     All other systems reviewed and are negative     Objective:     BP (!) 140/74 (BP Location: Right arm, Patient Position: Sitting, BP Cuff Size: Adult)   Pulse 60   Temp 36.6 °C (97.8 °F) (Temporal)   Ht 1.702 m (5' 7\")   Wt 78 kg (172 lb)   SpO2 98%  Body mass index is 26.94 kg/m².      Physical Exam:    Constitutional: Alert, no distress.  Skin: No suspicious lesions  Eye: Equal, round and reactive, conjunctiva clear, lids normal.  ENMT: Lips without lesions, good dentition, oropharynx clear.  Neck: Trachea midline, no masses, no thyromegaly. No " cervical or supraclavicular lymphadenopathy.  Respiratory: Unlabored respiratory effort, lungs clear to auscultation, no wheezes, no ronchi.  Cardiovascular: Normal S1, S2, no murmur, no edema  Abdomen: Soft, non-tender, no masses, no hepatosplenomegaly.        Assessment and Plan:   The following treatment plan was discussed    All recent labs and provider notes reviewed    1. C. difficile enteritis-recurrent    I will give her another 4 days of vancomycin as she only received 10 days.  She will follow her GI doctors instructions by repeating labs after finishing vancomycin    - vancomycin (VANCOCIN) 125 MG capsule; Take 1 Capsule by mouth 4 times a day for 4 days.  Dispense: 16 Capsule; Refill: 0             Instructed to Follow up in clinic or ER for worsening symptoms, difficulty breathing, lack of expected recovery, or should new symptoms or problems arise.    Followup: Return in about 6 months (around 11/16/2023) for labs, Reevaluation.

## 2023-05-17 DIAGNOSIS — K92.89 GAS BLOAT SYNDROME: ICD-10-CM

## 2023-05-17 RX ORDER — OMEPRAZOLE 20 MG/1
20 CAPSULE, DELAYED RELEASE ORAL DAILY
Qty: 90 CAPSULE | Refills: 0 | Status: SHIPPED | OUTPATIENT
Start: 2023-05-17 | End: 2023-07-06

## 2023-05-17 RX ORDER — OMEPRAZOLE 20 MG/1
20 CAPSULE, DELAYED RELEASE ORAL DAILY
Qty: 90 CAPSULE | Refills: 0 | Status: SHIPPED | OUTPATIENT
Start: 2023-05-17 | End: 2023-05-23

## 2023-05-23 ENCOUNTER — HOSPITAL ENCOUNTER (OUTPATIENT)
Facility: MEDICAL CENTER | Age: 68
End: 2023-05-23
Attending: INTERNAL MEDICINE
Payer: MEDICARE

## 2023-05-23 ENCOUNTER — APPOINTMENT (OUTPATIENT)
Dept: RADIOLOGY | Facility: MEDICAL CENTER | Age: 68
End: 2023-05-23
Payer: MEDICARE

## 2023-05-23 ENCOUNTER — APPOINTMENT (OUTPATIENT)
Dept: CARDIOLOGY | Facility: MEDICAL CENTER | Age: 68
End: 2023-05-23
Attending: HOSPITALIST
Payer: MEDICARE

## 2023-05-23 ENCOUNTER — HOSPITAL ENCOUNTER (OUTPATIENT)
Facility: MEDICAL CENTER | Age: 68
End: 2023-05-24
Attending: EMERGENCY MEDICINE | Admitting: HOSPITALIST
Payer: MEDICARE

## 2023-05-23 DIAGNOSIS — R07.9 CHEST PAIN, UNSPECIFIED TYPE: ICD-10-CM

## 2023-05-23 LAB
ALBUMIN SERPL BCP-MCNC: 4.2 G/DL (ref 3.2–4.9)
ALBUMIN/GLOB SERPL: 1.8 G/DL
ALP SERPL-CCNC: 77 U/L (ref 30–99)
ALT SERPL-CCNC: 37 U/L (ref 2–50)
ANION GAP SERPL CALC-SCNC: 9 MMOL/L (ref 7–16)
AST SERPL-CCNC: 29 U/L (ref 12–45)
BASOPHILS # BLD AUTO: 0.3 % (ref 0–1.8)
BASOPHILS # BLD: 0.02 K/UL (ref 0–0.12)
BILIRUB SERPL-MCNC: 0.3 MG/DL (ref 0.1–1.5)
BUN SERPL-MCNC: 16 MG/DL (ref 8–22)
C DIFF DNA SPEC QL NAA+PROBE: NEGATIVE
C DIFF TOX GENS STL QL NAA+PROBE: NEGATIVE
CALCIUM ALBUM COR SERPL-MCNC: 9.3 MG/DL (ref 8.5–10.5)
CALCIUM SERPL-MCNC: 9.5 MG/DL (ref 8.4–10.2)
CHLORIDE SERPL-SCNC: 105 MMOL/L (ref 96–112)
CO2 SERPL-SCNC: 25 MMOL/L (ref 20–33)
CREAT SERPL-MCNC: 0.62 MG/DL (ref 0.5–1.4)
EKG IMPRESSION: NORMAL
EOSINOPHIL # BLD AUTO: 0.06 K/UL (ref 0–0.51)
EOSINOPHIL NFR BLD: 0.9 % (ref 0–6.9)
ERYTHROCYTE [DISTWIDTH] IN BLOOD BY AUTOMATED COUNT: 43.9 FL (ref 35.9–50)
GFR SERPLBLD CREATININE-BSD FMLA CKD-EPI: 96 ML/MIN/1.73 M 2
GLOBULIN SER CALC-MCNC: 2.4 G/DL (ref 1.9–3.5)
GLUCOSE BLD STRIP.AUTO-MCNC: 106 MG/DL (ref 65–99)
GLUCOSE BLD STRIP.AUTO-MCNC: 115 MG/DL (ref 65–99)
GLUCOSE SERPL-MCNC: 94 MG/DL (ref 65–99)
HCT VFR BLD AUTO: 45.8 % (ref 37–47)
HGB BLD-MCNC: 15.2 G/DL (ref 12–16)
IMM GRANULOCYTES # BLD AUTO: 0.02 K/UL (ref 0–0.11)
IMM GRANULOCYTES NFR BLD AUTO: 0.3 % (ref 0–0.9)
LACTOFERRIN STL QL IA: NEGATIVE
LV EJECT FRACT  99904: 70
LV EJECT FRACT MOD 2C 99903: 78.76
LV EJECT FRACT MOD 4C 99902: 75.08
LV EJECT FRACT MOD BP 99901: 76.69
LYMPHOCYTES # BLD AUTO: 1.3 K/UL (ref 1–4.8)
LYMPHOCYTES NFR BLD: 18.8 % (ref 22–41)
MCH RBC QN AUTO: 30.8 PG (ref 27–33)
MCHC RBC AUTO-ENTMCNC: 33.2 G/DL (ref 32.2–35.5)
MCV RBC AUTO: 92.9 FL (ref 81.4–97.8)
MONOCYTES # BLD AUTO: 0.64 K/UL (ref 0–0.85)
MONOCYTES NFR BLD AUTO: 9.2 % (ref 0–13.4)
NEUTROPHILS # BLD AUTO: 4.88 K/UL (ref 1.82–7.42)
NEUTROPHILS NFR BLD: 70.5 % (ref 44–72)
NRBC # BLD AUTO: 0 K/UL
NRBC BLD-RTO: 0 /100 WBC (ref 0–0.2)
PLATELET # BLD AUTO: 254 K/UL (ref 164–446)
PMV BLD AUTO: 10.1 FL (ref 9–12.9)
POTASSIUM SERPL-SCNC: 4.7 MMOL/L (ref 3.6–5.5)
PROT SERPL-MCNC: 6.6 G/DL (ref 6–8.2)
RBC # BLD AUTO: 4.93 M/UL (ref 4.2–5.4)
SODIUM SERPL-SCNC: 139 MMOL/L (ref 135–145)
TROPONIN T SERPL-MCNC: <6 NG/L (ref 6–19)
WBC # BLD AUTO: 6.9 K/UL (ref 4.8–10.8)

## 2023-05-23 PROCEDURE — 82962 GLUCOSE BLOOD TEST: CPT

## 2023-05-23 PROCEDURE — 87493 C DIFF AMPLIFIED PROBE: CPT

## 2023-05-23 PROCEDURE — 85025 COMPLETE CBC W/AUTO DIFF WBC: CPT

## 2023-05-23 PROCEDURE — 84484 ASSAY OF TROPONIN QUANT: CPT

## 2023-05-23 PROCEDURE — 36415 COLL VENOUS BLD VENIPUNCTURE: CPT

## 2023-05-23 PROCEDURE — A9270 NON-COVERED ITEM OR SERVICE: HCPCS | Performed by: EMERGENCY MEDICINE

## 2023-05-23 PROCEDURE — 93306 TTE W/DOPPLER COMPLETE: CPT

## 2023-05-23 PROCEDURE — 700102 HCHG RX REV CODE 250 W/ 637 OVERRIDE(OP): Performed by: HOSPITALIST

## 2023-05-23 PROCEDURE — 700102 HCHG RX REV CODE 250 W/ 637 OVERRIDE(OP): Performed by: EMERGENCY MEDICINE

## 2023-05-23 PROCEDURE — 93005 ELECTROCARDIOGRAM TRACING: CPT

## 2023-05-23 PROCEDURE — 93005 ELECTROCARDIOGRAM TRACING: CPT | Performed by: EMERGENCY MEDICINE

## 2023-05-23 PROCEDURE — 71045 X-RAY EXAM CHEST 1 VIEW: CPT

## 2023-05-23 PROCEDURE — 93306 TTE W/DOPPLER COMPLETE: CPT | Mod: 26 | Performed by: INTERNAL MEDICINE

## 2023-05-23 PROCEDURE — G0378 HOSPITAL OBSERVATION PER HR: HCPCS

## 2023-05-23 PROCEDURE — 80053 COMPREHEN METABOLIC PANEL: CPT

## 2023-05-23 PROCEDURE — 99223 1ST HOSP IP/OBS HIGH 75: CPT | Performed by: HOSPITALIST

## 2023-05-23 PROCEDURE — A9270 NON-COVERED ITEM OR SERVICE: HCPCS | Performed by: HOSPITALIST

## 2023-05-23 PROCEDURE — 700111 HCHG RX REV CODE 636 W/ 250 OVERRIDE (IP): Performed by: HOSPITALIST

## 2023-05-23 PROCEDURE — 96374 THER/PROPH/DIAG INJ IV PUSH: CPT

## 2023-05-23 PROCEDURE — 83993 ASSAY FOR CALPROTECTIN FECAL: CPT

## 2023-05-23 PROCEDURE — 99291 CRITICAL CARE FIRST HOUR: CPT

## 2023-05-23 PROCEDURE — 83630 LACTOFERRIN FECAL (QUAL): CPT

## 2023-05-23 RX ORDER — AMOXICILLIN 250 MG
2 CAPSULE ORAL 2 TIMES DAILY
Status: DISCONTINUED | OUTPATIENT
Start: 2023-05-23 | End: 2023-05-23

## 2023-05-23 RX ORDER — ASPIRIN 325 MG
325 TABLET ORAL ONCE
Status: COMPLETED | OUTPATIENT
Start: 2023-05-23 | End: 2023-05-23

## 2023-05-23 RX ORDER — PROPRANOLOL HCL 60 MG
60 CAPSULE, EXTENDED RELEASE 24HR ORAL NIGHTLY
Status: DISCONTINUED | OUTPATIENT
Start: 2023-05-23 | End: 2023-05-24 | Stop reason: HOSPADM

## 2023-05-23 RX ORDER — NITROGLYCERIN 0.4 MG/1
0.4 TABLET SUBLINGUAL ONCE
Status: COMPLETED | OUTPATIENT
Start: 2023-05-23 | End: 2023-05-23

## 2023-05-23 RX ORDER — DICYCLOMINE HCL 20 MG
20 TABLET ORAL EVERY 6 HOURS
Status: DISCONTINUED | OUTPATIENT
Start: 2023-05-23 | End: 2023-05-24 | Stop reason: HOSPADM

## 2023-05-23 RX ORDER — ALUMINA, MAGNESIA, AND SIMETHICONE 2400; 2400; 240 MG/30ML; MG/30ML; MG/30ML
30 SUSPENSION ORAL EVERY 4 HOURS PRN
Status: ACTIVE | OUTPATIENT
Start: 2023-05-23 | End: 2023-05-24

## 2023-05-23 RX ORDER — ATORVASTATIN CALCIUM 40 MG/1
80 TABLET, FILM COATED ORAL
Status: DISCONTINUED | OUTPATIENT
Start: 2023-05-23 | End: 2023-05-24 | Stop reason: HOSPADM

## 2023-05-23 RX ORDER — HYDROXYZINE HYDROCHLORIDE 25 MG/1
25 TABLET, FILM COATED ORAL 3 TIMES DAILY PRN
Status: DISCONTINUED | OUTPATIENT
Start: 2023-05-23 | End: 2023-05-24 | Stop reason: HOSPADM

## 2023-05-23 RX ORDER — TRAZODONE HYDROCHLORIDE 100 MG/1
100 TABLET ORAL NIGHTLY
COMMUNITY
End: 2023-09-12 | Stop reason: SDUPTHER

## 2023-05-23 RX ORDER — POLYETHYLENE GLYCOL 3350 17 G/17G
1 POWDER, FOR SOLUTION ORAL
Status: DISCONTINUED | OUTPATIENT
Start: 2023-05-23 | End: 2023-05-23

## 2023-05-23 RX ORDER — TRAZODONE HYDROCHLORIDE 50 MG/1
100 TABLET ORAL NIGHTLY
Status: DISCONTINUED | OUTPATIENT
Start: 2023-05-23 | End: 2023-05-24 | Stop reason: HOSPADM

## 2023-05-23 RX ORDER — LOSARTAN POTASSIUM 25 MG/1
100 TABLET ORAL EVERY EVENING
Status: DISCONTINUED | OUTPATIENT
Start: 2023-05-23 | End: 2023-05-24 | Stop reason: HOSPADM

## 2023-05-23 RX ORDER — ONDANSETRON 4 MG/1
4 TABLET, ORALLY DISINTEGRATING ORAL EVERY 8 HOURS PRN
COMMUNITY
End: 2023-09-26

## 2023-05-23 RX ORDER — VANCOMYCIN HYDROCHLORIDE 125 MG/1
125 CAPSULE ORAL 4 TIMES DAILY
COMMUNITY
End: 2023-06-05 | Stop reason: SDUPTHER

## 2023-05-23 RX ORDER — ALPRAZOLAM 0.5 MG/1
0.5 TABLET ORAL
Status: DISCONTINUED | OUTPATIENT
Start: 2023-05-23 | End: 2023-05-24 | Stop reason: HOSPADM

## 2023-05-23 RX ORDER — METRONIDAZOLE 500 MG/1
500 TABLET ORAL 3 TIMES DAILY
COMMUNITY
End: 2023-06-05

## 2023-05-23 RX ORDER — BISACODYL 10 MG
10 SUPPOSITORY, RECTAL RECTAL
Status: DISCONTINUED | OUTPATIENT
Start: 2023-05-23 | End: 2023-05-23

## 2023-05-23 RX ORDER — DIPHENHYDRAMINE HYDROCHLORIDE 50 MG/ML
50 INJECTION INTRAMUSCULAR; INTRAVENOUS
Status: COMPLETED | OUTPATIENT
Start: 2023-05-23 | End: 2023-05-23

## 2023-05-23 RX ORDER — ACETAMINOPHEN 325 MG/1
650 TABLET ORAL EVERY 6 HOURS PRN
Status: DISCONTINUED | OUTPATIENT
Start: 2023-05-23 | End: 2023-05-24 | Stop reason: HOSPADM

## 2023-05-23 RX ORDER — OMEPRAZOLE 20 MG/1
20 CAPSULE, DELAYED RELEASE ORAL DAILY
Status: DISCONTINUED | OUTPATIENT
Start: 2023-05-24 | End: 2023-05-24 | Stop reason: HOSPADM

## 2023-05-23 RX ADMIN — NITROGLYCERIN 0.4 MG: 0.4 TABLET, ORALLY DISINTEGRATING SUBLINGUAL at 14:16

## 2023-05-23 RX ADMIN — ATORVASTATIN CALCIUM 80 MG: 40 TABLET, FILM COATED ORAL at 20:54

## 2023-05-23 RX ADMIN — LOSARTAN POTASSIUM 100 MG: 25 TABLET, FILM COATED ORAL at 18:13

## 2023-05-23 RX ADMIN — DIPHENHYDRAMINE HYDROCHLORIDE 50 MG: 50 INJECTION INTRAMUSCULAR; INTRAVENOUS at 20:54

## 2023-05-23 RX ADMIN — ASPIRIN 325 MG: 325 TABLET ORAL at 14:11

## 2023-05-23 RX ADMIN — TRAZODONE HYDROCHLORIDE 100 MG: 50 TABLET ORAL at 20:54

## 2023-05-23 RX ADMIN — APIXABAN 5 MG: 5 TABLET, FILM COATED ORAL at 18:00

## 2023-05-23 RX ADMIN — DICYCLOMINE HYDROCHLORIDE 20 MG: 20 TABLET ORAL at 18:00

## 2023-05-23 ASSESSMENT — CHA2DS2 SCORE
HYPERTENSION: YES
CHF OR LEFT VENTRICULAR DYSFUNCTION: NO
DIABETES: YES
PRIOR STROKE OR TIA OR THROMBOEMBOLISM: NO
AGE 65 TO 74: YES
SEX: FEMALE
VASCULAR DISEASE: YES
AGE 75 OR GREATER: NO
CHA2DS2 VASC SCORE: 5

## 2023-05-23 ASSESSMENT — ENCOUNTER SYMPTOMS
BRUISES/BLEEDS EASILY: 0
COUGH: 0
EYE REDNESS: 0
MYALGIAS: 0
SHORTNESS OF BREATH: 0
FLANK PAIN: 0
CHILLS: 0
EYE DISCHARGE: 0
NERVOUS/ANXIOUS: 0
FEVER: 0
ABDOMINAL PAIN: 0
VOMITING: 0
STRIDOR: 0
FOCAL WEAKNESS: 0

## 2023-05-23 ASSESSMENT — FIBROSIS 4 INDEX: FIB4 SCORE: 1.22

## 2023-05-23 ASSESSMENT — HEART SCORE
TROPONIN: LESS THAN OR EQUAL TO NORMAL LIMIT
ECG: NON-SPECIFIC REPOLARIZATION DISTURBANCE
AGE: 65+
HISTORY: SLIGHTLY SUSPICIOUS
RISK FACTORS: >2 RISK FACTORS OR HX OF ATHEROSCLEROTIC DISEASE
HEART SCORE: 5

## 2023-05-23 NOTE — H&P
Hospital Medicine History & Physical Note    Date of Service  5/23/2023    Primary Care Physician  Lonny Mckinney M.D.    Consultants  None     Code Status  Full Code    Chief Complaint  Chief Complaint   Patient presents with    Chest Pain     Started yesterday, around Right Breast radiated to Left side this am    Rash     Started this am, denies any new medications, has not taken any Benadryl       History of Presenting Illness  Judy Blackburn is a 68 y.o. female with a past medical history of primary hypertension, hyperlipidemia, recent C. difficile colitis and atrial fibrillation on anticoagulation and diabetes who presented 5/23/2023 with chest pain.  Pain started last night.  The pain is retrosternal in location and does radiate to the right side of the chest.  The pain is described as having pressure like character.  She also reports having associated shortness of breath.  She denies having lower extremity pain redness or swelling.       I discussed the plan of care with emergency department physician, the patient.    Review of Systems  Review of Systems   Constitutional:  Negative for chills and fever.   Eyes:  Negative for discharge and redness.   Respiratory:  Negative for cough, shortness of breath and stridor.    Cardiovascular:  Positive for chest pain. Negative for leg swelling.   Gastrointestinal:  Negative for abdominal pain and vomiting.   Genitourinary:  Negative for flank pain.   Musculoskeletal:  Negative for myalgias.   Skin: Negative.    Neurological:  Negative for focal weakness.   Endo/Heme/Allergies:  Does not bruise/bleed easily.   Psychiatric/Behavioral:  The patient is not nervous/anxious.      Past Medical History   has a past medical history of Anxiety, Atrial fibrillation (HCC), Diabetes (HCC), H/O Clostridium difficile infection, Hyperlipidemia, and Hypertension.    Surgical History   has a past surgical history that includes lumpectomy; trevor (N/A, 4/26/2022); and cardioversion  (N/A, 4/26/2022).     Family History  family history includes Arrythmia in her sister.      Social History   reports that she has never smoked. She has never used smokeless tobacco. She reports that she does not currently use alcohol. She reports that she does not use drugs.    Allergies  Allergies   Allergen Reactions    Codeine Vomiting and Nausea     Medications  Prior to Admission Medications   Prescriptions Last Dose Informant Patient Reported? Taking?   ALPRAZolam (XANAX) 0.5 MG Tab 5/22/2023 at in the night  Yes No   Sig: Take 0.5 mg by mouth 1 time a day as needed for Anxiety.   MAGNESIUM PO <week at pm Patient Yes No   Sig: Take 1 Tablet by mouth at bedtime as needed.   Potassium 99 MG Tab 5/21/2023 at am  Yes No   Sig: Take 99 mg by mouth every day.   apixaban (ELIQUIS) 5mg Tab 5/23/2023 at 0900  No No   Sig: Take 1 Tablet by mouth 2 times a day.   atorvastatin (LIPITOR) 80 MG tablet 5/22/2023 at 2200  No No   Sig: Take 1 Tablet by mouth at bedtime.   dicyclomine (BENTYL) 20 MG Tab 5/20/2023 at am  No No   Sig: Take 1 Tablet by mouth every 6 hours for 30 days.   losartan (COZAAR) 100 MG Tab 5/22/2023 at 1800  No No   Sig: Take 1 Tablet by mouth every day.   metFORMIN ER (GLUCOPHAGE XR) 500 MG TABLET SR 24 HR 5/22/2023 at 1800  No No   Sig: Take 1 Tablet by mouth every day.   metroNIDAZOLE (FLAGYL) 500 MG Tab 5/20/2023 at finished  Yes Yes   Sig: Take 500 mg by mouth 3 times a day.   omeprazole (PRILOSEC) 20 MG delayed-release capsule 5/23/2023 at 1400  No No   Sig: Take 1 Capsule by mouth every day.   ondansetron (ZOFRAN ODT) 4 MG TABLET DISPERSIBLE last week at unk  Yes Yes   Sig: Take 4 mg by mouth every 8 hours as needed for Nausea/Vomiting.   propranolol LA (INDERAL LA) 60 MG CAPSULE SR 24 HR 5/22/2023 at 2200  No No   Sig: Take 1 Capsule by mouth every day.   traZODone (DESYREL) 100 MG Tab 5/22/2023 at 2200  Yes Yes   Sig: Take 100 mg by mouth every evening.   vancomycin (VANCOCIN) 125 MG capsule  5/20/2023 at finished  Yes Yes   Sig: Take 125 mg by mouth 4 times a day.      Facility-Administered Medications: None     Physical Exam  Temp:  [36.5 °C (97.7 °F)] 36.5 °C (97.7 °F)  Pulse:  [74-82] 78  Resp:  [15-18] 16  BP: ()/(38-94) 96/64  SpO2:  [93 %-96 %] 93 %  Blood Pressure : 96/64   Temperature: 36.5 °C (97.7 °F)   Pulse: 78   Respiration: 16   Pulse Oximetry: 93 %     Physical Exam  Constitutional:       General: She is not in acute distress.  HENT:      Head: Normocephalic and atraumatic.      Right Ear: External ear normal.      Left Ear: External ear normal.      Nose: No congestion or rhinorrhea.      Mouth/Throat:      Mouth: Mucous membranes are moist.      Pharynx: No oropharyngeal exudate or posterior oropharyngeal erythema.   Eyes:      General: No scleral icterus.        Right eye: No discharge.         Left eye: No discharge.      Conjunctiva/sclera: Conjunctivae normal.      Pupils: Pupils are equal, round, and reactive to light.   Cardiovascular:      Heart sounds:      No friction rub. No gallop.   Pulmonary:      Effort: Pulmonary effort is normal.   Abdominal:      General: Abdomen is flat. There is no distension.      Tenderness: There is no guarding.   Musculoskeletal:         General: No swelling.      Cervical back: Neck supple. No rigidity. No muscular tenderness.      Right lower leg: No edema.      Left lower leg: No edema.   Skin:     Capillary Refill: Capillary refill takes 2 to 3 seconds.      Coloration: Skin is not jaundiced or pale.      Findings: No bruising or erythema.   Neurological:      Mental Status: She is alert and oriented to person, place, and time.   Psychiatric:         Mood and Affect: Mood normal.         Judgment: Judgment normal.       Laboratory:  Recent Labs     05/23/23  1341   WBC 6.9   RBC 4.93   HEMOGLOBIN 15.2   HEMATOCRIT 45.8   MCV 92.9   MCH 30.8   MCHC 33.2   RDW 43.9   PLATELETCT 254   MPV 10.1     Recent Labs     05/23/23  1341   SODIUM 139    POTASSIUM 4.7   CHLORIDE 105   CO2 25   GLUCOSE 94   BUN 16   CREATININE 0.62   CALCIUM 9.5     Recent Labs     05/23/23  1341   ALTSGPT 37   ASTSGOT 29   ALKPHOSPHAT 77   TBILIRUBIN 0.3   GLUCOSE 94         No results for input(s): NTPROBNP in the last 72 hours.      Recent Labs     05/23/23  1341   TROPONINT <6     Imaging:  DX-CHEST-PORTABLE (1 VIEW)   Final Result      Cardiomegaly.      EC-ECHOCARDIOGRAM COMPLETE W/O CONT    (Results Pending)     I personally reviewed patient EKG which shows sinus rhythm with a rate of 80, there is no ST elevation, there is ST depression and T wave inversions in lead V2-V4.  QTc is 421.    Assessment/Plan:  Justification for Admission Status  I anticipate this patient is appropriate for observation status at this time because likely discharge after 1 midnight    Patient will need a Telemetry bed on CARDIOLOGY service.  The patient is admitted for chest pain.    * Chest pain- (present on admission)  Assessment & Plan  EKG shows, sinus rhythm with a rate of 80, there is no ST elevation, there is ST depression and T wave inversions in lead V2-V4.  QTc is 421.  Initial troponin is within normal limits, I will trend  Stat EKG and troponin for recurrence of chest pain.   Continuous cardiac monitoring.  Plan for stress test in the morning [ordered] as long as there are no significant EKG changes or significant troponin elevation      Type 2 diabetes mellitus without complication, without long-term current use of insulin (HCC)- (present on admission)  Assessment & Plan  Without hyperglycemia  Last glycated hemoglobin was 6.2%  I will start short acting insulin for now  Accu-Checks, hypoglycemia protocol  Adjust according to blood sugars trend      Atrial fibrillation status post cardioversion (HCC)- (present on admission)  Assessment & Plan  Resume home anticoagulation with apixaban.  Resume home propranolol    Essential hypertension- (present on admission)  Assessment & Plan  Resume  home losartan and propranolol with hold parameters     Anxiety- (present on admission)  Assessment & Plan  Resume home trazodone and alprazolam    GERD (gastroesophageal reflux disease)- (present on admission)  Assessment & Plan  Resume home omeprazole    Mixed hyperlipidemia- (present on admission)  Assessment & Plan  Resume home atorvastatin    VTE prophylaxis: SCDs/TEDs and therapeutic anticoagulation with apixaban

## 2023-05-23 NOTE — ASSESSMENT & PLAN NOTE
EKG shows, sinus rhythm with a rate of 80, there is no ST elevation, there is ST depression and T wave inversions in lead V2-V4.  QTc is 421.  Initial troponin is within normal limits, I will trend  Stat EKG and troponin for recurrence of chest pain.   Continuous cardiac monitoring.  Plan for stress test in the morning [ordered] as long as there are no significant EKG changes or significant troponin elevation

## 2023-05-23 NOTE — ED PROVIDER NOTES
ED Provider Note    CHIEF COMPLAINT  Chief Complaint   Patient presents with    Chest Pain     Started yesterday, around Right Breast radiated to Left side this am    Rash     Started this am, denies any new medications, has not taken any Benadryl         HPI/ROS    Judy Blackubrn is a 68 y.o. female who presents chest pain.  The patient states this started last night.  She states initially seemed to be on the right side and now its more substernal in location.  She describes it as a pressure.  She does have some associated dyspnea and she is also noted recently that she has had some exertional dyspnea.  She has not had any associated nausea but did have some diaphoresis this morning.  From a cardiac standpoint she does have diabetes mellitus as well as hypertension and dyslipidemia.  She does not have any known ischemic heart disease but does have a history of atrial fibrillation and she is currently on anticoagulation.  She does not have any pain or swelling to her lower extremities.  She does finished a course of antibiotics for C. difficile colitis.    PAST MEDICAL HISTORY   has a past medical history of Anxiety, Diabetes (HCC), H/O Clostridium difficile infection, Hyperlipidemia, and Hypertension.    SURGICAL HISTORY   has a past surgical history that includes lumpectomy; trevor (N/A, 4/26/2022); and cardioversion (N/A, 4/26/2022).    FAMILY HISTORY  Family History   Problem Relation Age of Onset    Arrythmia Sister        SOCIAL HISTORY  Social History     Tobacco Use    Smoking status: Never    Smokeless tobacco: Never   Vaping Use    Vaping Use: Never used   Substance and Sexual Activity    Alcohol use: Not Currently    Drug use: Never    Sexual activity: Not on file       CURRENT MEDICATIONS  Home Medications    **Home medications have not yet been reviewed for this encounter**         ALLERGIES  Allergies   Allergen Reactions    Codeine Vomiting and Nausea       PHYSICAL EXAM  VITAL SIGNS: BP (!) 147/94  "  Pulse 80   Temp 36.5 °C (97.7 °F) (Temporal)   Resp 15   Ht 1.702 m (5' 7\")   Wt 77.2 kg (170 lb 3.1 oz)   SpO2 96%   BMI 26.66 kg/m²    In general the patient does not appear toxic    HEENT unremarkable    Pulmonary chest is clear to auscultation bilaterally    Cardiovascular S1-S2 with a regular rate and rhythm    GI abdomen soft with no tenderness and no distention    Skin no pallor nor cyanosis    Extremities no edema    Neurologic examination is grossly intact    DIAGNOSTIC STUDIES  Results for orders placed or performed during the hospital encounter of 05/23/23   CBC with Differential   Result Value Ref Range    WBC 6.9 4.8 - 10.8 K/uL    RBC 4.93 4.20 - 5.40 M/uL    Hemoglobin 15.2 12.0 - 16.0 g/dL    Hematocrit 45.8 37.0 - 47.0 %    MCV 92.9 81.4 - 97.8 fL    MCH 30.8 27.0 - 33.0 pg    MCHC 33.2 32.2 - 35.5 g/dL    RDW 43.9 35.9 - 50.0 fL    Platelet Count 254 164 - 446 K/uL    MPV 10.1 9.0 - 12.9 fL    Neutrophils-Polys 70.50 44.00 - 72.00 %    Lymphocytes 18.80 (L) 22.00 - 41.00 %    Monocytes 9.20 0.00 - 13.40 %    Eosinophils 0.90 0.00 - 6.90 %    Basophils 0.30 0.00 - 1.80 %    Immature Granulocytes 0.30 0.00 - 0.90 %    Nucleated RBC 0.00 0.00 - 0.20 /100 WBC    Neutrophils (Absolute) 4.88 1.82 - 7.42 K/uL    Lymphs (Absolute) 1.30 1.00 - 4.80 K/uL    Monos (Absolute) 0.64 0.00 - 0.85 K/uL    Eos (Absolute) 0.06 0.00 - 0.51 K/uL    Baso (Absolute) 0.02 0.00 - 0.12 K/uL    Immature Granulocytes (abs) 0.02 0.00 - 0.11 K/uL    NRBC (Absolute) 0.00 K/uL   Complete Metabolic Panel (CMP)   Result Value Ref Range    Sodium 139 135 - 145 mmol/L    Potassium 4.7 3.6 - 5.5 mmol/L    Chloride 105 96 - 112 mmol/L    Co2 25 20 - 33 mmol/L    Anion Gap 9.0 7.0 - 16.0    Glucose 94 65 - 99 mg/dL    Bun 16 8 - 22 mg/dL    Creatinine 0.62 0.50 - 1.40 mg/dL    Calcium 9.5 8.4 - 10.2 mg/dL    AST(SGOT) 29 12 - 45 U/L    ALT(SGPT) 37 2 - 50 U/L    Alkaline Phosphatase 77 30 - 99 U/L    Total Bilirubin 0.3 0.1 - " 1.5 mg/dL    Albumin 4.2 3.2 - 4.9 g/dL    Total Protein 6.6 6.0 - 8.2 g/dL    Globulin 2.4 1.9 - 3.5 g/dL    A-G Ratio 1.8 g/dL   Troponins NOW   Result Value Ref Range    Troponin T <6 6 - 19 ng/L   CORRECTED CALCIUM   Result Value Ref Range    Correct Calcium 9.3 8.5 - 10.5 mg/dL   ESTIMATED GFR   Result Value Ref Range    GFR (CKD-EPI) 96 >60 mL/min/1.73 m 2   EKG   Result Value Ref Range    Report       [facility] Emergency Dept.    Test Date:  2023  Pt Name:    ADÁN LOPEZ                Department: Health system  MRN:        [id number]                      Room:  Gender:     Female                       Technician: [id number]  :        [id number]                   Requested By:ER TRIAGE PROTOCOL  Order #:    [id number]                    Reading MD: RADHA CRAMER MD    Measurements  Intervals                                Axis  Rate:       80                           P:          -3  LA:         149                          QRS:        38  QRSD:       90                           T:          45  QT:         365  QTc:        421    Interpretive Statements  twelve-lead EKG shows a normal sinus rhythm with a ventricular rate of 80,  normal QRS, normal intervals, T wave inversion in V1 and V2 with some slight  depression but no ST segment elevation  Electronically Signed On 2023 14:08:43 PDT by RADHA CRAMER MD         RADIOLOGY  DX-CHEST-PORTABLE (1 VIEW)   Final Result      Cardiomegaly.        HEART Score: 5    COURSE & MEDICAL DECISION MAKING  This a 68-year-old female who presents the emergency department with chest discomfort her EKG on arrival does not show any ischemic change does have some T wave inversion and may be some slight ST segment depression.  Her initial troponin was negative.  She did receive aspirin as well as nitroglycerin.  On repeat exam she has had significant improvement which is concerning for possible unstable angina standpoint.  Esophagitis would  still be in the differential.  Otherwise the chest x-ray does not show any evidence of a focal process such as pneumonia.  She does not have any risk factors from a DVT standpoint nor she hypoxic nor tachycardic therefore suspect this would be unlikely.  Clinically her abdomen is benign therefore referred pain would be unlikely.  The patient is significantly better after the nitro and should be admitted to the hospital for further cardiac rule out.    FINAL DIAGNOSIS  Chest pain    Disposition  Patient will be admitted in stable condition       Electronically signed by: Pro Jacobo M.D., 5/23/2023 2:05 PM

## 2023-05-23 NOTE — ASSESSMENT & PLAN NOTE
Without hyperglycemia  Last glycated hemoglobin was 6.2%  I will start short acting insulin for now  Accu-Checks, hypoglycemia protocol  Adjust according to blood sugars trend

## 2023-05-23 NOTE — ED TRIAGE NOTES
"Chief Complaint   Patient presents with    Chest Pain     Started yesterday, around Right Breast radiated to Left side this am    Rash     Started this am, denies any new medications, has not taken any Benadryl       BP (!) 147/94   Pulse 80   Temp 36.5 °C (97.7 °F) (Temporal)   Resp 15   Ht 1.702 m (5' 7\")   Wt 77.2 kg (170 lb 3.1 oz)   SpO2 96%   BMI 26.66 kg/m²     Pt ambulated to ED w/ visitor for c/o CP started on Right Side yesterday and radiated to Left side this am.  Pt states also noticed generalized full body rash this am, unknown cause.  Pt states completed antibx on Friday for C-Diff.  Pt states has a new stool sample pending w/ lab for follow up for C-diff.  "

## 2023-05-24 ENCOUNTER — APPOINTMENT (OUTPATIENT)
Dept: RADIOLOGY | Facility: MEDICAL CENTER | Age: 68
End: 2023-05-24
Attending: HOSPITALIST
Payer: MEDICARE

## 2023-05-24 VITALS
SYSTOLIC BLOOD PRESSURE: 129 MMHG | TEMPERATURE: 97.5 F | BODY MASS INDEX: 26.71 KG/M2 | HEART RATE: 86 BPM | WEIGHT: 170.19 LBS | RESPIRATION RATE: 18 BRPM | DIASTOLIC BLOOD PRESSURE: 70 MMHG | HEIGHT: 67 IN | OXYGEN SATURATION: 92 %

## 2023-05-24 LAB
ALBUMIN SERPL BCP-MCNC: 3.6 G/DL (ref 3.2–4.9)
ALBUMIN/GLOB SERPL: 1.8 G/DL
ALP SERPL-CCNC: 63 U/L (ref 30–99)
ALT SERPL-CCNC: 27 U/L (ref 2–50)
ANION GAP SERPL CALC-SCNC: 7 MMOL/L (ref 7–16)
AST SERPL-CCNC: 21 U/L (ref 12–45)
BILIRUB SERPL-MCNC: 0.3 MG/DL (ref 0.1–1.5)
BUN SERPL-MCNC: 21 MG/DL (ref 8–22)
CALCIUM ALBUM COR SERPL-MCNC: 9.3 MG/DL (ref 8.5–10.5)
CALCIUM SERPL-MCNC: 9 MG/DL (ref 8.4–10.2)
CHLORIDE SERPL-SCNC: 106 MMOL/L (ref 96–112)
CHOLEST SERPL-MCNC: 126 MG/DL (ref 100–199)
CO2 SERPL-SCNC: 26 MMOL/L (ref 20–33)
CREAT SERPL-MCNC: 0.57 MG/DL (ref 0.5–1.4)
ERYTHROCYTE [DISTWIDTH] IN BLOOD BY AUTOMATED COUNT: 44 FL (ref 35.9–50)
GFR SERPLBLD CREATININE-BSD FMLA CKD-EPI: 98 ML/MIN/1.73 M 2
GLOBULIN SER CALC-MCNC: 2 G/DL (ref 1.9–3.5)
GLUCOSE BLD STRIP.AUTO-MCNC: 84 MG/DL (ref 65–99)
GLUCOSE SERPL-MCNC: 109 MG/DL (ref 65–99)
HCT VFR BLD AUTO: 42.4 % (ref 37–47)
HDLC SERPL-MCNC: 40 MG/DL
HGB BLD-MCNC: 14.1 G/DL (ref 12–16)
LDLC SERPL CALC-MCNC: 44 MG/DL
MAGNESIUM SERPL-MCNC: 2.3 MG/DL (ref 1.5–2.5)
MCH RBC QN AUTO: 31.1 PG (ref 27–33)
MCHC RBC AUTO-ENTMCNC: 33.3 G/DL (ref 32.2–35.5)
MCV RBC AUTO: 93.6 FL (ref 81.4–97.8)
PLATELET # BLD AUTO: 248 K/UL (ref 164–446)
PMV BLD AUTO: 10.1 FL (ref 9–12.9)
POTASSIUM SERPL-SCNC: 4.4 MMOL/L (ref 3.6–5.5)
PROT SERPL-MCNC: 5.6 G/DL (ref 6–8.2)
RBC # BLD AUTO: 4.53 M/UL (ref 4.2–5.4)
SODIUM SERPL-SCNC: 139 MMOL/L (ref 135–145)
TRIGL SERPL-MCNC: 211 MG/DL (ref 0–149)
WBC # BLD AUTO: 5.6 K/UL (ref 4.8–10.8)

## 2023-05-24 PROCEDURE — 700111 HCHG RX REV CODE 636 W/ 250 OVERRIDE (IP): Performed by: HOSPITALIST

## 2023-05-24 PROCEDURE — 85027 COMPLETE CBC AUTOMATED: CPT

## 2023-05-24 PROCEDURE — 80053 COMPREHEN METABOLIC PANEL: CPT

## 2023-05-24 PROCEDURE — 78452 HT MUSCLE IMAGE SPECT MULT: CPT

## 2023-05-24 PROCEDURE — 80061 LIPID PANEL: CPT

## 2023-05-24 PROCEDURE — 96375 TX/PRO/DX INJ NEW DRUG ADDON: CPT | Mod: XU

## 2023-05-24 PROCEDURE — G0378 HOSPITAL OBSERVATION PER HR: HCPCS

## 2023-05-24 PROCEDURE — 36415 COLL VENOUS BLD VENIPUNCTURE: CPT

## 2023-05-24 PROCEDURE — 82962 GLUCOSE BLOOD TEST: CPT

## 2023-05-24 PROCEDURE — 99239 HOSP IP/OBS DSCHRG MGMT >30: CPT | Performed by: INTERNAL MEDICINE

## 2023-05-24 PROCEDURE — 93018 CV STRESS TEST I&R ONLY: CPT | Performed by: INTERNAL MEDICINE

## 2023-05-24 PROCEDURE — 83735 ASSAY OF MAGNESIUM: CPT

## 2023-05-24 PROCEDURE — 78452 HT MUSCLE IMAGE SPECT MULT: CPT | Mod: 26 | Performed by: INTERNAL MEDICINE

## 2023-05-24 PROCEDURE — 700111 HCHG RX REV CODE 636 W/ 250 OVERRIDE (IP): Performed by: INTERNAL MEDICINE

## 2023-05-24 RX ORDER — REGADENOSON 0.08 MG/ML
0.4 INJECTION, SOLUTION INTRAVENOUS ONCE
Status: COMPLETED | OUTPATIENT
Start: 2023-05-24 | End: 2023-05-24

## 2023-05-24 RX ORDER — AMINOPHYLLINE 25 MG/ML
100 INJECTION, SOLUTION INTRAVENOUS
Status: DISCONTINUED | OUTPATIENT
Start: 2023-05-24 | End: 2023-05-24 | Stop reason: HOSPADM

## 2023-05-24 RX ORDER — FUROSEMIDE 10 MG/ML
20 INJECTION INTRAMUSCULAR; INTRAVENOUS ONCE
Status: COMPLETED | OUTPATIENT
Start: 2023-05-24 | End: 2023-05-24

## 2023-05-24 RX ADMIN — REGADENOSON 0.4 MG: 0.08 INJECTION, SOLUTION INTRAVENOUS at 09:45

## 2023-05-24 RX ADMIN — FUROSEMIDE 20 MG: 10 INJECTION, SOLUTION INTRAVENOUS at 15:45

## 2023-05-24 ASSESSMENT — COGNITIVE AND FUNCTIONAL STATUS - GENERAL
SUGGESTED CMS G CODE MODIFIER DAILY ACTIVITY: CH
DAILY ACTIVITIY SCORE: 24
SUGGESTED CMS G CODE MODIFIER MOBILITY: CH
MOBILITY SCORE: 24

## 2023-05-24 ASSESSMENT — LIFESTYLE VARIABLES
TOTAL SCORE: 0
HAVE PEOPLE ANNOYED YOU BY CRITICIZING YOUR DRINKING: NO
HAVE YOU EVER FELT YOU SHOULD CUT DOWN ON YOUR DRINKING: NO
TOTAL SCORE: 0
ALCOHOL_USE: YES
EVER HAD A DRINK FIRST THING IN THE MORNING TO STEADY YOUR NERVES TO GET RID OF A HANGOVER: NO
HOW MANY TIMES IN THE PAST YEAR HAVE YOU HAD 5 OR MORE DRINKS IN A DAY: 0
ON A TYPICAL DAY WHEN YOU DRINK ALCOHOL HOW MANY DRINKS DO YOU HAVE: 1
EVER FELT BAD OR GUILTY ABOUT YOUR DRINKING: NO
AVERAGE NUMBER OF DAYS PER WEEK YOU HAVE A DRINK CONTAINING ALCOHOL: 1
CONSUMPTION TOTAL: NEGATIVE
TOTAL SCORE: 0

## 2023-05-24 ASSESSMENT — PATIENT HEALTH QUESTIONNAIRE - PHQ9
1. LITTLE INTEREST OR PLEASURE IN DOING THINGS: NOT AT ALL
SUM OF ALL RESPONSES TO PHQ9 QUESTIONS 1 AND 2: 0
2. FEELING DOWN, DEPRESSED, IRRITABLE, OR HOPELESS: NOT AT ALL
1. LITTLE INTEREST OR PLEASURE IN DOING THINGS: NOT AT ALL
2. FEELING DOWN, DEPRESSED, IRRITABLE, OR HOPELESS: NOT AT ALL
SUM OF ALL RESPONSES TO PHQ9 QUESTIONS 1 AND 2: 0

## 2023-05-24 ASSESSMENT — PAIN DESCRIPTION - PAIN TYPE
TYPE: ACUTE PAIN
TYPE: ACUTE PAIN

## 2023-05-24 ASSESSMENT — FIBROSIS 4 INDEX: FIB4 SCORE: 1.3

## 2023-05-24 NOTE — ED NOTES
Patient resting more comfortably aware she will most likely stay in the ER through the night.  Patient medicated as ordered.

## 2023-05-24 NOTE — PROGRESS NOTES
12-hour chart check complete.    Monitor Summary  Rhythm: SR  Rate: 80-90  Ectopy: NA  Measurements: .16/.08/.34

## 2023-05-24 NOTE — ED NOTES
Pt updated on POC at this time. NAD. Call light within reach. NAD. Pt also notified of being NPO strict at midnight.

## 2023-05-24 NOTE — DISCHARGE INSTRUCTIONS
Chest Pain Observation  It is often hard to give a specific diagnosis for the cause of chest pain. Among other possibilities your symptoms might be caused by inadequate oxygen delivery to your heart (angina). Angina that is not treated or evaluated can lead to a heart attack (myocardial infarction) or death.  Blood tests, electrocardiograms, and X-rays may have been done to help determine a possible cause of your chest pain. After evaluation and observation, your health care provider has determined that it is unlikely your pain was caused by an unstable condition that requires hospitalization. However, a full evaluation of your pain may need to be completed, with additional diagnostic testing as directed. It is very important to keep your follow-up appointments. Not keeping your follow-up appointments could result in permanent heart damage, disability, or death. If there is any problem keeping your follow-up appointments, you must call your health care provider.  HOME CARE INSTRUCTIONS   Due to the slight chance that your pain could be angina, it is important to follow your health care provider's treatment plan and also maintain a healthy lifestyle:  Maintain or work toward achieving a healthy weight.  Stay physically active and exercise regularly.  Decrease your salt intake.  Eat a balanced, healthy diet. Talk to a dietitian to learn about heart-healthy foods.  Increase your fiber intake by including whole grains, vegetables, fruits, and nuts in your diet.  Avoid situations that cause stress, anger, or depression.  Take medicines as advised by your health care provider. Report any side effects to your health care provider. Do not stop medicines or adjust the dosages on your own.  Quit smoking. Do not use nicotine patches or gum until you check with your health care provider.  Keep your blood pressure, blood sugar, and cholesterol levels within normal limits.  Limit alcohol intake to no more than 1 drink per day for  women who are not pregnant and 2 drinks per day for men.  Do not abuse drugs.  SEEK IMMEDIATE MEDICAL CARE IF:  You have severe chest pain or pressure which may include symptoms such as:  You feel pain or pressure in your arms, neck, jaw, or back.  You have severe back or abdominal pain, feel sick to your stomach (nauseous), or throw up (vomit).  You are sweating profusely.  You are having a fast or irregular heartbeat.  You feel short of breath while at rest.  You notice increasing shortness of breath during rest, sleep, or with activity.  You have chest pain that does not get better after rest or after taking your usual medicine.  You wake from sleep with chest pain.  You are unable to sleep because you cannot breathe.  You develop a frequent cough or you are coughing up blood.  You feel dizzy, faint, or experience extreme fatigue.  You develop severe weakness, dizziness, fainting, or chills.  Any of these symptoms may represent a serious problem that is an emergency. Do not wait to see if the symptoms will go away. Call your local emergency services (911 in the [place] Do not drive yourself to the hospital.  MAKE SURE YOU:  Understand these instructions.  Will watch your condition.  Will get help right away if you are not doing well or get worse.     This information is not intended to replace advice given to you by your health care provider. Make sure you discuss any questions you have with your health care provider.     Document Released: 01/20/2012 Document Revised: 12/23/2014 Document Reviewed: 06/19/2014  [employer] Patient Education ©2016 [employer]

## 2023-05-24 NOTE — PROGRESS NOTES
4 Eyes Skin Assessment Completed by ELISA Farrar and ELISA Negro.    Head WDL  Ears WDL  Nose WDL  Mouth WDL  Neck WDL  Breast/Chest WDL  Shoulder Blades WDL  Spine WDL  (R) Arm/Elbow/Hand WDL  (L) Arm/Elbow/Hand WDL  Abdomen WDL  Groin WDL  Scrotum/Coccyx/Buttocks WDL  (R) Leg WDL  (L) Leg WDL  (R) Heel/Foot/Toe WDL  (L) Heel/Foot/Toe WDL          Devices In Places Tele Box, Blood Pressure Cuff, and Pulse Ox      Interventions In Place NC W/Ear Foams    Possible Skin Injury No    Pictures Uploaded Into Epic N/A  Wound Consult Placed N/A  RN Wound Prevention Protocol Ordered No

## 2023-05-25 ENCOUNTER — PATIENT OUTREACH (OUTPATIENT)
Dept: MEDICAL GROUP | Age: 68
End: 2023-05-25
Payer: MEDICARE

## 2023-05-25 LAB — CALPROTECTIN STL-MCNT: 26 UG/G

## 2023-05-25 NOTE — DISCHARGE SUMMARY
"Discharge Summary    CHIEF COMPLAINT ON ADMISSION  Chief Complaint   Patient presents with    Chest Pain     Started yesterday, around Right Breast radiated to Left side this am    Rash     Started this am, denies any new medications, has not taken any Benadryl         Reason for Admission  Chest Pain     Admission Date  5/23/2023    CODE STATUS  Full Code    HPI & HOSPITAL COURSE  This is \"Judy Blackburn is a 68 y.o. female with a past medical history of primary hypertension, hyperlipidemia, recent C. difficile colitis and atrial fibrillation on anticoagulation and diabetes who presented 5/23/2023 with chest pain.  Pain started last night.  The pain is retrosternal in location and does radiate to the right side of the chest.  The pain is described as having pressure like character.  She also reports having associated shortness of breath.  She denies having lower extremity pain redness or swelling.  \" ( as per admitting physician Dr. Ignacio).    Given patient's complaints, and risk factors, she had an NM Lexiscan stress test.  Results showed normal myocardial perfusion, no ischemia and negative stress ECG for ischemia.    I reviewed patient's echocardiogram which showed LVEF 70%, mild tricuspid regurgitation, RVSP 35 mmHg, trace mitral regurgitation.      After evaluation suspect patient's symptoms are due to acid reflux.  We did discuss potential triggers to her acid reflux including diet soda which she drinks in higher quantities, her morning coffee.  Patient was also taking Prilosec in the morning with her other medications which may interfere with its activation.  I also explained to the patient that she may need to take Prilosec twice a day in the meantime to help with her acid reflux for 14 days and then transition back to daily.  I did  the patient on taking Prilosec alone at least 30 minutes to 1 hour prior to any food or other medications.    Prior to discharge patient did complain of a small " shortness of breath.  Given her RVSP slightly elevated, I did give the patient IV Lasix 20 mg.  Afterwards she had urinated well and had no further complaints of shortness of breath.  I do not suspect she had an acute heart failure.     Therefore, she is discharged in good and stable condition to home with close outpatient follow-up.    The patient recovered much more quickly than anticipated on admission.    Discharge Date  5/24/2023    FOLLOW UP ITEMS POST DISCHARGE  With cardiologist and PCP    DISCHARGE DIAGNOSES  Principal Problem:    Chest pain (POA: Yes)  Active Problems:    Mixed hyperlipidemia (POA: Yes)    Essential hypertension (POA: Yes)      Overview: with white coat effect            Formatting of this note might be different from the original.      with white coat effect    GERD (gastroesophageal reflux disease) (POA: Yes)    Anxiety (POA: Yes)    Atrial fibrillation status post cardioversion (HCC) (POA: Yes)    Type 2 diabetes mellitus without complication, without long-term current use of insulin (HCC) (POA: Yes)  Resolved Problems:    * No resolved hospital problems. *      FOLLOW UP  No future appointments.  Lonny Mckinney M.D.  [address] [place] [address] [address] [place] [id number] [phone]    Call  CALL AND MAKE AN APPOINTMENT FOR 1-2 WEEKS      MEDICATIONS ON DISCHARGE     Medication List        CONTINUE taking these medications        Instructions   ALPRAZolam 0.5 MG Tabs  Commonly known as: XANAX   Take 0.5 mg by mouth 1 time a day as needed for Anxiety.  Dose: 0.5 mg     apixaban 5mg Tabs  Commonly known as: ELIQUIS  Next Dose Due: TONIGHT 05/24   Take 1 Tablet by mouth 2 times a day.  Dose: 5 mg     atorvastatin 80 MG tablet  Commonly known as: LIPITOR  Next Dose Due: TONIGHT 05/24   Take 1 Tablet by mouth at bedtime.  Dose: 80 mg     dicyclomine 20 MG Tabs  Commonly known as: BENTYL   Take 1 Tablet by mouth every 6 hours for 30 days.  Dose: 20 mg     losartan 100 MG Tabs  Commonly known as: COZAAR  Next  Dose Due: RESUME NORMAL HOME ROUTINE   Take 1 Tablet by mouth every day.  Dose: 100 mg     MAGNESIUM PO   Take 1 Tablet by mouth at bedtime as needed.  Dose: 1 Tablet     metFORMIN  MG Tb24  Commonly known as: GLUCOPHAGE XR  Next Dose Due: TOMORROW 05/24   Take 1 Tablet by mouth every day.  Dose: 500 mg     metroNIDAZOLE 500 MG Tabs  Commonly known as: FLAGYL   Take 500 mg by mouth 3 times a day.  Dose: 500 mg     omeprazole 20 MG delayed-release capsule  Commonly known as: PRILOSEC  Next Dose Due: TOMORROW 05/25  Notes to patient: 1 hr before meals and other meds on an empty stomach!   Take 1 Capsule by mouth every day.  Dose: 20 mg     ondansetron 4 MG Tbdp  Commonly known as: ZOFRAN ODT   Take 4 mg by mouth every 8 hours as needed for Nausea/Vomiting.  Dose: 4 mg     Potassium 99 MG Tabs  Next Dose Due: TOMORROW 05/25   Take 99 mg by mouth every day.  Dose: 99 mg     propranolol LA 60 MG Cp24  Commonly known as: INDERAL LA  Next Dose Due: RESUME NORMAL HOME ROUTINE   Take 1 Capsule by mouth every day.  Dose: 60 mg     traZODone 100 MG Tabs  Commonly known as: DESYREL  Next Dose Due: TONIGHT 05/24   Take 100 mg by mouth every evening.  Dose: 100 mg     Vancocin 125 MG capsule  Generic drug: vancomycin   Take 125 mg by mouth 4 times a day.  Dose: 125 mg              Allergies  Allergies   Allergen Reactions    Codeine Vomiting and Nausea    Tape Hives and Itching       DIET  Orders Placed This Encounter   Procedures    Diet Order Diet: Consistent CHO (Diabetic)     Standing Status:   Standing     Number of Occurrences:   1     Order Specific Question:   Diet:     Answer:   Consistent CHO (Diabetic) [4]       ACTIVITY  As tolerated.  Weight bearing as tolerated    CONSULTATIONS  none    PROCEDURES  NM Lexiscan stress test    LABORATORY  Lab Results   Component Value Date    SODIUM 139 05/24/2023    POTASSIUM 4.4 05/24/2023    CHLORIDE 106 05/24/2023    CO2 26 05/24/2023    GLUCOSE 109 (H) 05/24/2023    BUN 21  05/24/2023    CREATININE 0.57 05/24/2023        Lab Results   Component Value Date    WBC 5.6 05/24/2023    HEMOGLOBIN 14.1 05/24/2023    HEMATOCRIT 42.4 05/24/2023    PLATELETCT 248 05/24/2023        Total time of the discharge process exceeds 32 minutes.  More than 50% of time was spent face to face with patient.  This included but not limited to review of hospital course with patient, treatment goals upon discharge, recommendations to PCP, continued and new medications and their adverse reactions, and nursing instructions for patient.          NM-CARDIAC STRESS TEST   Final Result      EC-ECHOCARDIOGRAM COMPLETE W/O CONT   Final Result      DX-CHEST-PORTABLE (1 VIEW)   Final Result      Cardiomegaly.

## 2023-05-25 NOTE — PROGRESS NOTES
Patient education and discharge instructions provided. All questions answered, no concerns. Patient knows to call and schedule follow up appointment with PCP. Correct pharmacy was confirmed. Patient ambulated out with CNA, all belongings with patient.

## 2023-05-25 NOTE — PROGRESS NOTES
Transitional Care Management    Discharge Questions  Discharge Date: 5/24/23  Outreach call: Date 05/25/23  Time: 10:54 AM   Now that you are home, how are you feeling?  Fair  Did you receive any new prescriptions? No  Do you have any questions about your current medications or new medications (Review Med Rec)?  No  Do you have a follow up appointment scheduled with your PCP?  No Was an appointment scheduled? Yes  Date/Time 6/5/23 1440   Any issues or paperwork you wish to discuss with your PCP? No  Does this patient qualify for the CCM program?  No    Transitional Care  Number of attempts: 1  Current or previous attempts competed within two business days of discharge?  Yes  Provided education regarding treatment plan, medications, self-management, ADLs?  Yes  Has patient completed an Advanced Directive?  No  Care Manager phone number provided? No  Is there anything else I can help you with?  No    Discharge Summary   Chief Complaint:  chest pain/rash   Admitting Dx:  chest pain   Discharge Dx:  chest pain    Notes:  Patient has established with GI for Cdiff and most recent stool was clear. She is no longer on antibiotics. She is not having chest pain. Reports scattered hive like rash that worsened over night (present in ED but not as bad) and she is taking Benadryl and cold baths to help. C/o general malaise for some time now and she will keep f/u appt of she is not feeling better by then.

## 2023-05-25 NOTE — PROGRESS NOTES
Transitional Care Management     Discharge Questions  Discharge Date: 5/24/23  Outreach call: Date 05/25/23  Time: 10:54 AM   Now that you are home, how are you feeling?  Fair  Did you receive any new prescriptions? No  Do you have any questions about your current medications or new medications (Review Med Rec)?  No  Do you have a follow up appointment scheduled with your PCP?  No Was an appointment scheduled? Yes  Date/Time 6/5/23 1440   Any issues or paperwork you wish to discuss with your PCP? No  Does this patient qualify for the CCM program?  No     Transitional Care  Number of attempts: 1  Current or previous attempts competed within two business days of discharge?  Yes  Provided education regarding treatment plan, medications, self-management, ADLs?  Yes  Has patient completed an Advanced Directive?  No  Care Manager phone number provided? No  Is there anything else I can help you with?  No     Discharge Summary        Chief Complaint:  chest pain/rash        Admitting Dx:  chest pain        Discharge Dx:  chest pain     Notes:  Patient has established with GI for Cdiff and most recent stool was clear. She is no longer on antibiotics. She is not having chest pain. Reports scattered hive like rash that worsened over night (present in ED but not as bad) and she is taking Benadryl and cold baths to help. C/o general malaise for some time now and she will keep f/u appt of she is not feeling better by then.     Subjective:     Judy Blackburn is a 68 y.o. female who presents for Hospital Follow-up.    HPI:   Recently hospitalized for chest pain and rash 5/23-5/24.  She initially presented to the ER after onset of chest pain that started the night prior with associated shortness of breath.  Stress test showed normal myocardial perfusion, no ischemia.  Echo completed showing showed LVEF 70%, mild tricuspid regurgitation, RVSP 35 mmHg, trace mitral regurgitation.  Symptoms were thought to be due to possible acid  reflux.  She was advised to start taking Prilosec twice daily for 14 days, then transition back to daily.  She was complaining of shortness of breath, she was given 20 mg of IV Lasix.  Which resolved symptoms of shortness of breath.  No suspicion for heart failure.  In clinic today she has been continuing to take her omeprazole twice daily.  Symptoms have completely resolved.  Has not had her presentation of chest pain.    Today though her biggest concern is representation of C. difficile.  She has been working with her PCP and gastroenterologist Dr. Sneed.  She has subsequently been on 2 rounds of vancomycin with positive C. difficile.  She completed her vancomycin a week ago, then had representation of symptoms.  Her PCP did order C. difficile stool, which was +3 days ago.  She had some leftover vancomycin, did start taking it has been taking it for 3 days now.  Has improved her diarrhea, although today she she has had 12 bowel movements, they are malodorous, watery.  She does note abdominal cramping.  Initially Difcid  was prescribed with her first occurrence of C. difficile, however this was not covered by her insurance and was not affordable.  She is trying to stay adequately hydrated.  Denies fevers, melena, hematochezia, nausea, vomiting.    Current medicines (including reconciliation performed today)  Current Outpatient Medications   Medication Sig Dispense Refill    vancomycin (VANCOCIN) 125 MG capsule Take 1 Capsule by mouth 4 times a day for 10 days. 40 Capsule 0    Fidaxomicin 200 MG Tab Take 1 Tablet by mouth 2 times a day for 10 days. 20 Tablet 0    traZODone (DESYREL) 100 MG Tab Take 100 mg by mouth every evening.      ondansetron (ZOFRAN ODT) 4 MG TABLET DISPERSIBLE Take 4 mg by mouth every 8 hours as needed for Nausea/Vomiting.      omeprazole (PRILOSEC) 20 MG delayed-release capsule Take 1 Capsule by mouth every day. 90 Capsule 0    dicyclomine (BENTYL) 20 MG Tab Take 1 Tablet by mouth every 6 hours  "for 30 days. 120 Tablet 0    ALPRAZolam (XANAX) 0.5 MG Tab Take 0.5 mg by mouth 1 time a day as needed for Anxiety.      metFORMIN ER (GLUCOPHAGE XR) 500 MG TABLET SR 24 HR Take 1 Tablet by mouth every day. 90 Tablet 3    losartan (COZAAR) 100 MG Tab Take 1 Tablet by mouth every day. 90 Tablet 3    apixaban (ELIQUIS) 5mg Tab Take 1 Tablet by mouth 2 times a day. 180 Tablet 3    propranolol LA (INDERAL LA) 60 MG CAPSULE SR 24 HR Take 1 Capsule by mouth every day. 90 Capsule 3    atorvastatin (LIPITOR) 80 MG tablet Take 1 Tablet by mouth at bedtime. 90 Tablet 3    Potassium 99 MG Tab Take 99 mg by mouth every day.      MAGNESIUM PO Take 1 Tablet by mouth at bedtime as needed.       No current facility-administered medications for this visit.       Allergies:   Codeine and Tape    Social History     Tobacco Use    Smoking status: Never    Smokeless tobacco: Never   Vaping Use    Vaping Use: Never used   Substance Use Topics    Alcohol use: Not Currently    Drug use: Never       ROS:  See above    Objective:     Vitals:    06/05/23 1444   BP: 116/64   BP Location: Left arm   Patient Position: Sitting   BP Cuff Size: Adult   Pulse: 75   Resp: 16   Temp: 36.1 °C (97 °F)   TempSrc: Temporal   SpO2: 96%   Weight: 76.2 kg (168 lb)   Height: 1.702 m (5' 7\")     Body mass index is 26.31 kg/m².    Physical Exam:  General: Alert, pleasant, NAD  HEENT: Normocephalic. Neck supple.  No thyromegaly or masses palpated. No cervical or supraclavicular lymphadenopathy. No carotid bruits   Heart: Regular rate and rhythm.  S1 and S2 normal.  No murmurs appreciated.  Respiratory: Normal respiratory effort.  Clear to auscultation bilaterally.  Abdomen:  Normoactive bowel sounds.  Non-distended, soft, minimal generalized tenderness, no guarding/rebound. No hepatosplenomegaly.  No hernias.  Negative Lucero's sign.Negative McBurney's    Skin: Warm, dry, no rashes.  Extremities: No leg edema.  Radial pulses 2+ symmetric  Psych:   tearful at " times, judgement is good, memory is intact, grooming is appropriate.    Assessment and Plan:     1. Hospital discharge follow-up  Discharge medications reviewed and reconciled    2. Gastroesophageal reflux disease without esophagitis  -Stable.  Continue 20 mg of omeprazole daily.    3. Chest pain, unspecified type  -Resolved.  Negative work-up in the ER.  If any point she has representation of chest pain follow-up immediately    4. C. difficile enteritis  -She had positive C. difficile test 3 days ago, is having abdominal cramping, has had 12 bowel movements today, stool is malodorous.  We will restart her on the vancomycin for total of 14 days as this is her third occurrence.  Did try to reach out to her gastroenterologist Dr. Sneed's office, left message with MA seeing if she can get in sooner or recommend any alternative treatments.  Did send in Dificid to the pharmacy, requesting them to start her prior approval, as this is now her third round of antibiotics on the vancomycin.  If this is covered advised to stop the vancomycin start the Dificid we will check CBC and CMP.  Continue with good hydration.  - CBC WITH DIFFERENTIAL; Future  - Comp Metabolic Panel; Future  - vancomycin (VANCOCIN) 125 MG capsule; Take 1 Capsule by mouth 4 times a day for 10 days.  Dispense: 40 Capsule; Refill: 0  - Fidaxomicin 200 MG Tab; Take 1 Tablet by mouth 2 times a day for 10 days.  Dispense: 20 Tablet; Refill: 0    - Chart and discharge summary were reviewed.   - Hospitalization and results reviewed with patient.   - Medications reviewed including instructions regarding high risk medications, dosing and side effects.  - Recommended Services: No services needed at this time  - Advance directive/POLST on file?  No     Follow-up:Return in about 10 days (around 6/15/2023) for w/pcp C. difficile.    Face-to-face transitional care management services with [facility] (today's visit is within 14 days post discharge & LACE+ score of 28-58)  medical decision complexity were provided.     LACE+ Historical Score Over Time (0-28: Low, 29-58: Medium, 59+: High): 76

## 2023-06-01 ENCOUNTER — DOCUMENTATION (OUTPATIENT)
Dept: VASCULAR LAB | Facility: MEDICAL CENTER | Age: 68
End: 2023-06-01
Payer: MEDICARE

## 2023-06-01 NOTE — PROGRESS NOTES
Left message for patient to call back and schedule 1 yr follow up with Dr. Coker in late September.

## 2023-06-02 ENCOUNTER — HOSPITAL ENCOUNTER (OUTPATIENT)
Facility: MEDICAL CENTER | Age: 68
End: 2023-06-02
Attending: FAMILY MEDICINE
Payer: MEDICARE

## 2023-06-02 ENCOUNTER — TELEPHONE (OUTPATIENT)
Dept: MEDICAL GROUP | Age: 68
End: 2023-06-02
Payer: MEDICARE

## 2023-06-02 DIAGNOSIS — A04.72 C. DIFFICILE ENTERITIS: ICD-10-CM

## 2023-06-02 PROCEDURE — 87493 C DIFF AMPLIFIED PROBE: CPT

## 2023-06-02 PROCEDURE — 87324 CLOSTRIDIUM AG IA: CPT

## 2023-06-05 ENCOUNTER — HOSPITAL ENCOUNTER (OUTPATIENT)
Dept: LAB | Facility: MEDICAL CENTER | Age: 68
End: 2023-06-05
Attending: PHYSICIAN ASSISTANT
Payer: MEDICARE

## 2023-06-05 ENCOUNTER — OFFICE VISIT (OUTPATIENT)
Dept: MEDICAL GROUP | Age: 68
End: 2023-06-05
Payer: MEDICARE

## 2023-06-05 VITALS
HEIGHT: 67 IN | TEMPERATURE: 97 F | BODY MASS INDEX: 26.37 KG/M2 | DIASTOLIC BLOOD PRESSURE: 64 MMHG | SYSTOLIC BLOOD PRESSURE: 116 MMHG | HEART RATE: 75 BPM | RESPIRATION RATE: 16 BRPM | OXYGEN SATURATION: 96 % | WEIGHT: 168 LBS

## 2023-06-05 DIAGNOSIS — A04.72 C. DIFFICILE ENTERITIS: ICD-10-CM

## 2023-06-05 DIAGNOSIS — Z09 HOSPITAL DISCHARGE FOLLOW-UP: Primary | ICD-10-CM

## 2023-06-05 DIAGNOSIS — K21.9 GASTROESOPHAGEAL REFLUX DISEASE WITHOUT ESOPHAGITIS: ICD-10-CM

## 2023-06-05 DIAGNOSIS — R07.9 CHEST PAIN, UNSPECIFIED TYPE: ICD-10-CM

## 2023-06-05 LAB
ALBUMIN SERPL BCP-MCNC: 4.3 G/DL (ref 3.2–4.9)
ALBUMIN/GLOB SERPL: 1.8 G/DL
ALP SERPL-CCNC: 80 U/L (ref 30–99)
ALT SERPL-CCNC: 29 U/L (ref 2–50)
ANION GAP SERPL CALC-SCNC: 11 MMOL/L (ref 7–16)
AST SERPL-CCNC: 20 U/L (ref 12–45)
BASOPHILS # BLD AUTO: 0.6 % (ref 0–1.8)
BASOPHILS # BLD: 0.05 K/UL (ref 0–0.12)
BILIRUB SERPL-MCNC: 0.3 MG/DL (ref 0.1–1.5)
BUN SERPL-MCNC: 11 MG/DL (ref 8–22)
CALCIUM ALBUM COR SERPL-MCNC: 9.2 MG/DL (ref 8.5–10.5)
CALCIUM SERPL-MCNC: 9.4 MG/DL (ref 8.5–10.5)
CHLORIDE SERPL-SCNC: 108 MMOL/L (ref 96–112)
CO2 SERPL-SCNC: 26 MMOL/L (ref 20–33)
CREAT SERPL-MCNC: 0.61 MG/DL (ref 0.5–1.4)
EOSINOPHIL # BLD AUTO: 0.24 K/UL (ref 0–0.51)
EOSINOPHIL NFR BLD: 3.1 % (ref 0–6.9)
ERYTHROCYTE [DISTWIDTH] IN BLOOD BY AUTOMATED COUNT: 44.1 FL (ref 35.9–50)
GFR SERPLBLD CREATININE-BSD FMLA CKD-EPI: 96 ML/MIN/1.73 M 2
GLOBULIN SER CALC-MCNC: 2.4 G/DL (ref 1.9–3.5)
GLUCOSE SERPL-MCNC: 93 MG/DL (ref 65–99)
HCT VFR BLD AUTO: 46.3 % (ref 37–47)
HGB BLD-MCNC: 15.5 G/DL (ref 12–16)
IMM GRANULOCYTES # BLD AUTO: 0.02 K/UL (ref 0–0.11)
IMM GRANULOCYTES NFR BLD AUTO: 0.3 % (ref 0–0.9)
LYMPHOCYTES # BLD AUTO: 2.38 K/UL (ref 1–4.8)
LYMPHOCYTES NFR BLD: 30.6 % (ref 22–41)
MCH RBC QN AUTO: 31.2 PG (ref 27–33)
MCHC RBC AUTO-ENTMCNC: 33.5 G/DL (ref 32.2–35.5)
MCV RBC AUTO: 93.2 FL (ref 81.4–97.8)
MONOCYTES # BLD AUTO: 0.72 K/UL (ref 0–0.85)
MONOCYTES NFR BLD AUTO: 9.2 % (ref 0–13.4)
NEUTROPHILS # BLD AUTO: 4.38 K/UL (ref 1.82–7.42)
NEUTROPHILS NFR BLD: 56.2 % (ref 44–72)
NRBC # BLD AUTO: 0 K/UL
NRBC BLD-RTO: 0 /100 WBC (ref 0–0.2)
PLATELET # BLD AUTO: 264 K/UL (ref 164–446)
PMV BLD AUTO: 10.9 FL (ref 9–12.9)
POTASSIUM SERPL-SCNC: 4.3 MMOL/L (ref 3.6–5.5)
PROT SERPL-MCNC: 6.7 G/DL (ref 6–8.2)
RBC # BLD AUTO: 4.97 M/UL (ref 4.2–5.4)
SODIUM SERPL-SCNC: 145 MMOL/L (ref 135–145)
WBC # BLD AUTO: 7.8 K/UL (ref 4.8–10.8)

## 2023-06-05 PROCEDURE — 36415 COLL VENOUS BLD VENIPUNCTURE: CPT

## 2023-06-05 PROCEDURE — 99495 TRANSJ CARE MGMT MOD F2F 14D: CPT | Performed by: PHYSICIAN ASSISTANT

## 2023-06-05 PROCEDURE — 3074F SYST BP LT 130 MM HG: CPT | Performed by: PHYSICIAN ASSISTANT

## 2023-06-05 PROCEDURE — 85025 COMPLETE CBC W/AUTO DIFF WBC: CPT

## 2023-06-05 PROCEDURE — 80053 COMPREHEN METABOLIC PANEL: CPT

## 2023-06-05 PROCEDURE — 3078F DIAST BP <80 MM HG: CPT | Performed by: PHYSICIAN ASSISTANT

## 2023-06-05 RX ORDER — VANCOMYCIN HYDROCHLORIDE 125 MG/1
125 CAPSULE ORAL 4 TIMES DAILY
Qty: 40 CAPSULE | Refills: 0 | Status: SHIPPED | OUTPATIENT
Start: 2023-06-05 | End: 2023-06-15

## 2023-06-05 ASSESSMENT — FIBROSIS 4 INDEX: FIB4 SCORE: 1.12

## 2023-06-06 NOTE — RESULT ENCOUNTER NOTE
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 She was transferred to OSH ICU 1/22 and intubated and requiring vasopressors. 1/24 transferred to [facility] Medical ICU service for ongoing management, extubated on 1/29 to BiPAP.      CHANGES and MAJOR THINGS TODAY:   - Continue BiPAP/HFNC weaning FiO2 as tolerated  - Increase metoprolol to 37.5 BID  - Optho consult for unequal pupils  - Dialysis today  - IR consult to place a post-pyloric feeding tube  - Will discuss discontinuation of empiric ceftriaxone with pulm team as patient has completed 7 day course.    PLAN:     Neuro:  #Insomnia  - Melatonin     #Unequal pupils  Patient with alternating fixed and dilated pupils. CTH unremarkable. Neuro exam otherwise unremarkable  - Optho consult     Pulmonary:  #Acute Hypoxic Respiratory Failure, improving  Suspected 2/2 PJP. OSH CT 1/23 + bilateral ground glass opacities and consolidative lung infiltrates centrally distributed to the upper lobes and RLL, . Covid-19 negative X3 at OSH.   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  - Positive PJP PCR on 1/28 with  I discussed results and plan with patient, who stated understanding.       Lonny Mckinney MD  [employer], [address] [address]   positive fungitel   - Continue PTA tacrolimus  - Prednisone taper, holding home dose for now  - Pulmonary lung transplant team consulted and following, appreciate recs     Cardiovascular:  #Shock, resolved  #Hx Hypertension  Patient was transferred on Levophed. Right heart catheterization 03/10/2020 concluded right sided filling pressures mildly elevated, mild PHTN, mild right sided filling pressures, and normal cardiac output. Last ECHO 10/21/20 with EF 60-65%, normal LV & RV function.   - Patient weaned off pressors 1/26 currently hemodynamically stable off pressors  - Hold PTA coreg and amlodipine     # Atrial fibrillation w/ RVR  Patient with pAF with rates into 130-40s. Likely in the setting of acute pulmoary illness  - Increase Metoprolol to 37.5 BID  - Consider diltiazem drip if unable to control with PO metoprolol     GI/Nutrition:  # Portal HTN  Liver biopsy positive for congestive hepatopathy. Previous had ascites thought to be r/t elevated right sided heart pressures. Last saw GI on 12/21/20 and patient endorsed that her ascites is no longer present.   - NTD     # Nutrition  - NJT placed after multiple attempts and was post-pyloric but subsequently retracted into the stomach; TF rate 20ml/hr  - IR consulted for post-pyloric placement given multiple failed bedside attempts    # Constipation  Patient reportedly has history of constipation with bactrim use, now stooling  - Scheduled miralax ordered  - PRN dulcolax and senna-doc ordered     Renal/Fluids/Electrolytes:  # ESRD on iHD twice weekly (M/Th).   # Anion gap metabolic acidosis r/t ESRD, uremia- Resolved  Outpatient dry body weight 56.5 kg. Ran dialysis 1/26, 1/28, 1/30  - Nephrology Consulted   - Continued dialysis per neph recs  - I&O  - Daily Weights   - Trend BMP     # Hypokalemia  - Dialysis as above     Endocrine:  # Seronegative RA with Raynaud's   - NTD, monitor   # Hyperglycemia, steroid induced  - High intensity sliding scale insulin  - 10  units glargine daily     ID:  # Sepsis   Suspected related to bacterial pneumonia. OSH CT 1/23 + bilateral ground glass opacities and consolidative lung infiltrates centrally distributed to the upper lobes and RLL. Covid-19 negative X3 at OSH. Procalcitonin negative. Urine strep, CMV, and RPP negative.  - PJP PCR positive, fungitell positive as well  - Continue treatment with bactrim, prednisone taper  - Bronch completed, KOH and Gram not revealing,  with neutrophil predominance, other labs pending  - s/p thora 1/25, fluid exudative, NGTD  - Will discuss discontinuation of empiric ceftriaxone with pulm team as patient has completed 7 day course.    # Thrush  -nystatin swish and spit     # Chronic/Stable right aspergillus empyema   - Continue PTA posaconazole, will monitor QTc     Hematology:    # Anemia r/t renal disease, chronic stable   # Thrombocytopenia r/t critical illness, resolved   # Leukocytosis, infection/steroids, improving  Takes aranesp 25 mcg every four weeks, last dose 01/14.    - Daily CBC     Musculoskeletal:  #Weakness/Deconditioning of critical illness  - PT/OT consulted     Skin:  # Dermatomyositis antitryptase syndrome   - Noted, NTD     General Cares/Prophylaxis:    DVT Prophylaxis: Heparin SQ  GI Prophylaxis: PPI  Restraints: Not indicated  Family Communication:  updated at bedside  Code Status: Full      Lines/tubes/drains:  - HD fistula left forearm  - CVC RIJ     Disposition:  - Medical ICU    Patient seen and findings/plan discussed with medical ICU staff, Dr. James.    Tomas Rolle     ====================================  INTERVAL HISTORY:   Overnight patient was noted to have fixed dilation of her left pupil (formerly occurred in her right). NAEO. Patient reports she feels comfortable this AM. Denies CP, SOB, palpitations, presyncopal symptoms, changes in bowel/bladder habits or abdominal pain.    OBJECTIVE:   1. VITAL SIGNS:   Temp:  [97.8  F (36.6  C)-98.9  F (37.2   C)] 98.6  F (37  C)  Pulse:  [] 109  Resp:  [24-36] 28  BP: ()/(66-93) 109/73  FiO2 (%):  [75 %-100 %] 75 %  SpO2:  [87 %-96 %] 91 %  FiO2 (%): 75 %  Resp: 28    2. INTAKE/ OUTPUT:   I/O last 3 completed shifts:  In: 1565 [I.V.:625; NG/GT:400]  Out: -     3. PHYSICAL EXAMINATION:  General: Laying in bed. No acute distress. On HFNC  HEENT: Following commands. Responding appropriately. No focal deficits.  Neuro: A&Ox3, non focal. Reflexes intact. Leftpupil dilated without constriction to light (right constricts when light shined in left)   Pulm/Resp: Clear/diminished bilaterally  CV: RRR  Abdomen: Soft, non-distended, non-tender  Incisions/Skin: Left AV fistula with thrill and bruit    4. LABS:   Arterial Blood Gases   Recent Labs   Lab 01/29/21  1547 01/27/21  0540 01/27/21  0316 01/27/21  0147   PH 7.46* 7.39 7.41 7.39   PCO2 41 41 38 40   PO2 56* 79* 59* 45*   HCO3 29* 25 24 24     Complete Blood Count   Recent Labs   Lab 02/01/21 0424 01/31/21 0441 01/30/21 0417 01/29/21  0416   WBC 10.5 11.8* 11.5* 9.7   HGB 10.5* 11.3* 10.7* 9.5*    276 198 176     Basic Metabolic Panel  Recent Labs   Lab 02/01/21  0424 01/31/21  0441 01/30/21  0417 01/29/21  2321 01/29/21  0416    133 134  --  137   POTASSIUM 3.3* 3.1* 3.5 3.3* 3.4   CHLORIDE 99 97 99  --  100   CO2 28 25 24  --  26   BUN 55* 37* 44*  --  28   CR 3.76* 3.02* 3.72*  --  2.76*   * 260* 152*  --  164*     Liver Function Tests  Recent Labs   Lab 01/29/21  0416 01/28/21  0412 01/27/21  0414 01/26/21  0425   AST 9 10 15 11   ALT 21 26 30 30   ALKPHOS 135 164* 171* 184*   BILITOTAL 0.5 0.6 0.8 0.8   ALBUMIN 1.9* 1.8* 2.2* 2.0*     Coagulation Profile  No lab results found in last 7 days.    5. RADIOLOGY:   No results found for this or any previous visit (from the past 24 hour(s)).

## 2023-07-06 DIAGNOSIS — K92.89 GAS BLOAT SYNDROME: ICD-10-CM

## 2023-07-06 RX ORDER — OMEPRAZOLE 20 MG/1
20 CAPSULE, DELAYED RELEASE ORAL DAILY
Qty: 90 CAPSULE | Refills: 0 | Status: SHIPPED | OUTPATIENT
Start: 2023-07-06 | End: 2023-09-13

## 2023-07-27 DIAGNOSIS — E78.49 OTHER HYPERLIPIDEMIA: ICD-10-CM

## 2023-07-27 RX ORDER — ATORVASTATIN CALCIUM 80 MG/1
80 TABLET, FILM COATED ORAL
Qty: 90 TABLET | Refills: 0 | Status: SHIPPED | OUTPATIENT
Start: 2023-07-27 | End: 2024-03-08 | Stop reason: SDUPTHER

## 2023-07-27 NOTE — TELEPHONE ENCOUNTER
Is the patient due for a refill? Yes- courtesy refill    Was the patient seen the past year? Yes    Date of last office visit: 10/13/2022    Does the patient have an upcoming appointment?  No   If yes, When?     Provider to refill:MR    Does the patients insurance require a 100 day supply?  No

## 2023-09-12 DIAGNOSIS — K92.89 GAS BLOAT SYNDROME: ICD-10-CM

## 2023-09-12 NOTE — TELEPHONE ENCOUNTER
Received request via: Patient    Was the patient seen in the last year in this department? Yes    Does the patient have an active prescription (recently filled or refills available) for medication(s) requested? No    Does the patient have [facility] and need 100 day supply (blood pressure, diabetes and cholesterol meds only)? Patient does not have SCP

## 2023-09-13 RX ORDER — TRAZODONE HYDROCHLORIDE 100 MG/1
100 TABLET ORAL NIGHTLY
Qty: 100 TABLET | Refills: 2 | Status: SHIPPED | OUTPATIENT
Start: 2023-09-13 | End: 2023-10-17 | Stop reason: SDUPTHER

## 2023-09-13 RX ORDER — OMEPRAZOLE 20 MG/1
20 CAPSULE, DELAYED RELEASE ORAL DAILY
Qty: 100 CAPSULE | Refills: 0 | Status: SHIPPED | OUTPATIENT
Start: 2023-09-13 | End: 2023-10-17 | Stop reason: SDUPTHER

## 2023-09-23 SDOH — HEALTH STABILITY: PHYSICAL HEALTH: ON AVERAGE, HOW MANY DAYS PER WEEK DO YOU ENGAGE IN MODERATE TO STRENUOUS EXERCISE (LIKE A BRISK WALK)?: 5 DAYS

## 2023-09-23 SDOH — ECONOMIC STABILITY: INCOME INSECURITY: IN THE LAST 12 MONTHS, WAS THERE A TIME WHEN YOU WERE NOT ABLE TO PAY THE MORTGAGE OR RENT ON TIME?: PATIENT REFUSED

## 2023-09-23 SDOH — ECONOMIC STABILITY: FOOD INSECURITY: WITHIN THE PAST 12 MONTHS, YOU WORRIED THAT YOUR FOOD WOULD RUN OUT BEFORE YOU GOT MONEY TO BUY MORE.: SOMETIMES TRUE

## 2023-09-23 SDOH — ECONOMIC STABILITY: INCOME INSECURITY: HOW HARD IS IT FOR YOU TO PAY FOR THE VERY BASICS LIKE FOOD, HOUSING, MEDICAL CARE, AND HEATING?: SOMEWHAT HARD

## 2023-09-23 SDOH — ECONOMIC STABILITY: FOOD INSECURITY: WITHIN THE PAST 12 MONTHS, THE FOOD YOU BOUGHT JUST DIDN'T LAST AND YOU DIDN'T HAVE MONEY TO GET MORE.: SOMETIMES TRUE

## 2023-09-23 SDOH — ECONOMIC STABILITY: TRANSPORTATION INSECURITY
IN THE PAST 12 MONTHS, HAS LACK OF TRANSPORTATION KEPT YOU FROM MEETINGS, WORK, OR FROM GETTING THINGS NEEDED FOR DAILY LIVING?: NO

## 2023-09-23 SDOH — ECONOMIC STABILITY: HOUSING INSECURITY
IN THE LAST 12 MONTHS, WAS THERE A TIME WHEN YOU DID NOT HAVE A STEADY PLACE TO SLEEP OR SLEPT IN A SHELTER (INCLUDING NOW)?: PATIENT REFUSED

## 2023-09-23 SDOH — HEALTH STABILITY: PHYSICAL HEALTH: ON AVERAGE, HOW MANY MINUTES DO YOU ENGAGE IN EXERCISE AT THIS LEVEL?: 20 MIN

## 2023-09-23 SDOH — ECONOMIC STABILITY: HOUSING INSECURITY

## 2023-09-23 ASSESSMENT — LIFESTYLE VARIABLES
SKIP TO QUESTIONS 9-10: 1
HOW OFTEN DO YOU HAVE A DRINK CONTAINING ALCOHOL: 2-4 TIMES A MONTH
HOW OFTEN DO YOU HAVE SIX OR MORE DRINKS ON ONE OCCASION: NEVER
AUDIT-C TOTAL SCORE: 2
HOW MANY STANDARD DRINKS CONTAINING ALCOHOL DO YOU HAVE ON A TYPICAL DAY: 1 OR 2

## 2023-09-23 ASSESSMENT — SOCIAL DETERMINANTS OF HEALTH (SDOH)
HOW OFTEN DO YOU HAVE A DRINK CONTAINING ALCOHOL: 2-4 TIMES A MONTH
DO YOU BELONG TO ANY CLUBS OR ORGANIZATIONS SUCH AS CHURCH GROUPS UNIONS, FRATERNAL OR ATHLETIC GROUPS, OR SCHOOL GROUPS?: NO
IN A TYPICAL WEEK, HOW MANY TIMES DO YOU TALK ON THE PHONE WITH FAMILY, FRIENDS, OR NEIGHBORS?: MORE THAN THREE TIMES A WEEK
HOW OFTEN DO YOU GET TOGETHER WITH FRIENDS OR RELATIVES?: MORE THAN THREE TIMES A WEEK
HOW HARD IS IT FOR YOU TO PAY FOR THE VERY BASICS LIKE FOOD, HOUSING, MEDICAL CARE, AND HEATING?: SOMEWHAT HARD
HOW OFTEN DO YOU GET TOGETHER WITH FRIENDS OR RELATIVES?: MORE THAN THREE TIMES A WEEK
HOW OFTEN DO YOU HAVE SIX OR MORE DRINKS ON ONE OCCASION: NEVER
HOW OFTEN DO YOU ATTEND CHURCH OR RELIGIOUS SERVICES?: 1 TO 4 TIMES PER YEAR
HOW MANY DRINKS CONTAINING ALCOHOL DO YOU HAVE ON A TYPICAL DAY WHEN YOU ARE DRINKING: 1 OR 2
HOW OFTEN DO YOU ATTENT MEETINGS OF THE CLUB OR ORGANIZATION YOU BELONG TO?: PATIENT DECLINED
HOW OFTEN DO YOU ATTENT MEETINGS OF THE CLUB OR ORGANIZATION YOU BELONG TO?: PATIENT DECLINED
IN A TYPICAL WEEK, HOW MANY TIMES DO YOU TALK ON THE PHONE WITH FAMILY, FRIENDS, OR NEIGHBORS?: MORE THAN THREE TIMES A WEEK
DO YOU BELONG TO ANY CLUBS OR ORGANIZATIONS SUCH AS CHURCH GROUPS UNIONS, FRATERNAL OR ATHLETIC GROUPS, OR SCHOOL GROUPS?: NO
HOW OFTEN DO YOU ATTEND CHURCH OR RELIGIOUS SERVICES?: 1 TO 4 TIMES PER YEAR
WITHIN THE PAST 12 MONTHS, YOU WORRIED THAT YOUR FOOD WOULD RUN OUT BEFORE YOU GOT THE MONEY TO BUY MORE: SOMETIMES TRUE

## 2023-09-26 ENCOUNTER — HOSPITAL ENCOUNTER (OUTPATIENT)
Facility: MEDICAL CENTER | Age: 68
End: 2023-09-26
Attending: FAMILY MEDICINE
Payer: MEDICARE

## 2023-09-26 ENCOUNTER — OFFICE VISIT (OUTPATIENT)
Dept: MEDICAL GROUP | Age: 68
End: 2023-09-26
Payer: MEDICARE

## 2023-09-26 VITALS
SYSTOLIC BLOOD PRESSURE: 119 MMHG | BODY MASS INDEX: 25.74 KG/M2 | WEIGHT: 164 LBS | HEIGHT: 67 IN | HEART RATE: 76 BPM | DIASTOLIC BLOOD PRESSURE: 72 MMHG

## 2023-09-26 DIAGNOSIS — E11.9 TYPE 2 DIABETES MELLITUS WITHOUT COMPLICATION, WITHOUT LONG-TERM CURRENT USE OF INSULIN (HCC): ICD-10-CM

## 2023-09-26 DIAGNOSIS — Z23 NEED FOR VACCINATION: ICD-10-CM

## 2023-09-26 DIAGNOSIS — E78.2 MIXED HYPERLIPIDEMIA: ICD-10-CM

## 2023-09-26 DIAGNOSIS — I48.91 ATRIAL FIBRILLATION STATUS POST CARDIOVERSION (HCC): ICD-10-CM

## 2023-09-26 DIAGNOSIS — F51.01 PRIMARY INSOMNIA: ICD-10-CM

## 2023-09-26 DIAGNOSIS — Z00.00 MEDICARE ANNUAL WELLNESS VISIT, INITIAL: ICD-10-CM

## 2023-09-26 DIAGNOSIS — I10 ESSENTIAL HYPERTENSION: ICD-10-CM

## 2023-09-26 DIAGNOSIS — K21.00 GASTROESOPHAGEAL REFLUX DISEASE WITH ESOPHAGITIS WITHOUT HEMORRHAGE: ICD-10-CM

## 2023-09-26 DIAGNOSIS — G62.9 NEUROPATHY: ICD-10-CM

## 2023-09-26 LAB
AMBIGUOUS DTTM AMBI4: NORMAL
HBA1C MFR BLD: 6 % (ref ?–5.8)
POCT INT CON NEG: NEGATIVE
POCT INT CON POS: POSITIVE
RETINAL SCREEN: NEGATIVE

## 2023-09-26 PROCEDURE — 92250 FUNDUS PHOTOGRAPHY W/I&R: CPT | Mod: TC | Performed by: FAMILY MEDICINE

## 2023-09-26 PROCEDURE — G0439 PPPS, SUBSEQ VISIT: HCPCS | Performed by: FAMILY MEDICINE

## 2023-09-26 PROCEDURE — 82043 UR ALBUMIN QUANTITATIVE: CPT

## 2023-09-26 PROCEDURE — 3078F DIAST BP <80 MM HG: CPT | Performed by: FAMILY MEDICINE

## 2023-09-26 PROCEDURE — 3074F SYST BP LT 130 MM HG: CPT | Performed by: FAMILY MEDICINE

## 2023-09-26 PROCEDURE — 82570 ASSAY OF URINE CREATININE: CPT

## 2023-09-26 PROCEDURE — 83036 HEMOGLOBIN GLYCOSYLATED A1C: CPT | Performed by: FAMILY MEDICINE

## 2023-09-26 ASSESSMENT — PATIENT HEALTH QUESTIONNAIRE - PHQ9
CLINICAL INTERPRETATION OF PHQ2 SCORE: 1
5. POOR APPETITE OR OVEREATING: 0 - NOT AT ALL
SUM OF ALL RESPONSES TO PHQ QUESTIONS 1-9: 2

## 2023-09-26 ASSESSMENT — FIBROSIS 4 INDEX: FIB4 SCORE: 0.97

## 2023-09-26 ASSESSMENT — ENCOUNTER SYMPTOMS: GENERAL WELL-BEING: GOOD

## 2023-09-26 ASSESSMENT — ACTIVITIES OF DAILY LIVING (ADL): BATHING_REQUIRES_ASSISTANCE: 0

## 2023-09-26 NOTE — PROGRESS NOTES
Chief Complaint   Patient presents with    Annual Exam    Pain     Left hib/ butt pain        HPI:  Judy Blackburn is a 68 y.o. here for Medicare Annual Wellness Visit     Patient Active Problem List    Diagnosis Date Noted    Chest pain 05/23/2023    C. difficile enteritis-recurrent 05/04/2023    Breast pain 02/15/2023    Secondary hypercoagulability disorder (HCC) 09/29/2022    Type 2 diabetes mellitus without complication, without long-term current use of insulin (HCC) 09/29/2022    Agatston CAC score, <100 09/29/2022    Superior mesenteric artery stenosis (HCC) 09/29/2022    Celiac artery stenosis (HCC) 09/29/2022    Neuropathy 05/31/2022    Varicose veins of left lower extremity 05/31/2022    Chronic anticoagulation 05/23/2022    GERD (gastroesophageal reflux disease) 04/26/2022    Anxiety 04/26/2022    Atrial fibrillation status post cardioversion ([facility]) 04/26/2022    Dermatitis 02/09/2022    H/O bilateral breast reduction surgery 02/09/2022    Age-related cataract 02/04/2021    Primary insomnia 02/04/2021    Sun-damaged skin 02/04/2021    Essential hypertension 01/18/2021    Menopausal and postmenopausal disorder 01/18/2021    Pneumonia due to COVID-19 virus 12/29/2020    Mixed hyperlipidemia     Posterior vitreous detachment of both eyes 03/10/2017    Pseudophakia of right eye 03/10/2017    Prediabetes 02/21/2017    History of blepharoplasty 09/25/2014    Nuclear sclerosis 09/25/2014       Current Outpatient Medications   Medication Sig Dispense Refill    omeprazole (PRILOSEC) 20 MG delayed-release capsule TAKE 1 CAPSULE BY MOUTH EVERY  Capsule 0    traZODone (DESYREL) 100 MG Tab Take 1 Tablet by mouth every evening. 100 Tablet 2    atorvastatin (LIPITOR) 80 MG tablet TAKE 1 TABLET BY MOUTH EVERYDAY AT BEDTIME 90 Tablet 0    ALPRAZolam (XANAX) 0.5 MG Tab Take 0.5 mg by mouth 1 time a day as needed for Anxiety.      metFORMIN ER (GLUCOPHAGE XR) 500 MG TABLET SR 24 HR Take 1 Tablet by mouth every  day. 90 Tablet 3    losartan (COZAAR) 100 MG Tab Take 1 Tablet by mouth every day. 90 Tablet 3    apixaban (ELIQUIS) 5mg Tab Take 1 Tablet by mouth 2 times a day. 180 Tablet 3    propranolol LA (INDERAL LA) 60 MG CAPSULE SR 24 HR Take 1 Capsule by mouth every day. 90 Capsule 3    Potassium 99 MG Tab Take 99 mg by mouth every day.      MAGNESIUM PO Take 1 Tablet by mouth at bedtime as needed.       No current facility-administered medications for this visit.          Current supplements as per medication list.     Allergies: Codeine and Tape    Current social contact/activities: activities with family and friends like swimming and exercise     She  reports that she has never smoked. She has never used smokeless tobacco. She reports that she does not currently use alcohol. She reports that she does not use drugs.  Counseling given: Not Answered      ROS:    Gait: Uses no assistive device  Ostomy: No  Other tubes: No  Amputations: No  Chronic oxygen use: No  Last eye exam: 2022  Wears hearing aids: No   : Reports urinary leakage during the last 6 months that has not interfered at all with their daily activities or sleep.    Screening:    Depression Screening  Little interest or pleasure in doing things?  0 - not at all  Feeling down, depressed , or hopeless? 1 - several days  Trouble falling or staying asleep, or sleeping too much?  0 - not at all  Feeling tired or having little energy?  1 - several days  Poor appetite or overeating?  0 - not at all  Feeling bad about yourself - or that you are a failure or have let yourself or your family down? 0 - not at all  Trouble concentrating on things, such as reading the newspaper or watching television? 0 - not at all  Moving or speaking so slowly that other people could have noticed.  Or the opposite - being so fidgety or restless that you have been moving around a lot more than usual?  0 - not at all  Thoughts that you would be better off dead, or of hurting yourself?  0  - not at all  Patient Health Questionnaire Score: 2    If depressive symptoms identified deferred to follow up visit unless specifically addressed in assessment and plan.    Interpretation of PHQ-9 Total Score   Score Severity   1-4 No Depression   5-9 Mild Depression   10-14 Moderate Depression   15-19 Moderately Severe Depression   20-27 Severe Depression    Screening for Cognitive Impairment  Do you or any of your friends or family members have any concern about your memory? No  Three Minute Recall (Banana, Sunrise, Chair) 3/3    Vic clock face with all 12 numbers and set the hands to show 20 past 8.  Yes    Cognitive concerns identified deferred for follow up unless specifically addressed in assessment and plan.    Fall Risk Assessment  Has the patient had two or more falls in the last year or any fall with injury in the last year?  No    Safety Assessment  Do you always wear your seatbelt?  Yes  Any changes to home needed to function safely? No  Difficulty hearing.  No  Patient counseled about all safety risks that were identified.    Functional Assessment ADLs  Are there any barriers preventing you from cooking for yourself or meeting nutritional needs?  No.    Are there any barriers preventing you from driving safely or obtaining transportation?  No.    Are there any barriers preventing you from using a telephone or calling for help?  No    Are there any barriers preventing you from shopping?  No.    Are there any barriers preventing you from taking care of your own finances?  No    Are there any barriers preventing you from managing your medications?  No    Are there any barriers preventing you from showering, bathing or dressing yourself? No    Are there any barriers preventing you from doing housework or laundry? No  Are there any barriers preventing you from using the toilet?No  Are you currently engaging in any exercise or physical activity?  Yes.      Self-Assessment of Health  What is your perception  of your health? Good  Do you sleep more than six hours a night? Yes  In the past 7 days, how much did pain keep you from doing your normal work? None  Do you spend quality time with family or friends (virtually or in person)? Yes  Do you usually eat a heart healthy diet that constists of a variety of fruits, vegetables, whole grains and fiber? Yes  Do you eat foods high in fat and/or Fast Food more than three times per week? No    Advance Care Planning  Do you have an Advance Directive, Living Will, Durable Power of , or POLST? No                 Health Maintenance Summary            Overdue - COVID-19 Vaccine (3 - Moderna series) Overdue since 6/5/2021      04/10/2021  Imm Admin: MODERNA SARS-COV-2 VACCINE (12+)    03/12/2021  Imm Admin: MODERNA SARS-COV-2 VACCINE (12+)              Ordered - Diabetes: Retinopathy Screening (Yearly) Ordered on 9/26/2023 04/13/2022  REFERRAL FOR RETINAL SCREENING EXAM    05/03/2021  REFERRAL FOR RETINAL SCREENING EXAM    04/02/2021  REFERRAL FOR RETINAL SCREENING EXAM    08/07/2020      04/15/2019      Only the first 5 history entries have been loaded, but more history exists.              Ordered - Diabetes: Urine Protein Screening (Yearly) Ordered on 9/26/2023 07/18/2022  MICROALBUMIN CREAT RATIO URINE              Overdue - Influenza Vaccine (1) Overdue since 9/1/2023 12/14/2022  Imm Admin: Influenza, Unspecified - HISTORICAL DATA    02/04/2021  Imm Admin: Influenza Vaccine Adult HD    12/14/2015  Imm Admin: Influenza Vaccine Quad Inj (Preserved)    11/29/2012  Imm Admin: Influenza Vaccine Quad Inj (Pf)    11/29/2012  Imm Admin: Influenza Vaccine Pediatric Split - Historical Data    Only the first 5 history entries have been loaded, but more history exists.              Diabetes: Monofilament / LE Exam (Yearly) Next due on 2/15/2024      02/15/2023  Done              A1c Screening (Every 6 Months) Tentatively due on 3/26/2024      09/26/2023  POCT  Hemoglobin A1C    02/14/2023  HEMOGLOBIN A1C    06/02/2022  HEMOGLOBIN A1C    02/09/2022  HEMOGLOBIN A1C    04/27/2020  HEMOGLOBIN A1C    Only the first 5 history entries have been loaded, but more history exists.              Fasting Lipid Profile (Yearly) Tentatively due on 5/24/2024 05/24/2023  Lipid Profile    07/15/2022  Lipid Profile    06/02/2022  Lipid Profile    02/09/2022  Lipid Profile    06/22/2021  Lipid Profile    Only the first 5 history entries have been loaded, but more history exists.              SERUM CREATININE (Yearly) Next due on 6/5/2024 06/05/2023  Comp Metabolic Panel    05/24/2023  Comp Metabolic Panel (CMP)    05/23/2023  Complete Metabolic Panel (CMP)    05/06/2023  Complete Metabolic Panel    07/15/2022  Comp Metabolic Panel    Only the first 5 history entries have been loaded, but more history exists.              Annual Wellness Visit (Every 366 Days) Next due on 9/26/2024 09/26/2023  Visit Dx: Medicare annual wellness visit, initial    09/26/2023  Level of Service: INITIAL ANNUAL WELLNESS VISIT-INCLUDES PPPS    02/09/2022  Visit Dx: Medicare annual wellness visit, initial              Mammogram (Every 2 Years) Next due on 3/20/2025      03/20/2023  MA-DIAGNOSTIC MAMMO BILAT W/TOMOSYNTHESIS W/CAD    06/28/2022  MA-SCREENING MAMMO BILAT W/TOMOSYNTHESIS W/CAD    06/04/2021  MA-SCREENING MAMMO BILAT W/TOMOSYNTHESIS W/CAD    05/29/2020  MA-SCREENING MAMMO BILAT W/TOMOSYNTHESIS W/CAD    05/28/2019  MA-SCREENING MAMMO BILAT W/TOMOSYNTHESIS W/CAD    Only the first 5 history entries have been loaded, but more history exists.              Bone Density Scan (Every 5 Years) Tentatively due on 6/4/2026 06/04/2021  DS-BONE DENSITY STUDY (DEXA)    02/13/2018  DS-BONE DENSITY STUDY (DEXA)              Colorectal Cancer Screening (Colonoscopy - Every 7 Years) Next due on 3/11/2028      03/11/2021  REFERRAL TO GI FOR COLONOSCOPY              IMM DTaP/Tdap/Td Vaccine (3 - Td or  Tdap) Next due on 8/19/2031 08/19/2021  Imm Admin: Tdap Vaccine    04/11/2011  Imm Admin: Tdap Vaccine              Hepatitis A Vaccine (Hep A) (Series Information) Aged Out      03/14/2019  Imm Admin: Hepatitis A Vaccine, Adult              Pneumococcal Vaccine: 65+ Years (Series Information) Completed      03/14/2019  Imm Admin: Pneumococcal polysaccharide vaccine (PPSV-23)    02/21/2017  Imm Admin: Pneumococcal Conjugate Vaccine (Prevnar/PCV-13)              Hepatitis C Screening  Tentatively Complete      06/22/2021  Hepatitis C Antibody component of HEP C VIRUS ANTIBODY              Zoster (Shingles) Vaccines (Series Information) Completed      08/19/2021  Imm Admin: Zoster Vaccine Recombinant (RZV) (SHINGRIX)    02/04/2021  Imm Admin: Zoster Vaccine Recombinant (RZV) (SHINGRIX)    12/14/2015  Imm Admin: Zoster Vaccine Live (ZVL) (Zostavax) - HISTORICAL DATA              Hepatitis B Vaccine (Hep B) (Series Information) Aged Out      No completion history exists for this topic.              HPV Vaccines (Series Information) Aged Out      No completion history exists for this topic.              Polio Vaccine (Inactivated Polio) (Series Information) Aged Out      No completion history exists for this topic.              Meningococcal Immunization (Series Information) Aged Out      No completion history exists for this topic.              Discontinued - Cervical Cancer Screening  Discontinued        Frequency changed to Never automatically (Topic No Longer Applies)    09/03/2021  Pathology Gynecology Specimen    09/03/2021  THINPREP PAP WITH HPV                    Patient Care Team:  Lonny Mckinney M.D. as PCP - General (Family Medicine)      Social History     Tobacco Use    Smoking status: Never    Smokeless tobacco: Never   Vaping Use    Vaping Use: Never used   Substance Use Topics    Alcohol use: Not Currently    Drug use: Never     Family History   Problem Relation Age of Onset    Arrythmia Sister   "    She  has a past medical history of Anxiety, Atrial fibrillation ([facility]), Diabetes ([facility]), H/O Clostridium difficile infection, Hyperlipidemia, and Hypertension.   Past Surgical History:   Procedure Laterality Date    FAHAD N/A 4/26/2022    Procedure: ECHOCARDIOGRAM, TRANSESOPHAGEAL;  Surgeon: Reymundo Garcia M.D.;  Location: [facility];  Service: Cardiac    CARDIOVERSION N/A 4/26/2022    Procedure: CARDIOVERSION;  Surgeon: Reymundo Garcia M.D.;  Location: SURGERY [facility];  Service: Cardiac    LUMPECTOMY         Exam:   Ht 1.702 m (5' 7\")   Wt 74.4 kg (164 lb)  Body mass index is 25.69 kg/m².    Hearing good.    Dentition good  Alert, oriented in no acute distress.  Eye contact is good, speech goal directed, affect calm    Assessment and Plan. The following treatment and monitoring plan is recommended:    1. Medicare annual wellness visit, initial    2. Type 2 diabetes mellitus without complication, without long-term current use of insulin ([facility])  - POCT Hemoglobin A1C  - POCT Retinal Eye Exam  - Microalbumin Creat Ratio Urine (Clinic Collect); Future    3. Atrial fibrillation status post cardioversion ([facility])    4. Essential hypertension    5. Gastroesophageal reflux disease with esophagitis without hemorrhage    6. Mixed hyperlipidemia    7. Neuropathy    8. Primary insomnia    9. Need for vaccination    Plan    Completed    2. Patient has been stable with current management  We will make no changes for now    3. Patient has been stable with current management  We will make no changes for now    4. Patient has been stable with current management  We will make no changes for now    5. Patient has been stable with current management  We will make no changes for now    6. Patient has been stable with current management  We will make no changes for now    7. Patient has been stable with current management  We will make no changes for now    8. Patient has been stable with current " management  We will make no changes for now    9. Vaccine was administered today without adverse event.    Services suggested: No services needed at this time  Health Care Screening: Age-appropriate preventive services recommended by USPTF and ACIP covered by Medicare were discussed today. Services ordered if indicated and agreed upon by the patient.  Referrals offered: Community-based lifestyle interventions to reduce health risks and promote self-management and wellness, fall prevention, nutrition, physical activity, tobacco-use cessation, weight loss, and mental health services as per orders if indicated.    Discussion today about general wellness and lifestyle habits:    Prevent falls and reduce trip hazards; Cautioned about securing or removing rugs.  Have a working fire alarm and carbon monoxide detector;   Engage in regular physical activity and social activities     Follow-up: Return in about 6 months (around 3/26/2024) for Reevaluation, labs.

## 2023-09-27 LAB
CREAT UR-MCNC: 15.79 MG/DL
MICROALBUMIN UR-MCNC: <1.2 MG/DL
MICROALBUMIN/CREAT UR: NORMAL MG/G (ref 0–30)

## 2023-10-12 ENCOUNTER — OFFICE VISIT (OUTPATIENT)
Dept: VASCULAR LAB | Facility: MEDICAL CENTER | Age: 68
End: 2023-10-12
Attending: FAMILY MEDICINE
Payer: MEDICARE

## 2023-10-12 VITALS
WEIGHT: 165.2 LBS | DIASTOLIC BLOOD PRESSURE: 83 MMHG | BODY MASS INDEX: 25.93 KG/M2 | HEIGHT: 67 IN | SYSTOLIC BLOOD PRESSURE: 131 MMHG | HEART RATE: 74 BPM

## 2023-10-12 DIAGNOSIS — E11.9 TYPE 2 DIABETES MELLITUS WITHOUT COMPLICATION, WITHOUT LONG-TERM CURRENT USE OF INSULIN (HCC): ICD-10-CM

## 2023-10-12 DIAGNOSIS — I48.91 ATRIAL FIBRILLATION STATUS POST CARDIOVERSION (HCC): ICD-10-CM

## 2023-10-12 DIAGNOSIS — I77.1 CELIAC ARTERY STENOSIS (HCC): ICD-10-CM

## 2023-10-12 DIAGNOSIS — E78.2 MIXED HYPERLIPIDEMIA: ICD-10-CM

## 2023-10-12 DIAGNOSIS — R93.1 AGATSTON CAC SCORE, <100: ICD-10-CM

## 2023-10-12 DIAGNOSIS — D68.69 SECONDARY HYPERCOAGULABILITY DISORDER (HCC): ICD-10-CM

## 2023-10-12 DIAGNOSIS — Z79.01 CHRONIC ANTICOAGULATION: ICD-10-CM

## 2023-10-12 PROBLEM — K64.9 HEMORRHOIDS: Status: ACTIVE | Noted: 2023-10-12

## 2023-10-12 PROBLEM — K63.5 POLYP OF COLON: Status: ACTIVE | Noted: 2021-03-11

## 2023-10-12 PROBLEM — D12.3 BENIGN NEOPLASM OF TRANSVERSE COLON: Status: ACTIVE | Noted: 2021-03-11

## 2023-10-12 PROBLEM — K57.30 DIVERTICULOSIS OF COLON: Status: ACTIVE | Noted: 2021-03-11

## 2023-10-12 PROCEDURE — 99214 OFFICE O/P EST MOD 30 MIN: CPT | Performed by: FAMILY MEDICINE

## 2023-10-12 PROCEDURE — 99212 OFFICE O/P EST SF 10 MIN: CPT

## 2023-10-12 PROCEDURE — 3079F DIAST BP 80-89 MM HG: CPT | Performed by: FAMILY MEDICINE

## 2023-10-12 PROCEDURE — 3075F SYST BP GE 130 - 139MM HG: CPT | Performed by: FAMILY MEDICINE

## 2023-10-12 ASSESSMENT — ENCOUNTER SYMPTOMS
WHEEZING: 0
ORTHOPNEA: 0
PALPITATIONS: 0
BRUISES/BLEEDS EASILY: 0
COUGH: 0
SHORTNESS OF BREATH: 0
WEAKNESS: 0
SPUTUM PRODUCTION: 0
BLOOD IN STOOL: 0
FEVER: 0
HEMOPTYSIS: 0
MYALGIAS: 0
CLAUDICATION: 0
NAUSEA: 0
PND: 0
CHILLS: 0

## 2023-10-12 ASSESSMENT — FIBROSIS 4 INDEX: FIB4 SCORE: 0.97

## 2023-10-12 NOTE — PROGRESS NOTES
FOLLOW-UP VASCULAR MEDICINE VISIT  10/12/23     Judy Blackburn is a  female  who presents for f/u  initially referred by No ref. provider found for eval and med mgmt of mesenteric aa stenoses   Est 2022     Subjective         Interval hx/concerns: last seen 2022, feeling well w/o interval changes.  No postprandial abd pain, no distal LE cyanosis or other issues.   Had negative cardiac w/u in 2023    Mesenteric aa stenoses  Initial visit hx: No prior postprandial abd pain or recurrent bloody diarrhea.   No prior episodes of mes aa ischemia  Noted incidental on CT scan     HTN:  stable, tolerating meds.   Home BP lo-120s/70s     Hyperlipidemia:  Stable,   Current treatment: lifestyle changes  and High intensity atorva     Antiplatelet/anticoagulation:  eliquis      Afib:  Stable, seeing cardiology  Pmhx:   Admitted [facility] 22 with palpittaions noted to have afib and had FAHAD w/o SERVANDO thrombus and s/p cardioversion.    Started on OAC.            Current Outpatient Medications:     omeprazole, 20 mg, Oral, DAILY, Taking    traZODone, 100 mg, Oral, Nightly, Taking    atorvastatin, 80 mg, Oral, QHS, Taking    ALPRAZolam, 0.5 mg, Oral, QDAY PRN, PRN    metFORMIN ER, 500 mg, Oral, DAILY, Taking    losartan, 100 mg, Oral, DAILY, Taking    apixaban, 5 mg, Oral, BID, Taking    propranolol LA, 60 mg, Oral, QDAY, Taking    Potassium, 99 mg, Oral, DAILY, Taking    MAGNESIUM PO, 1 Tablet, Oral, HS PRN, Taking      Social History     Tobacco Use    Smoking status: Never    Smokeless tobacco: Never   Vaping Use    Vaping Use: Never used   Substance Use Topics    Alcohol use: Not Currently    Drug use: Never     Review of Systems   Constitutional:  Negative for chills, fever and malaise/fatigue.   HENT:  Negative for nosebleeds.    Respiratory:  Negative for cough, hemoptysis, sputum production, shortness of breath and wheezing.    Cardiovascular:  Negative for chest pain, palpitations, orthopnea, claudication, leg  "swelling and PND.   Gastrointestinal:  Negative for blood in stool and nausea.   Musculoskeletal:  Negative for myalgias.   Neurological:  Negative for weakness.   Endo/Heme/Allergies:  Does not bruise/bleed easily.           Objective   Vitals:    10/12/23 0836 10/12/23 0840   BP: (!) 154/94 131/83   BP Location: Left arm Left arm   Patient Position: Sitting Sitting   BP Cuff Size: Adult Adult   Pulse: 77 74   Weight: 74.9 kg (165 lb 3.2 oz)    Height: 1.702 m (5' 7\")         BP Readings from Last 5 Encounters:   10/12/23 131/83   09/26/23 119/72   06/05/23 116/64   05/24/23 129/70   05/16/23 (!) 140/74      Body mass index is 25.87 kg/m².  Physical Exam  Vitals reviewed.   Constitutional:       General: She is not in acute distress.     Appearance: Normal appearance.   HENT:      Head: Normocephalic and atraumatic.   Neck:      Thyroid: No thyroid mass.      Vascular: Normal carotid pulses.      Trachea: Trachea normal.   Cardiovascular:      Rate and Rhythm: Normal rate and regular rhythm.      Chest Wall: PMI is not displaced.      Pulses: Normal pulses.           Carotid pulses are 2+ on the right side and 2+ on the left side.       Radial pulses are 2+ on the right side and 2+ on the left side.        Dorsalis pedis pulses are 2+ on the right side and 2+ on the left side.        Posterior tibial pulses are 2+ on the right side and 2+ on the left side.      Heart sounds: Normal heart sounds.   Pulmonary:      Effort: Pulmonary effort is normal.      Breath sounds: Normal breath sounds.   Abdominal:      General: Abdomen is flat. Bowel sounds are normal.      Palpations: Abdomen is soft.      Tenderness: There is no abdominal tenderness.   Musculoskeletal:      Cervical back: Full passive range of motion without pain.      Right lower leg: No edema.      Left lower leg: No edema.   Skin:     General: Skin is warm and dry.      Capillary Refill: Capillary refill takes less than 2 seconds.      Coloration: Skin is " not cyanotic.      Nails: There is no clubbing.   Neurological:      General: No focal deficit present.      Mental Status: She is alert and oriented to person, place, and time. Mental status is at baseline.      Cranial Nerves: No cranial nerve deficit.      Coordination: Coordination is intact. Coordination normal.      Gait: Gait is intact. Gait normal.   Psychiatric:         Mood and Affect: Mood normal.         Behavior: Behavior normal.     Lab Results   Component Value Date    CHOLSTRLTOT 126 05/24/2023    LDL 44 05/24/2023    HDL 40 05/24/2023    TRIGLYCERIDE 211 (H) 05/24/2023      Lab Results   Component Value Date/Time    LIPOPROTA <6 07/15/2022 03:15 PM      Lab Results   Component Value Date/Time    APOB 72 07/15/2022 03:15 PM            Lab Results   Component Value Date    HBA1C 6.0 (A) 09/26/2023      Lab Results   Component Value Date    SODIUM 145 06/05/2023    POTASSIUM 4.3 06/05/2023    CHLORIDE 108 06/05/2023    CO2 26 06/05/2023    GLUCOSE 93 06/05/2023    BUN 11 06/05/2023    CREATININE 0.61 06/05/2023    IFAFRICA >60 02/09/2022    IFNOTAFR >60 02/09/2022        Lab Results   Component Value Date    WBC 7.8 06/05/2023    RBC 4.97 06/05/2023    HEMOGLOBIN 15.5 06/05/2023    HEMATOCRIT 46.3 06/05/2023    MCV 93.2 06/05/2023    MCH 31.2 06/05/2023    MCHC 33.5 06/05/2023    MPV 10.9 06/05/2023     Lab Results   Component Value Date    HBA1C 6.0 (A) 09/26/2023      Lab Results   Component Value Date/Time    MALBCRT see below 09/26/2023 12:30 AM    MICROALBUR <1.2 09/26/2023 12:30 AM        VASCULAR IMAGING:     Carotid 1/2021   No prior study is available for comparison.   Mild soft plaque bilaterally. No significant stenosis    ARTEMIO duplex 1/2021    Velocities of the celiac and superior mesenteric arteries are consistent with   a   >= 75% stenosis.   No evidence of renal artery stenosis.    CAC 2/2022   Coronary calcification:  LMA - 0.0  LCX - 0.0  LAD - 5.9  RCA - 5.7  PDA - 0.0   Total Calcium  Score: 11.6   Percentile: Calcium score is above the 25th percentile for the patient's age and sex.   Other findings:  Heart: Normal size. Calcifications aortic valve  Lungs: Clear.  Mediastinum: Normal.  Upper abdomen: Normal.    FAHAD 4/2022   No thrombus detected in the left atrial appendage or left atrial Structures.    TTE 4/2022   Normal left ventricular size, wall thickness, systolic, and diastolic   function.  Normal right ventricular size and systolic function.  Mild tricuspid regurgitation.  No pericardial effusion.   No prior study is available for comparison.     CTA abd/pelvis 7/2022   1.  Moderate focal stenosis of the origin of celiac trunk.  2.  Superior and inferior mesenteric arteries are normal in caliber and patent.  3.  Duplicated bilateral renal arteries, patent.  4.  Colonic diverticulosis without evidence for diverticulitis.    NM stress test 5/2023  NUCLEAR IMAGING INTERPRETATION   Normal myocardial perfusion with no ischemia.   Normal left ventricular wall motion.  LV ejection fraction = 73%.   ECG INTERPRETATION   Negative stress ECG for ischemia.    Echo 5/2023  Prior study on 4/26/22, compared to the report of the prior study,   there has been no significant change.   Normal left ventricular systolic function.  The left ventricular ejection fraction is visually estimated to be 70%.  Mild tricuspid regurgitation.  Estimated right ventricular systolic pressure is 35 mmHg.         Medical Decision Making:  Today's Assessment / Status / Plan:     1. Celiac artery stenosis (HCC)        2. Agatston CAC score, <100        3. Atrial fibrillation status post cardioversion (HCC)        4. Chronic anticoagulation        5. Secondary hypercoagulability disorder (HCC)        6. Mixed hyperlipidemia        7. Type 2 diabetes mellitus without complication, without long-term current use of insulin (HCC)            Patient Type: Primary Prevention    Etiology of Established CVD if Present:     1)   moderate, chronic celiac a stenoses w/o symptoms or hx of ischemia - stable, no sx  - resolved SMA stenosis CTA 7/2022   previously reviewed anatomy, presumed has sufficient collateral circulation.  Assured there is no aortic stenosis, renal aa stenosis  - review s/s to watch for with mesenteric ischemia - gave handouts  - continue med mgmt and antiplat vs OAC   - consider Q2-3yr (next 2024-25) Ao/iliac duplex for surveillance, sooner if post-prandial pain or acute abd pain, consider repeat CTA in 5yrs     2) Afib, s/p cardioversion 4/2022 - stable   - continue rate-control and OAC per cardiology     3) subclinical CAD, CAC = 11 (2022) - no sx   - no further w/u, continue med mgmt     BLOOD PRESSURE MANAGEMENT:  ACC/AHA (2017) goal <130/80  Home BP at goal:  yes  Office BP at goal:  yes  24h ABPM: not ordered to date  Plan:   Monitoring:   - start/continue home BP monitoring, reviewed correct technique, provide BP log and instructions  - order 24h ABPM:  NO  - monitor lytes/gfr routinely   - contact office if BP consistently >140/>90 for discussion of tx adjustments   Medications:  - continue losartan 100mg daily   - continue propranolol 80mg ER - defer BB decisions for rate-control to cardiology      LIPID MANAGEMENT:   Qualifies for Statin Therapy Based on 2018 ACC/AHA Guidelines: yes, Primary Prevention - 40-74yo, LDLc >70, <190 w/o DM  The ASCVD Risk score (Porsha REMY, et al., 2019) failed to calculate.  Major ASCVD events: None  High-risk condition: N/A  Risk-enhancers: Persistently elevated trigs >174  Currently on Statin: Yes  Tx goals: LDL-C <100 (consider non-HDL-C <130, apoB <90)  At goal? Yes, 5/2023   Plan:   - reinforced ongoing TLC measures as noted   - monitor labs   Meds:   - continue atorva 40mg     ANTITHROMBOTIC/ANTIPLATELET THERAPY: continue eliquis 5mg BID, LOT deferred to cardiology    GLYCEMIC STATUS: prior Diabetic, now preDM levels   Goal A1c < 7.0  Lab Results   Component Value Date    HBA1C  6.0 (A) 09/26/2023     Plan:  - continue current medication plan   - recommmend for routine care with PCP (or endocrine) to include regular A1c monitoring, annual albumin/creatinine ratio (ACR), annual diabetic retinopathy screening, foot exams, annual flu vaccine, and updates to pneumonia vaccines as appropriate     LIFESTYLE INTERVENTIONS:    SMOKING:   reports that she has never smoked. She has never used smokeless tobacco.   - continued complete avoidance of all tobacco products     PHYSICAL ACTIVITY: continue healthy activity to improve CV fitness.  In general, targeting >150min/week of moderate-level activity.     WEIGHT MANAGEMENT AND NUTRITION:  Mediterranean style dietary approach     OTHER:     # noct cramping, intermittent   - continue patricia's, use mag 500mg daily and theraworx foam prn     Instructed to follow-up with PCP for remainder of adult medical needs: yes  We will partner with other providers in the management of established vascular disease and cardiometabolic risk factors.    Studies to Be Obtained: repeat Ao/iliac 10/2024 - RN to track (timeline adjusted)   Labs to Be Obtained: as noted above     Follow up in: 1 yr, sooner prlazaro Coker M.D.  [facility]   [facility]   [phone]

## 2023-10-17 DIAGNOSIS — K92.89 GAS BLOAT SYNDROME: ICD-10-CM

## 2023-10-17 DIAGNOSIS — I48.91 ATRIAL FIBRILLATION STATUS POST CARDIOVERSION (HCC): ICD-10-CM

## 2023-10-17 RX ORDER — TRAZODONE HYDROCHLORIDE 100 MG/1
100 TABLET ORAL NIGHTLY
Qty: 100 TABLET | Refills: 2 | Status: SHIPPED | OUTPATIENT
Start: 2023-10-17

## 2023-10-17 RX ORDER — OMEPRAZOLE 20 MG/1
20 CAPSULE, DELAYED RELEASE ORAL DAILY
Qty: 100 CAPSULE | Refills: 2 | Status: SHIPPED | OUTPATIENT
Start: 2023-10-17 | End: 2024-03-08 | Stop reason: SDUPTHER

## 2023-11-16 DIAGNOSIS — I48.91 ATRIAL FIBRILLATION STATUS POST CARDIOVERSION (HCC): ICD-10-CM

## 2023-11-28 ENCOUNTER — HOSPITAL ENCOUNTER (OUTPATIENT)
Facility: MEDICAL CENTER | Age: 68
End: 2023-11-28
Attending: FAMILY MEDICINE
Payer: MEDICARE

## 2023-11-28 ENCOUNTER — OFFICE VISIT (OUTPATIENT)
Dept: URGENT CARE | Facility: CLINIC | Age: 68
End: 2023-11-28
Payer: MEDICARE

## 2023-11-28 VITALS
HEIGHT: 67 IN | WEIGHT: 159 LBS | RESPIRATION RATE: 18 BRPM | SYSTOLIC BLOOD PRESSURE: 122 MMHG | OXYGEN SATURATION: 94 % | DIASTOLIC BLOOD PRESSURE: 60 MMHG | BODY MASS INDEX: 24.96 KG/M2 | TEMPERATURE: 98 F | HEART RATE: 68 BPM

## 2023-11-28 DIAGNOSIS — R39.9 UTI SYMPTOMS: ICD-10-CM

## 2023-11-28 DIAGNOSIS — N30.01 ACUTE CYSTITIS WITH HEMATURIA: ICD-10-CM

## 2023-11-28 LAB
APPEARANCE UR: NORMAL
BILIRUB UR STRIP-MCNC: NORMAL MG/DL
COLOR UR AUTO: NORMAL
GLUCOSE UR STRIP.AUTO-MCNC: NORMAL MG/DL
KETONES UR STRIP.AUTO-MCNC: NORMAL MG/DL
LEUKOCYTE ESTERASE UR QL STRIP.AUTO: NORMAL
NITRITE UR QL STRIP.AUTO: NORMAL
PH UR STRIP.AUTO: 5.5 [PH] (ref 5–8)
PROT UR QL STRIP: NORMAL MG/DL
RBC UR QL AUTO: NORMAL
SP GR UR STRIP.AUTO: 1.02
UROBILINOGEN UR STRIP-MCNC: 1 MG/DL

## 2023-11-28 PROCEDURE — 99213 OFFICE O/P EST LOW 20 MIN: CPT | Performed by: FAMILY MEDICINE

## 2023-11-28 PROCEDURE — 81002 URINALYSIS NONAUTO W/O SCOPE: CPT | Performed by: FAMILY MEDICINE

## 2023-11-28 PROCEDURE — 87086 URINE CULTURE/COLONY COUNT: CPT

## 2023-11-28 PROCEDURE — 3074F SYST BP LT 130 MM HG: CPT | Performed by: FAMILY MEDICINE

## 2023-11-28 PROCEDURE — 3078F DIAST BP <80 MM HG: CPT | Performed by: FAMILY MEDICINE

## 2023-11-28 RX ORDER — AMOXICILLIN 875 MG/1
875 TABLET, COATED ORAL 2 TIMES DAILY
COMMUNITY

## 2023-11-28 RX ORDER — BRINZOLAMIDE 10 MG/ML
1 SUSPENSION/ DROPS OPHTHALMIC 3 TIMES DAILY
COMMUNITY

## 2023-11-28 RX ORDER — SULFAMETHOXAZOLE AND TRIMETHOPRIM 800; 160 MG/1; MG/1
1 TABLET ORAL EVERY 12 HOURS
Qty: 6 TABLET | Refills: 0 | Status: SHIPPED | OUTPATIENT
Start: 2023-11-28 | End: 2023-12-01

## 2023-11-28 ASSESSMENT — FIBROSIS 4 INDEX: FIB4 SCORE: 0.97

## 2023-11-29 DIAGNOSIS — R39.9 UTI SYMPTOMS: ICD-10-CM

## 2023-11-29 DIAGNOSIS — N30.01 ACUTE CYSTITIS WITH HEMATURIA: ICD-10-CM

## 2023-11-29 NOTE — PROGRESS NOTES
"  Subjective:      68 y.o. female presents to urgent care for concerns of a bladder infection.  For the last week she has been experiencing increased urinary urgency, frequency, and dysuria.  No associated hematuria.  Bowel movements are regular and soft.  She drinks an average of 2L of water and 0 caffeinated beverages.  She is currently sexually active with one, male partner and they do not use any form of contraception.    She denies any other questions or concerns at this time.    Current problem list, medication, and past medical/surgical history were reviewed in Epic.    ROS  See HPI     Objective:      /60 (BP Location: Left arm, Patient Position: Sitting, BP Cuff Size: Adult)   Pulse 68   Temp 36.7 °C (98 °F) (Temporal)   Resp 18   Ht 1.702 m (5' 7\")   Wt 72.1 kg (159 lb)   SpO2 94%   BMI 24.90 kg/m²     Physical Exam  Constitutional:       General: She is not in acute distress.     Appearance: She is not diaphoretic.   Cardiovascular:      Rate and Rhythm: Normal rate and regular rhythm.      Heart sounds: Normal heart sounds.   Pulmonary:      Effort: Pulmonary effort is normal. No respiratory distress.      Breath sounds: Normal breath sounds.   Abdominal:      General: Bowel sounds are normal.      Palpations: Abdomen is soft.      Tenderness: There is no abdominal tenderness. There is no right CVA tenderness or left CVA tenderness.   Neurological:      Mental Status: She is alert.   Psychiatric:         Mood and Affect: Affect normal.         Judgment: Judgment normal.       Assessment/Plan:     1. Acute cystitis with hematuria  2. UTI symptoms  Urinalysis indicates infection.  Urine culture has been sent.  In the meantime prescription for Bactrim has been sent.  - POCT Urinalysis  - sulfamethoxazole-trimethoprim (BACTRIM DS) 800-160 MG tablet; Take 1 Tablet by mouth every 12 hours for 3 days.  Dispense: 6 Tablet; Refill: 0  - Urine Culture; Future      Instructed to return to Urgent Care " or nearest Emergency Department if symptoms fail to improve, for any change in condition, further concerns, or new concerning symptoms. Patient states understanding of the plan of care and discharge instructions.    Nisreen Heller M.D.

## 2023-12-01 LAB
BACTERIA UR CULT: NORMAL
SIGNIFICANT IND 70042: NORMAL
SITE SITE: NORMAL
SOURCE SOURCE: NORMAL

## 2023-12-10 NOTE — PROGRESS NOTES
Received bedside patient report from ELISA De La Cruz. Patient resting comfortably in bed, no complaints at this time. Safety precautions in place. Will continue to monitor.       Awake/Alert

## 2023-12-20 NOTE — PROGRESS NOTES
Per patient's request we will refer her to orthopedics for further evaluation will have her see Dr. Quiroz who specializes in chronic back pain.   Report given to Sharyn RN in pre-op in anticipation of FAHAD/cardioversion this afternoon. Patient's  at bedside, both him and patient aware of plan of care.

## 2023-12-28 DIAGNOSIS — I10 ESSENTIAL HYPERTENSION: ICD-10-CM

## 2023-12-28 RX ORDER — LOSARTAN POTASSIUM 100 MG/1
100 TABLET ORAL DAILY
Qty: 90 TABLET | Refills: 3 | Status: SHIPPED | OUTPATIENT
Start: 2023-12-28

## 2023-12-28 RX ORDER — PROPRANOLOL HCL 60 MG
60 CAPSULE, EXTENDED RELEASE 24HR ORAL
Qty: 90 CAPSULE | Refills: 3 | Status: SHIPPED | OUTPATIENT
Start: 2023-12-28

## 2024-03-05 DIAGNOSIS — R73.03 PREDIABETES: ICD-10-CM

## 2024-03-06 RX ORDER — METFORMIN HYDROCHLORIDE 500 MG/1
500 TABLET, EXTENDED RELEASE ORAL DAILY
Qty: 90 TABLET | Refills: 3 | Status: SHIPPED | OUTPATIENT
Start: 2024-03-06

## 2024-03-08 ENCOUNTER — PATIENT MESSAGE (OUTPATIENT)
Dept: MEDICAL GROUP | Age: 69
End: 2024-03-08
Payer: MEDICARE

## 2024-03-08 DIAGNOSIS — E78.49 OTHER HYPERLIPIDEMIA: ICD-10-CM

## 2024-03-08 DIAGNOSIS — K92.89 GAS BLOAT SYNDROME: ICD-10-CM

## 2024-03-08 RX ORDER — ATORVASTATIN CALCIUM 80 MG/1
80 TABLET, FILM COATED ORAL
Qty: 90 TABLET | Refills: 2 | Status: SHIPPED | OUTPATIENT
Start: 2024-03-08 | End: 2024-03-08 | Stop reason: SDUPTHER

## 2024-03-08 NOTE — TELEPHONE ENCOUNTER
Received request via: Patient    Was the patient seen in the last year in this department? Yes    Does the patient have an active prescription (recently filled or refills available) for medication(s) requested? No    Pharmacy Name: [facility]     Does the patient have long term Plus and need 100 day supply (blood pressure, diabetes and cholesterol meds only)? Patient does not have SCP

## 2024-03-12 RX ORDER — ATORVASTATIN CALCIUM 80 MG/1
80 TABLET, FILM COATED ORAL
Qty: 90 TABLET | Refills: 2 | Status: SHIPPED | OUTPATIENT
Start: 2024-03-12

## 2024-03-12 RX ORDER — OMEPRAZOLE 20 MG/1
20 CAPSULE, DELAYED RELEASE ORAL DAILY
Qty: 90 CAPSULE | Refills: 2 | Status: SHIPPED | OUTPATIENT
Start: 2024-03-12

## 2024-04-05 ENCOUNTER — OFFICE VISIT (OUTPATIENT)
Dept: URGENT CARE | Facility: CLINIC | Age: 69
End: 2024-04-05
Payer: MEDICARE

## 2024-04-05 VITALS
SYSTOLIC BLOOD PRESSURE: 126 MMHG | BODY MASS INDEX: 24.8 KG/M2 | RESPIRATION RATE: 14 BRPM | WEIGHT: 158 LBS | HEIGHT: 67 IN | OXYGEN SATURATION: 95 % | HEART RATE: 78 BPM | DIASTOLIC BLOOD PRESSURE: 80 MMHG | TEMPERATURE: 98.3 F

## 2024-04-05 DIAGNOSIS — S09.90XA INJURY OF HEAD, INITIAL ENCOUNTER: ICD-10-CM

## 2024-04-05 DIAGNOSIS — S81.801A WOUND OF RIGHT LOWER EXTREMITY, INITIAL ENCOUNTER: ICD-10-CM

## 2024-04-05 PROCEDURE — 3074F SYST BP LT 130 MM HG: CPT | Performed by: STUDENT IN AN ORGANIZED HEALTH CARE EDUCATION/TRAINING PROGRAM

## 2024-04-05 PROCEDURE — 3079F DIAST BP 80-89 MM HG: CPT | Performed by: STUDENT IN AN ORGANIZED HEALTH CARE EDUCATION/TRAINING PROGRAM

## 2024-04-05 PROCEDURE — 99214 OFFICE O/P EST MOD 30 MIN: CPT | Performed by: STUDENT IN AN ORGANIZED HEALTH CARE EDUCATION/TRAINING PROGRAM

## 2024-04-05 RX ORDER — AMOXICILLIN 500 MG/1
500 CAPSULE ORAL 3 TIMES DAILY
Qty: 15 CAPSULE | Refills: 0 | Status: SHIPPED | OUTPATIENT
Start: 2024-04-05 | End: 2024-04-10

## 2024-04-05 ASSESSMENT — FIBROSIS 4 INDEX: FIB4 SCORE: 0.98

## 2024-04-05 NOTE — PROGRESS NOTES
"Subjective     Judy Blackburn is a 69 y.o. female who presents with Abrasion (X 7 days, abrasion of the RT lower leg due to a fall on a wood board, redness swelling.)            Judy is a very pleasant 69-year-old female who presents to urgent care with concerns of an infection on her right lower leg.  Patient states a week ago she tripped and fell causing an abrasion on her right lower shin.  Patient states that her shin caught on a wooden board and scraped off a layer of skin.  Patient has noticed some redness around the scab.  Patient also reports increased warmth and burning sensation.  Patient states when she fell a week ago she tripped over that wood board and hit her head on tile floor.  Patient states she had a contusion on her forehead which has since resolved.  Patient is currently taking Eliquis.  Patient reports associated headache since fall.  No nausea/vomiting.  She has been a little more fatigued and does report symptoms of intermittent dizziness.        Review of Systems   Constitutional:  Negative for chills and fever.   Respiratory:  Negative for cough, shortness of breath and wheezing.    Cardiovascular:  Negative for chest pain and palpitations.   Gastrointestinal:  Negative for abdominal pain, constipation, diarrhea, nausea and vomiting.   Neurological:  Positive for dizziness and headaches.   All other systems reviewed and are negative.             Objective     /80   Pulse 78   Temp 36.8 °C (98.3 °F) (Temporal)   Resp 14   Ht 1.702 m (5' 7\")   Wt 71.7 kg (158 lb)   SpO2 95%   BMI 24.75 kg/m²      Physical Exam  Vitals reviewed.   Constitutional:       General: She is not in acute distress.     Appearance: Normal appearance. She is not ill-appearing or toxic-appearing.   HENT:      Head: Normocephalic and atraumatic.      Nose: Nose normal.      Mouth/Throat:      Mouth: Mucous membranes are moist.      Pharynx: Oropharynx is clear.   Eyes:      Extraocular Movements: " Extraocular movements intact.      Conjunctiva/sclera: Conjunctivae normal.      Pupils: Pupils are equal, round, and reactive to light.   Cardiovascular:      Rate and Rhythm: Normal rate and regular rhythm.   Pulmonary:      Effort: Pulmonary effort is normal.      Breath sounds: Normal breath sounds.   Skin:     General: Skin is warm and dry.   Neurological:      General: No focal deficit present.      Mental Status: She is alert and oriented to person, place, and time. Mental status is at baseline.      GCS: GCS eye subscore is 4. GCS verbal subscore is 5. GCS motor subscore is 6.      Cranial Nerves: Cranial nerves 2-12 are intact.      Sensory: Sensation is intact.      Motor: Motor function is intact.      Coordination: Coordination is intact.      Gait: Gait is intact.                          Assessment & Plan        1. Wound of right lower extremity, initial encounter  - mupirocin (BACTROBAN) 2 % Ointment; Apply 1 Application topically 2 times a day.  Dispense: 22 g; Refill: 0  - amoxicillin (AMOXIL) 500 MG Cap; Take 1 Capsule by mouth 3 times a day for 5 days.  Dispense: 15 Capsule; Refill: 0    2. Injury of head, initial encounter  - GLF with head injury 1 week ago. No LOC.  Patient is currently on anticoagulation therapy with Eliquis.  Patient reports headaches for the last week and she has been taking OTC Tylenol/ibuprofen to treat headache.  Patient reports intermittent episodes of dizziness.  No nausea/vomiting.  Recommend CT head due to age > 65 y.o. and + blood thinners with head injury. Patient declined CT Head. AMA form signed and scanned into patients chart.  - The patient is fully alert and oriented, not intoxicated, and demonstrates appropriate decision-making capacity.  My concerns at this time have been shared with the patient in great detail and despite our recommendations the patient is still choosing to go AGAINST MEDICAL ADVICE . The patient accepts the risks of worsening condition up to  and including increased disability, morbidity and mortality.   - CT-HEAD W/O; Future - Patient declined.   -  AMA/Refusal of Treatment     Differential diagnoses, supportive care measures and indications for immediate follow-up discussed with patient. Pathogenesis of diagnosis discussed including typical length and natural progression.  Follow-up with PCP.  Strict ER precautions discussed.    Instructed to return to urgent care or nearest emergency department if symptoms fail to improve, for any change in condition, further concerns, or new concerning symptoms.    Patient states understanding and agrees with the plan of care and discharge instructions.        My total time spent caring for the patient on the day of the encounter was 35 minutes including obtaining patient history, physical exam, discussing differential diagnosis, plan of care, supportive care measures, appropriate follow-up and indications for immediate follow-up.This does not include time spent on separately billable procedures/tests.

## 2024-04-06 ASSESSMENT — ENCOUNTER SYMPTOMS
CONSTIPATION: 0
DIZZINESS: 1
FEVER: 0
SHORTNESS OF BREATH: 0
ABDOMINAL PAIN: 0
CHILLS: 0
HEADACHES: 1
PALPITATIONS: 0
VOMITING: 0
NAUSEA: 0
DIARRHEA: 0
COUGH: 0
WHEEZING: 0

## 2024-04-08 ENCOUNTER — TELEPHONE (OUTPATIENT)
Dept: URGENT CARE | Facility: CLINIC | Age: 69
End: 2024-04-08
Payer: MEDICARE

## 2024-04-08 ENCOUNTER — HOSPITAL ENCOUNTER (OUTPATIENT)
Dept: RADIOLOGY | Facility: MEDICAL CENTER | Age: 69
End: 2024-04-08
Attending: STUDENT IN AN ORGANIZED HEALTH CARE EDUCATION/TRAINING PROGRAM
Payer: MEDICARE

## 2024-04-08 DIAGNOSIS — S09.90XA INJURY OF HEAD, INITIAL ENCOUNTER: ICD-10-CM

## 2024-04-08 PROCEDURE — 70450 CT HEAD/BRAIN W/O DYE: CPT

## 2024-04-09 NOTE — TELEPHONE ENCOUNTER
CT-HEAD W/O IMPRESSION:  No intracranial mass effect or acute hemorrhage.    Return to urgent care or nearest emergency department if symptoms fail to improve, for any change in condition, further concerns, or new concerning symptoms.

## 2024-04-11 ENCOUNTER — HOSPITAL ENCOUNTER (OUTPATIENT)
Dept: RADIOLOGY | Facility: MEDICAL CENTER | Age: 69
End: 2024-04-11
Attending: FAMILY MEDICINE
Payer: MEDICARE

## 2024-04-11 ENCOUNTER — OFFICE VISIT (OUTPATIENT)
Dept: MEDICAL GROUP | Age: 69
End: 2024-04-11
Payer: MEDICARE

## 2024-04-11 VITALS
WEIGHT: 157 LBS | OXYGEN SATURATION: 96 % | DIASTOLIC BLOOD PRESSURE: 80 MMHG | HEIGHT: 67 IN | SYSTOLIC BLOOD PRESSURE: 136 MMHG | BODY MASS INDEX: 24.64 KG/M2 | TEMPERATURE: 98 F | HEART RATE: 75 BPM

## 2024-04-11 DIAGNOSIS — E78.00 PURE HYPERCHOLESTEROLEMIA: ICD-10-CM

## 2024-04-11 DIAGNOSIS — M25.552 LEFT HIP PAIN: ICD-10-CM

## 2024-04-11 DIAGNOSIS — E55.9 VITAMIN D DEFICIENCY: ICD-10-CM

## 2024-04-11 DIAGNOSIS — M79.604 PAIN OF RIGHT LOWER EXTREMITY: ICD-10-CM

## 2024-04-11 DIAGNOSIS — E11.9 TYPE 2 DIABETES MELLITUS WITHOUT COMPLICATION, WITHOUT LONG-TERM CURRENT USE OF INSULIN (HCC): ICD-10-CM

## 2024-04-11 DIAGNOSIS — Z12.31 ENCOUNTER FOR MAMMOGRAM TO ESTABLISH BASELINE MAMMOGRAM: ICD-10-CM

## 2024-04-11 PROCEDURE — 3079F DIAST BP 80-89 MM HG: CPT | Performed by: FAMILY MEDICINE

## 2024-04-11 PROCEDURE — 99214 OFFICE O/P EST MOD 30 MIN: CPT | Performed by: FAMILY MEDICINE

## 2024-04-11 PROCEDURE — 3075F SYST BP GE 130 - 139MM HG: CPT | Performed by: FAMILY MEDICINE

## 2024-04-11 PROCEDURE — 77067 SCR MAMMO BI INCL CAD: CPT

## 2024-04-11 PROCEDURE — 73522 X-RAY EXAM HIPS BI 3-4 VIEWS: CPT

## 2024-04-11 ASSESSMENT — FIBROSIS 4 INDEX: FIB4 SCORE: 0.98

## 2024-04-11 ASSESSMENT — PATIENT HEALTH QUESTIONNAIRE - PHQ9: CLINICAL INTERPRETATION OF PHQ2 SCORE: 0

## 2024-04-11 NOTE — PROGRESS NOTES
This medical record contains text that has been entered with the assistance of computer voice recognition and dictation software.  Therefore, it may contain unintended errors in text, spelling, punctuation, or grammar      Chief Complaint   Patient presents with    Leg Injury     Leg injury on right lower front limb         Judy Blackburn is a 69 y.o. female here evaluation and management of: leg pain      HPI:           1. Pain of right lower extremity  2. Left hip pain    History of Present Illness  The patient is a 69-year-old female who presents for evaluation of multiple medical concerns.    The patient experienced a fall while walking in the dark, during which she tripped over a board and landed on her head. This resulted in significant bruising and an open wound. Initially, the discomfort was mild, but subsequently developed into a pulsing sensation. She sought medical attention at a [facility] walk-in clinic where she was prescribed amoxicillin and antibiotic ointment, which she completed yesterday. Despite these measures, she continues to experience severe pain, a new symptom for her. She is also on Eliquis.    During her last visit, the patient complained of leg and hip pain, which was partially alleviated by the prescribed treatment. She reports difficulty sleeping on her left leg due to severe cramping. Additionally, she experiences numbness that radiates down her leg.    The patient underwent a mammogram earlier today.    The patient is prediabetic and is due for A1c testing. She was prescribed metformin in [place] as a preventative measure.      Current medicines (including changes today)  Current Outpatient Medications   Medication Sig Dispense Refill    mupirocin (BACTROBAN) 2 % Ointment Apply 1 Application topically 2 times a day. 22 g 0    omeprazole (PRILOSEC) 20 MG delayed-release capsule Take 1 Capsule by mouth every day. 90 Capsule 2    atorvastatin (LIPITOR) 80 MG tablet Take 1 Tablet by  "mouth at bedtime. 90 Tablet 2    metFORMIN ER (GLUCOPHAGE XR) 500 MG TABLET SR 24 HR TAKE 1 TABLET BY MOUTH EVERY DAY 90 Tablet 3    losartan (COZAAR) 100 MG Tab TAKE 1 TABLET BY MOUTH EVERY DAY 90 Tablet 3    propranolol LA (INDERAL LA) 60 MG CAPSULE SR 24 HR TAKE 1 CAPSULE BY MOUTH EVERY DAY 90 Capsule 3    apixaban (ELIQUIS) 5mg Tab Take 1 Tablet by mouth 2 times a day. 180 Tablet 3    traZODone (DESYREL) 100 MG Tab Take 1 Tablet by mouth every evening. 100 Tablet 2    ALPRAZolam (XANAX) 0.5 MG Tab Take 0.5 mg by mouth 1 time a day as needed for Anxiety.      Potassium 99 MG Tab Take 99 mg by mouth every day.      MAGNESIUM PO Take 1 Tablet by mouth at bedtime as needed.       No current facility-administered medications for this visit.     She  has a past medical history of Anxiety, Atrial fibrillation (HCC), Diabetes (HCC), H/O Clostridium difficile infection, Hyperlipidemia, and Hypertension.  She  has a past surgical history that includes lumpectomy; trevor (N/A, 4/26/2022); and cardioversion (N/A, 4/26/2022).  Social History     Tobacco Use    Smoking status: Never    Smokeless tobacco: Never   Vaping Use    Vaping Use: Never used   Substance Use Topics    Alcohol use: Not Currently    Drug use: Never     Social History     Social History Narrative    Not on file     Family History   Problem Relation Age of Onset    Arrythmia Sister      Family Status   Relation Name Status    Sis  (Not Specified)         ROS    The pertinent  ROS findings can be seen in the HPI above.     All other systems reviewed and are negative     Objective:     /80 (BP Location: Left arm, Patient Position: Sitting, BP Cuff Size: Adult)   Pulse 75   Temp 36.7 °C (98 °F) (Temporal)   Ht 1.702 m (5' 7\")   Wt 71.2 kg (157 lb)   SpO2 96%  Body mass index is 24.59 kg/m².      Physical Exam:    Constitutional: Alert, no distress.  Skin: No suspicious lesions  Eye: Equal, round and reactive, conjunctiva clear, lids normal.  ENMT: Lips " without lesions, good dentition, oropharynx clear.  Neck: Trachea midline, no masses, no thyromegaly. No cervical or supraclavicular lymphadenopathy.  Respiratory: Unlabored respiratory effort, lungs clear to auscultation, no wheezes, no ronchi.  Cardiovascular: Normal S1, S2, no murmur, no edema  Abdomen: Soft, non-tender, no masses, no hepatosplenomegaly.  Extremity--left lower extremity reveals a well-healing wound with scab and surrounding erythema mildly tender to touch in the lower Achilles region      Assessment and Plan:   The following treatment plan was discussed    All recent labs and provider notes reviewed    1. Pain of right lower extremity    Evaluate for DVT    - US-EXTREMITY VENOUS LOWER UNILAT RIGHT; Future    2. Left hip pain    Will obtain a plain film to evaluate for degenerative disease    - DX-HIP-BILATERAL-WITH PELVIS-3/4 VIEWS; Future    3. Type 2 diabetes mellitus without complication, without long-term current use of insulin (HCC)    We will obtain new labs to update clinical profile.  Then we will adjust therapy as needed.    - CBC WITH DIFFERENTIAL; Future  - Comp Metabolic Panel; Future  - Lipid Profile; Future  - T3 FREE; Future  - TSH+FREE T4  - HEMOGLOBIN A1C; Future  - VITAMIN D,25 HYDROXY (DEFICIENCY); Future    4. Pure hypercholesterolemia  - CBC WITH DIFFERENTIAL; Future  - Comp Metabolic Panel; Future  - Lipid Profile; Future  - T3 FREE; Future  - TSH+FREE T4  - HEMOGLOBIN A1C; Future  - VITAMIN D,25 HYDROXY (DEFICIENCY); Future    5. Vitamin D deficiency  - VITAMIN D,25 HYDROXY (DEFICIENCY); Future             Instructed to Follow up in clinic or ER for worsening symptoms, difficulty breathing, lack of expected recovery, or should new symptoms or problems arise.    Followup: Return in about 6 months (around 10/11/2024) for Reevaluation, labs.

## 2024-04-12 ENCOUNTER — HOSPITAL ENCOUNTER (OUTPATIENT)
Dept: LAB | Facility: MEDICAL CENTER | Age: 69
End: 2024-04-12
Attending: FAMILY MEDICINE
Payer: MEDICARE

## 2024-04-12 DIAGNOSIS — E78.00 PURE HYPERCHOLESTEROLEMIA: ICD-10-CM

## 2024-04-12 DIAGNOSIS — E11.9 TYPE 2 DIABETES MELLITUS WITHOUT COMPLICATION, WITHOUT LONG-TERM CURRENT USE OF INSULIN (HCC): ICD-10-CM

## 2024-04-12 DIAGNOSIS — E55.9 VITAMIN D DEFICIENCY: ICD-10-CM

## 2024-04-12 DIAGNOSIS — E04.1 THYROID NODULE: ICD-10-CM

## 2024-04-12 LAB
25(OH)D3 SERPL-MCNC: 27 NG/ML (ref 30–100)
ALBUMIN SERPL BCP-MCNC: 4.4 G/DL (ref 3.2–4.9)
ALBUMIN/GLOB SERPL: 1.9 G/DL
ALP SERPL-CCNC: 72 U/L (ref 30–99)
ALT SERPL-CCNC: 18 U/L (ref 2–50)
ANION GAP SERPL CALC-SCNC: 10 MMOL/L (ref 7–16)
AST SERPL-CCNC: 24 U/L (ref 12–45)
BASOPHILS # BLD AUTO: 0.7 % (ref 0–1.8)
BASOPHILS # BLD: 0.04 K/UL (ref 0–0.12)
BILIRUB SERPL-MCNC: 0.3 MG/DL (ref 0.1–1.5)
BUN SERPL-MCNC: 15 MG/DL (ref 8–22)
CALCIUM ALBUM COR SERPL-MCNC: 8.9 MG/DL (ref 8.5–10.5)
CALCIUM SERPL-MCNC: 9.2 MG/DL (ref 8.5–10.5)
CHLORIDE SERPL-SCNC: 107 MMOL/L (ref 96–112)
CHOLEST SERPL-MCNC: 132 MG/DL (ref 100–199)
CO2 SERPL-SCNC: 25 MMOL/L (ref 20–33)
CREAT SERPL-MCNC: 0.5 MG/DL (ref 0.5–1.4)
EOSINOPHIL # BLD AUTO: 0.18 K/UL (ref 0–0.51)
EOSINOPHIL NFR BLD: 3.4 % (ref 0–6.9)
ERYTHROCYTE [DISTWIDTH] IN BLOOD BY AUTOMATED COUNT: 43.1 FL (ref 35.9–50)
EST. AVERAGE GLUCOSE BLD GHB EST-MCNC: 126 MG/DL
FASTING STATUS PATIENT QL REPORTED: NORMAL
GFR SERPLBLD CREATININE-BSD FMLA CKD-EPI: 101 ML/MIN/1.73 M 2
GLOBULIN SER CALC-MCNC: 2.3 G/DL (ref 1.9–3.5)
GLUCOSE SERPL-MCNC: 105 MG/DL (ref 65–99)
HBA1C MFR BLD: 6 % (ref 4–5.6)
HCT VFR BLD AUTO: 42.1 % (ref 37–47)
HDLC SERPL-MCNC: 52 MG/DL
HGB BLD-MCNC: 13.8 G/DL (ref 12–16)
IMM GRANULOCYTES # BLD AUTO: 0.01 K/UL (ref 0–0.11)
IMM GRANULOCYTES NFR BLD AUTO: 0.2 % (ref 0–0.9)
LDLC SERPL CALC-MCNC: 58 MG/DL
LYMPHOCYTES # BLD AUTO: 1.75 K/UL (ref 1–4.8)
LYMPHOCYTES NFR BLD: 32.6 % (ref 22–41)
MCH RBC QN AUTO: 30.8 PG (ref 27–33)
MCHC RBC AUTO-ENTMCNC: 32.8 G/DL (ref 32.2–35.5)
MCV RBC AUTO: 94 FL (ref 81.4–97.8)
MONOCYTES # BLD AUTO: 0.49 K/UL (ref 0–0.85)
MONOCYTES NFR BLD AUTO: 9.1 % (ref 0–13.4)
NEUTROPHILS # BLD AUTO: 2.89 K/UL (ref 1.82–7.42)
NEUTROPHILS NFR BLD: 54 % (ref 44–72)
NRBC # BLD AUTO: 0 K/UL
NRBC BLD-RTO: 0 /100 WBC (ref 0–0.2)
PLATELET # BLD AUTO: 243 K/UL (ref 164–446)
PMV BLD AUTO: 10.8 FL (ref 9–12.9)
POTASSIUM SERPL-SCNC: 4.5 MMOL/L (ref 3.6–5.5)
PROT SERPL-MCNC: 6.7 G/DL (ref 6–8.2)
RBC # BLD AUTO: 4.48 M/UL (ref 4.2–5.4)
SODIUM SERPL-SCNC: 142 MMOL/L (ref 135–145)
T3FREE SERPL-MCNC: 3.47 PG/ML (ref 2–4.4)
T4 FREE SERPL-MCNC: 1.31 NG/DL (ref 0.93–1.7)
TRIGL SERPL-MCNC: 108 MG/DL (ref 0–149)
TSH SERPL DL<=0.005 MIU/L-ACNC: 1.35 UIU/ML (ref 0.38–5.33)
WBC # BLD AUTO: 5.4 K/UL (ref 4.8–10.8)

## 2024-04-12 PROCEDURE — 80061 LIPID PANEL: CPT

## 2024-04-12 PROCEDURE — 36415 COLL VENOUS BLD VENIPUNCTURE: CPT

## 2024-04-12 PROCEDURE — 84481 FREE ASSAY (FT-3): CPT

## 2024-04-12 PROCEDURE — 80053 COMPREHEN METABOLIC PANEL: CPT

## 2024-04-12 PROCEDURE — 84443 ASSAY THYROID STIM HORMONE: CPT

## 2024-04-12 PROCEDURE — 83036 HEMOGLOBIN GLYCOSYLATED A1C: CPT | Mod: GA

## 2024-04-12 PROCEDURE — 84439 ASSAY OF FREE THYROXINE: CPT

## 2024-04-12 PROCEDURE — 82306 VITAMIN D 25 HYDROXY: CPT

## 2024-04-12 PROCEDURE — 85025 COMPLETE CBC W/AUTO DIFF WBC: CPT

## 2024-04-15 ENCOUNTER — APPOINTMENT (OUTPATIENT)
Dept: RADIOLOGY | Facility: MEDICAL CENTER | Age: 69
End: 2024-04-15
Attending: FAMILY MEDICINE
Payer: MEDICARE

## 2024-05-11 ENCOUNTER — OFFICE VISIT (OUTPATIENT)
Dept: URGENT CARE | Facility: CLINIC | Age: 69
End: 2024-05-11
Payer: MEDICARE

## 2024-05-11 VITALS
HEIGHT: 67 IN | SYSTOLIC BLOOD PRESSURE: 152 MMHG | OXYGEN SATURATION: 96 % | DIASTOLIC BLOOD PRESSURE: 76 MMHG | RESPIRATION RATE: 18 BRPM | TEMPERATURE: 97.6 F | BODY MASS INDEX: 24.33 KG/M2 | HEART RATE: 76 BPM | WEIGHT: 155 LBS

## 2024-05-11 DIAGNOSIS — S61.452A DOG BITE OF LEFT HAND, INITIAL ENCOUNTER: ICD-10-CM
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
## 2024-05-11 PROCEDURE — 99213 OFFICE O/P EST LOW 20 MIN: CPT | Performed by: PHYSICIAN ASSISTANT

## 2024-05-11 PROCEDURE — 3078F DIAST BP <80 MM HG: CPT | Performed by: PHYSICIAN ASSISTANT

## 2024-05-11 PROCEDURE — 3077F SYST BP >= 140 MM HG: CPT | Performed by: PHYSICIAN ASSISTANT

## 2024-05-11 RX ORDER — AMOXICILLIN AND CLAVULANATE POTASSIUM 875; 125 MG/1; MG/1
1 TABLET, FILM COATED ORAL 2 TIMES DAILY
Qty: 10 TABLET | Refills: 0 | Status: SHIPPED | OUTPATIENT
Start: 2024-05-11 | End: 2024-05-16

## 2024-05-11 ASSESSMENT — ENCOUNTER SYMPTOMS
FEVER: 0
DIARRHEA: 0
SHORTNESS OF BREATH: 0
EYE PAIN: 0
ABDOMINAL PAIN: 0
CHILLS: 0
MYALGIAS: 0
CONSTIPATION: 0
SORE THROAT: 0
NAUSEA: 0
COUGH: 0
VOMITING: 0
HEADACHES: 0

## 2024-05-11 ASSESSMENT — FIBROSIS 4 INDEX: FIB4 SCORE: 1.63

## 2024-05-12 NOTE — PROGRESS NOTES
"Subjective:   Judy Blackburn is a 69 y.o. female who presents for Dog Bite (Pt has swelling on (L) middle and ring finger, felling tight, trouble bending fingers, headache x today )      69-year-old female was on a walk this morning her small dog was attacked by 2 weeks fatally wounded.  During encounter.  During the encounter she sustained small puncture wounds to the third and fourth finger of the left hand.  There is  More pain and swelling today.  They have been in contact with animal services as well as police.  Her tetanus was updated in 2021    Review of Systems   Constitutional:  Negative for chills and fever.   HENT:  Negative for congestion, ear pain and sore throat.    Eyes:  Negative for pain.   Respiratory:  Negative for cough and shortness of breath.    Cardiovascular:  Negative for chest pain.   Gastrointestinal:  Negative for abdominal pain, constipation, diarrhea, nausea and vomiting.   Genitourinary:  Negative for dysuria.   Musculoskeletal:  Negative for myalgias.   Skin:  Negative for rash.   Neurological:  Negative for headaches.       Medications, Allergies, and current problem list reviewed today in Epic.     Objective:     BP (!) 152/76 (BP Location: Left arm, Patient Position: Sitting, BP Cuff Size: Adult)   Pulse 76   Temp 36.4 °C (97.6 °F) (Temporal)   Resp 18   Ht 1.702 m (5' 7\")   Wt 70.3 kg (155 lb)   SpO2 96%     Physical Exam  Vitals reviewed.   Constitutional:       Appearance: Normal appearance.   HENT:      Head: Normocephalic and atraumatic.      Right Ear: External ear normal.      Left Ear: External ear normal.      Nose: Nose normal.      Mouth/Throat:      Mouth: Mucous membranes are moist.   Eyes:      Conjunctiva/sclera: Conjunctivae normal.   Cardiovascular:      Rate and Rhythm: Normal rate.   Pulmonary:      Effort: Pulmonary effort is normal.   Skin:     General: Skin is warm and dry.      Capillary Refill: Capillary refill takes less than 2 seconds.      " Comments: 3-4 small puncture wounds to third and fourth distal digits.  No nail injury.  Noncontaminated   Neurological:      Mental Status: She is alert and oriented to person, place, and time.         Assessment/Plan:     Diagnosis and associated orders:     1. Dog bite of left hand, initial encounter  amoxicillin-clavulanate (AUGMENTIN) 875-125 MG Tab         Comments/MDM:     Good renal function, tdap updated in 2021, no allergies to antibiotics  5-day course of prophylactic Augmentin, elevate, may apply cool compresses for the first 24 hours.  Follow-up if worsening signs of infection.         Differential diagnosis, natural history, supportive care, and indications for immediate follow-up discussed.    Advised the patient to follow-up with the primary care physician for recheck, reevaluation, and consideration of further management.    Please note that this dictation was created using voice recognition software. I have made a reasonable attempt to correct obvious errors, but I expect that there are errors of grammar and possibly content that I did not discover before finalizing the note.    This note was electronically signed by Louis Aly PA-C

## 2024-05-14 DIAGNOSIS — F41.9 ANXIETY: ICD-10-CM

## 2024-05-14 RX ORDER — ALPRAZOLAM 0.5 MG/1
0.5 TABLET ORAL
Qty: 30 TABLET | Refills: 0 | Status: SHIPPED | OUTPATIENT
Start: 2024-05-14 | End: 2024-06-13

## 2024-05-17 DIAGNOSIS — F43.10 PTSD (POST-TRAUMATIC STRESS DISORDER): ICD-10-CM

## 2024-05-29 ENCOUNTER — OFFICE VISIT (OUTPATIENT)
Dept: MEDICAL GROUP | Age: 69
End: 2024-05-29
Payer: MEDICARE

## 2024-05-29 VITALS
HEIGHT: 67 IN | TEMPERATURE: 97.5 F | DIASTOLIC BLOOD PRESSURE: 78 MMHG | WEIGHT: 162 LBS | HEART RATE: 79 BPM | OXYGEN SATURATION: 96 % | SYSTOLIC BLOOD PRESSURE: 116 MMHG | BODY MASS INDEX: 25.43 KG/M2

## 2024-05-29 DIAGNOSIS — F43.21 GRIEF: ICD-10-CM

## 2024-05-29 DIAGNOSIS — L30.9 DERMATITIS: ICD-10-CM

## 2024-05-29 ASSESSMENT — FIBROSIS 4 INDEX: FIB4 SCORE: 1.63

## 2024-05-29 NOTE — PROGRESS NOTES
This medical record contains text that has been entered with the assistance of computer voice recognition and dictation software.  Therefore, it may contain unintended errors in text, spelling, punctuation, or grammar      Chief Complaint   Patient presents with    Follow-Up         Judy Blackburn is a 69 y.o. female here evaluation and management of: follow up      HPI:           1. Dermatitis  2. Grief    History of Present Illness  The patient is a 69-year-old female who presents for evaluation of multiple medical concerns.    The patient's depression has not shown improvement. 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  Currently, she is engaged in self-care strategies such as reading a book and engaging in one therapy session. Her next therapy session is scheduled for Friday. She has abstained from taking Xanax, having only consumed three doses. Her nightly regimen includes trazodone.    The patient experiences a bright red lesion every two months. She is scheduled for an ultrasound on 06/11/2024. She expresses a desire to investigate the cause of the lesion. The severity of the lesion has negatively impacted her social life. Two years ago, she considered undergoing a scratch test, but due to discomfort, she opted against it. She experiences a persistent clear runny nose, which she attributes to allergies.    Supplemental Information  She had a hip x-ray, which was normal. She did not go to the Ultram that was recommended as it was not healing. She was put on amoxicillin at an urgent care because she had to get off of it after she started having massive diarrhea on the third day, so she went into C. difficile. She was bitten on her finger and knuckle and her knees were swollen.  Her arms are healing now.        Current medicines (including changes today)  Current Outpatient Medications   Medication Sig Dispense Refill    ALPRAZolam (XANAX) 0.5 MG Tab Take 1 Tablet by mouth 1 time a day as needed for Anxiety for up to 30 days. 30 Tablet 0    mupirocin (BACTROBAN) 2 % Ointment Apply 1 Application topically 2 times a day. 22 g 0    omeprazole (PRILOSEC) 20 MG delayed-release capsule Take 1 Capsule by mouth every day. 90 Capsule 2    atorvastatin (LIPITOR) 80 MG tablet Take 1 Tablet by mouth at bedtime. 90 Tablet 2    metFORMIN ER (GLUCOPHAGE XR) 500 MG TABLET SR 24 HR TAKE 1 TABLET BY MOUTH EVERY DAY 90 Tablet 3    losartan (COZAAR) 100 MG Tab TAKE 1 TABLET BY MOUTH EVERY DAY 90 Tablet 3    propranolol LA (INDERAL LA) 60 MG CAPSULE SR 24 HR TAKE 1 CAPSULE BY MOUTH EVERY DAY 90 Capsule 3    apixaban (ELIQUIS) 5mg Tab Take 1 Tablet by mouth 2 times a day. 180 Tablet 3    traZODone (DESYREL) 100 MG Tab Take 1 Tablet by mouth every evening. 100 Tablet 2    Potassium 99 MG Tab Take 99 mg by mouth every day.      MAGNESIUM PO Take 1 Tablet by mouth at bedtime as needed.       No current facility-administered medications for this visit.     She  has a past medical history of Anxiety, Atrial fibrillation (HCC), Diabetes (HCC), H/O Clostridium difficile infection, Hyperlipidemia, and Hypertension.  She  has a past surgical history that includes lumpectomy; trevor (N/A, 4/26/2022); and cardioversion (N/A, 4/26/2022).  Social History     Tobacco Use    Smoking status: Never    Smokeless tobacco: Never   Vaping Use    Vaping status: Never Used   Substance Use Topics    Alcohol use: Yes     Comment: OCC    Drug use: Never     Social History     Social History Narrative    Not on file     Family History   Problem Relation Age of Onset    Arrythmia Sister      Family Status   Relation Name Status    Sis  (Not Specified)         ROS    The pertinent  ROS findings can be seen in the HPI above.     All other  "systems reviewed and are negative     Objective:     /78 (BP Location: Right arm, Patient Position: Sitting, BP Cuff Size: Adult)   Pulse 79   Temp 36.4 °C (97.5 °F) (Temporal)   Ht 1.702 m (5' 7\")   Wt 73.5 kg (162 lb)   SpO2 96%  Body mass index is 25.37 kg/m².      Physical Exam:    Constitutional: Alert, no distress.  Skin: No suspicious lesions  Eye: Equal, round and reactive, conjunctiva clear, lids normal.  ENMT: Lips without lesions, good dentition, oropharynx clear.  Neck: Trachea midline, no masses, no thyromegaly. No cervical or supraclavicular lymphadenopathy.  Respiratory: Unlabored respiratory effort, lungs clear to auscultation, no wheezes, no ronchi.  Cardiovascular: Normal S1, S2, no murmur, no edema  Abdomen: Soft, non-tender, no masses, no hepatosplenomegaly.        Assessment and Plan:   The following treatment plan was discussed    All recent labs and provider notes reviewed    1. Dermatitis    We will refer to allergy for appropriate testing    - Referral to Allergy    2. Grief     Encouraged her to continue her therapy sessions weekly, continue trazodone 100 mg nightly.  You may use Xanax as needed.  He does have good support at home with her boyfriend and her sister lives nearby as well as her brother-in-law.            Instructed to Follow up in clinic or ER for worsening symptoms, difficulty breathing, lack of expected recovery, or should new symptoms or problems arise.    Followup: Return in about 3 months (around 8/29/2024) for Reevaluation.               "

## 2024-05-30 DIAGNOSIS — I77.1 CELIAC ARTERY STENOSIS (HCC): ICD-10-CM

## 2024-05-30 NOTE — PROGRESS NOTES
Studies to Be Obtained: repeat Ao/iliac 10/2024     Orders placed for vascular surveillance imaging.    Out[employer]rippin message sent to pt to remind that they are due for their surveillance vascular imaging.       Danielle Baumann R.N.   [facility]

## 2024-06-11 ENCOUNTER — HOSPITAL ENCOUNTER (OUTPATIENT)
Dept: RADIOLOGY | Facility: MEDICAL CENTER | Age: 69
End: 2024-06-11
Attending: FAMILY MEDICINE
Payer: MEDICARE

## 2024-06-11 DIAGNOSIS — E04.1 THYROID NODULE: ICD-10-CM

## 2024-06-11 PROCEDURE — 76536 US EXAM OF HEAD AND NECK: CPT

## 2024-06-13 ENCOUNTER — PATIENT MESSAGE (OUTPATIENT)
Dept: MEDICAL GROUP | Age: 69
End: 2024-06-13
Payer: MEDICARE

## 2024-06-13 DIAGNOSIS — E04.1 THYROID NODULE: ICD-10-CM

## 2024-09-19 ENCOUNTER — OFFICE VISIT (OUTPATIENT)
Dept: MEDICAL GROUP | Age: 69
End: 2024-09-19
Payer: MEDICARE

## 2024-09-19 VITALS
TEMPERATURE: 97.4 F | HEIGHT: 67 IN | DIASTOLIC BLOOD PRESSURE: 80 MMHG | WEIGHT: 165 LBS | OXYGEN SATURATION: 96 % | HEART RATE: 80 BPM | BODY MASS INDEX: 25.9 KG/M2 | SYSTOLIC BLOOD PRESSURE: 142 MMHG

## 2024-09-19 DIAGNOSIS — F43.10 PTSD (POST-TRAUMATIC STRESS DISORDER): ICD-10-CM

## 2024-09-19 DIAGNOSIS — R73.03 PREDIABETES: ICD-10-CM

## 2024-09-19 PROCEDURE — 3077F SYST BP >= 140 MM HG: CPT | Performed by: FAMILY MEDICINE

## 2024-09-19 PROCEDURE — 99214 OFFICE O/P EST MOD 30 MIN: CPT | Performed by: FAMILY MEDICINE

## 2024-09-19 PROCEDURE — 3079F DIAST BP 80-89 MM HG: CPT | Performed by: FAMILY MEDICINE

## 2024-09-19 RX ORDER — METFORMIN HCL 500 MG
500 TABLET, EXTENDED RELEASE 24 HR ORAL DAILY
Qty: 90 TABLET | Refills: 3 | Status: SHIPPED | OUTPATIENT
Start: 2024-09-19

## 2024-09-19 RX ORDER — ALPRAZOLAM 0.5 MG
0.5 TABLET ORAL NIGHTLY PRN
Qty: 30 TABLET | Refills: 0 | Status: SHIPPED | OUTPATIENT
Start: 2024-09-19 | End: 2024-10-19

## 2024-09-19 RX ORDER — TRAZODONE HYDROCHLORIDE 100 MG/1
100 TABLET ORAL NIGHTLY
Qty: 100 TABLET | Refills: 2 | Status: SHIPPED | OUTPATIENT
Start: 2024-09-19

## 2024-09-19 ASSESSMENT — FIBROSIS 4 INDEX: FIB4 SCORE: 1.63

## 2024-09-19 NOTE — PROGRESS NOTES
This medical record contains text that has been entered with the assistance of computer voice recognition and dictation software.  Therefore, it may contain unintended errors in text, spelling, punctuation, or grammar      Verbal consent was acquired by the patient to use [employer] ambient listening note generation during this visit Yes       Chief Complaint   Patient presents with    Depression     Anxiety          Judy Blackburn is a 69 y.o. female here evaluation and management of: PTSD      HPI:           1. PTSD (post-traumatic stress disorder)  History of Present Illness  The patient is a 69-year-old female who presents for evaluation of anxiety and depression.    She continues to struggle with emotional distress, despite having seen a therapist twice. Her sleep is disturbed, often filled with nightmares, and she finds herself crying daily. These symptoms were present even while on medication. She has discontinued her use of trazodone, which she had been taking for an unspecified duration. Occasionally, she takes Xanax, which aids her sleep, but she is reluctant to rely on it. Her sleep deprivation leads to fatigue, lack of exercise, and poor eating habits. She expresses a desire to avoid psychiatric medications.  She states she tolerated the trazodone she just did not want to be on too many medications.    Additionally, she mentions the need for a refill of her metformin prescription.        Current medicines (including changes today)  Current Outpatient Medications   Medication Sig Dispense Refill    traZODone (DESYREL) 100 MG Tab Take 1 Tablet by mouth every evening. 100 Tablet 2    ALPRAZolam (XANAX) 0.5 MG Tab Take 1 Tablet by mouth at bedtime as needed for Sleep for up to 30 days. 30 Tablet 0    metFORMIN ER (GLUCOPHAGE XR) 500 MG TABLET SR 24 HR Take 1 Tablet by mouth every day. 90 Tablet 3    mupirocin (BACTROBAN) 2 % Ointment Apply 1 Application topically 2 times a day. 22 g 0    omeprazole  "(PRILOSEC) 20 MG delayed-release capsule Take 1 Capsule by mouth every day. 90 Capsule 2    atorvastatin (LIPITOR) 80 MG tablet Take 1 Tablet by mouth at bedtime. 90 Tablet 2    losartan (COZAAR) 100 MG Tab TAKE 1 TABLET BY MOUTH EVERY DAY 90 Tablet 3    propranolol LA (INDERAL LA) 60 MG CAPSULE SR 24 HR TAKE 1 CAPSULE BY MOUTH EVERY DAY 90 Capsule 3    apixaban (ELIQUIS) 5mg Tab Take 1 Tablet by mouth 2 times a day. 180 Tablet 3    Potassium 99 MG Tab Take 99 mg by mouth every day.      MAGNESIUM PO Take 1 Tablet by mouth at bedtime as needed.       No current facility-administered medications for this visit.     She  has a past medical history of Anxiety, Atrial fibrillation (HCC), Diabetes (HCC), H/O Clostridium difficile infection, Hyperlipidemia, and Hypertension.  She  has a past surgical history that includes lumpectomy; trevor (N/A, 4/26/2022); and cardioversion (N/A, 4/26/2022).  Social History     Tobacco Use    Smoking status: Never    Smokeless tobacco: Never   Vaping Use    Vaping status: Never Used   Substance Use Topics    Alcohol use: Yes     Comment: OCC    Drug use: Never     Social History     Social History Narrative    Not on file     Family History   Problem Relation Age of Onset    Arrythmia Sister      Family Status   Relation Name Status    Sis  (Not Specified)   No partnership data on file         ROS    The pertinent  ROS findings can be seen in the HPI above.     All other systems reviewed and are negative     Objective:     BP (!) 142/80 (BP Location: Right arm, Patient Position: Sitting, BP Cuff Size: Adult)   Pulse 80   Temp 36.3 °C (97.4 °F) (Temporal)   Ht 1.702 m (5' 7\")   Wt 74.8 kg (165 lb)   SpO2 96%  Body mass index is 25.84 kg/m².      Physical Exam:    Constitutional: Alert, no distress.  Skin: No suspicious lesions  Eye: Equal, round and reactive, conjunctiva clear, lids normal.  ENMT: Lips without lesions, good dentition, oropharynx clear.  Neck: Trachea midline, no " masses, no thyromegaly. No cervical or supraclavicular lymphadenopathy.  Respiratory: Unlabored respiratory effort, lungs clear to auscultation, no wheezes, no ronchi.  Cardiovascular: Normal S1, S2, no murmur, no edema  Abdomen: Soft, non-tender, no masses, no hepatosplenomegaly.        Assessment and Plan:   The following treatment plan was discussed    All recent labs and provider notes reviewed    1. PTSD (post-traumatic stress disorder)    I recommend that she restart trazodone will help her with her sleep as well as anxiety/depression.  Also return to her counselor and continue therapy    - traZODone (DESYREL) 100 MG Tab; Take 1 Tablet by mouth every evening.  Dispense: 100 Tablet; Refill: 2  - ALPRAZolam (XANAX) 0.5 MG Tab; Take 1 Tablet by mouth at bedtime as needed for Sleep for up to 30 days.  Dispense: 30 Tablet; Refill: 0             Instructed to Follow up in clinic or ER for worsening symptoms, difficulty breathing, lack of expected recovery, or should new symptoms or problems arise.    Followup: Return in about 3 months (around 12/19/2024) for Reevaluation.

## 2024-10-01 ENCOUNTER — APPOINTMENT (OUTPATIENT)
Dept: RADIOLOGY | Facility: MEDICAL CENTER | Age: 69
End: 2024-10-01
Attending: FAMILY MEDICINE
Payer: MEDICARE

## 2024-10-01 DIAGNOSIS — I77.4 CELIAC ARTERY STENOSIS (HCC): ICD-10-CM

## 2024-10-01 PROCEDURE — 93978 VASCULAR STUDY: CPT | Mod: 26 | Performed by: INTERNAL MEDICINE

## 2024-10-01 PROCEDURE — 93978 VASCULAR STUDY: CPT

## 2024-10-02 ENCOUNTER — DOCUMENTATION (OUTPATIENT)
Dept: VASCULAR LAB | Facility: MEDICAL CENTER | Age: 69
End: 2024-10-02
Payer: MEDICARE

## 2024-10-15 ENCOUNTER — APPOINTMENT (OUTPATIENT)
Dept: VASCULAR LAB | Facility: MEDICAL CENTER | Age: 69
End: 2024-10-15
Payer: MEDICARE

## 2024-10-17 ENCOUNTER — APPOINTMENT (RX ONLY)
Dept: URBAN - METROPOLITAN AREA CLINIC 6 | Facility: CLINIC | Age: 69
Setting detail: DERMATOLOGY
End: 2024-10-17

## 2024-10-17 DIAGNOSIS — Z71.89 OTHER SPECIFIED COUNSELING: ICD-10-CM

## 2024-10-17 DIAGNOSIS — L57.0 ACTINIC KERATOSIS: ICD-10-CM

## 2024-10-17 DIAGNOSIS — Z80.8 FAMILY HISTORY OF MALIGNANT NEOPLASM OF OTHER ORGANS OR SYSTEMS: ICD-10-CM

## 2024-10-17 PROCEDURE — 99202 OFFICE O/P NEW SF 15 MIN: CPT | Mod: 25

## 2024-10-17 PROCEDURE — ? SUNSCREEN RECOMMENDATIONS

## 2024-10-17 PROCEDURE — ? LIQUID NITROGEN

## 2024-10-17 PROCEDURE — 17000 DESTRUCT PREMALG LESION: CPT

## 2024-10-17 PROCEDURE — ? COUNSELING

## 2024-10-17 PROCEDURE — ? DIAGNOSIS COMMENT

## 2024-10-17 ASSESSMENT — LOCATION DETAILED DESCRIPTION DERM: LOCATION DETAILED: LEFT PROXIMAL ULNAR DORSAL RING FINGER

## 2024-10-17 ASSESSMENT — LOCATION ZONE DERM: LOCATION ZONE: FINGER

## 2024-10-17 ASSESSMENT — LOCATION SIMPLE DESCRIPTION DERM: LOCATION SIMPLE: LEFT RING FINGER

## 2024-10-17 NOTE — HPI: WART (PATIENT REPORTED)
Where Is Your Wart Located?: Left ring finger\\nFamily history of melanoma
List Over The Counter Wart Treatments You Are Currently Using (Separate Each Name With A Comma):: Topical

## 2024-10-18 ENCOUNTER — OFFICE VISIT (OUTPATIENT)
Dept: VASCULAR LAB | Facility: MEDICAL CENTER | Age: 69
End: 2024-10-18
Attending: FAMILY MEDICINE
Payer: MEDICARE

## 2024-10-18 VITALS
BODY MASS INDEX: 25.74 KG/M2 | HEART RATE: 74 BPM | SYSTOLIC BLOOD PRESSURE: 149 MMHG | WEIGHT: 164 LBS | DIASTOLIC BLOOD PRESSURE: 82 MMHG | HEIGHT: 67 IN

## 2024-10-18 DIAGNOSIS — E78.2 MIXED HYPERLIPIDEMIA: ICD-10-CM

## 2024-10-18 DIAGNOSIS — I48.91 ATRIAL FIBRILLATION STATUS POST CARDIOVERSION (HCC): ICD-10-CM

## 2024-10-18 DIAGNOSIS — D68.69 SECONDARY HYPERCOAGULABILITY DISORDER (HCC): ICD-10-CM

## 2024-10-18 DIAGNOSIS — Z79.01 CHRONIC ANTICOAGULATION: ICD-10-CM

## 2024-10-18 DIAGNOSIS — E11.9 TYPE 2 DIABETES MELLITUS WITHOUT COMPLICATION, WITHOUT LONG-TERM CURRENT USE OF INSULIN (HCC): ICD-10-CM

## 2024-10-18 DIAGNOSIS — I77.4 CELIAC ARTERY STENOSIS (HCC): ICD-10-CM

## 2024-10-18 PROCEDURE — 99214 OFFICE O/P EST MOD 30 MIN: CPT | Performed by: FAMILY MEDICINE

## 2024-10-18 PROCEDURE — 99212 OFFICE O/P EST SF 10 MIN: CPT

## 2024-10-18 PROCEDURE — 3075F SYST BP GE 130 - 139MM HG: CPT | Performed by: FAMILY MEDICINE

## 2024-10-18 PROCEDURE — 3079F DIAST BP 80-89 MM HG: CPT | Performed by: FAMILY MEDICINE

## 2024-10-18 ASSESSMENT — ENCOUNTER SYMPTOMS
NAUSEA: 0
BRUISES/BLEEDS EASILY: 0
MYALGIAS: 0
NERVOUS/ANXIOUS: 1
CLAUDICATION: 0
FEVER: 0
CHILLS: 0
COUGH: 0
WHEEZING: 0
BLOOD IN STOOL: 0
SPUTUM PRODUCTION: 0
ORTHOPNEA: 0
PALPITATIONS: 0
WEAKNESS: 0
HEMOPTYSIS: 0
SHORTNESS OF BREATH: 0
PND: 0

## 2024-10-18 ASSESSMENT — FIBROSIS 4 INDEX: FIB4 SCORE: 1.63

## 2024-10-29 ENCOUNTER — APPOINTMENT (RX ONLY)
Dept: URBAN - METROPOLITAN AREA CLINIC 35 | Facility: CLINIC | Age: 69
Setting detail: DERMATOLOGY
End: 2024-10-29

## 2024-10-29 DIAGNOSIS — Z41.9 ENCOUNTER FOR PROCEDURE FOR PURPOSES OTHER THAN REMEDYING HEALTH STATE, UNSPECIFIED: ICD-10-CM

## 2024-10-29 PROCEDURE — ? BOTOX

## 2024-10-29 ASSESSMENT — LOCATION DETAILED DESCRIPTION DERM
LOCATION DETAILED: GLABELLA
LOCATION DETAILED: RIGHT CENTRAL FRONTAL SCALP
LOCATION DETAILED: RIGHT LATERAL FOREHEAD
LOCATION DETAILED: LEFT INFERIOR MEDIAL FOREHEAD
LOCATION DETAILED: RIGHT LATERAL CANTHUS
LOCATION DETAILED: RIGHT SUPERIOR CENTRAL MALAR CHEEK
LOCATION DETAILED: LEFT SUPERIOR CENTRAL MALAR CHEEK
LOCATION DETAILED: LEFT INFERIOR FOREHEAD
LOCATION DETAILED: LEFT INFERIOR TEMPLE
LOCATION DETAILED: RIGHT LATERAL EYEBROW
LOCATION DETAILED: RIGHT MEDIAL FOREHEAD
LOCATION DETAILED: LEFT FOREHEAD
LOCATION DETAILED: LEFT CENTRAL FRONTAL SCALP
LOCATION DETAILED: RIGHT INFERIOR FOREHEAD
LOCATION DETAILED: LEFT LATERAL EYEBROW

## 2024-10-29 ASSESSMENT — LOCATION SIMPLE DESCRIPTION DERM
LOCATION SIMPLE: LEFT SCALP
LOCATION SIMPLE: LEFT FOREHEAD
LOCATION SIMPLE: LEFT TEMPLE
LOCATION SIMPLE: RIGHT SCALP
LOCATION SIMPLE: RIGHT EYELID
LOCATION SIMPLE: LEFT CHEEK
LOCATION SIMPLE: RIGHT FOREHEAD
LOCATION SIMPLE: LEFT EYEBROW
LOCATION SIMPLE: GLABELLA
LOCATION SIMPLE: RIGHT EYEBROW
LOCATION SIMPLE: RIGHT CHEEK

## 2024-10-29 ASSESSMENT — LOCATION ZONE DERM
LOCATION ZONE: SCALP
LOCATION ZONE: EYELID
LOCATION ZONE: FACE

## 2024-10-29 NOTE — PROCEDURE: BOTOX
[facility] Units: 0
Show Glabellar Units: Yes
Additional Area 3 Location: glabella
Show Right And Left Brow Units: No
Consent: Written consent obtained. Risks include but not limited to lid/brow ptosis, bruising, swelling, diplopia, temporary effect, incomplete chemical denervation.
Dilution (U/0.1 Cc): 4
Additional Area 2 Location: crows feet
Periorbital Skin Units: 20
Additional Area 6 Location: bunny nose
Post-Care Instructions: Patient instructed to not lie down for 4 hours and limit physical activity for 24 hours.
Reconstitution Date (Optional): 10/28/2024
Detail Level: Detailed
Incrementing Botox Units: By 0.5 Units
Comments: First time Botox
Additional Area 5 Location: lip
Additional Area 1 Location: frontalis
Additional Area 4 Location: chin
Expiration Date (Month Year): 10/2026
Forehead Units: 6
Glabellar Complex Units: 24
Lot #: [id number]
Price (Use Numbers Only, No Special Characters Or $): 550.00

## 2024-11-15 ENCOUNTER — PATIENT MESSAGE (OUTPATIENT)
Dept: MEDICAL GROUP | Age: 69
End: 2024-11-15
Payer: MEDICARE

## 2024-11-19 ENCOUNTER — HOSPITAL ENCOUNTER (OUTPATIENT)
Facility: MEDICAL CENTER | Age: 69
End: 2024-11-19
Attending: FAMILY MEDICINE
Payer: MEDICARE

## 2024-11-19 ENCOUNTER — OFFICE VISIT (OUTPATIENT)
Dept: MEDICAL GROUP | Age: 69
End: 2024-11-19
Payer: MEDICARE

## 2024-11-19 VITALS
DIASTOLIC BLOOD PRESSURE: 70 MMHG | BODY MASS INDEX: 26.21 KG/M2 | SYSTOLIC BLOOD PRESSURE: 140 MMHG | WEIGHT: 167 LBS | HEIGHT: 67 IN | HEART RATE: 83 BPM | OXYGEN SATURATION: 94 % | TEMPERATURE: 97.8 F

## 2024-11-19 DIAGNOSIS — M79.652 LEFT THIGH PAIN: ICD-10-CM

## 2024-11-19 DIAGNOSIS — I10 ESSENTIAL HYPERTENSION: ICD-10-CM

## 2024-11-19 DIAGNOSIS — K92.89 GAS BLOAT SYNDROME: ICD-10-CM

## 2024-11-19 DIAGNOSIS — F43.10 PTSD (POST-TRAUMATIC STRESS DISORDER): ICD-10-CM

## 2024-11-19 DIAGNOSIS — E11.9 TYPE 2 DIABETES MELLITUS WITHOUT COMPLICATION, WITHOUT LONG-TERM CURRENT USE OF INSULIN (HCC): ICD-10-CM

## 2024-11-19 DIAGNOSIS — E78.49 OTHER HYPERLIPIDEMIA: ICD-10-CM

## 2024-11-19 LAB
HBA1C MFR BLD: 5.7 % (ref ?–5.8)
POCT INT CON NEG: NEGATIVE
POCT INT CON POS: POSITIVE

## 2024-11-19 PROCEDURE — 3077F SYST BP >= 140 MM HG: CPT | Performed by: FAMILY MEDICINE

## 2024-11-19 PROCEDURE — 99214 OFFICE O/P EST MOD 30 MIN: CPT | Performed by: FAMILY MEDICINE

## 2024-11-19 PROCEDURE — 82043 UR ALBUMIN QUANTITATIVE: CPT

## 2024-11-19 PROCEDURE — G2211 COMPLEX E/M VISIT ADD ON: HCPCS | Performed by: FAMILY MEDICINE

## 2024-11-19 PROCEDURE — 82570 ASSAY OF URINE CREATININE: CPT

## 2024-11-19 PROCEDURE — 3078F DIAST BP <80 MM HG: CPT | Performed by: FAMILY MEDICINE

## 2024-11-19 PROCEDURE — 92250 FUNDUS PHOTOGRAPHY W/I&R: CPT | Mod: 26 | Performed by: FAMILY MEDICINE

## 2024-11-19 PROCEDURE — 83036 HEMOGLOBIN GLYCOSYLATED A1C: CPT | Performed by: FAMILY MEDICINE

## 2024-11-19 RX ORDER — SEMAGLUTIDE 1.34 MG/ML
INJECTION, SOLUTION SUBCUTANEOUS
Qty: 3 ML | Refills: 0 | Status: SHIPPED | OUTPATIENT
Start: 2024-11-19

## 2024-11-19 RX ORDER — PROPRANOLOL HYDROCHLORIDE 60 MG/1
60 CAPSULE, EXTENDED RELEASE ORAL
Qty: 90 CAPSULE | Refills: 3 | Status: SHIPPED | OUTPATIENT
Start: 2024-11-19

## 2024-11-19 RX ORDER — LOSARTAN POTASSIUM 100 MG/1
100 TABLET ORAL DAILY
Qty: 90 TABLET | Refills: 3 | Status: SHIPPED | OUTPATIENT
Start: 2024-11-19

## 2024-11-19 RX ORDER — SEMAGLUTIDE 1.34 MG/ML
INJECTION, SOLUTION SUBCUTANEOUS
Qty: 0.5 ML | Refills: 0 | Status: SHIPPED | OUTPATIENT
Start: 2024-11-19

## 2024-11-19 RX ORDER — SEMAGLUTIDE 0.68 MG/ML
0.5 INJECTION, SOLUTION SUBCUTANEOUS
Qty: 3 ML | Refills: 0 | Status: SHIPPED | OUTPATIENT
Start: 2024-11-19

## 2024-11-19 RX ORDER — METFORMIN HYDROCHLORIDE 500 MG/1
500 TABLET, EXTENDED RELEASE ORAL DAILY
Qty: 90 TABLET | Refills: 3 | Status: SHIPPED | OUTPATIENT
Start: 2024-11-19

## 2024-11-19 RX ORDER — ATORVASTATIN CALCIUM 80 MG/1
80 TABLET, FILM COATED ORAL
Qty: 90 TABLET | Refills: 2 | Status: SHIPPED | OUTPATIENT
Start: 2024-11-19

## 2024-11-19 RX ORDER — TRAZODONE HYDROCHLORIDE 100 MG/1
100 TABLET ORAL NIGHTLY
Qty: 100 TABLET | Refills: 2 | Status: SHIPPED | OUTPATIENT
Start: 2024-11-19

## 2024-11-19 ASSESSMENT — FIBROSIS 4 INDEX: FIB4 SCORE: 1.63

## 2024-11-19 NOTE — PROGRESS NOTES
This medical record contains text that has been entered with the assistance of computer voice recognition and dictation software.  Therefore, it may contain unintended errors in text, spelling, punctuation, or grammar      Verbal consent was acquired by the patient to use [employer] ambient listening note generation during this visit Yes       Chief Complaint   Patient presents with    Diabetes Follow-up     DM F/U AND MEDICATIONS     Leg Pain     PT HAS PAIN ON HER LEFT LEG THAT HAS BEEN ONGOING FOR A COUPLE WEEKS AND COMES AND GOES. HAS A BLOOD CLOTTING CONCERN         Judy Blackburn is a 69 y.o. female here evaluation and management of: left thigh pain and diabetes      HPI:           1. Type 2 diabetes mellitus without complication, without long-term current use of insulin (HCC)      2. Left thigh pain  History of Present Illness  The patient is a 69-year-old female who presents for evaluation of multiple medical concerns.    She has been diagnosed with prediabetes and is currently on metformin 800 mg. She is considering the addition of Ozempic to her treatment regimen and is curious about the potential benefits of combining it with metformin. She is not seeking significant weight loss but rather a reduction in her abdominal fat. Additionally, she is interested in learning about vitamins that could aid in managing her constipation.    She has been experiencing leg pain and has consulted a vascular specialist. The specialist advised her to seek immediate medical attention if she experiences pain. Recently, she has been experiencing a dull ache in her thigh that started two days ago and persists throughout the day and night, disrupting her sleep. She has also noticed some pitting edema in her leg. She is concerned about the possibility of peripheral artery disease, a condition her mother had.    She has an upcoming appointment with a cardiologist. She has been informed that a biopsy may be necessary to  evaluate her thyroid condition. She has been waiting for this appointment for three months and is eager to proceed with the biopsy.    FAMILY HISTORY  Her mother had peripheral artery disease.          Current medicines (including changes today)  Current Outpatient Medications   Medication Sig Dispense Refill    atorvastatin (LIPITOR) 80 MG tablet Take 1 Tablet by mouth at bedtime. 90 Tablet 2    losartan (COZAAR) 100 MG Tab Take 1 Tablet by mouth every day. 90 Tablet 3    metFORMIN ER (GLUCOPHAGE XR) 500 MG TABLET SR 24 HR Take 1 Tablet by mouth every day. 90 Tablet 3    omeprazole (PRILOSEC) 20 MG delayed-release capsule Take 1 Capsule by mouth every day. 90 Capsule 2    propranolol LA (INDERAL LA) 60 MG CAPSULE SR 24 HR Take 1 Capsule by mouth every day. 90 Capsule 3    traZODone (DESYREL) 100 MG Tab Take 1 Tablet by mouth every evening. 100 Tablet 2    Semaglutide,0.25 or 0.5MG/DOS, (OZEMPIC, 0.25 OR 0.5 MG/DOSE,) 2 MG/1.5ML Solution Pen-injector MONTH #1 Inject 0.25 mg subcutaneously q. 7 days 0.5 mL 0    Semaglutide, 1 MG/DOSE, (OZEMPIC, 1 MG/DOSE,) 4 MG/3ML Solution Pen-injector inject 1mg SC Q7 days 3 mL 0    Semaglutide,0.25 or 0.5MG/DOS, (OZEMPIC, 0.25 OR 0.5 MG/DOSE,) 2 MG/3ML Solution Pen-injector Inject 0.5 mg under the skin every 7 days. Month #2, inject 0.5 mg subcutaneously q. 7 days 3 mL 0    apixaban (ELIQUIS) 5mg Tab Take 1 Tablet by mouth 2 times a day. 180 Tablet 3    Potassium 99 MG Tab Take 99 mg by mouth every day.      MAGNESIUM PO Take 1 Tablet by mouth at bedtime as needed.      mupirocin (BACTROBAN) 2 % Ointment Apply 1 Application topically 2 times a day. (Patient not taking: Reported on 11/19/2024) 22 g 0     No current facility-administered medications for this visit.     She  has a past medical history of Anxiety, Atrial fibrillation (HCC), Diabetes (HCC), H/O Clostridium difficile infection, Hyperlipidemia, and Hypertension.  She  has a past surgical history that includes lumpectomy;  "trevor (N/A, 4/26/2022); and cardioversion (N/A, 4/26/2022).  Social History     Tobacco Use    Smoking status: Never    Smokeless tobacco: Never   Vaping Use    Vaping status: Never Used   Substance Use Topics    Alcohol use: Yes     Comment: OCC    Drug use: Never     Social History     Social History Narrative    Not on file     Family History   Problem Relation Age of Onset    Arrythmia Sister      Family Status   Relation Name Status    Sis  (Not Specified)   No partnership data on file         ROS    The pertinent  ROS findings can be seen in the HPI above.     All other systems reviewed and are negative     Objective:     BP (!) 140/70 (BP Location: Left arm, Patient Position: Sitting, BP Cuff Size: Large adult)   Pulse 83   Temp 36.6 °C (97.8 °F) (Temporal)   Ht 1.702 m (5' 7\")   Wt 75.8 kg (167 lb)   SpO2 94%  Body mass index is 26.16 kg/m².      Physical Exam:    Constitutional: Alert, no distress.  Skin: No suspicious lesions  Eye: Equal, round and reactive, conjunctiva clear, lids normal.  ENMT: Lips without lesions, good dentition, oropharynx clear.  Neck: Trachea midline, no masses, no thyromegaly. No cervical or supraclavicular lymphadenopathy.  Respiratory: Unlabored respiratory effort, lungs clear to auscultation, no wheezes, no ronchi.  Cardiovascular: Normal S1, S2, no murmur, no edema  Abdomen: Soft, non-tender, no masses, no hepatosplenomegaly.        Assessment and Plan:   The following treatment plan was discussed    All recent labs and provider notes reviewed    1. Type 2 diabetes mellitus without complication, without long-term current use of insulin (HCC)    Judy would like to start Ozempic  She may only start if the thyroid biopsy comes back as negative for malignancy  She understands this clearly.    - POCT Hemoglobin A1C  - POCT Microalbumin Creat Ratio Urine; Future  - POCT Retinal Eye Exam  - metFORMIN ER (GLUCOPHAGE XR) 500 MG TABLET SR 24 HR; Take 1 Tablet by mouth every day.  " Dispense: 90 Tablet; Refill: 3  - Semaglutide,0.25 or 0.5MG/DOS, (OZEMPIC, 0.25 OR 0.5 MG/DOSE,) 2 MG/1.5ML Solution Pen-injector; MONTH #1 Inject 0.25 mg subcutaneously q. 7 days  Dispense: 0.5 mL; Refill: 0  - Semaglutide, 1 MG/DOSE, (OZEMPIC, 1 MG/DOSE,) 4 MG/3ML Solution Pen-injector; inject 1mg SC Q7 days  Dispense: 3 mL; Refill: 0  - Semaglutide,0.25 or 0.5MG/DOS, (OZEMPIC, 0.25 OR 0.5 MG/DOSE,) 2 MG/3ML Solution Pen-injector; Inject 0.5 mg under the skin every 7 days. Month #2, inject 0.5 mg subcutaneously q. 7 days  Dispense: 3 mL; Refill: 0    2. Left thigh pain  - US-EXTREMITY VENOUS LOWER UNILAT LEFT; Future  - US-ALEX SINGLE LEVEL BILAT; Future    3. Other hyperlipidemia  - atorvastatin (LIPITOR) 80 MG tablet; Take 1 Tablet by mouth at bedtime.  Dispense: 90 Tablet; Refill: 2    4. Essential hypertension  - losartan (COZAAR) 100 MG Tab; Take 1 Tablet by mouth every day.  Dispense: 90 Tablet; Refill: 3  - propranolol LA (INDERAL LA) 60 MG CAPSULE SR 24 HR; Take 1 Capsule by mouth every day.  Dispense: 90 Capsule; Refill: 3    5. Gas bloat syndrome  - omeprazole (PRILOSEC) 20 MG delayed-release capsule; Take 1 Capsule by mouth every day.  Dispense: 90 Capsule; Refill: 2    6. PTSD (post-traumatic stress disorder)  - traZODone (DESYREL) 100 MG Tab; Take 1 Tablet by mouth every evening.  Dispense: 100 Tablet; Refill: 2         Secondary to the complexity of this patient's illnesses and their interactions.  All problems listed were discussed during the office visit, medications were evaluated and complexities were discussed as well as plan for the future.      Instructed to Follow up in clinic or ER for worsening symptoms, difficulty breathing, lack of expected recovery, or should new symptoms or problems arise.    Followup: Return in about 3 months (around 2/19/2025) for Reevaluation, labs.

## 2024-11-20 ENCOUNTER — APPOINTMENT (OUTPATIENT)
Dept: MEDICAL GROUP | Age: 69
End: 2024-11-20
Payer: MEDICARE

## 2024-11-20 ENCOUNTER — HOSPITAL ENCOUNTER (OUTPATIENT)
Dept: RADIOLOGY | Facility: MEDICAL CENTER | Age: 69
End: 2024-11-20
Attending: FAMILY MEDICINE
Payer: MEDICARE

## 2024-11-20 DIAGNOSIS — M79.652 LEFT THIGH PAIN: ICD-10-CM

## 2024-11-20 LAB
CREAT UR-MCNC: 52.73 MG/DL
MICROALBUMIN UR-MCNC: <1.2 MG/DL
MICROALBUMIN/CREAT UR: NORMAL MG/G (ref 0–30)

## 2024-11-20 PROCEDURE — 93922 UPR/L XTREMITY ART 2 LEVELS: CPT | Mod: 26 | Performed by: INTERNAL MEDICINE

## 2024-11-20 PROCEDURE — 93971 EXTREMITY STUDY: CPT | Mod: 26,LT | Performed by: INTERNAL MEDICINE

## 2024-11-20 PROCEDURE — 93971 EXTREMITY STUDY: CPT | Mod: LT

## 2024-11-20 PROCEDURE — 93922 UPR/L XTREMITY ART 2 LEVELS: CPT

## 2024-11-22 LAB — RETINAL SCREEN: POSITIVE

## 2024-11-25 DIAGNOSIS — I48.91 ATRIAL FIBRILLATION STATUS POST CARDIOVERSION (HCC): ICD-10-CM

## 2024-11-25 NOTE — TELEPHONE ENCOUNTER
Received request via: Pharmacy    Was the patient seen in the last year in this department? Yes    Does the patient have an active prescription (recently filled or refills available) for medication(s) requested?  YES    Pharmacy Name: [facility]    Does the patient have long term Plus and need 100-day supply? (This applies to ALL medications) MEDICARE

## 2024-11-26 DIAGNOSIS — R94.111: ICD-10-CM

## 2024-11-27 DIAGNOSIS — R94.111: ICD-10-CM

## 2024-11-27 DIAGNOSIS — I48.91 ATRIAL FIBRILLATION STATUS POST CARDIOVERSION (HCC): ICD-10-CM

## 2024-11-27 NOTE — TELEPHONE ENCOUNTER
Patient messaged and stated that she is currently in the donut hole and can only do 30 days worth of medication

## 2024-12-03 ENCOUNTER — APPOINTMENT (OUTPATIENT)
Dept: URBAN - METROPOLITAN AREA CLINIC 6 | Facility: CLINIC | Age: 69
Setting detail: DERMATOLOGY
End: 2024-12-03

## 2024-12-03 DIAGNOSIS — L82.0 INFLAMED SEBORRHEIC KERATOSIS: ICD-10-CM

## 2024-12-03 DIAGNOSIS — L30.8 OTHER SPECIFIED DERMATITIS: ICD-10-CM

## 2024-12-03 DIAGNOSIS — L57.0 ACTINIC KERATOSIS: ICD-10-CM

## 2024-12-03 DIAGNOSIS — D22 MELANOCYTIC NEVI: ICD-10-CM

## 2024-12-03 DIAGNOSIS — Z71.89 OTHER SPECIFIED COUNSELING: ICD-10-CM

## 2024-12-03 DIAGNOSIS — L81.4 OTHER MELANIN HYPERPIGMENTATION: ICD-10-CM

## 2024-12-03 DIAGNOSIS — D18.0 HEMANGIOMA: ICD-10-CM

## 2024-12-03 DIAGNOSIS — D485 NEOPLASM OF UNCERTAIN BEHAVIOR OF SKIN: ICD-10-CM

## 2024-12-03 DIAGNOSIS — L82.1 OTHER SEBORRHEIC KERATOSIS: ICD-10-CM

## 2024-12-03 PROBLEM — D18.01 HEMANGIOMA OF SKIN AND SUBCUTANEOUS TISSUE: Status: ACTIVE | Noted: 2024-12-03

## 2024-12-03 PROBLEM — D22.5 MELANOCYTIC NEVI OF TRUNK: Status: ACTIVE | Noted: 2024-12-03

## 2024-12-03 PROBLEM — D48.5 NEOPLASM OF UNCERTAIN BEHAVIOR OF SKIN: Status: ACTIVE | Noted: 2024-12-03

## 2024-12-03 PROCEDURE — 99214 OFFICE O/P EST MOD 30 MIN: CPT | Mod: 25

## 2024-12-03 PROCEDURE — ? SUNSCREEN RECOMMENDATIONS

## 2024-12-03 PROCEDURE — ? COUNSELING

## 2024-12-03 PROCEDURE — 11102 TANGNTL BX SKIN SINGLE LES: CPT | Mod: 59

## 2024-12-03 PROCEDURE — ? MEDICATION COUNSELING

## 2024-12-03 PROCEDURE — ? PRESCRIPTION

## 2024-12-03 PROCEDURE — ? DIAGNOSIS COMMENT

## 2024-12-03 PROCEDURE — ? BIOPSY BY SHAVE METHOD

## 2024-12-03 PROCEDURE — ? LIQUID NITROGEN

## 2024-12-03 PROCEDURE — 17110 DESTRUCTION B9 LES UP TO 14: CPT

## 2024-12-03 RX ORDER — TRIAMCINOLONE ACETONIDE 1 MG/G
OINTMENT TOPICAL BID
Qty: 80 | Refills: 1 | Status: ERX | COMMUNITY
Start: 2024-12-03

## 2024-12-03 RX ORDER — FLUOROURACIL 5 MG/G
CREAM TOPICAL BID
Qty: 40 | Refills: 0 | Status: ERX | COMMUNITY
Start: 2024-12-03

## 2024-12-03 RX ADMIN — FLUOROURACIL: 5 CREAM TOPICAL at 00:00

## 2024-12-03 RX ADMIN — TRIAMCINOLONE ACETONIDE: 1 OINTMENT TOPICAL at 00:00

## 2024-12-03 ASSESSMENT — LOCATION DETAILED DESCRIPTION DERM
LOCATION DETAILED: PERIUMBILICAL SKIN
LOCATION DETAILED: LEFT INFERIOR MEDIAL MALAR CHEEK
LOCATION DETAILED: RIGHT VENTRAL PROXIMAL FOREARM
LOCATION DETAILED: RIGHT RADIAL DORSAL HAND
LOCATION DETAILED: RIGHT DISTAL PRETIBIAL REGION
LOCATION DETAILED: LEFT ANTERIOR PROXIMAL THIGH
LOCATION DETAILED: RIGHT MEDIAL BREAST 4-5:00 REGION
LOCATION DETAILED: LEFT ULNAR DORSAL HAND
LOCATION DETAILED: EPIGASTRIC SKIN
LOCATION DETAILED: NASAL SUPRATIP
LOCATION DETAILED: RIGHT MEDIAL SUPERIOR CHEST

## 2024-12-03 ASSESSMENT — LOCATION ZONE DERM
LOCATION ZONE: TRUNK
LOCATION ZONE: FACE
LOCATION ZONE: HAND
LOCATION ZONE: LEG
LOCATION ZONE: ARM
LOCATION ZONE: NOSE

## 2024-12-03 ASSESSMENT — LOCATION SIMPLE DESCRIPTION DERM
LOCATION SIMPLE: LEFT CHEEK
LOCATION SIMPLE: RIGHT FOREARM
LOCATION SIMPLE: NOSE
LOCATION SIMPLE: ABDOMEN
LOCATION SIMPLE: RIGHT BREAST
LOCATION SIMPLE: RIGHT HAND
LOCATION SIMPLE: LEFT HAND
LOCATION SIMPLE: LEFT THIGH
LOCATION SIMPLE: RIGHT PRETIBIAL REGION
LOCATION SIMPLE: CHEST

## 2024-12-03 NOTE — PROCEDURE: LIQUID NITROGEN
Consent: The patient's consent was obtained including but not limited to risks of crusting, scabbing, blistering, scarring, darker or lighter pigmentary change, recurrence, incomplete removal and infection.
Medical Necessity Clause: This procedure was medically necessary because the lesions that were treated were:
Include Z78.9 (Other Specified Conditions Influencing Health Status) As An Associated Diagnosis?: No
Spray Paint Text: The liquid nitrogen was applied to the skin utilizing a spray paint frosting technique.
Show Spray Paint Technique Variable?: Yes
Total Number Of Lesions Treated: 1
Duration Of Freeze Thaw-Cycle (Seconds): 0
Post-Care Instructions: I reviewed with the patient in detail post-care instructions. Patient is to wear sunprotection, and avoid picking at any of the treated lesions. Pt may apply Vaseline to crusted or scabbing areas.
Detail Level: Zone
Medical Necessity Information: It is in your best interest to select a reason for this procedure from the list below. All of these items fulfill various CMS LCD requirements except the new and changing color options.

## 2024-12-03 NOTE — HPI: FULL BODY SKIN EXAMINATION
What Type Of Note Output Would You Prefer (Optional)?: Standard Output
What Is The Reason For Today's Visit?: Initial Full Body Skin Examination
Additional History: Pt does not want “cuts” or “blisters” on face or hands

## 2024-12-03 NOTE — PROCEDURE: MEDICATION COUNSELING
Doxycycline Counseling:  Patient counseled regarding possible photosensitivity and increased risk for sunburn.  Patient instructed to avoid sunlight, if possible.  When exposed to sunlight, patients should wear protective clothing, sunglasses, and sunscreen.  The patient was instructed to call the office immediately if the following severe adverse effects occur:  hearing changes, easy bruising/bleeding, severe headache, or vision changes.  The patient verbalized understanding of the proper use and possible adverse effects of doxycycline.  All of the patient's questions and concerns were addressed.
Spironolactone Pregnancy And Lactation Text: This medication can cause feminization of the male fetus and should be avoided during pregnancy. The active metabolite is also found in breast milk.
Azelaic Acid Counseling: Patient counseled that medicine may cause skin irritation and to avoid applying near the eyes.  In the event of skin irritation, the patient was advised to reduce the amount of the drug applied or use it less frequently.   The patient verbalized understanding of the proper use and possible adverse effects of azelaic acid.  All of the patient's questions and concerns were addressed.
Tremfya Pregnancy And Lactation Text: The risk during pregnancy and breastfeeding is uncertain with this medication.
Nemluvio Pregnancy And Lactation Text: It is not known if Nemluvio causes fetal harm or is present in breast milk. Please proceed with caution if patients who are pregnant or breastfeeding.
Xolair Counseling:  Patient informed of potential adverse effects including but not limited to fever, muscle aches, rash and allergic reactions.  The patient verbalized understanding of the proper use and possible adverse effects of Xolair.  All of the patient's questions and concerns were addressed.
Zoryve Pregnancy And Lactation Text: It is unknown if this medication can cause problems during pregnancy and breastfeeding.
Rituxan Counseling:  I discussed with the patient the risks of Rituxan infusions. Side effects can include infusion reactions, severe drug rashes including mucocutaneous reactions, reactivation of latent hepatitis and other infections and rarely progressive multifocal leukoencephalopathy.  All of the patient's questions and concerns were addressed.
Methotrexate Counseling:  Patient counseled regarding adverse effects of methotrexate including but not limited to nausea, vomiting, abnormalities in liver function tests. Patients may develop mouth sores, rash, diarrhea, and abnormalities in blood counts. The patient understands that monitoring is required including LFT's and blood counts.  There is a rare possibility of scarring of the liver and lung problems that can occur when taking methotrexate. Persistent nausea, loss of appetite, pale stools, dark urine, cough, and shortness of breath should be reported immediately. Patient advised to discontinue methotrexate treatment at least three months before attempting to become pregnant.  I discussed the need for folate supplements while taking methotrexate.  These supplements can decrease side effects during methotrexate treatment. The patient verbalized understanding of the proper use and possible adverse effects of methotrexate.  All of the patient's questions and concerns were addressed.
Tazorac Pregnancy And Lactation Text: This medication is not safe during pregnancy. It is unknown if this medication is excreted in breast milk.
Imiquimod Counseling:  I discussed with the patient the risks of imiquimod including but not limited to erythema, scaling, itching, weeping, crusting, and pain.  Patient understands that the inflammatory response to imiquimod is variable from person to person and was educated regarded proper titration schedule.  If flu-like symptoms develop, patient knows to discontinue the medication and contact us.
Hydroxychloroquine Counseling:  I discussed with the patient that a baseline ophthalmologic exam is needed at the start of therapy and every year thereafter while on therapy. A CBC may also be warranted for monitoring.  The side effects of this medication were discussed with the patient, including but not limited to agranulocytosis, aplastic anemia, seizures, rashes, retinopathy, and liver toxicity. Patient instructed to call the office should any adverse effect occur.  The patient verbalized understanding of the proper use and possible adverse effects of Plaquenil.  All the patient's questions and concerns were addressed.
Hydroxychloroquine Pregnancy And Lactation Text: This medication has been shown to cause fetal harm but it isn't assigned a Pregnancy Risk Category. There are small amounts excreted in breast milk.
Topical Clindamycin Counseling: Patient counseled that this medication may cause skin irritation or allergic reactions.  In the event of skin irritation, the patient was advised to reduce the amount of the drug applied or use it less frequently.   The patient verbalized understanding of the proper use and possible adverse effects of clindamycin.  All of the patient's questions and concerns were addressed.
Doxycycline Pregnancy And Lactation Text: This medication is Pregnancy Category D and not consider safe during pregnancy. It is also excreted in breast milk but is considered safe for shorter treatment courses.
Azelaic Acid Pregnancy And Lactation Text: This medication is considered safe during pregnancy and breast feeding.
Rituxan Pregnancy And Lactation Text: This medication is Pregnancy Category C and it isn't know if it is safe during pregnancy. It is unknown if this medication is excreted in breast milk but similar antibodies are known to be excreted.
Xolair Pregnancy And Lactation Text: This medication is Pregnancy Category B and is considered safe during pregnancy. This medication is excreted in breast milk.
Benzoyl Peroxide Counseling: Patient counseled that medicine may cause skin irritation and bleach clothing.  In the event of skin irritation, the patient was advised to reduce the amount of the drug applied or use it less frequently.   The patient verbalized understanding of the proper use and possible adverse effects of benzoyl peroxide.  All of the patient's questions and concerns were addressed.
Methotrexate Pregnancy And Lactation Text: This medication is Pregnancy Category X and is known to cause fetal harm. This medication is excreted in breast milk.
Zyclara Counseling:  I discussed with the patient the risks of imiquimod including but not limited to erythema, scaling, itching, weeping, crusting, and pain.  Patient understands that the inflammatory response to imiquimod is variable from person to person and was educated regarded proper titration schedule.  If flu-like symptoms develop, patient knows to discontinue the medication and contact us.
Imiquimod Pregnancy And Lactation Text: This medication is Pregnancy Category C. It is unknown if this medication is excreted in breast milk.
Adbry Counseling: I discussed with the patient the risks of tralokinumab including but not limited to eye infection and irritation, cold sores, injection site reactions, worsening of asthma, allergic reactions and increased risk of parasitic infection.  Live vaccines should be avoided while taking tralokinumab. The patient understands that monitoring is required and they must alert us or the primary physician if symptoms of infection or other concerning signs are noted.
Klisyri Counseling:  I discussed with the patient the risks of Klisyri including but not limited to erythema, scaling, itching, weeping, crusting, and pain.
Low Dose Naltrexone Counseling- I discussed with the patient the potential risks and side effects of low dose naltrexone including but not limited to: more vivid dreams, headaches, nausea, vomiting, abdominal pain, fatigue, dizziness, and anxiety.
Topical Clindamycin Pregnancy And Lactation Text: This medication is Pregnancy Category B and is considered safe during pregnancy. It is unknown if it is excreted in breast milk.
Erythromycin Counseling:  I discussed with the patient the risks of erythromycin including but not limited to GI upset, allergic reaction, drug rash, diarrhea, increase in liver enzymes, and yeast infections.
Oxybutynin Counseling:  I discussed with the patient the risks of oxybutynin including but not limited to skin rash, drowsiness, dry mouth, difficulty urinating, and blurred vision.
Siliq Counseling:  I discussed with the patient the risks of Siliq including but not limited to new or worsening depression, suicidal thoughts and behavior, immunosuppression, malignancy, posterior leukoencephalopathy syndrome, and serious infections.  The patient understands that monitoring is required including a PPD at baseline and must alert us or the primary physician if symptoms of infection or other concerning signs are noted. There is also a special program designed to monitor depression which is required with Siliq.
Benzoyl Peroxide Pregnancy And Lactation Text: This medication is Pregnancy Category C. It is unknown if benzoyl peroxide is excreted in breast milk.
Adbry Pregnancy And Lactation Text: It is unknown if this medication will adversely affect pregnancy or breast feeding.
Cibinqo Counseling: I discussed with the patient the risks of Cibinqo therapy including but not limited to common cold, nausea, headache, cold sores, increased blood CPK levels, dizziness, UTIs, fatigue, acne, and vomitting. Live vaccines should be avoided.  This medication has been linked to serious infections; higher rate of mortality; malignancy and lymphoproliferative disorders; major adverse cardiovascular events; thrombosis; thrombocytopenia and lymphopenia; lipid elevations; and retinal detachment.
Prednisone Counseling:  I discussed with the patient the risks of prolonged use of prednisone including but not limited to weight gain, insomnia, osteoporosis, mood changes, diabetes, susceptibility to infection, glaucoma and high blood pressure.  In cases where prednisone use is prolonged, patients should be monitored with blood pressure checks, serum glucose levels and an eye exam.  Additionally, the patient may need to be placed on GI prophylaxis, PCP prophylaxis, and calcium and vitamin D supplementation and/or a bisphosphonate.  The patient verbalized understanding of the proper use and the possible adverse effects of prednisone.  All of the patient's questions and concerns were addressed.
Prednisone Pregnancy And Lactation Text: This medication is Pregnancy Category C and it isn't know if it is safe during pregnancy. This medication is excreted in breast milk.
Klisyri Pregnancy And Lactation Text: It is unknown if this medication can harm a developing fetus or if it is excreted in breast milk.
Bimzelx Counseling:  I discussed with the patient the risks of Bimzelx including but not limited to depression, immunosuppression, allergic reactions and infections.  The patient understands that monitoring is required including a PPD at baseline and must alert us or the primary physician if symptoms of infection or other concerning signs are noted.
Cibinqo Pregnancy And Lactation Text: It is unknown if this medication will adversely affect pregnancy or breast feeding.  You should not take this medication if you are currently pregnant or planning a pregnancy or while breastfeeding.
Topical Ketoconazole Counseling: Patient counseled that this medication may cause skin irritation or allergic reactions.  In the event of skin irritation, the patient was advised to reduce the amount of the drug applied or use it less frequently.   The patient verbalized understanding of the proper use and possible adverse effects of ketoconazole.  All of the patient's questions and concerns were addressed.
Low Dose Naltrexone Pregnancy And Lactation Text: Naltrexone is pregnancy category C.  There have been no adequate and well-controlled studies in pregnant women.  It should be used in pregnancy only if the potential benefit justifies the potential risk to the fetus.   Limited data indicates that naltrexone is minimally excreted into breastmilk.
Erythromycin Pregnancy And Lactation Text: This medication is Pregnancy Category B and is considered safe during pregnancy. It is also excreted in breast milk.
Metronidazole Counseling:  I discussed with the patient the risks of metronidazole including but not limited to seizures, nausea/vomiting, a metallic taste in the mouth, nausea/vomiting and severe allergy.
Carac Counseling:  I discussed with the patient the risks of Carac including but not limited to erythema, scaling, itching, weeping, crusting, and pain.
Winlevi Counseling:  I discussed with the patient the risks of topical clascoterone including but not limited to erythema, scaling, itching, and stinging. Patient voiced their understanding.
Cellcept Pregnancy And Lactation Text: This medication is Pregnancy Category D and isn't considered safe during pregnancy. It is unknown if this medication is excreted in breast milk.
Hydroxyzine Pregnancy And Lactation Text: This medication is not safe during pregnancy and should not be taken. It is also excreted in breast milk and breast feeding isn't recommended.
Valtrex Pregnancy And Lactation Text: this medication is Pregnancy Category B and is considered safe during pregnancy. This medication is not directly found in breast milk but it's metabolite acyclovir is present.
Dapsone Pregnancy And Lactation Text: This medication is Pregnancy Category C and is not considered safe during pregnancy or breast feeding.
Ketoconazole Counseling:   Patient counseled regarding improving absorption with orange juice.  Adverse effects include but are not limited to breast enlargement, headache, diarrhea, nausea, upset stomach, liver function test abnormalities, taste disturbance, and stomach pain.  There is a rare possibility of liver failure that can occur when taking ketoconazole. The patient understands that monitoring of LFTs may be required, especially at baseline. The patient verbalized understanding of the proper use and possible adverse effects of ketoconazole.  All of the patient's questions and concerns were addressed.
Xelalexz Pregnancy And Lactation Text: This medication is Pregnancy Category D and is not considered safe during pregnancy.  The risk during breast feeding is also uncertain.
Finasteride Female Counseling: Finasteride Counseling:  I discussed with the patient the risks of use of finasteride including but not limited to decreased libido and sexual dysfunction. Explained the teratogenic nature of the medication and stressed the importance of not getting pregnant during treatment. All of the patient's questions and concerns were addressed.
Soolantra Counseling: I discussed with the patients the risks of topial Soolantra. This is a medicine which decreases the number of mites and inflammation in the skin. You experience burning, stinging, eye irritation or allergic reactions.  Please call our office if you develop any problems from using this medication.
Erivedge Pregnancy And Lactation Text: This medication is Pregnancy Category X and is absolutely contraindicated during pregnancy. It is unknown if it is excreted in breast milk.
Soolantra Pregnancy And Lactation Text: This medication is Pregnancy Category C. This medication is considered safe during breast feeding.
Libtayo Counseling- I discussed with the patient the risks of Libtayo including but not limited to nausea, vomiting, diarrhea, and bone or muscle pain.  The patient verbalized understanding of the proper use and possible adverse effects of Libtayo.  All of the patient's questions and concerns were addressed.
Opioid Pregnancy And Lactation Text: These medications can lead to premature delivery and should be avoided during pregnancy. These medications are also present in breast milk in small amounts.
Tetracycline Counseling: Patient counseled regarding possible photosensitivity and increased risk for sunburn.  Patient instructed to avoid sunlight, if possible.  When exposed to sunlight, patients should wear protective clothing, sunglasses, and sunscreen.  The patient was instructed to call the office immediately if the following severe adverse effects occur:  hearing changes, easy bruising/bleeding, severe headache, or vision changes.  The patient verbalized understanding of the proper use and possible adverse effects of tetracycline.  All of the patient's questions and concerns were addressed. Patient understands to avoid pregnancy while on therapy due to potential birth defects.
Cephalexin Counseling: I counseled the patient regarding use of cephalexin as an antibiotic for prophylactic and/or therapeutic purposes. Cephalexin (commonly prescribed under brand name Keflex) is a cephalosporin antibiotic which is active against numerous classes of bacteria, including most skin bacteria. Side effects may include nausea, diarrhea, gastrointestinal upset, rash, hives, yeast infections, and in rare cases, hepatitis, kidney disease, seizures, fever, confusion, neurologic symptoms, and others. Patients with severe allergies to penicillin medications are cautioned that there is about a 10% incidence of cross-reactivity with cephalosporins. When possible, patients with penicillin allergies should use alternatives to cephalosporins for antibiotic therapy.
Zepbound Pregnancy And Lactation Text: The fetal risk of this medication is unknown and taking while pregnant is not recommended. It is unknown if this medication is present in breast milk.
Stelara Pregnancy And Lactation Text: This medication is Pregnancy Category B and is considered safe during pregnancy. It is unknown if this medication is excreted in breast milk.
Taltz Counseling: I discussed with the patient the risks of ixekizumab including but not limited to immunosuppression, serious infections, worsening of inflammatory bowel disease and drug reactions.  The patient understands that monitoring is required including a PPD at baseline and must alert us or the primary physician if symptoms of infection or other concerning signs are noted.
Cyclophosphamide Counseling:  I discussed with the patient the risks of cyclophosphamide including but not limited to hair loss, hormonal abnormalities, decreased fertility, abdominal pain, diarrhea, nausea and vomiting, bone marrow suppression and infection. The patient understands that monitoring is required while taking this medication.
Infliximab Counseling:  I discussed with the patient the risks of infliximab including but not limited to myelosuppression, immunosuppression, autoimmune hepatitis, demyelinating diseases, lymphoma, and serious infections.  The patient understands that monitoring is required including a PPD at baseline and must alert us or the primary physician if symptoms of infection or other concerning signs are noted.
Winlevi Pregnancy And Lactation Text: This medication is considered safe during pregnancy and breastfeeding.
Finasteride Pregnancy And Lactation Text: This medication is absolutely contraindicated during pregnancy. It is unknown if it is excreted in breast milk.
Eucrisa Counseling: Patient may experience a mild burning sensation during topical application. Eucrisa is not approved in children less than 2 years of age.
Gabapentin Counseling: I discussed with the patient the risks of gabapentin including but not limited to dizziness, somnolence, fatigue and ataxia.
Ketoconazole Pregnancy And Lactation Text: This medication is Pregnancy Category C and it isn't know if it is safe during pregnancy. It is also excreted in breast milk and breast feeding isn't recommended.
Use Enhanced Medication Counseling?: No
Topical Retinoid counseling:  Patient advised to apply a pea-sized amount only at bedtime and wait 30 minutes after washing their face before applying.  If too drying, patient may add a non-comedogenic moisturizer. The patient verbalized understanding of the proper use and possible adverse effects of retinoids.  All of the patient's questions and concerns were addressed.
Gabapentin Pregnancy And Lactation Text: This medication is Pregnancy Category C and isn't considered safe during pregnancy. It is excreted in breast milk.
Libtayo Pregnancy And Lactation Text: This medication is contraindicated in pregnancy and when breast feeding.
Cephalexin Pregnancy And Lactation Text: This medication is Pregnancy Category B and considered safe during pregnancy.  It is also excreted in breast milk but can be used safely for shorter doses.
Tetracycline Pregnancy And Lactation Text: This medication is Pregnancy Category D and not consider safe during pregnancy. It is also excreted in breast milk.
Cyclophosphamide Pregnancy And Lactation Text: This medication is Pregnancy Category D and it isn't considered safe during pregnancy. This medication is excreted in breast milk.
Terbinafine Counseling: Patient counseling regarding adverse effects of terbinafine including but not limited to headache, diarrhea, rash, upset stomach, liver function test abnormalities, itching, taste/smell disturbance, nausea, abdominal pain, and flatulence.  There is a rare possibility of liver failure that can occur when taking terbinafine.  The patient understands that a baseline LFT and kidney function test may be required. The patient verbalized understanding of the proper use and possible adverse effects of terbinafine.  All of the patient's questions and concerns were addressed.
Eucrisa Pregnancy And Lactation Text: This medication has not been assigned a Pregnancy Risk Category but animal studies failed to show danger with the topical medication. It is unknown if the medication is excreted in breast milk.
Birth Control Pills Counseling: Birth Control Pill Counseling: I discussed with the patient the potential side effects of OCPs including but not limited to increased risk of stroke, heart attack, thrombophlebitis, deep venous thrombosis, hepatic adenomas, breast changes, GI upset, headaches, and depression.  The patient verbalized understanding of the proper use and possible adverse effects of OCPs. All of the patient's questions and concerns were addressed.
VTAMA Counseling: I discussed with the patient that VTAMA is not for use in the eyes, mouth or mouth. They should call the office if they develop any signs of allergic reactions to VTAMA. The patient verbalized understanding of the proper use and possible adverse effects of VTAMA.  All of the patient's questions and concerns were addressed.
Glycopyrrolate Counseling:  I discussed with the patient the risks of glycopyrrolate including but not limited to skin rash, drowsiness, dry mouth, difficulty urinating, and blurred vision.
Birth Control Pills Pregnancy And Lactation Text: This medication should be avoided if pregnant and for the first 30 days post-partum.
Terbinafine Pregnancy And Lactation Text: This medication is Pregnancy Category B and is considered safe during pregnancy. It is also excreted in breast milk and breast feeding isn't recommended.
Clindamycin Counseling: I counseled the patient regarding use of clindamycin as an antibiotic for prophylactic and/or therapeutic purposes. Clindamycin is active against numerous classes of bacteria, including skin bacteria. Side effects may include nausea, diarrhea, gastrointestinal upset, rash, hives, yeast infections, and in rare cases, colitis.
Aklief counseling:  Patient advised to apply a pea-sized amount only at bedtime and wait 30 minutes after washing their face before applying.  If too drying, patient may add a non-comedogenic moisturizer.  The most commonly reported side effects including irritation, redness, scaling, dryness, stinging, burning, itching, and increased risk of sunburn.  The patient verbalized understanding of the proper use and possible adverse effects of retinoids.  All of the patient's questions and concerns were addressed.
Odomzo Counseling- I discussed with the patient the risks of Odomzo including but not limited to nausea, vomiting, diarrhea, constipation, weight loss, changes in the sense of taste, decreased appetite, muscle spasms, and hair loss.  The patient verbalized understanding of the proper use and possible adverse effects of Odomzo.  All of the patient's questions and concerns were addressed.
Aklief Pregnancy And Lactation Text: It is unknown if this medication is safe to use during pregnancy.  It is unknown if this medication is excreted in breast milk.  Breastfeeding women should use the topical cream on the smallest area of the skin for the shortest time needed while breastfeeding.  Do not apply to nipple and areola.
Tremfya Counseling: I discussed with the patient the risks of guselkumab including but not limited to immunosuppression, serious infections, and drug reactions.  The patient understands that monitoring is required including a PPD at baseline and must alert us or the primary physician if symptoms of infection or other concerning signs are noted.
Nemluvio Counseling: I discussed with the patient the risks of nemolizumab including but not limited to headache, gastrointestinal complaints, nasopharyngitis, musculoskeletal complaints, injection site reactions, and allergic reactions. The patient understands that monitoring is required and they must alert us or the primary physician if any side effects are noted.
Hydroquinone Counseling:  Patient advised that medication may result in skin irritation, lightening (hypopigmentation), dryness, and burning.  In the event of skin irritation, the patient was advised to reduce the amount of the drug applied or use it less frequently.  Rarely, spots that are treated with hydroquinone can become darker (pseudoochronosis).  Should this occur, patient instructed to stop medication and call the office. The patient verbalized understanding of the proper use and possible adverse effects of hydroquinone.  All of the patient's questions and concerns were addressed.
Cyclosporine Counseling:  I discussed with the patient the risks of cyclosporine including but not limited to hypertension, gingival hyperplasia,myelosuppression, immunosuppression, liver damage, kidney damage, neurotoxicity, lymphoma, and serious infections. The patient understands that monitoring is required including baseline blood pressure, CBC, CMP, lipid panel and uric acid, and then 1-2 times monthly CMP and blood pressure.
Zoryve Counseling:  I discussed with the patient that Zoryve is not for use in the eyes, mouth or vagina. The most commonly reported side effects include diarrhea, headache, insomnia, application site pain, upper respiratory tract infections, and urinary tract infections.  All of the patient's questions and concerns were addressed.
Glycopyrrolate Pregnancy And Lactation Text: This medication is Pregnancy Category B and is considered safe during pregnancy. It is unknown if it is excreted breast milk.
Spironolactone Counseling: Patient advised regarding risks of diarrhea, abdominal pain, hyperkalemia, birth defects (for female patients), liver toxicity and renal toxicity. The patient may need blood work to monitor liver and kidney function and potassium levels while on therapy. The patient verbalized understanding of the proper use and possible adverse effects of spironolactone.  All of the patient's questions and concerns were addressed.
Tazorac Counseling:  Patient advised that medication is irritating and drying.  Patient may need to apply sparingly and wash off after an hour before eventually leaving it on overnight.  The patient verbalized understanding of the proper use and possible adverse effects of tazorac.  All of the patient's questions and concerns were addressed.
Clindamycin Pregnancy And Lactation Text: This medication can be used in pregnancy if certain situations. Clindamycin is also present in breast milk.
Cantharidin Pregnancy And Lactation Text: This medication has not been proven safe during pregnancy. It is unknown if this medication is excreted in breast milk.
Quinolones Pregnancy And Lactation Text: This medication is Pregnancy Category C and it isn't know if it is safe during pregnancy. It is also excreted in breast milk.
Olanzapine Pregnancy And Lactation Text: This medication is pregnancy category C.   There are no adequate and well controlled trials with olanzapine in pregnant females.  Olanzapine should be used during pregnancy only if the potential benefit justifies the potential risk to the fetus.   In a study in lactating healthy women, olanzapine was excreted in breast milk.  It is recommended that women taking olanzapine should not breast feed.
Ivermectin Pregnancy And Lactation Text: This medication is Pregnancy Category C and it isn't known if it is safe during pregnancy. It is also excreted in breast milk.
Semaglutide Counseling: I reviewed the possible side effects including: thyroid tumors, kidney disease, gallbladder disease, abdominal pain, constipation, diarrhea, nausea, vomiting and pancreatitis. Do not take this medication if you have a history or family history of multiple endocrine neoplasia syndrome type 2. Side effects reviewed, pt to contact office should one occur.
Skyrizi Counseling: I discussed with the patient the risks of risankizumab-rzaa including but not limited to immunosuppression, and serious infections.  The patient understands that monitoring is required including a PPD at baseline and must alert us or the primary physician if symptoms of infection or other concerning signs are noted.
5-Fu Counseling: 5-Fluorouracil Counseling:  I discussed with the patient the risks of 5-fluorouracil including but not limited to erythema, scaling, itching, weeping, crusting, and pain.
Cimetidine Counseling:  I discussed with the patient the risks of Cimetidine including but not limited to gynecomastia, headache, diarrhea, nausea, drowsiness, arrhythmias, pancreatitis, skin rashes, psychosis, bone marrow suppression and kidney toxicity.
Bexarotene Pregnancy And Lactation Text: This medication is Pregnancy Category X and should not be given to women who are pregnant or may become pregnant. This medication should not be used if you are breast feeding.
Isotretinoin Counseling: Patient should get monthly blood tests, not donate blood, not drive at night if vision affected, not share medication, and not undergo elective surgery for 6 months after tx completed. Side effects reviewed, pt to contact office should one occur.
Dutasteride Male Counseling: Dustasteride Counseling:  I discussed with the patient the risks of use of dutasteride including but not limited to decreased libido, decreased ejaculate volume, and gynecomastia. Women who can become pregnant should not handle medication.  All of the patient's questions and concerns were addressed.
5-Fu Pregnancy And Lactation Text: This medication is Pregnancy Category X and contraindicated in pregnancy and in women who may become pregnant. It is unknown if this medication is excreted in breast milk.
Rinvoq Pregnancy And Lactation Text: Based on animal studies, Rinvoq may cause embryo-fetal harm when administered to pregnant women.  The medication should not be used in pregnancy.  Breastfeeding is not recommended during treatment and for 6 days after the last dose.
Griseofulvin Counseling:  I discussed with the patient the risks of griseofulvin including but not limited to photosensitivity, cytopenia, liver damage, nausea/vomiting and severe allergy.  The patient understands that this medication is best absorbed when taken with a fatty meal (e.g., ice cream or french fries).
Topical Sulfur Applications Counseling: Topical Sulfur Counseling: Patient counseled that this medication may cause skin irritation or allergic reactions.  In the event of skin irritation, the patient was advised to reduce the amount of the drug applied or use it less frequently.   The patient verbalized understanding of the proper use and possible adverse effects of topical sulfur application.  All of the patient's questions and concerns were addressed.
Dupixent Counseling: I discussed with the patient the risks of dupilumab including but not limited to eye infection and irritation, cold sores, injection site reactions, worsening of asthma, allergic reactions and increased risk of parasitic infection.  Live vaccines should be avoided while taking dupilumab. Dupilumab will also interact with certain medications such as warfarin and cyclosporine. The patient understands that monitoring is required and they must alert us or the primary physician if symptoms of infection or other concerning signs are noted.
Rhofade Counseling: Rhofade is a topical medication which can decrease superficial blood flow where applied. Side effects are uncommon and include stinging, redness and allergic reactions.
Opzelura Pregnancy And Lactation Text: There is insufficient data to evaluate drug-associated risk for major birth defects, miscarriage, or other adverse maternal or fetal outcomes.  There is a pregnancy registry that monitors pregnancy outcomes in pregnant persons exposed to the medication during pregnancy.  It is unknown if this medication is excreted in breast milk.  Do not breastfeed during treatment and for about 4 weeks after the last dose.
Dupixent Pregnancy And Lactation Text: This medication likely crosses the placenta but the risk for the fetus is uncertain. This medication is excreted in breast milk.
Picato Counseling:  I discussed with the patient the risks of Picato including but not limited to erythema, scaling, itching, weeping, crusting, and pain.
Oral Minoxidil Counseling- I discussed with the patient the risks of oral minoxidil including but not limited to shortness of breath, swelling of the feet or ankles, dizziness, lightheadedness, unwanted hair growth and allergic reaction.  The patient verbalized understanding of the proper use and possible adverse effects of oral minoxidil.  All of the patient's questions and concerns were addressed.
Azithromycin Counseling:  I discussed with the patient the risks of azithromycin including but not limited to GI upset, allergic reaction, drug rash, diarrhea, and yeast infections.
Rifampin Counseling: I discussed with the patient the risks of rifampin including but not limited to liver damage, kidney damage, red-orange body fluids, nausea/vomiting and severe allergy.
Spevigo Counseling: I discussed with the patient the risks of Spevigo including but not limited to fatigue, nasuea, vomiting, headache, pruritus, urinary tract infection, an infusion related reactions.  The patient understands that monitoring is required including screening for tuberculosis at baseline and yearly screening thereafter while continuing Spevigo therapy. They should contact us if symptoms of infection or other concerning signs are noted.
Azathioprine Counseling:  I discussed with the patient the risks of azathioprine including but not limited to myelosuppression, immunosuppression, hepatotoxicity, lymphoma, and infections.  The patient understands that monitoring is required including baseline LFTs, Creatinine, possible TPMP genotyping and weekly CBCs for the first month and then every 2 weeks thereafter.  The patient verbalized understanding of the proper use and possible adverse effects of azathioprine.  All of the patient's questions and concerns were addressed.
Doxepin Counseling:  Patient advised that the medication is sedating and not to drive a car after taking this medication. Patient informed of potential adverse effects including but not limited to dry mouth, urinary retention, and blurry vision.  The patient verbalized understanding of the proper use and possible adverse effects of doxepin.  All of the patient's questions and concerns were addressed.
Isotretinoin Pregnancy And Lactation Text: This medication is Pregnancy Category X and is considered extremely dangerous during pregnancy. It is unknown if it is excreted in breast milk.
Topical Sulfur Applications Pregnancy And Lactation Text: This medication is Pregnancy Category C and has an unknown safety profile during pregnancy. It is unknown if this topical medication is excreted in breast milk.
Hyrimoz Counseling:  I discussed with the patient the risks of adalimumab including but not limited to myelosuppression, immunosuppression, autoimmune hepatitis, demyelinating diseases, lymphoma, and serious infections.  The patient understands that monitoring is required including a PPD at baseline and must alert us or the primary physician if symptoms of infection or other concerning signs are noted.
Sotyktu Counseling:  I discussed the most common side effects of Sotyktu including: common cold, sore throat, sinus infections, cold sores, canker sores, folliculitis, and acne.? I also discussed more serious side effects of Sotyktu including but not limited to: serious allergic reactions; increased risk for infections such as TB; cancers such as lymphomas; rhabdomyolysis and elevated CPK; and elevated triglycerides and liver enzymes.?
Rhofade Pregnancy And Lactation Text: This medication has not been assigned a Pregnancy Risk Category. It is unknown if the medication is excreted in breast milk.
Dutasteride Female Counseling: Dutasteride Counseling:  I discussed with the patient the risks of use of dutasteride including but not limited to decreased libido and sexual dysfunction. Explained the teratogenic nature of the medication and stressed the importance of not getting pregnant during treatment. All of the patient's questions and concerns were addressed.
Drysol Counseling:  I discussed with the patient the risks of drysol/aluminum chloride including but not limited to skin rash, itching, irritation, burning.
Colchicine Counseling:  Patient counseled regarding adverse effects including but not limited to stomach upset (nausea, vomiting, stomach pain, or diarrhea).  Patient instructed to limit alcohol consumption while taking this medication.  Colchicine may reduce blood counts especially with prolonged use.  The patient understands that monitoring of kidney function and blood counts may be required, especially at baseline. The patient verbalized understanding of the proper use and possible adverse effects of colchicine.  All of the patient's questions and concerns were addressed.
Griseofulvin Pregnancy And Lactation Text: This medication is Pregnancy Category X and is known to cause serious birth defects. It is unknown if this medication is excreted in breast milk but breast feeding should be avoided.
Tranexamic Acid Counseling:  Patient advised of the small risk of bleeding problems with tranexamic acid. They were also instructed to call if they developed any nausea, vomiting or diarrhea. All of the patient's questions and concerns were addressed.
Solaraze Counseling:  I discussed with the patient the risks of Solaraze including but not limited to erythema, scaling, itching, weeping, crusting, and pain.
Dutasteride Pregnancy And Lactation Text: This medication is absolutely contraindicated in women, especially during pregnancy and breast feeding. Feminization of male fetuses is possible if taking while pregnant.
Tranexamic Acid Pregnancy And Lactation Text: It is unknown if this medication is safe during pregnancy or breast feeding.
Azithromycin Pregnancy And Lactation Text: This medication is considered safe during pregnancy and is also secreted in breast milk.
Oral Minoxidil Pregnancy And Lactation Text: This medication should only be used when clearly needed if you are pregnant, attempting to become pregnant or breast feeding.
Wegovy Counseling: I reviewed the possible side effects including: thyroid tumors, kidney disease, gallbladder disease, abdominal pain, constipation, diarrhea, nausea, vomiting and pancreatitis. Do not take this medication if you have a history or family history of multiple endocrine neoplasia syndrome type 2. Side effects reviewed, pt to contact office should one occur.
Rifampin Pregnancy And Lactation Text: This medication is Pregnancy Category C and it isn't know if it is safe during pregnancy. It is also excreted in breast milk and should not be used if you are breast feeding.
Otezla Counseling: The side effects of Otezla were discussed with the patient, including but not limited to worsening or new depression, weight loss, diarrhea, nausea, upper respiratory tract infection, and headache. Patient instructed to call the office should any adverse effect occur.  The patient verbalized understanding of the proper use and possible adverse effects of Otezla.  All the patient's questions and concerns were addressed.
Doxepin Pregnancy And Lactation Text: This medication is Pregnancy Category C and it isn't known if it is safe during pregnancy. It is also excreted in breast milk and breast feeding isn't recommended.
High Dose Vitamin A Counseling: Side effects reviewed, pt to contact office should one occur.
Spevigo Pregnancy And Lactation Text: The risk during pregnancy and breastfeeding is uncertain with this medication. This medication does cross the placenta. It is unknown if this medication is found in breast milk.
Sotyktu Pregnancy And Lactation Text: There is insufficient data to evaluate whether or not Sotyktu is safe to use during pregnancy.? ?It is not known if Sotyktu passes into breast milk and whether or not it is safe to use when breastfeeding.??
Itraconazole Counseling:  I discussed with the patient the risks of itraconazole including but not limited to liver damage, nausea/vomiting, neuropathy, and severe allergy.  The patient understands that this medication is best absorbed when taken with acidic beverages such as non-diet cola or ginger ale.  The patient understands that monitoring is required including baseline LFTs and repeat LFTs at intervals.  The patient understands that they are to contact us or the primary physician if concerning signs are noted.
Wartpeel Counseling:  I discussed with the patient the risks of Wartpeel including but not limited to erythema, scaling, itching, weeping, crusting, and pain.
Dapsone Counseling: I discussed with the patient the risks of dapsone including but not limited to hemolytic anemia, agranulocytosis, rashes, methemoglobinemia, kidney failure, peripheral neuropathy, headaches, GI upset, and liver toxicity.  Patients who start dapsone require monitoring including baseline LFTs and weekly CBCs for the first month, then every month thereafter.  The patient verbalized understanding of the proper use and possible adverse effects of dapsone.  All of the patient's questions and concerns were addressed.
Valtrex Counseling: I discussed with the patient the risks of valacyclovir including but not limited to kidney damage, nausea, vomiting and severe allergy.  The patient understands that if the infection seems to be worsening or is not improving, they are to call.
Finasteride Male Counseling: Finasteride Counseling:  I discussed with the patient the risks of use of finasteride including but not limited to decreased libido, decreased ejaculate volume, gynecomastia, and depression. Women should not handle medication.  All of the patient's questions and concerns were addressed.
Xeljanz Counseling: I discussed with the patient the risks of Xeljanz therapy including increased risk of infection, liver issues, headache, diarrhea, or cold symptoms. Live vaccines should be avoided. They were instructed to call if they have any problems.
Solaraze Pregnancy And Lactation Text: This medication is Pregnancy Category B and is considered safe. There is some data to suggest avoiding during the third trimester. It is unknown if this medication is excreted in breast milk.
Bactrim Counseling:  I discussed with the patient the risks of sulfa antibiotics including but not limited to GI upset, allergic reaction, drug rash, diarrhea, dizziness, photosensitivity, and yeast infections.  Rarely, more serious reactions can occur including but not limited to aplastic anemia, agranulocytosis, methemoglobinemia, blood dyscrasias, liver or kidney failure, lung infiltrates or desquamative/blistering drug rashes.
Erivedge Counseling- I discussed with the patient the risks of Erivedge including but not limited to nausea, vomiting, diarrhea, constipation, weight loss, changes in the sense of taste, decreased appetite, muscle spasms, and hair loss.  The patient verbalized understanding of the proper use and possible adverse effects of Erivedge.  All of the patient's questions and concerns were addressed.
Sarecycline Counseling: Patient advised regarding possible photosensitivity and discoloration of the teeth, skin, lips, tongue and gums.  Patient instructed to avoid sunlight, if possible.  When exposed to sunlight, patients should wear protective clothing, sunglasses, and sunscreen.  The patient was instructed to call the office immediately if the following severe adverse effects occur:  hearing changes, easy bruising/bleeding, severe headache, or vision changes.  The patient verbalized understanding of the proper use and possible adverse effects of sarecycline.  All of the patient's questions and concerns were addressed.
Stelara Counseling:  I discussed with the patient the risks of ustekinumab including but not limited to immunosuppression, malignancy, posterior leukoencephalopathy syndrome, and serious infections.  The patient understands that monitoring is required including a PPD at baseline and must alert us or the primary physician if symptoms of infection or other concerning signs are noted.
Opioid Counseling: I discussed with the patient the potential side effects of opioids including but not limited to addiction, altered mental status, and depression. I stressed avoiding alcohol, benzodiazepines, muscle relaxants and sleep aids unless specifically okayed by a physician. The patient verbalized understanding of the proper use and possible adverse effects of opioids. All of the patient's questions and concerns were addressed. They were instructed to flush the remaining pills down the toilet if they did not need them for pain.
Zepbound Counseling: I reviewed the possible side effects including: thyroid tumors, kidney disease, gallbladder disease, abdominal pain, constipation, diarrhea, nausea, vomiting and pancreatitis. Do not take this medication if you have a history or family history of multiple endocrine neoplasia syndrome type 2. Side effects reviewed, pt to contact office should one occur.
Otezla Pregnancy And Lactation Text: This medication is Pregnancy Category C and it isn't known if it is safe during pregnancy. It is unknown if it is excreted in breast milk.
Bactrim Pregnancy And Lactation Text: This medication is Pregnancy Category D and is known to cause fetal risk.  It is also excreted in breast milk.
Elidel Counseling: Patient may experience a mild burning sensation during topical application. Elidel is not approved in children less than 2 years of age. There have been case reports of hematologic and skin malignancies in patients using topical calcineurin inhibitors although causality is questionable.
Cellcept Counseling:  I discussed with the patient the risks of mycophenolate mofetil including but not limited to infection/immunosuppression, GI upset, hypokalemia, hypercholesterolemia, bone marrow suppression, lymphoproliferative disorders, malignancy, GI ulceration/bleed/perforation, colitis, interstitial lung disease, kidney failure, progressive multifocal leukoencephalopathy, and birth defects.  The patient understands that monitoring is required including a baseline creatinine and regular CBC testing. In addition, patient must alert us immediately if symptoms of infection or other concerning signs are noted.
Hydroxyzine Counseling: Patient advised that the medication is sedating and not to drive a car after taking this medication.  Patient informed of potential adverse effects including but not limited to dry mouth, urinary retention, and blurry vision.  The patient verbalized understanding of the proper use and possible adverse effects of hydroxyzine.  All of the patient's questions and concerns were addressed.
High Dose Vitamin A Pregnancy And Lactation Text: High dose vitamin A therapy is contraindicated during pregnancy and breast feeding.
Ilumya Counseling: I discussed with the patient the risks of tildrakizumab including but not limited to immunosuppression, malignancy, posterior leukoencephalopathy syndrome, and serious infections.  The patient understands that monitoring is required including a PPD at baseline and must alert us or the primary physician if symptoms of infection or other concerning signs are noted.
Propranolol Counseling:  I discussed with the patient the risks of propranolol including but not limited to low heart rate, low blood pressure, low blood sugar, restlessness and increased cold sensitivity. They should call the office if they experience any of these side effects.
Bimzelx Pregnancy And Lactation Text: This medication crosses the placenta and the safety is uncertain during pregnancy. It is unknown if this medication is present in breast milk.
Ebglyss Counseling: I discussed with the patient the risks of lebrikizumab including but not limited to eye inflammation and irritation, cold sores, injection site reactions, allergic reactions and increased risk of parasitic infection. The patient understands that monitoring is required and they must alert us or the primary physician if symptoms of infection or other concerning signs are noted.
Minoxidil Counseling: Minoxidil is a topical medication which can increase blood flow where it is applied. It is uncertain how this medication increases hair growth. Side effects are uncommon and include stinging and allergic reactions.
Niacinamide Counseling: I recommended taking niacin or niacinamide, also know as vitamin B3, twice daily. Recent evidence suggests that taking vitamin B3 (500 mg twice daily) can reduce the risk of actinic keratoses and non-melanoma skin cancers. Side effects of vitamin B3 include flushing and headache.
Metronidazole Pregnancy And Lactation Text: This medication is Pregnancy Category B and considered safe during pregnancy.  It is also excreted in breast milk.
Niacinamide Pregnancy And Lactation Text: These medications are considered safe during pregnancy.
Ozempic Counseling: I reviewed the possible side effects including: thyroid tumors, kidney disease, gallbladder disease, abdominal pain, constipation, diarrhea, nausea, vomiting and pancreatitis. Do not take this medication if you have a history or family history of multiple endocrine neoplasia syndrome type 2. Side effects reviewed, pt to contact office should one occur.
Litfulo Counseling: I discussed with the patient the risks of Litfulo therapy including but not limited to upper respiratory tract infections, shingles, cold sores, and nausea. Live vaccines should be avoided.  This medication has been linked to serious infections; higher rate of mortality; malignancy and lymphoproliferative disorders; major adverse cardiovascular events; thrombosis; gastrointestinal perforations; neutropenia; lymphopenia; anemia; liver enzyme elevations; and lipid elevations.
Simlandi Counseling:  I discussed with the patient the risks of adalimumab including but not limited to myelosuppression, immunosuppression, autoimmune hepatitis, demyelinating diseases, lymphoma, and serious infections.  The patient understands that monitoring is required including a PPD at baseline and must alert us or the primary physician if symptoms of infection or other concerning signs are noted.
Propranolol Pregnancy And Lactation Text: This medication is Pregnancy Category C and it isn't known if it is safe during pregnancy. It is excreted in breast milk.
Calcipotriene Counseling:  I discussed with the patient the risks of calcipotriene including but not limited to erythema, scaling, itching, and irritation.
Litfulo Pregnancy And Lactation Text: Based on animal studies, Lifulo may cause embryo-fetal harm when administered to pregnant women.  The medication should not be used in pregnancy.  Breastfeeding is not recommended during treatment.
Protopic Counseling: Patient may experience a mild burning sensation during topical application. Protopic is not approved in children less than 2 years of age. There have been case reports of hematologic and skin malignancies in patients using topical calcineurin inhibitors although causality is questionable.
Topical Metronidazole Counseling: Metronidazole is a topical antibiotic medication. You may experience burning, stinging, redness, or allergic reactions.  Please call our office if you develop any problems from using this medication.
Ebglyss Pregnancy And Lactation Text: This medication likely crosses the placenta but the risk for the fetus is uncertain. It is unknown if this medication is excreted in breast milk.
Cimzia Counseling:  I discussed with the patient the risks of Cimzia including but not limited to immunosuppression, allergic reactions and infections.  The patient understands that monitoring is required including a PPD at baseline and must alert us or the primary physician if symptoms of infection or other concerning signs are noted.
Cimzia Pregnancy And Lactation Text: This medication crosses the placenta but can be considered safe in certain situations. Cimzia may be excreted in breast milk.
Mirvaso Counseling: Mirvaso is a topical medication which can decrease superficial blood flow where applied. Side effects are uncommon and include stinging, redness and allergic reactions.
Nsaids Counseling: NSAID Counseling: I discussed with the patient that NSAIDs should be taken with food. Prolonged use of NSAIDs can result in the development of stomach ulcers.  Patient advised to stop taking NSAIDs if abdominal pain occurs.  The patient verbalized understanding of the proper use and possible adverse effects of NSAIDs.  All of the patient's questions and concerns were addressed.
Albendazole Counseling:  I discussed with the patient the risks of albendazole including but not limited to cytopenia, kidney damage, nausea/vomiting and severe allergy.  The patient understands that this medication is being used in an off-label manner.
Detail Level: Simple
Minocycline Counseling: Patient advised regarding possible photosensitivity and discoloration of the teeth, skin, lips, tongue and gums.  Patient instructed to avoid sunlight, if possible.  When exposed to sunlight, patients should wear protective clothing, sunglasses, and sunscreen.  The patient was instructed to call the office immediately if the following severe adverse effects occur:  hearing changes, easy bruising/bleeding, severe headache, or vision changes.  The patient verbalized understanding of the proper use and possible adverse effects of minocycline.  All of the patient's questions and concerns were addressed.
Acitretin Counseling:  I discussed with the patient the risks of acitretin including but not limited to hair loss, dry lips/skin/eyes, liver damage, hyperlipidemia, depression/suicidal ideation, photosensitivity.  Serious rare side effects can include but are not limited to pancreatitis, pseudotumor cerebri, bony changes, clot formation/stroke/heart attack.  Patient understands that alcohol is contraindicated since it can result in liver toxicity and significantly prolong the elimination of the drug by many years.
Acitretin Pregnancy And Lactation Text: This medication is Pregnancy Category X and should not be given to women who are pregnant or may become pregnant in the future. This medication is excreted in breast milk.
Simponi Counseling:  I discussed with the patient the risks of golimumab including but not limited to myelosuppression, immunosuppression, autoimmune hepatitis, demyelinating diseases, lymphoma, and serious infections.  The patient understands that monitoring is required including a PPD at baseline and must alert us or the primary physician if symptoms of infection or other concerning signs are noted.
Enbrel Counseling:  I discussed with the patient the risks of etanercept including but not limited to myelosuppression, immunosuppression, autoimmune hepatitis, demyelinating diseases, lymphoma, and infections.  The patient understands that monitoring is required including a PPD at baseline and must alert us or the primary physician if symptoms of infection or other concerning signs are noted.
Calcipotriene Pregnancy And Lactation Text: The use of this medication during pregnancy or lactation is not recommended as there is insufficient data.
Olumiant Counseling: I discussed with the patient the risks of Olumiant therapy including but not limited to upper respiratory tract infections, shingles, cold sores, and nausea. Live vaccines should be avoided.  This medication has been linked to serious infections; higher rate of mortality; malignancy and lymphoproliferative disorders; major adverse cardiovascular events; thrombosis; gastrointestinal perforations; neutropenia; lymphopenia; anemia; liver enzyme elevations; and lipid elevations.
Protopic Pregnancy And Lactation Text: This medication is Pregnancy Category C. It is unknown if this medication is excreted in breast milk when applied topically.
Arava Counseling:  Patient counseled regarding adverse effects of Arava including but not limited to nausea, vomiting, abnormalities in liver function tests. Patients may develop mouth sores, rash, diarrhea, and abnormalities in blood counts. The patient understands that monitoring is required including LFTs and blood counts.  There is a rare possibility of scarring of the liver and lung problems that can occur when taking methotrexate. Persistent nausea, loss of appetite, pale stools, dark urine, cough, and shortness of breath should be reported immediately. Patient advised to discontinue Arava treatment and consult with a physician prior to attempting conception. The patient will have to undergo a treatment to eliminate Arava from the body prior to conception.
Topical Metronidazole Pregnancy And Lactation Text: This medication is Pregnancy Category B and considered safe during pregnancy.  It is also considered safe to use while breastfeeding.
SSKI Counseling:  I discussed with the patient the risks of SSKI including but not limited to thyroid abnormalities, metallic taste, GI upset, fever, headache, acne, arthralgias, paraesthesias, lymphadenopathy, easy bleeding, arrhythmias, and allergic reaction.
Qbrexza Counseling:  I discussed with the patient the risks of Qbrexza including but not limited to headache, mydriasis, blurred vision, dry eyes, nasal dryness, dry mouth, dry throat, dry skin, urinary hesitation, and constipation.  Local skin reactions including erythema, burning, stinging, and itching can also occur.
Sski Pregnancy And Lactation Text: This medication is Pregnancy Category D and isn't considered safe during pregnancy. It is excreted in breast milk.
Topical Steroids Counseling: I discussed with the patient that prolonged use of topical steroids can result in the increased appearance of superficial blood vessels (telangiectasias), lightening (hypopigmentation) and thinning of the skin (atrophy).  Patient understands to avoid using high potency steroids in skin folds, the groin or the face.  The patient verbalized understanding of the proper use and possible adverse effects of topical steroids.  All of the patient's questions and concerns were addressed.
Cosentyx Counseling:  I discussed with the patient the risks of Cosentyx including but not limited to worsening of Crohn's disease, immunosuppression, allergic reactions and infections.  The patient understands that monitoring is required including a PPD at baseline and must alert us or the primary physician if symptoms of infection or other concerning signs are noted.
Saxenda Counseling: I reviewed the possible side effects including: thyroid tumors, kidney disease, gallbladder disease, abdominal pain, constipation, diarrhea, nausea, vomiting and pancreatitis. Do not take this medication if you have a history or family history of multiple endocrine neoplasia syndrome type 2. Side effects reviewed, pt to contact office should one occur.
Nsaids Pregnancy And Lactation Text: These medications are considered safe up to 30 weeks gestation. It is excreted in breast milk.
Olanzapine Counseling- I discussed with the patient the common side effects of olanzapine including but are not limited to: lack of energy, dry mouth, increased appetite, sleepiness, tremor, constipation, dizziness, changes in behavior, or restlessness.  Explained that teenagers are more likely to experience headaches, abdominal pain, pain in the arms or legs, tiredness, and sleepiness.  Serious side effects include but are not limited: increased risk of death in elderly patients who are confused, have memory loss, or dementia-related psychosis; hyperglycemia; increased cholesterol and triglycerides; and weight gain.
Bexarotene Counseling:  I discussed with the patient the risks of bexarotene including but not limited to hair loss, dry lips/skin/eyes, liver abnormalities, hyperlipidemia, pancreatitis, depression/suicidal ideation, photosensitivity, drug rash/allergic reactions, hypothyroidism, anemia, leukopenia, infection, cataracts, and teratogenicity.  Patient understands that they will need regular blood tests to check lipid profile, liver function tests, white blood cell count, thyroid function tests and pregnancy test if applicable.
Cantharidin Counseling:  I discussed with the patient the risks of Cantharidin including but not limited to pain, redness, burning, itching, and blistering.
Olumiant Pregnancy And Lactation Text: Based on animal studies, Olumiant may cause embryo-fetal harm when administered to pregnant women.  The medication should not be used in pregnancy.  Breastfeeding is not recommended during treatment.
Fluconazole Counseling:  Patient counseled regarding adverse effects of fluconazole including but not limited to headache, diarrhea, nausea, upset stomach, liver function test abnormalities, taste disturbance, and stomach pain.  There is a rare possibility of liver failure that can occur when taking fluconazole.  The patient understands that monitoring of LFTs and kidney function test may be required, especially at baseline. The patient verbalized understanding of the proper use and possible adverse effects of fluconazole.  All of the patient's questions and concerns were addressed.
Clofazimine Counseling:  I discussed with the patient the risks of clofazimine including but not limited to skin and eye pigmentation, liver damage, nausea/vomiting, gastrointestinal bleeding and allergy.
Thalidomide Counseling: I discussed with the patient the risks of thalidomide including but not limited to birth defects, anxiety, weakness, chest pain, dizziness, cough and severe allergy.
Humira Counseling:  I discussed with the patient the risks of adalimumab including but not limited to myelosuppression, immunosuppression, autoimmune hepatitis, demyelinating diseases, lymphoma, and serious infections.  The patient understands that monitoring is required including a PPD at baseline and must alert us or the primary physician if symptoms of infection or other concerning signs are noted.
Topical Steroids Applications Pregnancy And Lactation Text: Most topical steroids are considered safe to use during pregnancy and lactation.  Any topical steroid applied to the breast or nipple should be washed off before breastfeeding.
Rinvoq Counseling: I discussed with the patient the risks of Rinvoq therapy including but not limited to upper respiratory tract infections, shingles, cold sores, bronchitis, nausea, cough, fever, acne, and headache. Live vaccines should be avoided.  This medication has been linked to serious infections; higher rate of mortality; malignancy and lymphoproliferative disorders; major adverse cardiovascular events; thrombosis; thrombocytopenia, anemia, and neutropenia; lipid elevations; liver enzyme elevations; and gastrointestinal perforations.
Qbrexza Pregnancy And Lactation Text: There is no available data on Qbrexza use in pregnant women.  There is no available data on Qbrexza use in lactation.
Opzelura Counseling:  I discussed with the patient the risks of Opzelura including but not limited to nasopharngitis, bronchitis, ear infection, eosinophila, hives, diarrhea, folliculitis, tonsillitis, and rhinorrhea.  Taken orally, this medication has been linked to serious infections; higher rate of mortality; malignancy and lymphoproliferative disorders; major adverse cardiovascular events; thrombosis; thrombocytopenia, anemia, and neutropenia; and lipid elevations.
Ivermectin Counseling:  Patient instructed to take medication on an empty stomach with a full glass of water.  Patient informed of potential adverse effects including but not limited to nausea, diarrhea, dizziness, itching, and swelling of the extremities or lymph nodes.  The patient verbalized understanding of the proper use and possible adverse effects of ivermectin.  All of the patient's questions and concerns were addressed.
Quinolones Counseling:  I discussed with the patient the risks of fluoroquinolones including but not limited to GI upset, allergic reaction, drug rash, diarrhea, dizziness, photosensitivity, yeast infections, liver function test abnormalities, tendonitis/tendon rupture.

## 2024-12-18 ENCOUNTER — OFFICE VISIT (OUTPATIENT)
Dept: CARDIOLOGY | Facility: MEDICAL CENTER | Age: 69
End: 2024-12-18
Attending: FAMILY MEDICINE
Payer: MEDICARE

## 2024-12-18 VITALS
HEIGHT: 67 IN | HEART RATE: 70 BPM | DIASTOLIC BLOOD PRESSURE: 68 MMHG | SYSTOLIC BLOOD PRESSURE: 140 MMHG | BODY MASS INDEX: 26.37 KG/M2 | WEIGHT: 168 LBS | OXYGEN SATURATION: 96 % | RESPIRATION RATE: 16 BRPM

## 2024-12-18 DIAGNOSIS — D68.69 SECONDARY HYPERCOAGULABILITY DISORDER (HCC): Chronic | ICD-10-CM

## 2024-12-18 DIAGNOSIS — R93.1 AGATSTON CAC SCORE, <100: ICD-10-CM

## 2024-12-18 DIAGNOSIS — E78.2 MIXED HYPERLIPIDEMIA: ICD-10-CM

## 2024-12-18 DIAGNOSIS — I48.91 ATRIAL FIBRILLATION STATUS POST CARDIOVERSION (HCC): ICD-10-CM

## 2024-12-18 DIAGNOSIS — I10 ESSENTIAL HYPERTENSION: ICD-10-CM

## 2024-12-18 PROCEDURE — 3077F SYST BP >= 140 MM HG: CPT | Performed by: INTERNAL MEDICINE

## 2024-12-18 PROCEDURE — 3078F DIAST BP <80 MM HG: CPT | Performed by: INTERNAL MEDICINE

## 2024-12-18 PROCEDURE — 99214 OFFICE O/P EST MOD 30 MIN: CPT | Performed by: INTERNAL MEDICINE

## 2024-12-18 PROCEDURE — G2211 COMPLEX E/M VISIT ADD ON: HCPCS | Performed by: INTERNAL MEDICINE

## 2024-12-18 PROCEDURE — 99213 OFFICE O/P EST LOW 20 MIN: CPT | Performed by: INTERNAL MEDICINE

## 2024-12-18 ASSESSMENT — FIBROSIS 4 INDEX: FIB4 SCORE: 1.63

## 2024-12-18 NOTE — PROGRESS NOTES
Chief Complaint   Patient presents with    Follow-Up     Atrial fibrillation status post cardioversion (HCC)    Hyperlipidemia    Hypertension       Subjective     Judy Blackburn is a 69 y.o. female who presents today with pAF HTN and dyslipidemia with prediabetes    Has a lot of stress win sister is on Hospice for Dementia    Past Medical History:   Diagnosis Date    Anxiety     Atrial fibrillation (HCC)     Diabetes (HCC)     H/O Clostridium difficile infection     Hyperlipidemia     Hypertension      Past Surgical History:   Procedure Laterality Date    FAHAD N/A 4/26/2022    Procedure: ECHOCARDIOGRAM, TRANSESOPHAGEAL;  Surgeon: Reymundo Garcia M.D.;  Location: SURGERY [facility];  Service: Cardiac    CARDIOVERSION N/A 4/26/2022    Procedure: CARDIOVERSION;  Surgeon: Reymundo Garcia M.D.;  Location: SURGERY [facility];  Service: Cardiac    LUMPECTOMY       Family History   Problem Relation Age of Onset    Arrythmia Sister      Social History     Socioeconomic History    Marital status:      Spouse name: Not on file    Number of children: Not on file    Years of education: Not on file    Highest education level: Associate degree: occupational, technical, or vocational program   Occupational History    Not on file   Tobacco Use    Smoking status: Never    Smokeless tobacco: Never   Vaping Use    Vaping status: Never Used   Substance and Sexual Activity    Alcohol use: Yes     Comment: OCC    Drug use: Never    Sexual activity: Not Currently     Birth control/protection: None   Other Topics Concern    Not on file   Social History Narrative    Not on file     Social Drivers of Health     Financial Resource Strain: Medium Risk (9/23/2023)    Overall Financial Resource Strain (CARDIA)     Difficulty of Paying Living Expenses: Somewhat hard   Food Insecurity: Food Insecurity Present (9/23/2023)    Hunger Vital Sign     Worried About Running Out of Food in the Last Year: Sometimes true      Ran Out of Food in the Last Year: Sometimes true   Transportation Needs: No Transportation Needs (9/23/2023)    PRAPARE - Transportation     Lack of Transportation (Medical): No     Lack of Transportation (Non-Medical): No   Physical Activity: Insufficiently Active (9/23/2023)    Exercise Vital Sign     Days of Exercise per Week: 5 days     Minutes of Exercise per Session: 20 min   Stress: Stress Concern Present (9/23/2023)    [employer] - Occupational Stress Questionnaire     Feeling of Stress : Rather much   Social Connections: Moderately Isolated (9/23/2023)    Social Connection and Isolation Panel [NHANES]     Frequency of Communication with Friends and Family: More than three times a week     Frequency of Social Gatherings with Friends and Family: More than three times a week     Attends Synagogue Services: 1 to 4 times per year     Active Member of Clubs or Organizations: No     Attends Club or Organization Meetings: Patient declined     Marital Status:    Intimate Partner Violence: Not on file   Housing Stability: Unknown (9/23/2023)    Housing Stability Vital Sign     Unable to Pay for Housing in the Last Year: Patient refused     Number of Places Lived in the Last Year: Not on file     Unstable Housing in the Last Year: No     Allergies   Allergen Reactions    Codeine Vomiting and Nausea    Tape Hives and Itching     Outpatient Encounter Medications as of 12/18/2024   Medication Sig Dispense Refill    apixaban (ELIQUIS) 5mg Tab Take 1 Tablet by mouth 2 times a day. 180 Tablet 3    atorvastatin (LIPITOR) 80 MG tablet Take 1 Tablet by mouth at bedtime. 90 Tablet 2    losartan (COZAAR) 100 MG Tab Take 1 Tablet by mouth every day. 90 Tablet 3    metFORMIN ER (GLUCOPHAGE XR) 500 MG TABLET SR 24 HR Take 1 Tablet by mouth every day. 90 Tablet 3    omeprazole (PRILOSEC) 20 MG delayed-release capsule Take 1 Capsule by mouth every day. 90 Capsule 2    propranolol LA (INDERAL LA)  "60 MG CAPSULE SR 24 HR Take 1 Capsule by mouth every day. 90 Capsule 3    traZODone (DESYREL) 100 MG Tab Take 1 Tablet by mouth every evening. 100 Tablet 2    Potassium 99 MG Tab Take 99 mg by mouth every day.      MAGNESIUM PO Take 1 Tablet by mouth at bedtime as needed.      Semaglutide,0.25 or 0.5MG/DOS, (OZEMPIC, 0.25 OR 0.5 MG/DOSE,) 2 MG/1.5ML Solution Pen-injector MONTH #1 Inject 0.25 mg subcutaneously q. 7 days (Patient not taking: Reported on 12/18/2024) 0.5 mL 0    Semaglutide, 1 MG/DOSE, (OZEMPIC, 1 MG/DOSE,) 4 MG/3ML Solution Pen-injector inject 1mg SC Q7 days (Patient not taking: Reported on 12/18/2024) 3 mL 0    Semaglutide,0.25 or 0.5MG/DOS, (OZEMPIC, 0.25 OR 0.5 MG/DOSE,) 2 MG/3ML Solution Pen-injector Inject 0.5 mg under the skin every 7 days. Month #2, inject 0.5 mg subcutaneously q. 7 days (Patient not taking: Reported on 12/18/2024) 3 mL 0     No facility-administered encounter medications on file as of 12/18/2024.     ROS           Objective     BP (!) 140/68 (BP Location: Left arm, Patient Position: Sitting, BP Cuff Size: Adult)   Pulse 70   Resp 16   Ht 1.702 m (5' 7\")   Wt 76.2 kg (168 lb)   SpO2 96%   BMI 26.31 kg/m²     Physical Exam  Constitutional:       General: She is not in acute distress.     Appearance: She is not diaphoretic.   Eyes:      General: No scleral icterus.  Neck:      Vascular: No JVD.   Cardiovascular:      Rate and Rhythm: Normal rate.      Heart sounds: Normal heart sounds. No murmur heard.     No friction rub. No gallop.   Pulmonary:      Effort: No respiratory distress.      Breath sounds: No wheezing or rales.   Abdominal:      General: Bowel sounds are normal.      Palpations: Abdomen is soft.   Musculoskeletal:      Right lower leg: No edema.      Left lower leg: No edema.   Skin:     Findings: No rash.   Neurological:      Mental Status: She is alert. Mental status is at baseline.   Psychiatric:         Mood and Affect: Mood normal.            We reviewed " in person the most recent labs  Recent Results (from the past 30 weeks)   POCT Retinal Eye Exam    Collection Time: 11/19/24 11:30 AM   Result Value Ref Range    RETINAL SCREEN Positive (A) Negative, Indeterminate   POCT Hemoglobin A1C    Collection Time: 11/19/24 11:41 AM   Result Value Ref Range    Glycohemoglobin 5.7 <=5.8 %    Internal Control Positive Positive     Internal Control Negative Negative    POCT Microalbumin Creat Ratio Urine    Collection Time: 11/19/24 11:44 AM   Result Value Ref Range    Creatinine, Urine 52.73 mg/dL    Microalbumin, Urine Random <1.2 mg/dL    Micro Alb Creat Ratio see below 0 - 30 mg/g         Assessment & Plan     1. Mixed hyperlipidemia        2. Essential hypertension        3. Atrial fibrillation status post cardioversion (HCC)        4. Agatston CAC score, <100        5. Secondary hypercoagulability disorder (HCC)            Medical Decision Making: Today's Assessment/Status/Plan:        It was my pleasure to meet with Ms. Blackburn.    We addressed the management of hypertension at today's visit. Blood pressure is well controlled.  We specifically assessed the labs on hypertension treatment    We addressed the management of dyslipidemia at today's visit. She is on appropriate lipid lowering medication.        We addressed the management of atrial fibrillation.  She is on proper anticoagulation and rate or rhythm control as appropriate.  We addressed the potential side effects and laboratory follow-up for these medications.    We addressed the management of chronic anticoagulation at today's visit. She understands the risks and benefits of chronic anticoagulation.  We reviewed and verified the results of annual testing for anemia and kidney function.    I will see Ms. Blackburn back in 1 year time and encouraged her to follow up with us over the phone or electronically using my MobilePeakhart as issues arise.    It is my pleasure to participate in the care of Ms. Blackburn.  Please do  not hesitate to contact me with questions or concerns.    Reymundo Garcia MD PhD [facility]  Cardiologist [facility]    Please note that this dictation was created using voice recognition software. There may be errors I did not discover before finalizing the note.     () Today's E/M visit is associated with medical care services that serve as the continuing focal point for all needed health care services and/or with medical care services that  are part of ongoing care related to a patient's single, serious condition, or a complex condition: This includes  furnishing services to patients on an ongoing basis that result in care that is personalized  to the patient. The services result in a comprehensive, longitudinal, and continuous  relationship with the patient and involve delivery of team-based care that is accessible, coordinated with other practitioners and providers, and integrated with the broader health  care landscape.

## 2024-12-18 NOTE — PATIENT INSTRUCTIONS
Consider Kardia ([email or web address]/) $ DO NOT SUBSCRIBE WE WILL ALWAYS REVIEW YOUR TRACINGS ON MYCHART or Smartwatch such as Apple Watch $$$$ for monitoring of the heart palpitations.  This is a small device that syncs to your phone with Bluetooth that can tell you your heart rhythm.  You can send us a screencapture of the tracings with Plazeshart.        Typical Patch Monitor looks like this Zio or BioTel      Alternatively consider a pulse oximeter and let us know if Oxygen is <90 or pulse is <50 or > 110 while resting      OPTIONS FOR BLOOD THINNERS TO DRAMATICALLY REDUCE YOUR RISK OF STROKE WITH ATRIAL FIBRILLATION  (REDUCES RISK BY ~2/3) (in order of preference):    Eliquis (apixaban) typically 5 mg twice a day  Generic not available so may cost $20-$600 per month copay.    Eliquis is preferred because it is the least likely to cause GI Bleeding (20% less likely 3.3%/yr vs 3.7%/yr than dabigitran  (Pradaxa)*, may also have 28% less risk of stroke (1.1% vs 1.4% in 100 person years) than dabigitran (Pradaxa)**  No great antidote   Preferred in patients with severe kidney disease, elderly underweight or morbidly obese    Dabigatran (Pradaxa) typically 150 mg twice a day   Now generic   antidote available in a few Hospitals   Should be taken with full glass of water, otherwise can cause acid indigestion    Warfarin plus blood testing typically monthly   Generic so very affordable  antidote available in all hospitals  NEED TO MONITOR VITAMIN K FOODS SUCH AS GREEN VEGETABLES  Interacts with multiple medicine requiring more frequent blood checks    Saveysa (Edoxaban) 60-90 mg daily   Generic not available so may cost $20-$600 copay.   Newest medication and not commonly prescribed   No great antidote    Xarelto (rivaroxaban) 15-20 mg once a day with food   Generic not available so may cost $20-$600 copay.   Preferred for those that once daily medicine without frequent lab draws and diet monitoring   No  great antidote    For all blood thinners, unless you have a history of stroke when you have stopped them in the past, we will provide guidance to your surgeon on how to safely hold medications around the time of surgery.  If you have a history of stroke when off blood thinners in the past please be sure to inform us if you have to stop blood thinners.    IF YOU HIT YOUR HEAD GET EMERGENCY CHECK IN THE ER.  IF YOU HAVE A MAJOR INJURY AND PAIN/DIZZINESS GET EMERGENCY CHECK    You should check lab work at least yearly for anemia and metabolic panel on all blood thinners, sooner for symptoms of anemia such as blood in the stool or progressive shortness of breath  You are not supposed to have grapefruit with any of the medications.    You should avoid excessive alcohol on blood thinners  You should avoid other natural blood thinners such as fish oil or other supplements while on prescription blood thinners  You are not supposed to take NSAIDS with anticoagulants  In most cases antiplatelets like aspirin and clopidrogrel are not advised unless your cardiologist specifically says to take together    *Based on large observational study of 500,000 patients  Comparative Effectiveness and Safety Between Apixaban, Dabigatran, Edoxaban, and Rivaroxaban Among Patients With Atrial Fibrillation A Multinational Population-Based Cohort Study   Santiago Gale, PhD*, Doris Grullon, MSc*, et al.  Annals of Internal Medicine Original Research November 2022  [email or web address]  ** Based on large observation study 37,000 patients in the   Effectiveness and Safety of Oral Anticoagulants Among Nonvalvular Atrial Fibrillation Patients The ARISTOPHANES Study  Louis Mckeon, Thom Fleming et al. Stroke. 2018;49:0735-0991  [email or web address]    [facility]  [address],   [place] [address], [place]  Phone: [phone]    [facility]  [employer] tel [phone] fax [phone]    [facility] [phone] Fax [phone]    [facility] - [place], [place] - [address]  [phone]  THEY MAY [facility]ONOR A 365 DAY Rx

## 2024-12-21 DIAGNOSIS — I10 ESSENTIAL HYPERTENSION: ICD-10-CM

## 2024-12-23 RX ORDER — LOSARTAN POTASSIUM 100 MG/1
100 TABLET ORAL DAILY
Qty: 90 TABLET | Refills: 3 | OUTPATIENT
Start: 2024-12-23

## 2024-12-23 RX ORDER — PROPRANOLOL HYDROCHLORIDE 60 MG/1
60 CAPSULE, EXTENDED RELEASE ORAL
Qty: 90 CAPSULE | Refills: 3 | OUTPATIENT
Start: 2024-12-23

## 2024-12-31 ENCOUNTER — TELEPHONE (OUTPATIENT)
Dept: CARDIOLOGY | Facility: MEDICAL CENTER | Age: 69
End: 2024-12-31
Payer: MEDICARE

## 2024-12-31 NOTE — TELEPHONE ENCOUNTER
TAHIR    Caller: Judy Blackburn     Office Name, phone number, fax number:     Dr. Anna   [facility]  PH: [phone]    Fax clearance to [phone] (requesting to be sent in today)  Patient would like to know if she is to stop her medication    Procedure Name: melanoma removal    Procedure Scheduled Date: 01/02/2025    Callback Number: [phone]      Thank You  Xiomara SANTANA

## 2024-12-31 NOTE — TELEPHONE ENCOUNTER
Spoke with pt asking her to ask Dr. Anna's office to send over a surgical clearance in order for us to start processing clearance since we have not received the surgical clearance.

## 2025-01-02 ENCOUNTER — APPOINTMENT (OUTPATIENT)
Dept: URBAN - METROPOLITAN AREA CLINIC 6 | Facility: CLINIC | Age: 70
Setting detail: DERMATOLOGY
End: 2025-01-02

## 2025-01-02 PROBLEM — C43.61 MALIGNANT MELANOMA OF RIGHT UPPER LIMB, INCLUDING SHOULDER: Status: ACTIVE | Noted: 2025-01-02

## 2025-01-02 PROCEDURE — 12034 INTMD RPR S/TR/EXT 7.6-12.5: CPT

## 2025-01-02 PROCEDURE — 11603 EXC TR-EXT MAL+MARG 2.1-3 CM: CPT

## 2025-01-02 PROCEDURE — ? EXCISION

## 2025-01-02 NOTE — PROCEDURE: EXCISION
Referring Physician (Optional): Nela Zhao
Surgeon (Optional): Dr. Elisabeth Esteves
Biopsy Photograph Reviewed: Yes
Accession #: [id number]
Date Of Previous Biopsy (Optional): 12/03/2024
Size Of Lesion In Cm: 0.9
X Size Of Lesion In Cm (Optional): 0.7
Anesthesia Volume In Cc: 9
Was An Eye Clamp Used?: No
Eye Clamp Note Details: An eye clamp was used during the procedure.
Excision Method: Elliptical
Saucerization Depth: dermis and superficial adipose tissue
Repair Type: Intermediate
Intermediate / Complex Repair - Final Wound Length In Cm: 8.6
Suturegard Retention Suture: 2-0 Nylon
Retention Suture Bite Size: 3 mm
Length To Time In Minutes Device Was In Place: 10
Number Of Hemigard Strips Per Side: 1
Undermining Type: Entire Wound
Debridement Text: The wound edges were debrided prior to proceeding with the closure to facilitate wound healing.
Helical Rim Text: The closure involved the helical rim.
Vermilion Border Text: The closure involved the vermilion border.
Nostril Rim Text: The closure involved the nostril rim.
Retention Suture Text: Retention sutures were placed to support the closure and prevent dehiscence.
Primary Defect Length (In Cm): 0
Lab: [id number]
Lab Facility: 
Graft Donor Site Bandage (Optional-Leave Blank If You Don't Want In Note): Steri-strips and a pressure bandage were applied to the donor site.
Epidermal Closure Graft Donor Site (Optional): simple interrupted
Billing Type: Third-Party Bill
Excision Depth: fascia
Scalpel Size: 15 blade
Anesthesia Type: 1% lidocaine with epinephrine and a 1:10 solution of 8.4% sodium bicarbonate
Hemostasis: Electrodesiccation
Estimated Blood Loss (Cc): minimal
Detail Level: Detailed
Repair Depth: use same depth as excision depth
Deep Sutures: 4-0 Monocryl
Dermal Closure: buried vertical mattress
Epidermal Sutures: 4-0 Vicryl Rapide
Epidermal Closure: running subcuticular
Wound Care: Vaseline
Dressing: pressure dressing
Suturegard Intro: Intraoperative tissue expansion was performed, utilizing the SUTUREGARD device, in order to reduce wound tension.
Suturegard Body: The suture ends were repeatedly re-tightened and re-clamped to achieve the desired tissue expansion.
Hemigard Intro: Due to skin fragility and wound tension, it was decided to use HEMIGARD adhesive retention suture devices to permit a linear closure. The skin was cleaned and dried for a 6cm distance away from the wound. Excessive hair, if present, was removed to allow for adhesion.
Hemigard Postcare Instructions: The HEMIGARD strips are to remain completely dry for at least 5-7 days.
Positioning (Leave Blank If You Do Not Want): The patient was placed in a comfortable position exposing the surgical site.
Pre-Excision Curettage Text (Leave Blank If You Do Not Want): Prior to drawing the surgical margin the visible lesion was removed with electrodesiccation and curettage to clearly define the lesion size.
Complex Repair Preamble Text (Leave Blank If You Do Not Want): Extensive wide undermining was performed.
Intermediate Repair Preamble Text (Leave Blank If You Do Not Want): Undermining was performed with blunt dissection.
Curvilinear Excision Additional Text (Leave Blank If You Do Not Want): The margin was drawn around the clinically apparent lesion.  A curvilinear shape was then drawn on the skin incorporating the lesion and margins.  Incisions were then made along these lines to the appropriate tissue plane and the lesion was extirpated.
Fusiform Excision Additional Text (Leave Blank If You Do Not Want): The margin was drawn around the clinically apparent lesion.  A fusiform shape was then drawn on the skin incorporating the lesion and margins.  Incisions were then made along these lines to the appropriate tissue plane and the lesion was extirpated.
Elliptical Excision Additional Text (Leave Blank If You Do Not Want): The margin was drawn around the clinically apparent lesion.  An elliptical shape was then drawn on the skin incorporating the lesion and margins.  Incisions were then made along these lines to the appropriate tissue plane and the lesion was extirpated.
Saucerization Excision Additional Text (Leave Blank If You Do Not Want): The margin was drawn around the clinically apparent lesion.  Incisions were then made along these lines, in a tangential fashion, to the appropriate tissue plane and the lesion was extirpated.
Slit Excision Additional Text (Leave Blank If You Do Not Want): A linear line was drawn on the skin overlying the lesion. An incision was made slowly until the lesion was visualized.  Once visualized, the lesion was removed with blunt dissection.
Excisional Biopsy Additional Text (Leave Blank If You Do Not Want): The margin was drawn around the clinically apparent lesion. An elliptical shape was then drawn on the skin incorporating the lesion and margins.  Incisions were then made along these lines to the appropriate tissue plane and the lesion was extirpated.
Perilesional Excision Additional Text (Leave Blank If You Do Not Want): The margin was drawn around the clinically apparent lesion. Incisions were then made along these lines to the appropriate tissue plane and the lesion was extirpated.
Repair Performed By Another Provider Text (Leave Blank If You Do Not Want): After the tissue was excised the defect was repaired by another provider.
No Repair - Repaired With Adjacent Surgical Defect Text (Leave Blank If You Do Not Want): After the excision the defect was repaired concurrently with another surgical defect which was in close approximation.
Adjacent Tissue Transfer Text: The defect edges were debeveled with a #15 scalpel blade. Given the location of the defect and the proximity to free margins an adjacent tissue transfer was deemed most appropriate. Using a sterile surgical marker, an appropriate flap was drawn incorporating the defect and placing the expected incisions within the relaxed skin tension lines where possible. The area thus outlined was incised deep to adipose tissue with a #15 scalpel blade. The skin margins were undermined to an appropriate distance in all directions utilizing iris scissors and carried over to close the primary defect.
Advancement Flap (Single) Text: The defect edges were debeveled with a #15 scalpel blade.  Given the location of the defect and the proximity to free margins a single advancement flap was deemed most appropriate.  Using a sterile surgical marker, an appropriate advancement flap was drawn incorporating the defect and placing the expected incisions within the relaxed skin tension lines where possible.    The area thus outlined was incised deep to adipose tissue with a #15 scalpel blade.  The skin margins were undermined to an appropriate distance in all directions utilizing iris scissors.
Advancement Flap (Double) Text: The defect edges were debeveled with a #15 scalpel blade.  Given the location of the defect and the proximity to free margins a double advancement flap was deemed most appropriate.  Using a sterile surgical marker, the appropriate advancement flaps were drawn incorporating the defect and placing the expected incisions within the relaxed skin tension lines where possible.    The area thus outlined was incised deep to adipose tissue with a #15 scalpel blade.  The skin margins were undermined to an appropriate distance in all directions utilizing iris scissors.
Burow's Advancement Flap Text: The defect edges were debeveled with a #15 scalpel blade.  Given the location of the defect and the proximity to free margins a Burow's advancement flap was deemed most appropriate.  Using a sterile surgical marker, the appropriate advancement flap was drawn incorporating the defect and placing the expected incisions within the relaxed skin tension lines where possible.    The area thus outlined was incised deep to adipose tissue with a #15 scalpel blade.  The skin margins were undermined to an appropriate distance in all directions utilizing iris scissors.
Chonodrocutaneous Helical Advancement Flap Text: The defect edges were debeveled with a #15 scalpel blade.  Given the location of the defect and the proximity to free margins a chondrocutaneous helical advancement flap was deemed most appropriate.  Using a sterile surgical marker, the appropriate advancement flap was drawn incorporating the defect and placing the expected incisions within the relaxed skin tension lines where possible.    The area thus outlined was incised deep to adipose tissue with a #15 scalpel blade.  The skin margins were undermined to an appropriate distance in all directions utilizing iris scissors.
Crescentic Advancement Flap Text: The defect edges were debeveled with a #15 scalpel blade.  Given the location of the defect and the proximity to free margins a crescentic advancement flap was deemed most appropriate.  Using a sterile surgical marker, the appropriate advancement flap was drawn incorporating the defect and placing the expected incisions within the relaxed skin tension lines where possible.    The area thus outlined was incised deep to adipose tissue with a #15 scalpel blade.  The skin margins were undermined to an appropriate distance in all directions utilizing iris scissors.
A-T Advancement Flap Text: The defect edges were debeveled with a #15 scalpel blade.  Given the location of the defect, shape of the defect and the proximity to free margins an A-T advancement flap was deemed most appropriate.  Using a sterile surgical marker, an appropriate advancement flap was drawn incorporating the defect and placing the expected incisions within the relaxed skin tension lines where possible.    The area thus outlined was incised deep to adipose tissue with a #15 scalpel blade.  The skin margins were undermined to an appropriate distance in all directions utilizing iris scissors.
O-T Advancement Flap Text: The defect edges were debeveled with a #15 scalpel blade.  Given the location of the defect, shape of the defect and the proximity to free margins an O-T advancement flap was deemed most appropriate.  Using a sterile surgical marker, an appropriate advancement flap was drawn incorporating the defect and placing the expected incisions within the relaxed skin tension lines where possible.    The area thus outlined was incised deep to adipose tissue with a #15 scalpel blade.  The skin margins were undermined to an appropriate distance in all directions utilizing iris scissors.
O-L Flap Text: The defect edges were debeveled with a #15 scalpel blade.  Given the location of the defect, shape of the defect and the proximity to free margins an O-L flap was deemed most appropriate.  Using a sterile surgical marker, an appropriate advancement flap was drawn incorporating the defect and placing the expected incisions within the relaxed skin tension lines where possible.    The area thus outlined was incised deep to adipose tissue with a #15 scalpel blade.  The skin margins were undermined to an appropriate distance in all directions utilizing iris scissors.
O-Z Flap Text: The defect edges were debeveled with a #15 scalpel blade.  Given the location of the defect, shape of the defect and the proximity to free margins an O-Z flap was deemed most appropriate.  Using a sterile surgical marker, an appropriate transposition flap was drawn incorporating the defect and placing the expected incisions within the relaxed skin tension lines where possible. The area thus outlined was incised deep to adipose tissue with a #15 scalpel blade.  The skin margins were undermined to an appropriate distance in all directions utilizing iris scissors.
Double O-Z Flap Text: The defect edges were debeveled with a #15 scalpel blade.  Given the location of the defect, shape of the defect and the proximity to free margins a Double O-Z flap was deemed most appropriate.  Using a sterile surgical marker, an appropriate transposition flap was drawn incorporating the defect and placing the expected incisions within the relaxed skin tension lines where possible. The area thus outlined was incised deep to adipose tissue with a #15 scalpel blade.  The skin margins were undermined to an appropriate distance in all directions utilizing iris scissors.
V-Y Flap Text: The defect edges were debeveled with a #15 scalpel blade.  Given the location of the defect, shape of the defect and the proximity to free margins a V-Y flap was deemed most appropriate.  Using a sterile surgical marker, an appropriate advancement flap was drawn incorporating the defect and placing the expected incisions within the relaxed skin tension lines where possible.    The area thus outlined was incised deep to adipose tissue with a #15 scalpel blade.  The skin margins were undermined to an appropriate distance in all directions utilizing iris scissors.
Advancement-Rotation Flap Text: The defect edges were debeveled with a #15 scalpel blade.  Given the location of the defect, shape of the defect and the proximity to free margins an advancement-rotation flap was deemed most appropriate.  Using a sterile surgical marker, an appropriate flap was drawn incorporating the defect and placing the expected incisions within the relaxed skin tension lines where possible. The area thus outlined was incised deep to adipose tissue with a #15 scalpel blade.  The skin margins were undermined to an appropriate distance in all directions utilizing iris scissors.
Mercedes Flap Text: The defect edges were debeveled with a #15 scalpel blade.  Given the location of the defect, shape of the defect and the proximity to free margins a Mercedes flap was deemed most appropriate.  Using a sterile surgical marker, an appropriate advancement flap was drawn incorporating the defect and placing the expected incisions within the relaxed skin tension lines where possible. The area thus outlined was incised deep to adipose tissue with a #15 scalpel blade.  The skin margins were undermined to an appropriate distance in all directions utilizing iris scissors.
Modified Advancement Flap Text: The defect edges were debeveled with a #15 scalpel blade.  Given the location of the defect, shape of the defect and the proximity to free margins a modified advancement flap was deemed most appropriate.  Using a sterile surgical marker, an appropriate advancement flap was drawn incorporating the defect and placing the expected incisions within the relaxed skin tension lines where possible.    The area thus outlined was incised deep to adipose tissue with a #15 scalpel blade.  The skin margins were undermined to an appropriate distance in all directions utilizing iris scissors.
Mucosal Advancement Flap Text: Given the location of the defect, shape of the defect and the proximity to free margins a mucosal advancement flap was deemed most appropriate. Incisions were made with a 15 blade scalpel in the appropriate fashion along the cutaneous vermilion border and the mucosal lip. The remaining actinically damaged mucosal tissue was excised.  The mucosal advancement flap was then elevated to the gingival sulcus with care taken to preserve the neurovascular structures and advanced into the primary defect. Care was taken to ensure that precise realignment of the vermilion border was achieved.
Peng Advancement Flap Text: The defect edges were debeveled with a #15 scalpel blade.  Given the location of the defect, shape of the defect and the proximity to free margins a Peng advancement flap was deemed most appropriate.  Using a sterile surgical marker, an appropriate advancement flap was drawn incorporating the defect and placing the expected incisions within the relaxed skin tension lines where possible. The area thus outlined was incised deep to adipose tissue with a #15 scalpel blade.  The skin margins were undermined to an appropriate distance in all directions utilizing iris scissors.
Hatchet Flap Text: The defect edges were debeveled with a #15 scalpel blade.  Given the location of the defect, shape of the defect and the proximity to free margins a hatchet flap was deemed most appropriate.  Using a sterile surgical marker, an appropriate hatchet flap was drawn incorporating the defect and placing the expected incisions within the relaxed skin tension lines where possible.    The area thus outlined was incised deep to adipose tissue with a #15 scalpel blade.  The skin margins were undermined to an appropriate distance in all directions utilizing iris scissors.
Rotation Flap Text: The defect edges were debeveled with a #15 scalpel blade.  Given the location of the defect, shape of the defect and the proximity to free margins a rotation flap was deemed most appropriate.  Using a sterile surgical marker, an appropriate rotation flap was drawn incorporating the defect and placing the expected incisions within the relaxed skin tension lines where possible.    The area thus outlined was incised deep to adipose tissue with a #15 scalpel blade.  The skin margins were undermined to an appropriate distance in all directions utilizing iris scissors.
Bilateral Rotation Flap Text: The defect edges were debeveled with a #15 scalpel blade. Given the location of the defect, shape of the defect and the proximity to free margins a bilateral rotation flap was deemed most appropriate. Using a sterile surgical marker, an appropriate rotation flap was drawn incorporating the defect and placing the expected incisions within the relaxed skin tension lines where possible. The area thus outlined was incised deep to adipose tissue with a #15 scalpel blade. The skin margins were undermined to an appropriate distance in all directions utilizing iris scissors. Following this, the designed flap was carried over into the primary defect and sutured into place.
Spiral Flap Text: The defect edges were debeveled with a #15 scalpel blade.  Given the location of the defect, shape of the defect and the proximity to free margins a spiral flap was deemed most appropriate.  Using a sterile surgical marker, an appropriate rotation flap was drawn incorporating the defect and placing the expected incisions within the relaxed skin tension lines where possible. The area thus outlined was incised deep to adipose tissue with a #15 scalpel blade.  The skin margins were undermined to an appropriate distance in all directions utilizing iris scissors.
Staged Advancement Flap Text: The defect edges were debeveled with a #15 scalpel blade.  Given the location of the defect, shape of the defect and the proximity to free margins a staged advancement flap was deemed most appropriate.  Using a sterile surgical marker, an appropriate advancement flap was drawn incorporating the defect and placing the expected incisions within the relaxed skin tension lines where possible. The area thus outlined was incised deep to adipose tissue with a #15 scalpel blade.  The skin margins were undermined to an appropriate distance in all directions utilizing iris scissors.
Star Wedge Flap Text: The defect edges were debeveled with a #15 scalpel blade.  Given the location of the defect, shape of the defect and the proximity to free margins a star wedge flap was deemed most appropriate.  Using a sterile surgical marker, an appropriate rotation flap was drawn incorporating the defect and placing the expected incisions within the relaxed skin tension lines where possible. The area thus outlined was incised deep to adipose tissue with a #15 scalpel blade.  The skin margins were undermined to an appropriate distance in all directions utilizing iris scissors.
Transposition Flap Text: The defect edges were debeveled with a #15 scalpel blade.  Given the location of the defect and the proximity to free margins a transposition flap was deemed most appropriate.  Using a sterile surgical marker, an appropriate transposition flap was drawn incorporating the defect.    The area thus outlined was incised deep to adipose tissue with a #15 scalpel blade.  The skin margins were undermined to an appropriate distance in all directions utilizing iris scissors.
Muscle Hinge Flap Text: The defect edges were debeveled with a #15 scalpel blade.  Given the size, depth and location of the defect and the proximity to free margins a muscle hinge flap was deemed most appropriate.  Using a sterile surgical marker, an appropriate hinge flap was drawn incorporating the defect. The area thus outlined was incised with a #15 scalpel blade.  The skin margins were undermined to an appropriate distance in all directions utilizing iris scissors.
Mustarde Flap Text: The defect edges were debeveled with a #15 scalpel blade.  Given the size, depth and location of the defect and the proximity to free margins a Mustarde flap was deemed most appropriate. Using a sterile surgical marker, an appropriate flap was drawn incorporating the defect. The area thus outlined was incised with a #15 scalpel blade. The skin margins were undermined to an appropriate distance in all directions utilizing iris scissors. Following this, the designed flap was carried into the primary defect and sutured into place.
Nasal Turnover Hinge Flap Text: The defect edges were debeveled with a #15 scalpel blade.  Given the size, depth, location of the defect and the defect being full thickness a nasal turnover hinge flap was deemed most appropriate.  Using a sterile surgical marker, an appropriate hinge flap was drawn incorporating the defect. The area thus outlined was incised with a #15 scalpel blade. The flap was designed to recreate the nasal mucosal lining and the alar rim. The skin margins were undermined to an appropriate distance in all directions utilizing iris scissors.
Nasalis-Muscle-Based Myocutaneous Island Pedicle Flap Text: Using a #15 blade, an incision was made around the donor flap to the level of the nasalis muscle. Wide lateral undermining was then performed in both the subcutaneous plane above the nasalis muscle, and in a submuscular plane just above periosteum. This allowed the formation of a free nasalis muscle axial pedicle (based on the angular artery) which was still attached to the actual cutaneous flap, increasing its mobility and vascular viability. Hemostasis was obtained with pinpoint electrocoagulation. The flap was mobilized into position and the pivotal anchor points positioned and stabilized with buried interrupted sutures. Subcutaneous and dermal tissues were closed in a multilayered fashion with sutures. Tissue redundancies were excised, and the epidermal edges were apposed without significant tension and sutured with sutures.
Nasalis Myocutaneous Flap Text: Using a #15 blade, an incision was made around the donor flap to the level of the nasalis muscle. Wide lateral undermining was then performed in both the subcutaneous plane above the nasalis muscle, and in a submuscular plane just above periosteum. This allowed the formation of a free nasalis muscle axial pedicle which was still attached to the actual cutaneous flap, increasing its mobility and vascular viability. Hemostasis was obtained with pinpoint electrocoagulation. The flap was mobilized into position and the pivotal anchor points positioned and stabilized with buried interrupted sutures. Subcutaneous and dermal tissues were closed in a multilayered fashion with sutures. Tissue redundancies were excised, and the epidermal edges were apposed without significant tension and sutured with sutures.
Nasolabial Transposition Flap Text: The defect edges were debeveled with a #15 scalpel blade.  Given the size, depth and location of the defect and the proximity to free margins a nasolabial transposition flap was deemed most appropriate. Using a sterile surgical marker, an appropriate flap was drawn incorporating the defect. The area thus outlined was incised with a #15 scalpel blade. The skin margins were undermined to an appropriate distance in all directions utilizing iris scissors. Following this, the designed flap was carried into the primary defect and sutured into place.
Orbicularis Oris Muscle Flap Text: The defect edges were debeveled with a #15 scalpel blade.  Given that the defect affected the competency of the oral sphincter an obicularis oris muscle flap was deemed most appropriate to restore this competency and normal muscle function.  Using a sterile surgical marker, an appropriate flap was drawn incorporating the defect. The area thus outlined was incised with a #15 scalpel blade.
Melolabial Transposition Flap Text: The defect edges were debeveled with a #15 scalpel blade.  Given the location of the defect and the proximity to free margins a melolabial flap was deemed most appropriate.  Using a sterile surgical marker, an appropriate melolabial transposition flap was drawn incorporating the defect.    The area thus outlined was incised deep to adipose tissue with a #15 scalpel blade.  The skin margins were undermined to an appropriate distance in all directions utilizing iris scissors.
Rectangular Flap Text: The defect edges were debeveled with a #15 scalpel blade. Given the location of the defect and the proximity to free margins a rectangular flap was deemed most appropriate. Using a sterile surgical marker, an appropriate rectangular flap was drawn incorporating the defect. The area thus outlined was incised deep to adipose tissue with a #15 scalpel blade. The skin margins were undermined to an appropriate distance in all directions utilizing iris scissors. Following this, the designed flap was carried over into the primary defect and sutured into place.
Rhombic Flap Text: The defect edges were debeveled with a #15 scalpel blade.  Given the location of the defect and the proximity to free margins a rhombic flap was deemed most appropriate.  Using a sterile surgical marker, an appropriate rhombic flap was drawn incorporating the defect.    The area thus outlined was incised deep to adipose tissue with a #15 scalpel blade.  The skin margins were undermined to an appropriate distance in all directions utilizing iris scissors.
Rhomboid Transposition Flap Text: The defect edges were debeveled with a #15 scalpel blade.  Given the location of the defect and the proximity to free margins a rhomboid transposition flap was deemed most appropriate.  Using a sterile surgical marker, an appropriate rhomboid flap was drawn incorporating the defect.    The area thus outlined was incised deep to adipose tissue with a #15 scalpel blade.  The skin margins were undermined to an appropriate distance in all directions utilizing iris scissors.
Bi-Rhombic Flap Text: The defect edges were debeveled with a #15 scalpel blade.  Given the location of the defect and the proximity to free margins a bi-rhombic flap was deemed most appropriate.  Using a sterile surgical marker, an appropriate rhombic flap was drawn incorporating the defect. The area thus outlined was incised deep to adipose tissue with a #15 scalpel blade.  The skin margins were undermined to an appropriate distance in all directions utilizing iris scissors.
Helical Rim Advancement Flap Text: The defect edges were debeveled with a #15 blade scalpel.  Given the location of the defect and the proximity to free margins (helical rim) a double helical rim advancement flap was deemed most appropriate.  Using a sterile surgical marker, the appropriate advancement flaps were drawn incorporating the defect and placing the expected incisions between the helical rim and antihelix where possible.  The area thus outlined was incised through and through with a #15 scalpel blade.  With a skin hook and iris scissors, the flaps were gently and sharply undermined and freed up.
Bilateral Helical Rim Advancement Flap Text: The defect edges were debeveled with a #15 blade scalpel.  Given the location of the defect and the proximity to free margins (helical rim) a bilateral helical rim advancement flap was deemed most appropriate.  Using a sterile surgical marker, the appropriate advancement flaps were drawn incorporating the defect and placing the expected incisions between the helical rim and antihelix where possible.  The area thus outlined was incised through and through with a #15 scalpel blade.  With a skin hook and iris scissors, the flaps were gently and sharply undermined and freed up.
Ear Star Wedge Flap Text: The defect edges were debeveled with a #15 blade scalpel.  Given the location of the defect and the proximity to free margins (helical rim) an ear star wedge flap was deemed most appropriate.  Using a sterile surgical marker, the appropriate flap was drawn incorporating the defect and placing the expected incisions between the helical rim and antihelix where possible.  The area thus outlined was incised through and through with a #15 scalpel blade.
Flip-Flop Flap Text: The defect edges were debeveled with a #15 blade scalpel.  Given the location of the defect and the proximity to free margins a flip-flop flap was deemed most appropriate. Using a sterile surgical marker, the appropriate flap was drawn incorporating the defect and placing the expected incisions between the helical rim and antihelix where possible.  The area thus outlined was incised through and through with a #15 scalpel blade. Following this, the designed flap was carried over into the primary defect and sutured into place.
Banner Transposition Flap Text: The defect edges were debeveled with a #15 scalpel blade.  Given the location of the defect and the proximity to free margins a Banner transposition flap was deemed most appropriate.  Using a sterile surgical marker, an appropriate flap drawn around the defect. The area thus outlined was incised deep to adipose tissue with a #15 scalpel blade.  The skin margins were undermined to an appropriate distance in all directions utilizing iris scissors.
Bilobed Flap Text: The defect edges were debeveled with a #15 scalpel blade.  Given the location of the defect and the proximity to free margins a bilobe flap was deemed most appropriate.  Using a sterile surgical marker, an appropriate bilobe flap drawn around the defect.    The area thus outlined was incised deep to adipose tissue with a #15 scalpel blade.  The skin margins were undermined to an appropriate distance in all directions utilizing iris scissors.
Bilobed Transposition Flap Text: The defect edges were debeveled with a #15 scalpel blade.  Given the location of the defect and the proximity to free margins a bilobed transposition flap was deemed most appropriate.  Using a sterile surgical marker, an appropriate bilobe flap drawn around the defect.    The area thus outlined was incised deep to adipose tissue with a #15 scalpel blade.  The skin margins were undermined to an appropriate distance in all directions utilizing iris scissors.
Trilobed Flap Text: The defect edges were debeveled with a #15 scalpel blade.  Given the location of the defect and the proximity to free margins a trilobed flap was deemed most appropriate.  Using a sterile surgical marker, an appropriate trilobed flap drawn around the defect.    The area thus outlined was incised deep to adipose tissue with a #15 scalpel blade.  The skin margins were undermined to an appropriate distance in all directions utilizing iris scissors.
Dorsal Nasal Flap Text: The defect edges were debeveled with a #15 scalpel blade.  Given the location of the defect and the proximity to free margins a dorsal nasal flap was deemed most appropriate.  Using a sterile surgical marker, an appropriate dorsal nasal flap was drawn around the defect.    The area thus outlined was incised deep to adipose tissue with a #15 scalpel blade.  The skin margins were undermined to an appropriate distance in all directions utilizing iris scissors.
Island Pedicle Flap Text: The defect edges were debeveled with a #15 scalpel blade.  Given the location of the defect, shape of the defect and the proximity to free margins an island pedicle advancement flap was deemed most appropriate.  Using a sterile surgical marker, an appropriate advancement flap was drawn incorporating the defect, outlining the appropriate donor tissue and placing the expected incisions within the relaxed skin tension lines where possible.    The area thus outlined was incised deep to adipose tissue with a #15 scalpel blade.  The skin margins were undermined to an appropriate distance in all directions around the primary defect and laterally outward around the island pedicle utilizing iris scissors.  There was minimal undermining beneath the pedicle flap.
Island Pedicle Flap With Canthal Suspension Text: The defect edges were debeveled with a #15 scalpel blade.  Given the location of the defect, shape of the defect and the proximity to free margins an island pedicle advancement flap was deemed most appropriate.  Using a sterile surgical marker, an appropriate advancement flap was drawn incorporating the defect, outlining the appropriate donor tissue and placing the expected incisions within the relaxed skin tension lines where possible. The area thus outlined was incised deep to adipose tissue with a #15 scalpel blade.  The skin margins were undermined to an appropriate distance in all directions around the primary defect and laterally outward around the island pedicle utilizing iris scissors.  There was minimal undermining beneath the pedicle flap. A suspension suture was placed in the canthal tendon to prevent tension and prevent ectropion.
Alar Island Pedicle Flap Text: The defect edges were debeveled with a #15 scalpel blade.  Given the location of the defect, shape of the defect and the proximity to the alar rim an island pedicle advancement flap was deemed most appropriate.  Using a sterile surgical marker, an appropriate advancement flap was drawn incorporating the defect, outlining the appropriate donor tissue and placing the expected incisions within the nasal ala running parallel to the alar rim. The area thus outlined was incised with a #15 scalpel blade.  The skin margins were undermined minimally to an appropriate distance in all directions around the primary defect and laterally outward around the island pedicle utilizing iris scissors.  There was minimal undermining beneath the pedicle flap.
Double Island Pedicle Flap Text: The defect edges were debeveled with a #15 scalpel blade.  Given the location of the defect, shape of the defect and the proximity to free margins a double island pedicle advancement flap was deemed most appropriate.  Using a sterile surgical marker, an appropriate advancement flap was drawn incorporating the defect, outlining the appropriate donor tissue and placing the expected incisions within the relaxed skin tension lines where possible.    The area thus outlined was incised deep to adipose tissue with a #15 scalpel blade.  The skin margins were undermined to an appropriate distance in all directions around the primary defect and laterally outward around the island pedicle utilizing iris scissors.  There was minimal undermining beneath the pedicle flap.
Island Pedicle Flap-Requiring Vessel Identification Text: The defect edges were debeveled with a #15 scalpel blade.  Given the location of the defect, shape of the defect and the proximity to free margins an island pedicle advancement flap was deemed most appropriate.  Using a sterile surgical marker, an appropriate advancement flap was drawn, based on the axial vessel mentioned above, incorporating the defect, outlining the appropriate donor tissue and placing the expected incisions within the relaxed skin tension lines where possible.    The area thus outlined was incised deep to adipose tissue with a #15 scalpel blade.  The skin margins were undermined to an appropriate distance in all directions around the primary defect and laterally outward around the island pedicle utilizing iris scissors.  There was minimal undermining beneath the pedicle flap.
Keystone Flap Text: The defect edges were debeveled with a #15 scalpel blade.  Given the location of the defect, shape of the defect a keystone flap was deemed most appropriate.  Using a sterile surgical marker, an appropriate keystone flap was drawn incorporating the defect, outlining the appropriate donor tissue and placing the expected incisions within the relaxed skin tension lines where possible. The area thus outlined was incised deep to adipose tissue with a #15 scalpel blade.  The skin margins were undermined to an appropriate distance in all directions around the primary defect and laterally outward around the flap utilizing iris scissors.
O-T Plasty Text: The defect edges were debeveled with a #15 scalpel blade.  Given the location of the defect, shape of the defect and the proximity to free margins an O-T plasty was deemed most appropriate.  Using a sterile surgical marker, an appropriate O-T plasty was drawn incorporating the defect and placing the expected incisions within the relaxed skin tension lines where possible.    The area thus outlined was incised deep to adipose tissue with a #15 scalpel blade.  The skin margins were undermined to an appropriate distance in all directions utilizing iris scissors.
O-Z Plasty Text: The defect edges were debeveled with a #15 scalpel blade.  Given the location of the defect, shape of the defect and the proximity to free margins an O-Z plasty (double transposition flap) was deemed most appropriate.  Using a sterile surgical marker, the appropriate transposition flaps were drawn incorporating the defect and placing the expected incisions within the relaxed skin tension lines where possible.    The area thus outlined was incised deep to adipose tissue with a #15 scalpel blade.  The skin margins were undermined to an appropriate distance in all directions utilizing iris scissors.  Hemostasis was achieved with electrocautery.  The flaps were then transposed into place, one clockwise and the other counterclockwise, and anchored with interrupted buried subcutaneous sutures.
Double O-Z Plasty Text: The defect edges were debeveled with a #15 scalpel blade.  Given the location of the defect, shape of the defect and the proximity to free margins a Double O-Z plasty (double transposition flap) was deemed most appropriate.  Using a sterile surgical marker, the appropriate transposition flaps were drawn incorporating the defect and placing the expected incisions within the relaxed skin tension lines where possible. The area thus outlined was incised deep to adipose tissue with a #15 scalpel blade.  The skin margins were undermined to an appropriate distance in all directions utilizing iris scissors.  Hemostasis was achieved with electrocautery.  The flaps were then transposed into place, one clockwise and the other counterclockwise, and anchored with interrupted buried subcutaneous sutures.
V-Y Plasty Text: The defect edges were debeveled with a #15 scalpel blade.  Given the location of the defect, shape of the defect and the proximity to free margins an V-Y advancement flap was deemed most appropriate.  Using a sterile surgical marker, an appropriate advancement flap was drawn incorporating the defect and placing the expected incisions within the relaxed skin tension lines where possible.    The area thus outlined was incised deep to adipose tissue with a #15 scalpel blade.  The skin margins were undermined to an appropriate distance in all directions utilizing iris scissors.
H Plasty Text: Given the location of the defect, shape of the defect and the proximity to free margins a H-plasty was deemed most appropriate for repair.  Using a sterile surgical marker, the appropriate advancement arms of the H-plasty were drawn incorporating the defect and placing the expected incisions within the relaxed skin tension lines where possible. The area thus outlined was incised deep to adipose tissue with a #15 scalpel blade. The skin margins were undermined to an appropriate distance in all directions utilizing iris scissors.  The opposing advancement arms were then advanced into place in opposite direction and anchored with interrupted buried subcutaneous sutures.
W Plasty Text: The lesion was extirpated to the level of the fat with a #15 scalpel blade.  Given the location of the defect, shape of the defect and the proximity to free margins a W-plasty was deemed most appropriate for repair.  Using a sterile surgical marker, the appropriate transposition arms of the W-plasty were drawn incorporating the defect and placing the expected incisions within the relaxed skin tension lines where possible.    The area thus outlined was incised deep to adipose tissue with a #15 scalpel blade.  The skin margins were undermined to an appropriate distance in all directions utilizing iris scissors.  The opposing transposition arms were then transposed into place in opposite direction and anchored with interrupted buried subcutaneous sutures.
Z Plasty Text: The lesion was extirpated to the level of the fat with a #15 scalpel blade.  Given the location of the defect, shape of the defect and the proximity to free margins a Z-plasty was deemed most appropriate for repair.  Using a sterile surgical marker, the appropriate transposition arms of the Z-plasty were drawn incorporating the defect and placing the expected incisions within the relaxed skin tension lines where possible.    The area thus outlined was incised deep to adipose tissue with a #15 scalpel blade.  The skin margins were undermined to an appropriate distance in all directions utilizing iris scissors.  The opposing transposition arms were then transposed into place in opposite direction and anchored with interrupted buried subcutaneous sutures.
Double Z Plasty Text: The lesion was extirpated to the level of the fat with a #15 scalpel blade. Given the location of the defect, shape of the defect and the proximity to free margins a double Z-plasty was deemed most appropriate for repair. Using a sterile surgical marker, the appropriate transposition arms of the double Z-plasty were drawn incorporating the defect and placing the expected incisions within the relaxed skin tension lines where possible. The area thus outlined was incised deep to adipose tissue with a #15 scalpel blade. The skin margins were undermined to an appropriate distance in all directions utilizing iris scissors. The opposing transposition arms were then transposed and carried over into place in opposite direction and anchored with interrupted buried subcutaneous sutures.
Zygomaticofacial Flap Text: Given the location of the defect, shape of the defect and the proximity to free margins a zygomaticofacial flap was deemed most appropriate for repair.  Using a sterile surgical marker, the appropriate flap was drawn incorporating the defect and placing the expected incisions within the relaxed skin tension lines where possible. The area thus outlined was incised deep to adipose tissue with a #15 scalpel blade with preservation of a vascular pedicle.  The skin margins were undermined to an appropriate distance in all directions utilizing iris scissors.  The flap was then placed into the defect and anchored with interrupted buried subcutaneous sutures.
Cheek Interpolation Flap Text: A decision was made to reconstruct the defect utilizing an interpolation axial flap and a staged reconstruction.  A telfa template was made of the defect.  This telfa template was then used to outline the Cheek Interpolation flap.  The donor area for the pedicle flap was then injected with anesthesia.  The flap was excised through the skin and subcutaneous tissue down to the layer of the underlying musculature.  The interpolation flap was carefully excised within this deep plane to maintain its blood supply.  The edges of the donor site were undermined.   The donor site was closed in a primary fashion.  The pedicle was then rotated into position and sutured.  Once the tube was sutured into place, adequate blood supply was confirmed with blanching and refill.  The pedicle was then wrapped with xeroform gauze and dressed appropriately with a telfa and gauze bandage to ensure continued blood supply and protect the attached pedicle.
Cheek-To-Nose Interpolation Flap Text: A decision was made to reconstruct the defect utilizing an interpolation axial flap and a staged reconstruction.  A telfa template was made of the defect.  This telfa template was then used to outline the Cheek-To-Nose Interpolation flap.  The donor area for the pedicle flap was then injected with anesthesia.  The flap was excised through the skin and subcutaneous tissue down to the layer of the underlying musculature.  The interpolation flap was carefully excised within this deep plane to maintain its blood supply.  The edges of the donor site were undermined.   The donor site was closed in a primary fashion.  The pedicle was then rotated into position and sutured.  Once the tube was sutured into place, adequate blood supply was confirmed with blanching and refill.  The pedicle was then wrapped with xeroform gauze and dressed appropriately with a telfa and gauze bandage to ensure continued blood supply and protect the attached pedicle.
Interpolation Flap Text: A decision was made to reconstruct the defect utilizing an interpolation axial flap and a staged reconstruction.  A telfa template was made of the defect.  This telfa template was then used to outline the interpolation flap.  The donor area for the pedicle flap was then injected with anesthesia.  The flap was excised through the skin and subcutaneous tissue down to the layer of the underlying musculature.  The interpolation flap was carefully excised within this deep plane to maintain its blood supply.  The edges of the donor site were undermined.   The donor site was closed in a primary fashion.  The pedicle was then rotated into position and sutured.  Once the tube was sutured into place, adequate blood supply was confirmed with blanching and refill.  The pedicle was then wrapped with xeroform gauze and dressed appropriately with a telfa and gauze bandage to ensure continued blood supply and protect the attached pedicle.
Melolabial Interpolation Flap Text: A decision was made to reconstruct the defect utilizing an interpolation axial flap and a staged reconstruction.  A telfa template was made of the defect.  This telfa template was then used to outline the melolabial interpolation flap.  The donor area for the pedicle flap was then injected with anesthesia.  The flap was excised through the skin and subcutaneous tissue down to the layer of the underlying musculature.  The pedicle flap was carefully excised within this deep plane to maintain its blood supply.  The edges of the donor site were undermined.   The donor site was closed in a primary fashion.  The pedicle was then rotated into position and sutured.  Once the tube was sutured into place, adequate blood supply was confirmed with blanching and refill.  The pedicle was then wrapped with xeroform gauze and dressed appropriately with a telfa and gauze bandage to ensure continued blood supply and protect the attached pedicle.
Mastoid Interpolation Flap Text: A decision was made to reconstruct the defect utilizing an interpolation axial flap and a staged reconstruction.  A telfa template was made of the defect.  This telfa template was then used to outline the mastoid interpolation flap.  The donor area for the pedicle flap was then injected with anesthesia.  The flap was excised through the skin and subcutaneous tissue down to the layer of the underlying musculature.  The pedicle flap was carefully excised within this deep plane to maintain its blood supply.  The edges of the donor site were undermined.   The donor site was closed in a primary fashion.  The pedicle was then rotated into position and sutured.  Once the tube was sutured into place, adequate blood supply was confirmed with blanching and refill.  The pedicle was then wrapped with xeroform gauze and dressed appropriately with a telfa and gauze bandage to ensure continued blood supply and protect the attached pedicle.
Posterior Auricular Interpolation Flap Text: A decision was made to reconstruct the defect utilizing an interpolation axial flap and a staged reconstruction.  A telfa template was made of the defect.  This telfa template was then used to outline the posterior auricular interpolation flap.  The donor area for the pedicle flap was then injected with anesthesia.  The flap was excised through the skin and subcutaneous tissue down to the layer of the underlying musculature.  The pedicle flap was carefully excised within this deep plane to maintain its blood supply.  The edges of the donor site were undermined.   The donor site was closed in a primary fashion.  The pedicle was then rotated into position and sutured.  Once the tube was sutured into place, adequate blood supply was confirmed with blanching and refill.  The pedicle was then wrapped with xeroform gauze and dressed appropriately with a telfa and gauze bandage to ensure continued blood supply and protect the attached pedicle.
Paramedian Forehead Flap Text: A decision was made to reconstruct the defect utilizing an interpolation axial flap and a staged reconstruction.  A telfa template was made of the defect.  This telfa template was then used to outline the paramedian forehead pedicle flap.  The donor area for the pedicle flap was then injected with anesthesia.  The flap was excised through the skin and subcutaneous tissue down to the layer of the underlying musculature.  The pedicle flap was carefully excised within this deep plane to maintain its blood supply.  The edges of the donor site were undermined.   The donor site was closed in a primary fashion.  The pedicle was then rotated into position and sutured.  Once the tube was sutured into place, adequate blood supply was confirmed with blanching and refill.  The pedicle was then wrapped with xeroform gauze and dressed appropriately with a telfa and gauze bandage to ensure continued blood supply and protect the attached pedicle.
Abbe Flap (Upper To Lower Lip) Text: The defect of the lower lip was assessed and measured.  Given the location and size of the defect, an Abbe flap was deemed most appropriate. Using a sterile surgical marker, an appropriate Abbe flap was measured and drawn on the upper lip. Local anesthesia was then infiltrated.  A scalpel was then used to incise the upper lip through and through the skin, vermilion, muscle and mucosa, leaving the flap pedicled on the opposite side.  The flap was then rotated and transferred to the lower lip defect.  The flap was then sutured into place with a three layer technique, closing the orbicularis oris muscle layer with subcutaneous buried sutures, followed by a mucosal layer and an epidermal layer.
Abbe Flap (Lower To Upper Lip) Text: The defect of the upper lip was assessed and measured.  Given the location and size of the defect, an Abbe flap was deemed most appropriate. Using a sterile surgical marker, an appropriate Abbe flap was measured and drawn on the lower lip. Local anesthesia was then infiltrated. A scalpel was then used to incise the upper lip through and through the skin, vermilion, muscle and mucosa, leaving the flap pedicled on the opposite side.  The flap was then rotated and transferred to the lower lip defect.  The flap was then sutured into place with a three layer technique, closing the orbicularis oris muscle layer with subcutaneous buried sutures, followed by a mucosal layer and an epidermal layer.
Estlander Flap (Upper To Lower Lip) Text: The defect of the lower lip was assessed and measured.  Given the location and size of the defect, an Estlander flap was deemed most appropriate. Using a sterile surgical marker, an appropriate Estlander flap was measured and drawn on the upper lip. Local anesthesia was then infiltrated. A scalpel was then used to incise the lateral aspect of the flap, through skin, muscle and mucosa, leaving the flap pedicled medially.  The flap was then rotated and positioned to fill the lower lip defect.  The flap was then sutured into place with a three layer technique, closing the orbicularis oris muscle layer with subcutaneous buried sutures, followed by a mucosal layer and an epidermal layer.
Lip Wedge Excision Repair Text: Given the location of the defect and the proximity to free margins a full thickness wedge repair was deemed most appropriate.  Using a sterile surgical marker, the appropriate repair was drawn incorporating the defect and placing the expected incisions perpendicular to the vermilion border.  The vermilion border was also meticulously outlined to ensure appropriate reapproximation during the repair.  The area thus outlined was incised through and through with a #15 scalpel blade.  The muscularis and dermis were reaproximated with deep sutures following hemostasis. Care was taken to realign the vermilion border before proceeding with the superficial closure.  Once the vermilion was realigned the superfical and mucosal closure was finished.
Ftsg Text: The defect edges were debeveled with a #15 scalpel blade.  Given the location of the defect, shape of the defect and the proximity to free margins a full thickness skin graft was deemed most appropriate.  Using a sterile surgical marker, the primary defect shape was transferred to the donor site. The area thus outlined was incised deep to adipose tissue with a #15 scalpel blade.  The harvested graft was then trimmed of adipose tissue until only dermis and epidermis was left.  The skin margins of the secondary defect were undermined to an appropriate distance in all directions utilizing iris scissors.  The secondary defect was closed with interrupted buried subcutaneous sutures.  The skin edges were then re-apposed with running  sutures.  The skin graft was then placed in the primary defect and oriented appropriately.
Split-Thickness Skin Graft Text: The defect edges were debeveled with a #15 scalpel blade.  Given the location of the defect, shape of the defect and the proximity to free margins a split thickness skin graft was deemed most appropriate.  Using a sterile surgical marker, the primary defect shape was transferred to the donor site. The split thickness graft was then harvested.  The skin graft was then placed in the primary defect and oriented appropriately.
Pinch Graft Text: The defect edges were debeveled with a #15 scalpel blade. Given the location of the defect, shape of the defect and the proximity to free margins a pinch graft was deemed most appropriate. Using a sterile surgical marker, the primary defect shape was transferred to the donor site. The area thus outlined was incised deep to adipose tissue with a #15 scalpel blade.  The harvested graft was then trimmed of adipose tissue until only dermis and epidermis was left. The skin graft was then placed in the primary defect and oriented appropriately.
Burow's Graft Text: The defect edges were debeveled with a #15 scalpel blade.  Given the location of the defect, shape of the defect, the proximity to free margins and the presence of a standing cone deformity a Burow's skin graft was deemed most appropriate. The standing cone was removed and this tissue was then trimmed to the shape of the primary defect. The adipose tissue was also removed until only dermis and epidermis were left.  The skin margins of the secondary defect were undermined to an appropriate distance in all directions utilizing iris scissors.  The secondary defect was closed with interrupted buried subcutaneous sutures.  The skin edges were then re-apposed with running  sutures.  The skin graft was then placed in the primary defect and oriented appropriately.
Cartilage Graft Text: The defect edges were debeveled with a #15 scalpel blade.  Given the location of the defect, shape of the defect, the fact the defect involved a full thickness cartilage defect a cartilage graft was deemed most appropriate.  An appropriate donor site was identified, cleansed, and anesthetized. The cartilage graft was then harvested and transferred to the recipient site, oriented appropriately and then sutured into place.  The secondary defect was then repaired using a primary closure.
Composite Graft Text: The defect edges were debeveled with a #15 scalpel blade.  Given the location of the defect, shape of the defect, the proximity to free margins and the fact the defect was full thickness a composite graft was deemed most appropriate.  The defect was outline and then transferred to the donor site.  A full thickness graft was then excised from the donor site. The graft was then placed in the primary defect, oriented appropriately and then sutured into place.  The secondary defect was then repaired using a primary closure.
Epidermal Autograft Text: The defect edges were debeveled with a #15 scalpel blade.  Given the location of the defect, shape of the defect and the proximity to free margins an epidermal autograft was deemed most appropriate.  Using a sterile surgical marker, the primary defect shape was transferred to the donor site. The epidermal graft was then harvested.  The skin graft was then placed in the primary defect and oriented appropriately.
Dermal Autograft Text: The defect edges were debeveled with a #15 scalpel blade.  Given the location of the defect, shape of the defect and the proximity to free margins a dermal autograft was deemed most appropriate.  Using a sterile surgical marker, the primary defect shape was transferred to the donor site. The area thus outlined was incised deep to adipose tissue with a #15 scalpel blade.  The harvested graft was then trimmed of adipose and epidermal tissue until only dermis was left.  The skin graft was then placed in the primary defect and oriented appropriately.
Skin Substitute Text: The defect edges were debeveled with a #15 scalpel blade.  Given the location of the defect, shape of the defect and the proximity to free margins a skin substitute graft was deemed most appropriate.  The graft material was trimmed to fit the size of the defect. The graft was then placed in the primary defect and oriented appropriately.
Tissue Cultured Epidermal Autograft Text: The defect edges were debeveled with a #15 scalpel blade.  Given the location of the defect, shape of the defect and the proximity to free margins a tissue cultured epidermal autograft was deemed most appropriate.  The graft was then trimmed to fit the size of the defect.  The graft was then placed in the primary defect and oriented appropriately.
Xenograft Text: The defect edges were debeveled with a #15 scalpel blade.  Given the location of the defect, shape of the defect and the proximity to free margins a xenograft was deemed most appropriate.  The graft was then trimmed to fit the size of the defect.  The graft was then placed in the primary defect and oriented appropriately.
Purse String (Intermediate) Text: Given the location of the defect and the characteristics of the surrounding skin a purse string intermediate closure was deemed most appropriate.  Undermining was performed circumfirentially around the surgical defect.  A purse string suture was then placed and tightened.
Purse String (Simple) Text: Given the location of the defect and the characteristics of the surrounding skin a purse string simple closure was deemed most appropriate.  Undermining was performed circumferentially around the surgical defect.  A purse string suture was then placed and tightened.
Partial Purse String (Intermediate) Text: Given the location of the defect and the characteristics of the surrounding skin an intermediate purse string closure was deemed most appropriate.  Undermining was performed circumferentially around the surgical defect.  A purse string suture was then placed and tightened. Wound tension of the circular defect prevented complete closure of the wound.
Partial Purse String (Simple) Text: Given the location of the defect and the characteristics of the surrounding skin a simple purse string closure was deemed most appropriate.  Undermining was performed circumferentially around the surgical defect.  A purse string suture was then placed and tightened. Wound tension of the circular defect prevented complete closure of the wound.
Complex Repair And Single Advancement Flap Text: The defect edges were debeveled with a #15 scalpel blade.  The primary defect was closed partially with a complex linear closure.  Given the location of the remaining defect, shape of the defect and the proximity to free margins a single advancement flap was deemed most appropriate for complete closure of the defect.  Using a sterile surgical marker, an appropriate advancement flap was drawn incorporating the defect and placing the expected incisions within the relaxed skin tension lines where possible.    The area thus outlined was incised deep to adipose tissue with a #15 scalpel blade.  The skin margins were undermined to an appropriate distance in all directions utilizing iris scissors.
Complex Repair And Double Advancement Flap Text: The defect edges were debeveled with a #15 scalpel blade.  The primary defect was closed partially with a complex linear closure.  Given the location of the remaining defect, shape of the defect and the proximity to free margins a double advancement flap was deemed most appropriate for complete closure of the defect.  Using a sterile surgical marker, an appropriate advancement flap was drawn incorporating the defect and placing the expected incisions within the relaxed skin tension lines where possible.    The area thus outlined was incised deep to adipose tissue with a #15 scalpel blade.  The skin margins were undermined to an appropriate distance in all directions utilizing iris scissors.
Complex Repair And Modified Advancement Flap Text: The defect edges were debeveled with a #15 scalpel blade.  The primary defect was closed partially with a complex linear closure.  Given the location of the remaining defect, shape of the defect and the proximity to free margins a modified advancement flap was deemed most appropriate for complete closure of the defect.  Using a sterile surgical marker, an appropriate advancement flap was drawn incorporating the defect and placing the expected incisions within the relaxed skin tension lines where possible.    The area thus outlined was incised deep to adipose tissue with a #15 scalpel blade.  The skin margins were undermined to an appropriate distance in all directions utilizing iris scissors.
Complex Repair And A-T Advancement Flap Text: The defect edges were debeveled with a #15 scalpel blade.  The primary defect was closed partially with a complex linear closure.  Given the location of the remaining defect, shape of the defect and the proximity to free margins an A-T advancement flap was deemed most appropriate for complete closure of the defect.  Using a sterile surgical marker, an appropriate advancement flap was drawn incorporating the defect and placing the expected incisions within the relaxed skin tension lines where possible.    The area thus outlined was incised deep to adipose tissue with a #15 scalpel blade.  The skin margins were undermined to an appropriate distance in all directions utilizing iris scissors.
Complex Repair And O-T Advancement Flap Text: The defect edges were debeveled with a #15 scalpel blade.  The primary defect was closed partially with a complex linear closure.  Given the location of the remaining defect, shape of the defect and the proximity to free margins an O-T advancement flap was deemed most appropriate for complete closure of the defect.  Using a sterile surgical marker, an appropriate advancement flap was drawn incorporating the defect and placing the expected incisions within the relaxed skin tension lines where possible.    The area thus outlined was incised deep to adipose tissue with a #15 scalpel blade.  The skin margins were undermined to an appropriate distance in all directions utilizing iris scissors.
Complex Repair And O-L Flap Text: The defect edges were debeveled with a #15 scalpel blade.  The primary defect was closed partially with a complex linear closure.  Given the location of the remaining defect, shape of the defect and the proximity to free margins an O-L flap was deemed most appropriate for complete closure of the defect.  Using a sterile surgical marker, an appropriate flap was drawn incorporating the defect and placing the expected incisions within the relaxed skin tension lines where possible.    The area thus outlined was incised deep to adipose tissue with a #15 scalpel blade.  The skin margins were undermined to an appropriate distance in all directions utilizing iris scissors.
Complex Repair And Bilobe Flap Text: The defect edges were debeveled with a #15 scalpel blade.  The primary defect was closed partially with a complex linear closure.  Given the location of the remaining defect, shape of the defect and the proximity to free margins a bilobe flap was deemed most appropriate for complete closure of the defect.  Using a sterile surgical marker, an appropriate advancement flap was drawn incorporating the defect and placing the expected incisions within the relaxed skin tension lines where possible.    The area thus outlined was incised deep to adipose tissue with a #15 scalpel blade.  The skin margins were undermined to an appropriate distance in all directions utilizing iris scissors.
Complex Repair And Melolabial Flap Text: The defect edges were debeveled with a #15 scalpel blade.  The primary defect was closed partially with a complex linear closure.  Given the location of the remaining defect, shape of the defect and the proximity to free margins a melolabial flap was deemed most appropriate for complete closure of the defect.  Using a sterile surgical marker, an appropriate advancement flap was drawn incorporating the defect and placing the expected incisions within the relaxed skin tension lines where possible.    The area thus outlined was incised deep to adipose tissue with a #15 scalpel blade.  The skin margins were undermined to an appropriate distance in all directions utilizing iris scissors.
Complex Repair And Rotation Flap Text: The defect edges were debeveled with a #15 scalpel blade.  The primary defect was closed partially with a complex linear closure.  Given the location of the remaining defect, shape of the defect and the proximity to free margins a rotation flap was deemed most appropriate for complete closure of the defect.  Using a sterile surgical marker, an appropriate advancement flap was drawn incorporating the defect and placing the expected incisions within the relaxed skin tension lines where possible.    The area thus outlined was incised deep to adipose tissue with a #15 scalpel blade.  The skin margins were undermined to an appropriate distance in all directions utilizing iris scissors.
Complex Repair And Rhombic Flap Text: The defect edges were debeveled with a #15 scalpel blade.  The primary defect was closed partially with a complex linear closure.  Given the location of the remaining defect, shape of the defect and the proximity to free margins a rhombic flap was deemed most appropriate for complete closure of the defect.  Using a sterile surgical marker, an appropriate advancement flap was drawn incorporating the defect and placing the expected incisions within the relaxed skin tension lines where possible.    The area thus outlined was incised deep to adipose tissue with a #15 scalpel blade.  The skin margins were undermined to an appropriate distance in all directions utilizing iris scissors.
Complex Repair And Transposition Flap Text: The defect edges were debeveled with a #15 scalpel blade.  The primary defect was closed partially with a complex linear closure.  Given the location of the remaining defect, shape of the defect and the proximity to free margins a transposition flap was deemed most appropriate for complete closure of the defect.  Using a sterile surgical marker, an appropriate advancement flap was drawn incorporating the defect and placing the expected incisions within the relaxed skin tension lines where possible.    The area thus outlined was incised deep to adipose tissue with a #15 scalpel blade.  The skin margins were undermined to an appropriate distance in all directions utilizing iris scissors.
Complex Repair And V-Y Plasty Text: The defect edges were debeveled with a #15 scalpel blade.  The primary defect was closed partially with a complex linear closure.  Given the location of the remaining defect, shape of the defect and the proximity to free margins a V-Y plasty was deemed most appropriate for complete closure of the defect.  Using a sterile surgical marker, an appropriate advancement flap was drawn incorporating the defect and placing the expected incisions within the relaxed skin tension lines where possible.    The area thus outlined was incised deep to adipose tissue with a #15 scalpel blade.  The skin margins were undermined to an appropriate distance in all directions utilizing iris scissors.
Complex Repair And M Plasty Text: The defect edges were debeveled with a #15 scalpel blade.  The primary defect was closed partially with a complex linear closure.  Given the location of the remaining defect, shape of the defect and the proximity to free margins an M plasty was deemed most appropriate for complete closure of the defect.  Using a sterile surgical marker, an appropriate advancement flap was drawn incorporating the defect and placing the expected incisions within the relaxed skin tension lines where possible.    The area thus outlined was incised deep to adipose tissue with a #15 scalpel blade.  The skin margins were undermined to an appropriate distance in all directions utilizing iris scissors.
Complex Repair And Double M Plasty Text: The defect edges were debeveled with a #15 scalpel blade.  The primary defect was closed partially with a complex linear closure.  Given the location of the remaining defect, shape of the defect and the proximity to free margins a double M plasty was deemed most appropriate for complete closure of the defect.  Using a sterile surgical marker, an appropriate advancement flap was drawn incorporating the defect and placing the expected incisions within the relaxed skin tension lines where possible.    The area thus outlined was incised deep to adipose tissue with a #15 scalpel blade.  The skin margins were undermined to an appropriate distance in all directions utilizing iris scissors.
Complex Repair And W Plasty Text: The defect edges were debeveled with a #15 scalpel blade.  The primary defect was closed partially with a complex linear closure.  Given the location of the remaining defect, shape of the defect and the proximity to free margins a W plasty was deemed most appropriate for complete closure of the defect.  Using a sterile surgical marker, an appropriate advancement flap was drawn incorporating the defect and placing the expected incisions within the relaxed skin tension lines where possible.    The area thus outlined was incised deep to adipose tissue with a #15 scalpel blade.  The skin margins were undermined to an appropriate distance in all directions utilizing iris scissors.
Complex Repair And Z Plasty Text: The defect edges were debeveled with a #15 scalpel blade.  The primary defect was closed partially with a complex linear closure.  Given the location of the remaining defect, shape of the defect and the proximity to free margins a Z plasty was deemed most appropriate for complete closure of the defect.  Using a sterile surgical marker, an appropriate advancement flap was drawn incorporating the defect and placing the expected incisions within the relaxed skin tension lines where possible.    The area thus outlined was incised deep to adipose tissue with a #15 scalpel blade.  The skin margins were undermined to an appropriate distance in all directions utilizing iris scissors.
Complex Repair And Dorsal Nasal Flap Text: The defect edges were debeveled with a #15 scalpel blade.  The primary defect was closed partially with a complex linear closure.  Given the location of the remaining defect, shape of the defect and the proximity to free margins a dorsal nasal flap was deemed most appropriate for complete closure of the defect.  Using a sterile surgical marker, an appropriate flap was drawn incorporating the defect and placing the expected incisions within the relaxed skin tension lines where possible.    The area thus outlined was incised deep to adipose tissue with a #15 scalpel blade.  The skin margins were undermined to an appropriate distance in all directions utilizing iris scissors.
Complex Repair And Ftsg Text: The defect edges were debeveled with a #15 scalpel blade.  The primary defect was closed partially with a complex linear closure.  Given the location of the defect, shape of the defect and the proximity to free margins a full thickness skin graft was deemed most appropriate to repair the remaining defect.  The graft was trimmed to fit the size of the remaining defect.  The graft was then placed in the primary defect, oriented appropriately, and sutured into place.
Complex Repair And Burow's Graft Text: The defect edges were debeveled with a #15 scalpel blade.  The primary defect was closed partially with a complex linear closure.  Given the location of the defect, shape of the defect, the proximity to free margins and the presence of a standing cone deformity a Burow's graft was deemed most appropriate to repair the remaining defect.  The graft was trimmed to fit the size of the remaining defect.  The graft was then placed in the primary defect, oriented appropriately, and sutured into place.
Complex Repair And Split-Thickness Skin Graft Text: The defect edges were debeveled with a #15 scalpel blade.  The primary defect was closed partially with a complex linear closure.  Given the location of the defect, shape of the defect and the proximity to free margins a split thickness skin graft was deemed most appropriate to repair the remaining defect.  The graft was trimmed to fit the size of the remaining defect.  The graft was then placed in the primary defect, oriented appropriately, and sutured into place.
Complex Repair And Epidermal Autograft Text: The defect edges were debeveled with a #15 scalpel blade.  The primary defect was closed partially with a complex linear closure.  Given the location of the defect, shape of the defect and the proximity to free margins an epidermal autograft was deemed most appropriate to repair the remaining defect.  The graft was trimmed to fit the size of the remaining defect.  The graft was then placed in the primary defect, oriented appropriately, and sutured into place.
Complex Repair And Dermal Autograft Text: The defect edges were debeveled with a #15 scalpel blade.  The primary defect was closed partially with a complex linear closure.  Given the location of the defect, shape of the defect and the proximity to free margins an dermal autograft was deemed most appropriate to repair the remaining defect.  The graft was trimmed to fit the size of the remaining defect.  The graft was then placed in the primary defect, oriented appropriately, and sutured into place.
Complex Repair And Tissue Cultured Epidermal Autograft Text: The defect edges were debeveled with a #15 scalpel blade.  The primary defect was closed partially with a complex linear closure.  Given the location of the defect, shape of the defect and the proximity to free margins an tissue cultured epidermal autograft was deemed most appropriate to repair the remaining defect.  The graft was trimmed to fit the size of the remaining defect.  The graft was then placed in the primary defect, oriented appropriately, and sutured into place.
Complex Repair And Xenograft Text: The defect edges were debeveled with a #15 scalpel blade.  The primary defect was closed partially with a complex linear closure.  Given the location of the defect, shape of the defect and the proximity to free margins a xenograft was deemed most appropriate to repair the remaining defect.  The graft was trimmed to fit the size of the remaining defect.  The graft was then placed in the primary defect, oriented appropriately, and sutured into place.
Complex Repair And Skin Substitute Graft Text: The defect edges were debeveled with a #15 scalpel blade.  The primary defect was closed partially with a complex linear closure.  Given the location of the remaining defect, shape of the defect and the proximity to free margins a skin substitute graft was deemed most appropriate to repair the remaining defect.  The graft was trimmed to fit the size of the remaining defect.  The graft was then placed in the primary defect, oriented appropriately, and sutured into place.
Include Anticoagulation In Mohs Note?: Please Select the Appropriate Response
Path Notes (To The Dermatopathologist): Please check margins.
Consent was obtained from the patient. The risks and benefits to therapy were discussed in detail. Specifically, the risks of infection, scarring, bleeding, prolonged wound healing, incomplete removal, allergy to anesthesia, nerve injury and recurrence were addressed. Prior to the procedure, the treatment site was clearly identified and confirmed by the patient. All components of Universal Protocol/PAUSE Rule completed.
Post-Care Instructions: I reviewed with the patient in detail post-care instructions. Patient is not to engage in any heavy lifting, exercise, or swimming for the next 14 days. Should the patient develop any fevers, chills, bleeding, severe pain patient will contact the office immediately.
Home Suture Removal Text: Patient was provided a home suture removal kit and will remove their sutures at home.  If they have any questions or difficulties they will call the office.
Where Do You Want The Question To Include Opioid Counseling Located?: Case Summary Tab
Information: Selecting Yes will display possible errors in your note based on the variables you have selected. This validation is only offered as a suggestion for you. PLEASE NOTE THAT THE VALIDATION TEXT WILL BE REMOVED WHEN YOU FINALIZE YOUR NOTE. IF YOU WANT TO FAX A PRELIMINARY NOTE YOU WILL NEED TO TOGGLE THIS TO 'NO' IF YOU DO NOT WANT IT IN YOUR FAXED NOTE.

## 2025-01-08 ENCOUNTER — HOSPITAL ENCOUNTER (OUTPATIENT)
Dept: RADIOLOGY | Facility: MEDICAL CENTER | Age: 70
End: 2025-01-08
Attending: OTOLARYNGOLOGY
Payer: MEDICARE

## 2025-01-08 DIAGNOSIS — E04.1 THYROID NODULE: ICD-10-CM

## 2025-01-08 LAB — CYTOLOGY REG CYTOL: NORMAL

## 2025-01-08 PROCEDURE — 10005 FNA BX W/US GDN 1ST LES: CPT

## 2025-01-08 PROCEDURE — 88173 CYTOPATH EVAL FNA REPORT: CPT | Mod: 26 | Performed by: STUDENT IN AN ORGANIZED HEALTH CARE EDUCATION/TRAINING PROGRAM

## 2025-01-08 PROCEDURE — 88173 CYTOPATH EVAL FNA REPORT: CPT

## 2025-01-09 DIAGNOSIS — E11.9 TYPE 2 DIABETES MELLITUS WITHOUT COMPLICATION, WITHOUT LONG-TERM CURRENT USE OF INSULIN (HCC): ICD-10-CM

## 2025-01-09 NOTE — PROGRESS NOTES
US guided right thyroid nodule fine needle aspiration done by HORACIO Quarles; NON-SEDATION (no H&P required as this is a NON SEDATION procedure) right anterior aspect of neck access site, dressing CDI; 3 samples in 1 jar of cytolyt obtained, 2 samples in 1 vial afirma obtained and sent to lab. Pt tolerated the procedure well. Pt hemodynamically stable pre/intra/post procedure; all questions and concerns answered prior to being d/c; patient provided with appropriate education for procedure; pt d/c home.

## 2025-01-10 DIAGNOSIS — E11.9 TYPE 2 DIABETES MELLITUS WITHOUT COMPLICATION, WITHOUT LONG-TERM CURRENT USE OF INSULIN (HCC): ICD-10-CM

## 2025-01-10 RX ORDER — SEMAGLUTIDE 0.68 MG/ML
0.5 INJECTION, SOLUTION SUBCUTANEOUS
Qty: 3 ML | Refills: 0 | Status: SHIPPED | OUTPATIENT
Start: 2025-01-10

## 2025-01-10 RX ORDER — SEMAGLUTIDE 1.34 MG/ML
INJECTION, SOLUTION SUBCUTANEOUS
Qty: 3 ML | Refills: 0 | Status: SHIPPED | OUTPATIENT
Start: 2025-01-10

## 2025-01-10 RX ORDER — SEMAGLUTIDE 1.34 MG/ML
INJECTION, SOLUTION SUBCUTANEOUS
Qty: 0.5 ML | Refills: 0 | Status: SHIPPED | OUTPATIENT
Start: 2025-01-10

## 2025-01-16 ENCOUNTER — APPOINTMENT (OUTPATIENT)
Dept: URBAN - METROPOLITAN AREA CLINIC 6 | Facility: CLINIC | Age: 70
Setting detail: DERMATOLOGY
End: 2025-01-16

## 2025-01-16 DIAGNOSIS — Z48.02 ENCOUNTER FOR REMOVAL OF SUTURES: ICD-10-CM

## 2025-01-16 PROCEDURE — ? SUTURE REMOVAL (GLOBAL PERIOD)

## 2025-01-16 ASSESSMENT — LOCATION SIMPLE DESCRIPTION DERM: LOCATION SIMPLE: RIGHT FOREARM

## 2025-01-16 ASSESSMENT — LOCATION ZONE DERM: LOCATION ZONE: ARM

## 2025-01-16 ASSESSMENT — LOCATION DETAILED DESCRIPTION DERM: LOCATION DETAILED: RIGHT VENTRAL PROXIMAL FOREARM

## 2025-01-16 NOTE — PROCEDURE: SUTURE REMOVAL (GLOBAL PERIOD)
Detail Level: Detailed
Add 57828 Cpt? (Important Note: In 2017 The Use Of 86296 Is Being Tracked By Cms To Determine Future Global Period Reimbursement For Global Periods): no
Suture Removal Completed By (Optional): Corinne Retana

## 2025-01-17 NOTE — PROGRESS NOTES
Home health certification plan of care:  Start of Care Date: 1/5/25  Certification period: From: 1/5/25  To: 3/5/25  Certification Type: Initial; Mario was last seen on 1/15/2025.    Mario is being seen by [facility] for MSW, PT.   At this time, he is on:  Current Outpatient Medications   Medication Sig Dispense Refill    vitamin B-12 (CYANOCOBALAMIN) 1000 MCG tablet Take 1,000 mcg by mouth daily.      folic acid (FOLATE) 1 MG tablet Take 1 mg by mouth daily. Begin taking on January 5, 2025. Patient taking folate 1334 mcg DFE, folic acid 800 mcg      escitalopram (LEXAPRO) 10 MG tablet Take half tablet by mouth daily for one week.  May increase to full tablet daily if needed.  Follow up in 6-8 weeks 30 tablet 1    ALPRAZolam (XANAX) 0.25 MG tablet TAKE 1 TABLET BY MOUTH DAILY AS NEEDED FOR ANXIETY 30 tablet 1    valGANciclovir (VALCYTE) 450 MG tablet Take 1 tablet by mouth daily. 30 tablet 3    losartan (COZAAR) 50 MG tablet Take 1 tablet by mouth daily. 90 tablet 3    Multiple Vitamins-Minerals (CENTRUM SILVER PO) Take  by mouth daily. - Oral      Misc Natural Products (OSTEO BI-FLEX JOINT SHIELD PO) Take  by mouth daily. - Oral       No current facility-administered medications for this visit.       I agree with the current plan of care Yes.    I have completed the form for Home Health Certification and has been sent to [facility].    Authorizing Provider: Saad Villarreal MD      Subjective:   Judy Blackburn is a 66 y.o. female who presents for Covid-19 Home Monitoring Video Visit    This evaluation was conducted via Zoom using secure and encrypted videoconferencing technology. The patient was in a private location in the [facility].    The patient's identity was confirmed and verbal consent was obtained for this virtual visit.      She has been off O2 all day. Last night she felt SOB around 2 am and put on 1L with improvement. She has been able to ambulate today but does requiring resting after activities. No fever, chest pain. Cough has been controlled.       Home Monitoring Patient Triage  COVID-19 Monitoring Level: Monitoring no longer needed Home or Self Care     [facility] Oxygen Flowsheet   1. Record COVID-19 Severity Index (qCSI):  0  2.Confirm appropriate patient and chart with two identifiers: Yes - continue to question #3   3. Days Since Onset of Symptoms: 20  4. Does patient have another adult at home to help take care of you: Yes - continue to question #5  5. Confirm O2 supply is working and there are no cannula problems: Yes - continue to question #6  6. Is your breathing today better or worse? Better - continue to question #7  7. Are you able to tolerate fluids? Yes - Continue to question #8  8. Any vomiting or diarrhea in the last 24 hours? Yes - encourage trial of fluids, if concerns for dehydration contact [facility] for direct admit  9. Are you able to control your fevers? Yes - continue to question #10  10. Are you able to control your cough? Yes - continue to question #11  11. Current O2 Flow Rate: 0  12. Review ER precautions: present to ED or call 911 if experiencing severe shortness of breath: Yes  13. Confirm next VV: N/A  14. Final disposition: Stable at home  15. Time Spent: 15    Review of Systems   Constitutional: Negative for chills, fever and malaise/fatigue.   Respiratory: Negative for cough and shortness of breath.    Gastrointestinal:  Negative for abdominal pain, constipation, diarrhea, nausea and vomiting.   All other systems reviewed and are negative.      PMH:  has a past medical history of Hyperlipidemia and Hypertension.  MEDS:   Current Outpatient Medications:   •  ALPRAZolam (XANAX) 0.25 MG Tab, Take 1 Tab by mouth 1 time a day as needed for Sleep or Anxiety for up to 5 days., Disp: 5 Tab, Rfl: 0  •  aspirin EC (ECOTRIN) 81 MG Tablet Delayed Response, Take 81 mg by mouth every evening., Disp: , Rfl:   •  ondansetron (ZOFRAN ODT) 4 MG TABLET DISPERSIBLE, Take 1 Tab by mouth every 6 hours as needed for Nausea., Disp: 10 Tab, Rfl: 0  •  simvastatin (ZOCOR) 20 MG TABS, Take 20 mg by mouth every evening., Disp: , Rfl:   •  losartan (COZAAR) 25 MG TABS, Take 25 mg by mouth every evening., Disp: , Rfl:   ALLERGIES:   Allergies   Allergen Reactions   • Codeine Vomiting and Nausea     SURGHX: History reviewed. No pertinent surgical history.  SOCHX:  reports that she has never smoked. She has never used smokeless tobacco. She reports current alcohol use. She reports that she does not use drugs.  History reviewed. No pertinent family history.     Objective:   Pulse 80   Resp 17   SpO2 94%   Physical Exam:  Constitutional: Alert, no distress, well-groomed.  Skin: No rashes in visible areas.  Eye: Round. Conjunctiva clear, lids normal. No icterus.   ENMT: Lips pink without lesions, good dentition, moist mucous membranes. Phonation normal.  Neck: No masses, no thyromegaly. Moves freely without pain.  CV: Pulse as reported by patient  Respiratory: Unlabored respiratory effort, no cough or audible wheeze  Psych: Alert and oriented x3, normal affect and mood.         Assessment/Plan:     1. Pneumonia due to COVID-19 virus     2. COVID-19 virus infection     3. Requires supplemental oxygen       The has been off of oxygen without complication. She is discharged from program. She will f/u with pcp. Red flags and emergency room precautions discussed. The  patient confirmed understanding of information.    Please note that this dictation was created using voice recognition software. I have made every reasonable attempt to correct obvious errors, but I expect that there are errors of grammar and possibly content that I did not discover before finalizing the note.

## 2025-03-03 ENCOUNTER — PATIENT MESSAGE (OUTPATIENT)
Dept: MEDICAL GROUP | Age: 70
End: 2025-03-03
Payer: MEDICARE

## 2025-03-03 DIAGNOSIS — E78.2 MIXED HYPERLIPIDEMIA: ICD-10-CM

## 2025-03-03 DIAGNOSIS — E55.9 VITAMIN D DEFICIENCY: ICD-10-CM

## 2025-03-04 ENCOUNTER — APPOINTMENT (OUTPATIENT)
Dept: URBAN - METROPOLITAN AREA CLINIC 6 | Facility: CLINIC | Age: 70
Setting detail: DERMATOLOGY
End: 2025-03-04

## 2025-03-04 DIAGNOSIS — L81.4 OTHER MELANIN HYPERPIGMENTATION: ICD-10-CM

## 2025-03-04 DIAGNOSIS — D18.0 HEMANGIOMA: ICD-10-CM

## 2025-03-04 DIAGNOSIS — L57.0 ACTINIC KERATOSIS: ICD-10-CM

## 2025-03-04 DIAGNOSIS — L82.1 OTHER SEBORRHEIC KERATOSIS: ICD-10-CM

## 2025-03-04 PROBLEM — D18.01 HEMANGIOMA OF SKIN AND SUBCUTANEOUS TISSUE: Status: ACTIVE | Noted: 2025-03-04

## 2025-03-04 PROCEDURE — ? PHOTO-DOCUMENTATION

## 2025-03-04 PROCEDURE — ? DIAGNOSIS COMMENT

## 2025-03-04 PROCEDURE — 99213 OFFICE O/P EST LOW 20 MIN: CPT

## 2025-03-04 PROCEDURE — ? COUNSELING

## 2025-03-04 ASSESSMENT — LOCATION DETAILED DESCRIPTION DERM
LOCATION DETAILED: LEFT CENTRAL POSTAURICULAR SKIN
LOCATION DETAILED: NASAL SUPRATIP
LOCATION DETAILED: LEFT MEDIAL DISTAL PRETIBIAL REGION
LOCATION DETAILED: LEFT INFERIOR PARIETAL SCALP

## 2025-03-04 ASSESSMENT — LOCATION ZONE DERM
LOCATION ZONE: SCALP
LOCATION ZONE: LEG
LOCATION ZONE: NOSE

## 2025-03-04 ASSESSMENT — LOCATION SIMPLE DESCRIPTION DERM
LOCATION SIMPLE: SCALP
LOCATION SIMPLE: NOSE
LOCATION SIMPLE: LEFT PRETIBIAL REGION

## 2025-03-04 NOTE — PROCEDURE: DIAGNOSIS COMMENT
Render Risk Assessment In Note?: no
Detail Level: Simple
Comment: Pt just started 5FU this morning, advised to continue bid for 2 weeks.
Comment: Will reassess in 1 mo

## 2025-03-04 NOTE — PROCEDURE: MIPS QUALITY
Quality 397: Melanoma: Reporting: Pathology report includes the pT Category, thickness, ulceration and mitotic rate, peripheral and deep margin status and presence or absence of microsatellitosis for invasive tumors.
Quality 47: Advance Care Plan: Advance Care Planning discussed and documented in the medical record; patient did not wish or was not able to name a surrogate decision maker or provide an advance care plan.
Quality 130: Documentation Of Current Medications In The Medical Record: Current Medications Documented
Quality 137: Melanoma: Continuity Of Care - Recall System: Patient information entered into a recall system that includes: target date for the next exam specified AND a process to follow up with patients regarding missed or unscheduled appointments
Detail Level: Detailed
Quality 226: Preventive Care And Screening: Tobacco Use: Screening And Cessation Intervention: Patient screened for tobacco use and is an ex/non-smoker

## 2025-04-03 ENCOUNTER — APPOINTMENT (OUTPATIENT)
Dept: URBAN - METROPOLITAN AREA CLINIC 6 | Facility: CLINIC | Age: 70
Setting detail: DERMATOLOGY
End: 2025-04-03

## 2025-04-03 DIAGNOSIS — D18.0 HEMANGIOMA: ICD-10-CM

## 2025-04-03 DIAGNOSIS — Z71.89 OTHER SPECIFIED COUNSELING: ICD-10-CM

## 2025-04-03 DIAGNOSIS — Z80.8 FAMILY HISTORY OF MALIGNANT NEOPLASM OF OTHER ORGANS OR SYSTEMS: ICD-10-CM

## 2025-04-03 DIAGNOSIS — Z85.820 PERSONAL HISTORY OF MALIGNANT MELANOMA OF SKIN: ICD-10-CM

## 2025-04-03 DIAGNOSIS — L82.1 OTHER SEBORRHEIC KERATOSIS: ICD-10-CM

## 2025-04-03 DIAGNOSIS — L57.0 ACTINIC KERATOSIS: ICD-10-CM

## 2025-04-03 DIAGNOSIS — D22 MELANOCYTIC NEVI: ICD-10-CM

## 2025-04-03 DIAGNOSIS — L98.8 OTHER SPECIFIED DISORDERS OF THE SKIN AND SUBCUTANEOUS TISSUE: ICD-10-CM

## 2025-04-03 DIAGNOSIS — L81.4 OTHER MELANIN HYPERPIGMENTATION: ICD-10-CM

## 2025-04-03 PROBLEM — D18.01 HEMANGIOMA OF SKIN AND SUBCUTANEOUS TISSUE: Status: ACTIVE | Noted: 2025-04-03

## 2025-04-03 PROBLEM — D22.5 MELANOCYTIC NEVI OF TRUNK: Status: ACTIVE | Noted: 2025-04-03

## 2025-04-03 PROCEDURE — 17000 DESTRUCT PREMALG LESION: CPT

## 2025-04-03 PROCEDURE — ? COUNSELING

## 2025-04-03 PROCEDURE — 99213 OFFICE O/P EST LOW 20 MIN: CPT | Mod: 25

## 2025-04-03 PROCEDURE — 17003 DESTRUCT PREMALG LES 2-14: CPT

## 2025-04-03 PROCEDURE — ? SUNSCREEN RECOMMENDATIONS

## 2025-04-03 PROCEDURE — ? LIQUID NITROGEN

## 2025-04-03 PROCEDURE — ? DIAGNOSIS COMMENT

## 2025-04-03 ASSESSMENT — LOCATION DETAILED DESCRIPTION DERM
LOCATION DETAILED: LEFT ULNAR DORSAL HAND
LOCATION DETAILED: RIGHT MEDIAL BREAST 4-5:00 REGION
LOCATION DETAILED: PERIUMBILICAL SKIN
LOCATION DETAILED: RIGHT DORSAL WRIST
LOCATION DETAILED: LEFT INFERIOR FRONTAL SCALP
LOCATION DETAILED: NASAL SUPRATIP
LOCATION DETAILED: NASAL DORSUM
LOCATION DETAILED: RIGHT MEDIAL SUPERIOR CHEST
LOCATION DETAILED: EPIGASTRIC SKIN
LOCATION DETAILED: RIGHT VENTRAL PROXIMAL FOREARM
LOCATION DETAILED: RIGHT RADIAL DORSAL HAND
LOCATION DETAILED: LEFT INFERIOR MEDIAL MALAR CHEEK
LOCATION DETAILED: NASAL TIP

## 2025-04-03 ASSESSMENT — LOCATION SIMPLE DESCRIPTION DERM
LOCATION SIMPLE: ABDOMEN
LOCATION SIMPLE: SCALP
LOCATION SIMPLE: LEFT CHEEK
LOCATION SIMPLE: RIGHT FOREARM
LOCATION SIMPLE: CHEST
LOCATION SIMPLE: LEFT HAND
LOCATION SIMPLE: NOSE
LOCATION SIMPLE: RIGHT WRIST
LOCATION SIMPLE: RIGHT BREAST
LOCATION SIMPLE: RIGHT HAND

## 2025-04-03 ASSESSMENT — LOCATION ZONE DERM
LOCATION ZONE: TRUNK
LOCATION ZONE: NOSE
LOCATION ZONE: HAND
LOCATION ZONE: SCALP
LOCATION ZONE: ARM
LOCATION ZONE: FACE

## 2025-04-14 ENCOUNTER — HOSPITAL ENCOUNTER (OUTPATIENT)
Dept: LAB | Facility: MEDICAL CENTER | Age: 70
End: 2025-04-14
Attending: FAMILY MEDICINE
Payer: MEDICARE

## 2025-04-14 DIAGNOSIS — E78.2 MIXED HYPERLIPIDEMIA: ICD-10-CM

## 2025-04-14 DIAGNOSIS — E55.9 VITAMIN D DEFICIENCY: ICD-10-CM

## 2025-04-14 LAB
BASOPHILS # BLD AUTO: 0.7 % (ref 0–1.8)
BASOPHILS # BLD: 0.05 K/UL (ref 0–0.12)
EOSINOPHIL # BLD AUTO: 0.14 K/UL (ref 0–0.51)
EOSINOPHIL NFR BLD: 2 % (ref 0–6.9)
ERYTHROCYTE [DISTWIDTH] IN BLOOD BY AUTOMATED COUNT: 43.8 FL (ref 35.9–50)
HCT VFR BLD AUTO: 44.2 % (ref 37–47)
HGB BLD-MCNC: 14.6 G/DL (ref 12–16)
IMM GRANULOCYTES # BLD AUTO: 0.01 K/UL (ref 0–0.11)
IMM GRANULOCYTES NFR BLD AUTO: 0.1 % (ref 0–0.9)
LYMPHOCYTES # BLD AUTO: 2.36 K/UL (ref 1–4.8)
LYMPHOCYTES NFR BLD: 33.2 % (ref 22–41)
MCH RBC QN AUTO: 30.4 PG (ref 27–33)
MCHC RBC AUTO-ENTMCNC: 33 G/DL (ref 32.2–35.5)
MCV RBC AUTO: 91.9 FL (ref 81.4–97.8)
MONOCYTES # BLD AUTO: 0.63 K/UL (ref 0–0.85)
MONOCYTES NFR BLD AUTO: 8.9 % (ref 0–13.4)
NEUTROPHILS # BLD AUTO: 3.91 K/UL (ref 1.82–7.42)
NEUTROPHILS NFR BLD: 55.1 % (ref 44–72)
NRBC # BLD AUTO: 0 K/UL
NRBC BLD-RTO: 0 /100 WBC (ref 0–0.2)
PLATELET # BLD AUTO: 265 K/UL (ref 164–446)
PMV BLD AUTO: 10.2 FL (ref 9–12.9)
RBC # BLD AUTO: 4.81 M/UL (ref 4.2–5.4)
WBC # BLD AUTO: 7.1 K/UL (ref 4.8–10.8)

## 2025-04-14 PROCEDURE — 84439 ASSAY OF FREE THYROXINE: CPT

## 2025-04-14 PROCEDURE — 80061 LIPID PANEL: CPT

## 2025-04-14 PROCEDURE — 36415 COLL VENOUS BLD VENIPUNCTURE: CPT

## 2025-04-14 PROCEDURE — 80053 COMPREHEN METABOLIC PANEL: CPT

## 2025-04-14 PROCEDURE — 84481 FREE ASSAY (FT-3): CPT

## 2025-04-14 PROCEDURE — 85025 COMPLETE CBC W/AUTO DIFF WBC: CPT

## 2025-04-14 PROCEDURE — 82306 VITAMIN D 25 HYDROXY: CPT

## 2025-04-14 PROCEDURE — 84443 ASSAY THYROID STIM HORMONE: CPT

## 2025-04-15 ENCOUNTER — APPOINTMENT (OUTPATIENT)
Dept: RADIOLOGY | Facility: MEDICAL CENTER | Age: 70
End: 2025-04-15
Attending: FAMILY MEDICINE
Payer: MEDICARE

## 2025-04-15 DIAGNOSIS — Z12.31 ENCOUNTER FOR MAMMOGRAM TO ESTABLISH BASELINE MAMMOGRAM: ICD-10-CM

## 2025-04-15 LAB
25(OH)D3 SERPL-MCNC: 26 NG/ML (ref 30–100)
ALBUMIN SERPL BCP-MCNC: 4.4 G/DL (ref 3.2–4.9)
ALBUMIN/GLOB SERPL: 1.6 G/DL
ALP SERPL-CCNC: 84 U/L (ref 30–99)
ALT SERPL-CCNC: 25 U/L (ref 2–50)
ANION GAP SERPL CALC-SCNC: 10 MMOL/L (ref 7–16)
AST SERPL-CCNC: 26 U/L (ref 12–45)
BILIRUB SERPL-MCNC: 0.4 MG/DL (ref 0.1–1.5)
BUN SERPL-MCNC: 15 MG/DL (ref 8–22)
CALCIUM ALBUM COR SERPL-MCNC: 9.5 MG/DL (ref 8.5–10.5)
CALCIUM SERPL-MCNC: 9.8 MG/DL (ref 8.5–10.5)
CHLORIDE SERPL-SCNC: 105 MMOL/L (ref 96–112)
CHOLEST SERPL-MCNC: 113 MG/DL (ref 100–199)
CO2 SERPL-SCNC: 25 MMOL/L (ref 20–33)
CREAT SERPL-MCNC: 0.72 MG/DL (ref 0.5–1.4)
FASTING STATUS PATIENT QL REPORTED: NORMAL
GFR SERPLBLD CREATININE-BSD FMLA CKD-EPI: 89 ML/MIN/1.73 M 2
GLOBULIN SER CALC-MCNC: 2.8 G/DL (ref 1.9–3.5)
GLUCOSE SERPL-MCNC: 85 MG/DL (ref 65–99)
HDLC SERPL-MCNC: 47 MG/DL
LDLC SERPL CALC-MCNC: 43 MG/DL
POTASSIUM SERPL-SCNC: 4.6 MMOL/L (ref 3.6–5.5)
PROT SERPL-MCNC: 7.2 G/DL (ref 6–8.2)
SODIUM SERPL-SCNC: 140 MMOL/L (ref 135–145)
T3FREE SERPL-MCNC: 3.3 PG/ML (ref 2–4.4)
T4 FREE SERPL-MCNC: 1.63 NG/DL (ref 0.93–1.7)
TRIGL SERPL-MCNC: 113 MG/DL (ref 0–149)
TSH SERPL-ACNC: 0.75 UIU/ML (ref 0.38–5.33)

## 2025-04-15 PROCEDURE — 77067 SCR MAMMO BI INCL CAD: CPT

## 2025-04-16 ENCOUNTER — RESULTS FOLLOW-UP (OUTPATIENT)
Dept: MEDICAL GROUP | Age: 70
End: 2025-04-16

## 2025-04-17 ENCOUNTER — APPOINTMENT (OUTPATIENT)
Dept: MEDICAL GROUP | Age: 70
End: 2025-04-17
Payer: MEDICARE

## 2025-04-17 ENCOUNTER — RESULTS FOLLOW-UP (OUTPATIENT)
Dept: MEDICAL GROUP | Age: 70
End: 2025-04-17

## 2025-04-17 VITALS
SYSTOLIC BLOOD PRESSURE: 120 MMHG | HEART RATE: 81 BPM | HEIGHT: 67 IN | DIASTOLIC BLOOD PRESSURE: 70 MMHG | WEIGHT: 156 LBS | TEMPERATURE: 97.8 F | OXYGEN SATURATION: 97 % | BODY MASS INDEX: 24.48 KG/M2

## 2025-04-17 DIAGNOSIS — E55.9 VITAMIN D DEFICIENCY: ICD-10-CM

## 2025-04-17 DIAGNOSIS — E11.9 TYPE 2 DIABETES MELLITUS WITHOUT COMPLICATION, WITHOUT LONG-TERM CURRENT USE OF INSULIN (HCC): ICD-10-CM

## 2025-04-17 DIAGNOSIS — J30.2 SEASONAL ALLERGIES: ICD-10-CM

## 2025-04-17 DIAGNOSIS — I10 ESSENTIAL HYPERTENSION: ICD-10-CM

## 2025-04-17 DIAGNOSIS — N89.8 VAGINAL DRYNESS: ICD-10-CM

## 2025-04-17 DIAGNOSIS — K21.00 GASTROESOPHAGEAL REFLUX DISEASE WITH ESOPHAGITIS WITHOUT HEMORRHAGE: ICD-10-CM

## 2025-04-17 LAB
HBA1C MFR BLD: 5.3 % (ref ?–5.8)
POCT INT CON NEG: NEGATIVE
POCT INT CON POS: POSITIVE

## 2025-04-17 PROCEDURE — 3074F SYST BP LT 130 MM HG: CPT | Performed by: FAMILY MEDICINE

## 2025-04-17 PROCEDURE — G2211 COMPLEX E/M VISIT ADD ON: HCPCS | Performed by: FAMILY MEDICINE

## 2025-04-17 PROCEDURE — 3078F DIAST BP <80 MM HG: CPT | Performed by: FAMILY MEDICINE

## 2025-04-17 PROCEDURE — 99214 OFFICE O/P EST MOD 30 MIN: CPT | Performed by: FAMILY MEDICINE

## 2025-04-17 PROCEDURE — 83036 HEMOGLOBIN GLYCOSYLATED A1C: CPT | Performed by: FAMILY MEDICINE

## 2025-04-17 RX ORDER — CONJUGATED ESTROGENS 0.62 MG/G
0.5 CREAM VAGINAL DAILY
Qty: 30 G | Refills: 2 | Status: SHIPPED | OUTPATIENT
Start: 2025-04-17 | End: 2025-05-17

## 2025-04-17 RX ORDER — AZELASTINE 1 MG/ML
1 SPRAY, METERED NASAL 2 TIMES DAILY
Qty: 30 ML | Refills: 2 | Status: SHIPPED | OUTPATIENT
Start: 2025-04-17

## 2025-04-17 RX ORDER — MULTIVIT-MIN/IRON/FOLIC ACID/K 18-600-40
2 CAPSULE ORAL DAILY
Qty: 90 CAPSULE | Refills: 1 | Status: SHIPPED | OUTPATIENT
Start: 2025-04-17

## 2025-04-17 ASSESSMENT — PATIENT HEALTH QUESTIONNAIRE - PHQ9
SUM OF ALL RESPONSES TO PHQ QUESTIONS 1-9: 3
5. POOR APPETITE OR OVEREATING: 0 - NOT AT ALL
CLINICAL INTERPRETATION OF PHQ2 SCORE: 2

## 2025-04-17 ASSESSMENT — FIBROSIS 4 INDEX: FIB4 SCORE: 1.39

## 2025-04-17 NOTE — PROGRESS NOTES
This medical record contains text that has been entered with the assistance of computer voice recognition and dictation software.  Therefore, it may contain unintended errors in text, spelling, punctuation, or grammar      Verbal consent was acquired by the patient to use [employer] ambient listening note generation during this visit Yes       Chief Complaint   Patient presents with    Follow-Up     6 month          Judy Blackburn is a 70 y.o. female here evaluation and management of: 6 month follow up      HPI:     1. Type 2 diabetes mellitus without complication, without long-term current use of insulin (HCC)  2. Vitamin D deficiency  3. Seasonal allergies  4. Gastroesophageal reflux disease with esophagitis without hemorrhage  5. Essential hypertension        History of Present Illness  The patient, a 70-year-old female, presents to the clinic for a routine follow-up.    Weight Reduction and Dissatisfaction with side effects of semaglutide  She reports a weight reduction from 169 pounds to 156 pounds, as measured on her home scale yesterday. However, she notes a consistent discrepancy of 5 pounds heavier when measured in the clinic. Despite the weight loss, she expresses dissatisfaction with her physical appearance, particularly noting cutaneous laxity.  - Onset: Recent weight reduction.  - Location: General physical appearance.  - Character: Weight loss and cutaneous laxity.  - Severity: Dissatisfaction with physical appearance.    Symptoms Related to Ozempic Use  She describes a persistent sensation of heaviness, nausea, and fullness since initiating treatment with Ozempic.  - Onset: Since initiating treatment with Ozempic.  - Character: Heaviness, nausea, and fullness.    Dietary Habits and Fatigue  Her dietary habits are suboptimal, with a preference for carbohydrates and starches over healthier options such as proteins and vegetables. Although she acknowledges the need for dietary improvements, she  lacks motivation. Her children have expressed concern regarding her current medication regimen. She reports fatigue and an excessive desire to sleep.  - Character: Preference for carbohydrates and starches, fatigue, and excessive desire to sleep.  - Severity: Lack of motivation for dietary improvements.    Vitamin D3 Intake Issues  There is inconsistency in her intake of vitamin D3 supplements, which are typically obtained over the counter. She recalls a previous incident where her vitamin D3 levels were significantly elevated due to daily intake of 50,000 units, which was intended to be a weekly dose. Adherence to her multivitamin regimen has also been poor.  - Character: Inconsistent intake of vitamin D3 supplements.  - Severity: Previous incident of significantly elevated vitamin D3 levels.    Thyroid Nodules and Associated Anxiety  She has a history of thyroid nodules, which have been biopsied and found to be benign. She experienced significant pain during the biopsy procedure. She expresses anxiety about the potential risk of thyroid cancer associated with Ozempic use, despite not having a personal history of the disease.  - Onset: History of thyroid nodules.  - Character: Significant pain during biopsy, anxiety about potential risk of thyroid cancer.    Daily Allergy Symptoms  She experiences daily allergy symptoms, including epiphora and rhinorrhea. No treatment has been sought for these symptoms, but she takes Zyrtec in the morning.  - Onset: Daily.  - Character: Epiphora and rhinorrhea.  - Alleviating Factors: Takes Zyrtec in the morning.    Indigestion  She reports indigestion, for which Pepto-Bismol has been prescribed.  - Character: Indigestion.  - Alleviating Factors: Pepto-Bismol.    Blood Glucose Levels  Her blood glucose levels have been well-controlled, with readings of 108 mg/dL before breakfast and typically around 140 mg/dL two hours postprandial. The highest recorded level was 165 mg/dL.  -  Character: Well-controlled blood glucose levels.  - Timin mg/dL before breakfast, around 140 mg/dL two hours postprandial.  - Severity: Highest recorded level was 165 mg/dL.     Vaginal dryness/decreased libido    Patient states that she has a 61-year-old boyfriend who enjoys coitus but because of her vaginal dryness and decreased libido she does not.  She would like something to address this.    Results  Labs   - Blood sugar: Perfect   - Vitamin D level: 26         Current medicines (including changes today)  Current Outpatient Medications   Medication Sig Dispense Refill    Multiple Vitamins-Minerals (MULTIVITAMIN WOMEN PO) Take  by mouth.      Cholecalciferol (VITAMIN D) 50 MCG (2000 UT) Cap Take 2 Capsules by mouth every day. 90 Capsule 1    azelastine (ASTELIN) 137 MCG/SPRAY nasal spray Administer 1 Spray into affected nostril(S) 2 times a day. 30 mL 2    estrogens, conjugated (PREMARIN) 0.625 MG/GM Cream Insert 0.5 g into the vagina every day for 30 days. 30 g 2    Semaglutide, 2 MG/DOSE, 8 MG/3ML Solution Pen-injector Inject 2mg SC Q 7 days 3 mL 2    apixaban (ELIQUIS) 5mg Tab Take 1 Tablet by mouth 2 times a day. 180 Tablet 3    atorvastatin (LIPITOR) 80 MG tablet Take 1 Tablet by mouth at bedtime. 90 Tablet 2    losartan (COZAAR) 100 MG Tab Take 1 Tablet by mouth every day. 90 Tablet 3    metFORMIN ER (GLUCOPHAGE XR) 500 MG TABLET SR 24 HR Take 1 Tablet by mouth every day. 90 Tablet 3    omeprazole (PRILOSEC) 20 MG delayed-release capsule Take 1 Capsule by mouth every day. 90 Capsule 2    propranolol LA (INDERAL LA) 60 MG CAPSULE SR 24 HR Take 1 Capsule by mouth every day. 90 Capsule 3    traZODone (DESYREL) 100 MG Tab Take 1 Tablet by mouth every evening. 100 Tablet 2    Potassium 99 MG Tab Take 99 mg by mouth every day.      MAGNESIUM PO Take 1 Tablet by mouth at bedtime as needed.       No current facility-administered medications for this visit.     She  has a past medical history of Anxiety,  "Atrial fibrillation (HCC), Diabetes (HCC), H/O Clostridium difficile infection, Hyperlipidemia, and Hypertension.  She  has a past surgical history that includes lumpectomy; trevor (N/A, 4/26/2022); and cardioversion (N/A, 4/26/2022).  Social History     Tobacco Use    Smoking status: Never    Smokeless tobacco: Never   Vaping Use    Vaping status: Never Used   Substance Use Topics    Alcohol use: Yes     Comment: OCC    Drug use: Never     Social History     Social History Narrative    Not on file     Family History   Problem Relation Age of Onset    Arrythmia Sister      Family Status   Relation Name Status    Sis  (Not Specified)   No partnership data on file         ROS    The pertinent  ROS findings can be seen in the HPI above.     All other systems reviewed and are negative     Objective:     /70 (BP Location: Left arm, Patient Position: Sitting, BP Cuff Size: Large adult)   Pulse 81   Temp 36.6 °C (97.8 °F) (Temporal)   Ht 1.702 m (5' 7\")   Wt 70.8 kg (156 lb)   SpO2 97%  Body mass index is 24.43 kg/m².      Physical Exam:    Constitutional: Alert, no distress.  Skin: No suspicious lesions  Eye: Equal, round and reactive, conjunctiva clear, lids normal.  ENMT: Lips without lesions, good dentition, oropharynx clear.  Neck: Trachea midline, no masses, no thyromegaly. No cervical or supraclavicular lymphadenopathy.  Respiratory: Unlabored respiratory effort, lungs clear to auscultation, no wheezes, no ronchi.  Cardiovascular: Normal S1, S2, no murmur, no edema  Abdomen: Soft, non-tender, no masses, no hepatosplenomegaly.      Assessment and Plan:   The following treatment plan was discussed    All recent labs and provider notes reviewed      Assessment & Plan   Seasonal allergies  Persistent symptoms include watery eyes and nasal congestion.  Treatment plan: Currently taking Zyrtec in the morning. Prescription for Astelin nasal spray to manage symptoms.    WEIGHT MANAGEMENT: Weight management  BMI is " currently 24, indicating effective weight control with Ozempic. Continues to experience nausea and fullness, but no desire to discontinue Ozempic. No evidence of thyroid cancer despite concerns about Ozempic's potential risks. Will remain on the highest dose of Ozempic.    VITAMIN D DEFICIENCY: Vitamin D deficiency  Recent vitamin D level measured at 26. Reports feeling tired and not taking vitamin D consistently.  Treatment plan: Prescription for high-dose vitamin D supplement, 50,000 units once weekly, or 4000 units daily over-the-counter. Avoids sun exposure due to past melanoma diagnosis.    INDIGESTION: Indigestion  Reports bad indigestion and reflux, possibly related to Ozempic.  Treatment plan: Continues to use Pepto-Bismol for symptom relief. Will monitor for any changes in symptoms.    BLOOD GLUCOSE MANAGEMENT: Blood glucose management  Blood sugar levels are well-controlled, with recent measurements of 108 fasting and up to 165 postprandial.  Diagnostic plan: A1c test will be conducted today to further assess glucose control.    Follow-up: [next scheduled visit, other. Remove this row and header if blank].     1. Type 2 diabetes mellitus without complication, without long-term current use of insulin ([facility])  - Diabetic Monofilament LE Exam  - POCT  A1C    2. Vitamin D deficiency  - Cholecalciferol (VITAMIN D) 50 MCG (2000 UT) Cap; Take 2 Capsules by mouth every day.  Dispense: 90 Capsule; Refill: 1    3. Seasonal allergies  - azelastine (ASTELIN) 137 MCG/SPRAY nasal spray; Administer 1 Spray into affected nostril(S) 2 times a day.  Dispense: 30 mL; Refill: 2    4. Gastroesophageal reflux disease with esophagitis without hemorrhage    5. Essential hypertension    6. Vaginal dryness  - estrogens, conjugated (PREMARIN) 0.625 MG/GM Cream; Insert 0.5 g into the vagina every day for 30 days.  Dispense: 30 g; Refill: 2    Other orders  - Multiple Vitamins-Minerals (MULTIVITAMIN WOMEN PO); Take  by mouth.        Secondary to the complexity of this patient's illnesses and their interactions.  All problems listed were discussed during the office visit, medications were evaluated and complexities were discussed as well as plan for the future.        Instructed to Follow up in clinic or ER for worsening symptoms, difficulty breathing, lack of expected recovery, or should new symptoms or problems arise.    Followup: Return in about 6 months (around 10/17/2025) for Reevaluation.

## 2025-06-05 ENCOUNTER — PATIENT MESSAGE (OUTPATIENT)
Dept: MEDICAL GROUP | Age: 70
End: 2025-06-05
Payer: MEDICARE

## 2025-06-09 ENCOUNTER — OFFICE VISIT (OUTPATIENT)
Dept: URGENT CARE | Facility: CLINIC | Age: 70
End: 2025-06-09
Payer: MEDICARE

## 2025-06-09 VITALS
TEMPERATURE: 97.4 F | RESPIRATION RATE: 14 BRPM | OXYGEN SATURATION: 93 % | HEIGHT: 67 IN | WEIGHT: 157 LBS | SYSTOLIC BLOOD PRESSURE: 110 MMHG | BODY MASS INDEX: 24.64 KG/M2 | HEART RATE: 83 BPM | DIASTOLIC BLOOD PRESSURE: 64 MMHG

## 2025-06-09 DIAGNOSIS — H01.119 EYELID DERMATITIS, ALLERGIC/CONTACT: Primary | ICD-10-CM

## 2025-06-09 PROCEDURE — 3074F SYST BP LT 130 MM HG: CPT | Performed by: NURSE PRACTITIONER

## 2025-06-09 PROCEDURE — 99213 OFFICE O/P EST LOW 20 MIN: CPT | Performed by: NURSE PRACTITIONER

## 2025-06-09 PROCEDURE — 3078F DIAST BP <80 MM HG: CPT | Performed by: NURSE PRACTITIONER

## 2025-06-09 RX ORDER — ERYTHROMYCIN 5 MG/G
1 OINTMENT OPHTHALMIC 2 TIMES DAILY
Qty: 3.5 G | Refills: 0 | Status: SHIPPED | OUTPATIENT
Start: 2025-06-09 | End: 2025-06-19

## 2025-06-09 ASSESSMENT — VISUAL ACUITY: OU: 1

## 2025-06-09 ASSESSMENT — FIBROSIS 4 INDEX: FIB4 SCORE: 1.39

## 2025-06-09 NOTE — PROGRESS NOTES
"Judy Blackburn is a 70 y.o. female who presents for Rash (Rash on top of eyelid patient explains x on-going issue, redness, itchiness, burning, painful, is currently using eyelid ointment, is requesting erythromycin possibly )      HPI  This is a new problem. Judy Blackburn is a 70 y.o. patient who presents to urgent care with c/o: rash on both of eyelids. Skin is dry, scaly and sometimes peeling.   Eyelid wash. She has used some vaseline around her eye because her skin was so dry and uncomfortable. She called her PCP who told her to come to urgent care today. She is requesting erythromycin ointment which has worked well for her in the past and she has had this.  Occ takes zyrtec for allergies.     ROS See HPI    Allergies:     Allergies[1]    PMSFS Hx:  Past Medical History[2]  Past Surgical History[3]  Family History   Problem Relation Age of Onset    Arrythmia Sister      Social History     Tobacco Use    Smoking status: Never    Smokeless tobacco: Never   Substance Use Topics    Alcohol use: Yes     Comment: OCC         Problems:   Problem List[4]    Medications:   Medications Ordered Prior to Encounter[5]     Objective:     /64 (BP Location: Left arm, Patient Position: Sitting, BP Cuff Size: Large adult)   Pulse 83   Temp 36.3 °C (97.4 °F)   Resp 14   Ht 1.702 m (5' 7\")   Wt 71.2 kg (157 lb)   SpO2 93%   BMI 24.59 kg/m²     Physical Exam  Vitals reviewed.   Eyes:      General: Vision grossly intact.      Comments: Dry scaly skin on both eyelids and underneath eyes.  Small cracking areas at the lateral edge of the eyelids.   Cardiovascular:      Rate and Rhythm: Normal rate.   Neurological:      Mental Status: She is alert.         Assessment /Associated Orders:      1. Eyelid dermatitis, allergic/contact  erythromycin 5 MG/GM Ointment            Medical Decision Making:    Judy Blackburn is a very pleasant 70 y.o. female who is clinically stable at today's acute urgent care visit. " Presents with acute problem/ concern today.    No acute distress is noted at the time of the visit.  VSS. Appropriate for outpatient care at this time.   Patient is taking Eliquis and cannot have steroids.    Educated in proper administration of  prescription medication(s) ordered today including safety, possible SE, risks, benefits, rationale and alternatives to therapy.   Do not use Vaseline on eyelids.  Only use creams that are ophthalmology recommended.  Cool compresses as needed  Over-the-counter antihistamine of choice daily  Cool-mist humidifier at night by her bed  Remain well-hydrate  Resume all prior  RX medications. Take as prescribed.     Through shared decision making a discussion of the Dx and DDx, management options (risks,benefits, and alternatives to planned treatment), natural progression, supportive care and indications for immediate follow-up discussed. Expressed understanding and the treatment plan was agreed upon.    Questions were encouraged and answered     Follow Up:   Return to urgent care prn if new or worsening sx or if there is no improvement in condition prn.       Please note that this dictation was created using voice recognition software. I have worked with consultants from the vendor as well as technical experts from [employer] to optimize the interface. I have made every reasonable attempt to correct obvious errors, but I expect that there are errors of grammar and possibly content that I did not discover before finalizing the note.  This note was electronically signed by provider           [1]   Allergies  Allergen Reactions    Codeine Nausea, Vomiting and Unspecified    Tape Hives and Itching   [2]   Past Medical History:  Diagnosis Date    Anxiety     Atrial fibrillation (HCC)     Diabetes (HCC)     H/O Clostridium difficile infection     Hyperlipidemia     Hypertension    [3]   Past Surgical History:  Procedure Laterality Date    FAHAD N/A 4/26/2022    Procedure:  ECHOCARDIOGRAM, TRANSESOPHAGEAL;  Surgeon: Reymundo Garcia M.D.;  Location: SURGERY [facility];  Service: Cardiac    CARDIOVERSION N/A 4/26/2022    Procedure: CARDIOVERSION;  Surgeon: Reymundo Garcia M.D.;  Location: SURGERY [facility];  Service: Cardiac    LUMPECTOMY     [4]   Patient Active Problem List  Diagnosis    Pneumonia due to COVID-19 virus    Mixed hyperlipidemia    Essential hypertension    History of blepharoplasty    Menopausal and postmenopausal disorder    Nuclear sclerosis    Posterior vitreous detachment of both eyes    Prediabetes    Pseudophakia of right eye    Age-related cataract    Primary insomnia    Sun-damaged skin    Dermatitis    H/O bilateral breast reduction surgery    GERD (gastroesophageal reflux disease)    Anxiety    Atrial fibrillation status post cardioversion ([facility])    Chronic anticoagulation    Neuropathy    Varicose veins of left lower extremity    Secondary hypercoagulability disorder ([facility])    Type 2 diabetes mellitus without complication, without long-term current use of insulin ([facility])    Agatston CAC score, <100    Superior mesenteric artery stenosis (HCC)    Celiac artery stenosis (HCC)    Breast pain    C. difficile enteritis-recurrent    Chest pain    Polyp of colon    Hemorrhoids    Diverticulosis of colon    Benign neoplasm of transverse colon    Pain of right lower extremity    Left hip pain    Grief    Left thigh pain    Vaginal dryness   [5]   Current Outpatient Medications on File Prior to Visit   Medication Sig Dispense Refill    Multiple Vitamins-Minerals (MULTIVITAMIN WOMEN PO) Take  by mouth.      Cholecalciferol (VITAMIN D) 50 MCG (2000 UT) Cap Take 2 Capsules by mouth every day. 90 Capsule 1    azelastine (ASTELIN) 137 MCG/SPRAY nasal spray Administer 1 Spray into affected nostril(S) 2 times a day. 30 mL 2    Semaglutide, 2 MG/DOSE, 8 MG/3ML Solution Pen-injector Inject 2mg SC Q 7 days 3 mL 2    apixaban (ELIQUIS) 5mg Tab Take 1 Tablet by mouth  2 times a day. 180 Tablet 3    atorvastatin (LIPITOR) 80 MG tablet Take 1 Tablet by mouth at bedtime. 90 Tablet 2    losartan (COZAAR) 100 MG Tab Take 1 Tablet by mouth every day. 90 Tablet 3    metFORMIN ER (GLUCOPHAGE XR) 500 MG TABLET SR 24 HR Take 1 Tablet by mouth every day. 90 Tablet 3    omeprazole (PRILOSEC) 20 MG delayed-release capsule Take 1 Capsule by mouth every day. 90 Capsule 2    propranolol LA (INDERAL LA) 60 MG CAPSULE SR 24 HR Take 1 Capsule by mouth every day. 90 Capsule 3    traZODone (DESYREL) 100 MG Tab Take 1 Tablet by mouth every evening. 100 Tablet 2    Potassium 99 MG Tab Take 99 mg by mouth every day.      MAGNESIUM PO Take 1 Tablet by mouth at bedtime as needed.       No current facility-administered medications on file prior to visit.

## 2025-06-11 RX ORDER — SEMAGLUTIDE 2.68 MG/ML
2 INJECTION, SOLUTION SUBCUTANEOUS
Qty: 3 ML | Refills: 2 | Status: SHIPPED | OUTPATIENT
Start: 2025-06-11

## 2025-06-26 ENCOUNTER — APPOINTMENT (OUTPATIENT)
Dept: URBAN - METROPOLITAN AREA CLINIC 6 | Facility: CLINIC | Age: 70
Setting detail: DERMATOLOGY
End: 2025-06-26

## 2025-06-26 DIAGNOSIS — L20.89 OTHER ATOPIC DERMATITIS: ICD-10-CM

## 2025-06-26 DIAGNOSIS — L82.1 OTHER SEBORRHEIC KERATOSIS: ICD-10-CM

## 2025-06-26 DIAGNOSIS — L30.9 DERMATITIS, UNSPECIFIED: ICD-10-CM

## 2025-06-26 DIAGNOSIS — Z85.820 PERSONAL HISTORY OF MALIGNANT MELANOMA OF SKIN: ICD-10-CM

## 2025-06-26 DIAGNOSIS — Z80.8 FAMILY HISTORY OF MALIGNANT NEOPLASM OF OTHER ORGANS OR SYSTEMS: ICD-10-CM

## 2025-06-26 DIAGNOSIS — Z71.89 OTHER SPECIFIED COUNSELING: ICD-10-CM

## 2025-06-26 DIAGNOSIS — D22 MELANOCYTIC NEVI: ICD-10-CM

## 2025-06-26 DIAGNOSIS — L81.4 OTHER MELANIN HYPERPIGMENTATION: ICD-10-CM

## 2025-06-26 DIAGNOSIS — D18.0 HEMANGIOMA: ICD-10-CM

## 2025-06-26 DIAGNOSIS — L85.3 XEROSIS CUTIS: ICD-10-CM

## 2025-06-26 DIAGNOSIS — L98.8 OTHER SPECIFIED DISORDERS OF THE SKIN AND SUBCUTANEOUS TISSUE: ICD-10-CM

## 2025-06-26 PROBLEM — D18.01 HEMANGIOMA OF SKIN AND SUBCUTANEOUS TISSUE: Status: ACTIVE | Noted: 2025-06-26

## 2025-06-26 PROBLEM — D22.5 MELANOCYTIC NEVI OF TRUNK: Status: ACTIVE | Noted: 2025-06-26

## 2025-06-26 PROCEDURE — ? RECOMMENDATIONS

## 2025-06-26 PROCEDURE — ? PHOTO-DOCUMENTATION

## 2025-06-26 PROCEDURE — ? DIAGNOSIS COMMENT

## 2025-06-26 PROCEDURE — ? COUNSELING

## 2025-06-26 PROCEDURE — ? SUNSCREEN RECOMMENDATIONS

## 2025-06-26 PROCEDURE — ? ORDER FOR PATCH TESTING

## 2025-06-26 PROCEDURE — ? ADDITIONAL NOTES

## 2025-06-26 PROCEDURE — ? MEDICATION COUNSELING

## 2025-06-26 PROCEDURE — ? EDUCATIONAL RESOURCES PROVIDED

## 2025-06-26 PROCEDURE — ? PRESCRIPTION

## 2025-06-26 RX ORDER — TACROLIMUS 1 MG/G
OINTMENT TOPICAL QD
Qty: 60 | Refills: 1 | Status: ERX | COMMUNITY
Start: 2025-06-26

## 2025-06-26 RX ORDER — CLOBETASOL PROPIONATE 0.5 MG/G
AEROSOL, FOAM TOPICAL PRN
Qty: 50 | Refills: 4 | Status: ERX | COMMUNITY
Start: 2025-06-26

## 2025-06-26 RX ADMIN — TACROLIMUS: 1 OINTMENT TOPICAL at 00:00

## 2025-06-26 RX ADMIN — CLOBETASOL PROPIONATE: 0.5 AEROSOL, FOAM TOPICAL at 00:00

## 2025-06-26 ASSESSMENT — LOCATION SIMPLE DESCRIPTION DERM
LOCATION SIMPLE: CHEST
LOCATION SIMPLE: RIGHT ANTERIOR NECK
LOCATION SIMPLE: SCALP
LOCATION SIMPLE: LEFT CHEEK
LOCATION SIMPLE: LEFT HAND
LOCATION SIMPLE: UPPER BACK
LOCATION SIMPLE: RIGHT CHEEK
LOCATION SIMPLE: RIGHT SUPERIOR EYELID
LOCATION SIMPLE: ABDOMEN
LOCATION SIMPLE: RIGHT BREAST
LOCATION SIMPLE: RIGHT HAND
LOCATION SIMPLE: LEFT SUPERIOR EYELID
LOCATION SIMPLE: RIGHT FOREARM

## 2025-06-26 ASSESSMENT — LOCATION ZONE DERM
LOCATION ZONE: SCALP
LOCATION ZONE: FACE
LOCATION ZONE: HAND
LOCATION ZONE: EYELID
LOCATION ZONE: NECK
LOCATION ZONE: ARM
LOCATION ZONE: TRUNK

## 2025-06-26 ASSESSMENT — LOCATION DETAILED DESCRIPTION DERM
LOCATION DETAILED: RIGHT VENTRAL PROXIMAL FOREARM
LOCATION DETAILED: RIGHT RADIAL DORSAL HAND
LOCATION DETAILED: RIGHT MEDIAL SUPERIOR CHEST
LOCATION DETAILED: RIGHT INFERIOR ANTERIOR NECK
LOCATION DETAILED: LEFT SUPERIOR PARIETAL SCALP
LOCATION DETAILED: INFERIOR THORACIC SPINE
LOCATION DETAILED: PERIUMBILICAL SKIN
LOCATION DETAILED: LEFT LATERAL SUPERIOR EYELID
LOCATION DETAILED: RIGHT INFERIOR CENTRAL MALAR CHEEK
LOCATION DETAILED: LEFT INFERIOR FRONTAL SCALP
LOCATION DETAILED: RIGHT LATERAL SUPERIOR EYELID
LOCATION DETAILED: EPIGASTRIC SKIN
LOCATION DETAILED: RIGHT MEDIAL BREAST 4-5:00 REGION
LOCATION DETAILED: LEFT ULNAR DORSAL HAND
LOCATION DETAILED: LEFT INFERIOR MEDIAL MALAR CHEEK
LOCATION DETAILED: LEFT INFERIOR CENTRAL MALAR CHEEK

## 2025-07-29 ENCOUNTER — APPOINTMENT (OUTPATIENT)
Dept: OBGYN | Facility: CLINIC | Age: 70
End: 2025-07-29
Payer: MEDICARE

## 2025-07-29 VITALS
HEART RATE: 74 BPM | BODY MASS INDEX: 24.33 KG/M2 | SYSTOLIC BLOOD PRESSURE: 126 MMHG | DIASTOLIC BLOOD PRESSURE: 84 MMHG | HEIGHT: 67 IN | WEIGHT: 155 LBS

## 2025-07-29 DIAGNOSIS — N95.2 VAGINAL ATROPHY: ICD-10-CM

## 2025-07-29 DIAGNOSIS — R68.82 LOW LIBIDO: ICD-10-CM

## 2025-07-29 RX ORDER — ESTRADIOL 0.1 MG/G
CREAM VAGINAL
Qty: 42.5 G | Refills: 4 | Status: SHIPPED | OUTPATIENT
Start: 2025-07-29

## 2025-07-29 ASSESSMENT — FIBROSIS 4 INDEX: FIB4 SCORE: 1.39

## 2025-07-29 NOTE — PROGRESS NOTES
"Pt presents for vaginal dryness and tearing sensation  Last seen on: New Patient  Phone: [phone]   Pharmacy verified   LMP: postmenopausal   Sexually active: yes, 1 male partner   BCM: none   Last PAP: 9/7/2021 NILM, negative HPV  ^ denies hx of abnormal's  MAMMO: 4/16/2025 heterogeneously dense with benign findings.   Last colonoscopy: 3/11/2021 severe diverticulosis of the left side of the colon. 2mm polyp in the transverse colon, internal hemorrhoids   Pt states she has a boyfriend for the first time in a while, states she is younger than her (68). She has been having intercourse and does notice that he has a larger penis and this is causing friction on her inner portion of her right labia. She has used lubrication and does not notice a huge difference but she believes this is due to partners size. She has also tried premarin cream (prescribed from PCP) and did not notice a difference.  Patients boyfriend did recommend her to try \"scream cream\" and while she would not be opposed to this she is looking for more vaginal lubrication.   "

## 2025-07-29 NOTE — PROGRESS NOTES
"GYN PROBLEM VISIT    CC:  Gynecologic Exam (Vaginal Dryness)       HPI: Patient is a 70 y.o.  No LMP recorded. Patient is postmenopausal.  who presents today to discuss low libido and vaginal dryness which has been causing discomfort with intercourse. Additionally, she has a new boyfriend and she states he has a large penis and she feels that the dryness in addition to his size is contributing to overall discomfort.     She denies any vaginal bleeding or deep pelvic pain, no post coital bleeding/spotting. In the past she was prescribed with premarin cream from PCP but did not notice much of a difference. However, she is open to trying vaginal estrogen cream again and using it consistently. She inquires about a topical product called \"Scream Cream\" to help with her libido, it was recommended by her boyfriend.     Patient no longer due for PAPs. Her last PAP was on 2021 NILM, negative HPV. Denies any history of abnormal PAPs. Continues to get yearly mammograms with most recent one done 2025 showing heterogeneously dense breast tissue with benign findings. Her last colonoscopy was 3/11/2021 with  severe diverticulosis of the left side of the colon. 2mm polyp in the transverse colon, internal hemorrhoids. Follow up in 7 years.     ROS:   General: denies fever / chills  HEENT: denies sore throat:  CV: denies chest pain:  Repiratory: denies shortness of breath  GI: denies abdominal pain  : denies dysuria:    PFSH:  I personally reviewed the past medical and surgical histories.     Social History[1]     Social History     Substance and Sexual Activity   Sexual Activity Yes    Partners: Male    Birth control/protection: None        ALLERGIES / REACTIONS:  Allergies[2]                      Patient Active Problem List    Diagnosis Date Noted    Vaginal dryness 2025    Left thigh pain 2024    Grief 2024    Pain of right lower extremity 2024    Left hip pain 2024    Hemorrhoids " "10/12/2023    Chest pain 2023    C. difficile enteritis-recurrent 2023    Breast pain 02/15/2023    Secondary hypercoagulability disorder (HCC) 2022    Type 2 diabetes mellitus without complication, without long-term current use of insulin ([facility]) 2022    Agatston CAC score, <100 2022    Superior mesenteric artery stenosis (HCC) 2022    Celiac artery stenosis ([facility]) 2022    Neuropathy 2022    Varicose veins of left lower extremity 2022    Chronic anticoagulation 2022    GERD (gastroesophageal reflux disease) 2022    Anxiety 2022    Atrial fibrillation status post cardioversion ([facility]) 2022    Dermatitis 2022    H/O bilateral breast reduction surgery 2022    Polyp of colon 2021    Diverticulosis of colon 2021    Benign neoplasm of transverse colon 2021    Age-related cataract 2021    Primary insomnia 2021    Sun-damaged skin 2021    Essential hypertension 2021    Menopausal and postmenopausal disorder 2021    Pneumonia due to COVID-19 virus 2020    Mixed hyperlipidemia     Posterior vitreous detachment of both eyes 03/10/2017    Pseudophakia of right eye 03/10/2017    Prediabetes 2017    History of blepharoplasty 2014    Nuclear sclerosis 2014       PHYSICAL EXAMINATION:  Vital Signs:   BP (!) 139/90 (BP Location: Right arm, Patient Position: Sitting, BP Cuff Size: Adult)   Pulse 74   Ht 5' 7\"   Wt 155 lb   BMI 24.28 kg/m²     Gen: appears well, NAD  Respiratory: normal effort  Abdomen: Soft, non-tender.  Pelvic Exam:  Patient declines.     ASSESSMENT AND PLAN:  70 y.o.  here to discuss vaginal dryness and decreased libido.     Assessment & Plan  Vaginal atrophy  - Discussed physiology of menopause leading to vaginal atrophy.   - Rx for trial of vaginal estrogen cream.  - Recommend OTC vaginal moisturizer such as Replens.  - Recommend using  lubricant " "or well lubricated condom before and during intercourse. List of common lubricants provided to patient.   - Can also try ONE of the following vaginal/vulvar moisturizing creams and gels:  Replens: Use twice a week, gel inserted with applicator.   Creme de al Femme: This is a petroleum-based product. Squeeze into applicator and insert vaginally every 3 days.  Aloe Cadabra: Apply topically to vulva.   V Magic: Apply cream topically to vulva.   - OR can try ONE of the following Hyaluronic Acid Inserts:  Revaree:Vaginal suppository, insert vaginally every 2-3 days at bedtime.   Hyalogyn: Gel inserted into applicator and apply vaginally every 3 days at bedtime  - OR can try Vitamin E suppository. Sun Julian-E. Insert suppository vaginally 2-3 times per week. Do not use if coconut allergy.  - OR can try Coconut Oil. Apply to vulva/vagina as needed. Do not use if you have coconut allergy.  - Discussed vaginal laser options that may be offered at other clinics.  Orders:    estradiol (ESTRACE VAGINAL) 0.1 MG/GM vaginal cream; Apply 1g cream inside vagina using applicator nightly for 2 weeks, then twice per week thereafter    Low libido  - Reviewed ingredients in \"Scream Cream\" with patient and potential side effects with her medical history. Patient reports her BP is well controlled and she takes daily medication for it. Discussed with patient that is product itself is not FDA approved and discussed risks vs benefits. Patient feels that benefits outweighs potential risks and desires trial of Scream Cream.   - Consulted with Dr. Ding who recommends calling [facility] to discuss.   - Called [facility] and consulted with pharmacist Kendra who states low risk/low likelihood of any adverse drug reactions as this is topically applied, minimally absorbed, and only used PRN. Rx called in and plan to start with 0.2-0.5 ml. Apply 15 min prior to intercourse.        Kayy Knox P.A.-C.         [1] "   Social History  Tobacco Use    Smoking status: Never    Smokeless tobacco: Never   Vaping Use    Vaping status: Never Used   Substance Use Topics    Alcohol use: Yes     Comment: socially once a month very rare    Drug use: Never   [2]   Allergies  Allergen Reactions    Codeine Nausea, Vomiting and Unspecified    Tape Hives and Itching

## 2025-08-28 DIAGNOSIS — E78.49 OTHER HYPERLIPIDEMIA: ICD-10-CM

## 2025-08-29 RX ORDER — ATORVASTATIN CALCIUM 80 MG/1
80 TABLET, FILM COATED ORAL
Qty: 90 TABLET | Refills: 2 | Status: SHIPPED | OUTPATIENT
Start: 2025-08-29

## (undated) DEVICE — TOWEL STOP TIMEOUT SAFETY FLAG (40EA/CA)

## (undated) DEVICE — GOWN WARMING STANDARD FLEX - (30/CA)

## (undated) DEVICE — ELECTRODE 850 FOAM ADHESIVE - HYDROGEL RADIOTRNSPRNT (50/PK)